# Patient Record
Sex: FEMALE | Race: WHITE | NOT HISPANIC OR LATINO | Employment: FULL TIME | URBAN - METROPOLITAN AREA
[De-identification: names, ages, dates, MRNs, and addresses within clinical notes are randomized per-mention and may not be internally consistent; named-entity substitution may affect disease eponyms.]

---

## 2017-01-26 ENCOUNTER — TREATMENT (OUTPATIENT)
Dept: PHYSICAL THERAPY | Facility: CLINIC | Age: 47
End: 2017-01-26

## 2017-01-26 DIAGNOSIS — R29.3 POSTURE IMBALANCE: Primary | ICD-10-CM

## 2017-01-26 DIAGNOSIS — S29.012A STRAIN OF THORACIC PARASPINAL MUSCLES EXCLUDING T1 AND T2 LEVELS, INITIAL ENCOUNTER: ICD-10-CM

## 2017-01-26 PROCEDURE — 97140 MANUAL THERAPY 1/> REGIONS: CPT | Performed by: PHYSICAL THERAPIST

## 2017-01-26 PROCEDURE — 97112 NEUROMUSCULAR REEDUCATION: CPT | Performed by: PHYSICAL THERAPIST

## 2017-01-26 NOTE — PROGRESS NOTES
"Physical Therapy Note     SUBJECTIVE:   Changes Since Last Seen:  She is doing the PIC to the thoracic spine which has really helped decrease pain, does 1-2x.week.  She notes she has had to do it on the other side and it has also helped.   She is able to  her daughter now without concern for dropping her.   She has been doing the inner unit isolation \"pretty well\".  She has purchased the MY pillow and \"its great with the cervical roll\"      Current Pain level:   0/10  Lowest Pain level since last seen:    0/10  Highest pain level since last seen:   5/10      OBJECTIVE:     Palpation:         12/05/16 01/26/17              Right    Left Right  Left Right  Left Right  Left Right  Left Right  Left Right  Left Right  Left Right  Left Right  Left Right  Left Right  Left   Suboccipitals  slight     slight   -           -             Spinous Processes                          C2-7 rotated to    +  C4-7                  T4-9 rotated to                 +            T4-8             Upper traps  slight       +  slight   slight             Ant/Mid Scalenes  slight     slight  slight  slight             Sternocleidomastoid      -           -              Levator Scapula  slight        +  slight  slight             Pectoralis Major     -            -              Pectoralis Minor     -            -              Sternocostals    -             -              Bicipital tendon     -            -              Supraspinatus     -            -              Infraspinatus     -            -              Lats Insertion      -           -              Rhomboid, Major      -          +             slight             Rhomboid, Minor      -          +            slight             Lateral Epicondyle      -          -              Medial Epicondyle      -          -              Triceps Insertion      -          -                                                                                             Monthly Objective " Measurement/Functional Activity    Date: 12/05/2016 01/26/17         Neck Disability Index 14/100 6/100         Headache Index           Shoulder Pain Score                      AROM Cervical Spine:  Degrees  Degrees Degrees Degrees Degrees Degrees Degrees Degrees      Flexion 55 38            Extension 79 78            Right Lat. Flexion 41, pain left UT 50            Left Lat. Flexion 39 53            Right Rotation 45 52            Left Rotation 48 51                    AROM Shoulders: Degrees Right    Left Right  Left Right  Left Right  Left Right  Left Right  Left Right  Left Right  Left      Flexion 180    180 180      180            Abduction 180    180 180      180            Int. Rotation 90    95 90      96            Ext. Rotation  90    90    92      91         Functional Strength: in lbs Right     Left Right  Left Right  Left Right  Left Right  Left Right  Left Right  Left Right  Left       Strength Trial 1 70    75 74     75                                    Trial 2 55    65 65      70                                    Trial 3 68    65 65      70            Tanner Pinch                          15    14 -       -                    Root Level  -    Motor Right      Left Right  Left Right  Left Right  Left Right  Left Right  Left Right  Left Right  Leeft      C4                Shld Elevation 5/5        5/5             C5                Shld Abduction 5/5        5/5                                 Shld Flexion 5/5       5/5             C6                Elbow Flexion 5/5        5/5                                 Wrist Extension       5/5        5/5                     C7               Elbow Extension 5/5        5/5                                 Wrist Flexion 5/5        5/5             C8               Thumb Extension 5/5        5/5             T1               Hand Intrinsics 5/5        5/5                     Reflexes Right       Left Right  Left Right  Left Right  Left Right  Left Right  Left  Right  Left Right  Left      C5 Biceps +1        +1             C6 Brachioradialis +1        +1             C7 Triceps +1        +1                     Functional Activities:              Sit w/o pain? Yes/20 min Yes/ no limit            Stand w/o pain? Yes/ no limit Yes/ 15 min if carry            Walk w/o pain? Yes/ no limit Yes/ no limit            Drive w/o pain? Yes/ no limit Yes/ no limit            Work w/o paiin? Yes/ no limit Yes/ no limit            # times wakes w/o pain? 2-3x Sometimes wakes and her knees ache            Self [place] w/o pain? yes yes           ASSESSMENT:  Problem List:   1. Thoracic strain, facet restriction  2. Lack of spinal stabilization  3. Postural dysfunction     Discussion:  Debra is much improved!  She was instructed in how to engage the inner unit against gravity.  She has good understanding and will work on before next visit.  She did have some difficulty isolating the scapula to retract without engaging the upper traps.  Once she is able to do this will begin scapula stabilization exercises.        Short Term Goals: (4-6 weeks)                               Date Met:                    1.   pt independent in postural correction     01/26/17  2.   pt independent in thoracic facet self-corrections   01/26/17  3.   pt independent in exer to decrease post. Cervical disc pressures 01/26/17  4.   pt able to sleep through night without pain    01/26/17  5.   pt able to sit 25 minutes without pain     01/26/17  6.   Reduce pt pain to no greater than 5/10     01/26/17  9.   Decrease Neck Pain Score to no greater than 10/100  01/26/17  8.  Pt independent in engaging the inner unit in ADLs  9.  Pt able to do prone arm lift without engaging upper traps x6  10.  Pt able to do inner unit on elliptical machine on/off x4 min    Long Term Goals:(3-4 months)      Date Met:      1.  pt independent in self-management of symptoms       2.  pt independent in home program      3.  pt able to sit  60 minutes without pain      5.  pt able to lift daughter up without pain    PLAN OF CARE  1.  Ultrasound to increase extensibility, decrease pain, if needed  2.  Electrical stimulation for muscle relaxation, decrease pain, if needed, iontophoresis  3.  Manual therapy to increase function, decrease pain  4.  Therapeutic exercise to increase function, decrease pain  5.  Home programming and d/c planning to increase function and decrease pain    Frequency & Duration:  Pt will be seen on a once/week basis for    more visits      TREATMENT TODAY:    Total Treatment Time Minutes: (time in clinic)  50  Timed Code Treatment Minutes:  50    Self Care Home Management Training/ Patient Education:    Purpose of RX  Proper Body Mechanics  Postural Instruction    Compliance w/HEP    Orthotic/Equipmt Usage     Supplies given:     Manual Therapy:  (MA)  RX min: 15  Manual cervical distraction    Positional Isometric contraction (PIC) of T4-9    Positional Isometric contraction of (PIC) C2-7    Myofascial release, head/neck  Ant/middle scalene stretch  Upper trap stretching  Levator scapula stretching      Therapeutic Activities:  (TA)  RX min:      Neuromuscular Reeducation: (NMR)  RX min:  35       Ultrasound:  RX min:          1.6 grey/cm2       Location:   Iontophoresis:  6 hr patch                                Location:                           Procedures:  Code Procedure/Instruction 12/05/2016 01/26/17               TA         Sleeping Positions instructed                     TA Sternum-Up Posture instructed                TA SI jt. self-correction                 TA Lumbar Facet PIC                 TA   Order of Self-Corrections                 TA Use of lumbar pillow instructed                TA Use of cervical roll instructed                TA Use of orthotics                 TA Use of SI belt                 TA Use of Nada chair                 TA Use of Ice                 TA Use of [employer]/ heat                  TA Use of TENS unit                 TA Use of iontophoresis                 TA Decreasing Disc Pressures                 TA [employer] workstation instructed                NMR Pelvic Floor Isolation instructed reviewed               NMR Transverse Abdominus instructed reviewed               NMR Multifidus Isolation instructed reviewed               NMR InnerUnit against Gravity  instructed               NMR Sit-stand w/ inner unit  instructed               NMR Roll w/ inner unit  instructed               NMR Walk w/ inner unit   instructed               NMR Up/down steps w/ inner unit  instructed               NMR Water Exer Instruction                 NMR Hip Abd w/o piriformis                 NMR Hip Add w/o iliop/ham                  NMR Lower abs in supine                 NMR Abd obliques in supine                 NMR Prone Knee Flexion                 NMR Prone glut jasmina                 NMR Retro Step                 NMR Retro Walk                 NMR Retro walk on treadmill                 NMR Forward walk w/ inner unit                 NMR Balance Instruction                 NMR Stability Ball A/P & Lateral                 NMR Ball Catch/Throw /Supine                 NMR Ball Catch/Throw /Stand                 NMR StabilityBall All 4's Arm Lift                 NMR StabilityBall Prone Walk Out                 NMR StabilityBall Supine Oblique                 NMR Stability Ball sit-supine-sit                 NMR Walking Prog Progression                 MNR Home program sequencing                                                     TA Hamstring stretch                 TA Hip Ext Rot Stretch                 TA Hip Int Rot Stretch                 TA Hip Flex/IT Stretch                 TA Hip Add Stretch                 TA Lats Dorsi Stretch                 TA Gastroc/soleus stretch                 TA QuadratusLumborm Stretch                    Supine                    Stance                 TA Quad Stretch                                    NMR Planking  instructed               NMR BOSU Ball Exercises                 NMR Lateral Bridge Drop                 NMR Waiters [place]                 NMR O'Feldt Lat Pull Down                 NMR O'Feldt Hip Ext                 NMR O'Feldt Trunk Ext                                   TA CervDiscExtSup/stnd                 TA Cerv Stretches                 TA Neural Gliding                 TA Ant/Mid Scalene Strch                 TA Biceps stretch                 TA Rotator Cuff Stretch                 TA Anterior Chest Stretch                 TA Wrist Ext Stretch                                   NMR Prone Arm Lifts  instructed               NMR Scapula Stabilization                 NMR Occulomotor Isometrics                 NMR Cervical Isometrics                                   TA Rotator Cuff Theraband                 TA Shld AROM w/bar                 TA Shld Capsular Stretching                 TA Self PIC Thor Spine                                                                                                                                                                                                                                                               Selma Rice, PT, MS  Physical Therapist  [place]# [id number]

## 2017-02-21 ENCOUNTER — TREATMENT (OUTPATIENT)
Dept: PHYSICAL THERAPY | Facility: CLINIC | Age: 47
End: 2017-02-21

## 2017-02-21 DIAGNOSIS — R29.3 POSTURE IMBALANCE: Primary | ICD-10-CM

## 2017-02-21 DIAGNOSIS — S29.012A STRAIN OF THORACIC PARASPINAL MUSCLES EXCLUDING T1 AND T2 LEVELS, INITIAL ENCOUNTER: ICD-10-CM

## 2017-02-21 PROCEDURE — 97112 NEUROMUSCULAR REEDUCATION: CPT | Performed by: PHYSICAL THERAPIST

## 2017-02-21 PROCEDURE — 97140 MANUAL THERAPY 1/> REGIONS: CPT | Performed by: PHYSICAL THERAPIST

## 2017-02-21 PROCEDURE — 97530 THERAPEUTIC ACTIVITIES: CPT | Performed by: PHYSICAL THERAPIST

## 2017-02-21 NOTE — PROGRESS NOTES
"Physical Therapy Note     SUBJECTIVE:   Changes Since Last Seen:  Some days gets a pinch 3x/day since last week when she pulled a heavy table at work, tweaking the left lats dorsi.  She knew she had \"done something\" to her back so she did press ups and stretches and the majority of the pain was gone in 2-3 days. She spasmed initially and since she had catamine cream at home she used it  for a couple days.   She has been able to work out, not sure she's doing the planks correctly, not sure if position is correct.   She really likes the MY Pillow, really helping. She is pleased with ability to do the inner unit and use during the day.      Current Pain level:     0/10  Lowest Pain level since last seen:      0/10  Highest pain level since last seen:     5/10, after pulling on a heavy table      OBJECTIVE:     Palpation:         12/05/16 01/26/17 02/21/17             Right    Left Right  Left Right  Left Right  Left Right  Left Right  Left Right  Left Right  Left Right  Left Right  Left Right  Left Right  Left   Suboccipitals  slight     slight   -           -   -           -            Spinous Processes                          C2-7 rotated to    +  C4-7                  T4-9 rotated to                 +            T4-8             Upper traps  slight       +  slight   slight   -       slight            Ant/Mid Scalenes  slight     slight  slight  slight Slight slight            Sternocleidomastoid      -           -              Levator Scapula  slight        +  slight  slight Slight slight            Pectoralis Major     -            -              Pectoralis Minor     -            -              Sternocostals    -             -              Bicipital tendon     -            -              Supraspinatus     -            -                 +            Infraspinatus     -            -              Lats Insertion      -           -                 +            Rhomboid, Major      -          +             slight        "      Rhomboid, Minor      -          +            slight             Lateral Epicondyle      -          -              Medial Epicondyle      -          -              Triceps Insertion      -          -                             SI joint positioning               Ant rot  of innominate                x            Post rot of innominate      x            Sacral rotation                 x                               Monthly Objective Measurement/Functional Activity    Date: 12/05/2016 01/26/17         Neck Disability Index 14/100 6/100         Headache Index           Shoulder Pain Score                      AROM Cervical Spine:  Degrees  Degrees Degrees Degrees Degrees Degrees Degrees Degrees      Flexion 55 38            Extension 79 78            Right Lat. Flexion 41, pain left UT 50            Left Lat. Flexion 39 53            Right Rotation 45 52            Left Rotation 48 51                    AROM Shoulders: Degrees Right    Left Right  Left Right  Left Right  Left Right  Left Right  Left Right  Left Right  Left      Flexion 180    180 180      180            Abduction 180    180 180      180            Int. Rotation 90    95 90      96            Ext. Rotation  90    90    92      91         Functional Strength: in lbs Right     Left Right  Left Right  Left Right  Left Right  Left Right  Left Right  Left Right  Left       Strength Trial 1 70    75 74     75                                    Trial 2 55    65 65      70                                    Trial 3 68    65 65      70            Tanner Pinch                          15    14 -       -                    Root Level  -    Motor Right      Left Right  Left Right  Left Right  Left Right  Left Right  Left Right  Left Right  Leeft      C4                Shld Elevation 5/5        5/5             C5                Shld Abduction 5/5        5/5                                 Shld Flexion 5/5       5/5             C6                Elbow Flexion  5/5        5/5                                 Wrist Extension       5/5        5/5                     C7               Elbow Extension 5/5        5/5                                 Wrist Flexion 5/5        5/5             C8               Thumb Extension 5/5        5/5             T1               Hand Intrinsics 5/5        5/5                     Reflexes Right       Left Right  Left Right  Left Right  Left Right  Left Right  Left Right  Left Right  Left      C5 Biceps +1        +1             C6 Brachioradialis +1        +1             C7 Triceps +1        +1                     Functional Activities:              Sit w/o pain? Yes/20 min Yes/ no limit            Stand w/o pain? Yes/ no limit Yes/ 15 min if carry            Walk w/o pain? Yes/ no limit Yes/ no limit            Drive w/o pain? Yes/ no limit Yes/ no limit            Work w/o paiin? Yes/ no limit Yes/ no limit            # times wakes w/o pain? 2-3x Sometimes wakes and her knees ache            Self [place] w/o pain? yes yes           ASSESSMENT:  Problem List:   1. Thoracic strain, facet restriction  2. Lack of spinal stabilization  3. Postural dysfunction     Discussion:  Debra did not work on the scapula stabilization over the last few weeks, so reviewed it and moved on to wall planks/push ups.  She is having a hard time turning the inner unit on and off quickly.  Instructed in supine throw/catch to work on this.  The episode where she pulled on a heavy table did tweak the thoracic spine and the left lats.  She also irritated the left supraspinatus.  The need to do the PIC of the thoracic spine again seems to be related to pulling the heavy table. She did well with the prone arm lifts when reviewed this today.     Short Term Goals: (4-6 weeks)                               Date Met:                    1.   pt independent in postural correction     01/26/17  2.   pt independent in thoracic facet self-corrections   01/26/17  3.   pt independent  in exer to decrease post. Cervical disc pressures 01/26/17  4.   pt able to sleep through night without pain    01/26/17  5.   pt able to sit 25 minutes without pain     01/26/17  6.   Reduce pt pain to no greater than 5/10     01/26/17  9.   Decrease Neck Pain Score to no greater than 10/100  01/26/17  8.  Pt independent in engaging the inner unit in ADLs   02/21/17  9.  Pt able to do prone arm lift without engaging upper traps x6  10.  Pt able to do inner unit on elliptical machine on/off x4 min    Long Term Goals:(3-4 months)      Date Met:      1.  pt independent in self-management of symptoms       2.  pt independent in home program      3.  pt able to sit 60 minutes without pain      5.  pt able to lift daughter up without pain    PLAN OF CARE  1.  Ultrasound to increase extensibility, decrease pain, if needed  2.  Electrical stimulation for muscle relaxation, decrease pain, if needed, iontophoresis  3.  Manual therapy to increase function, decrease pain  4.  Therapeutic exercise to increase function, decrease pain  5.  Home programming and d/c planning to increase function and decrease pain    Frequency & Duration:  Pt will be seen on a once/week basis for 10 more visits      TREATMENT TODAY:    Total Treatment Time Minutes: (time in clinic)  50  Timed Code Treatment Minutes:  50    Self Care Home Management Training/ Patient Education:    Purpose of RX  Proper Body Mechanics  Postural Instruction    Compliance w/HEP    Orthotic/Equipmt Usage     Supplies given:     Manual Therapy:  (MA)  RX min: 10  Manual cervical distraction    Positional Isometric contraction (PIC) of T4-9    Positional Isometric contraction of (PIC) C2-7    Myofascial release, head/neck  Ant/middle scalene stretch  Upper trap stretching  Levator scapula stretching      Therapeutic Activities:  (TA)  RX min:   15    Neuromuscular Reeducation: (NMR)  RX min:  25      Ultrasound:  RX min:          1.6 grey/cm2       Location:    Iontophoresis:  6 hr patch                                Location:                           Procedures:  Code Procedure/Instruction 12/05/2016 01/26/17 02/21/17              TA         Sleeping Positions instructed                     TA Sternum-Up Posture instructed                TA SI jt. self-correction                 TA Lumbar Facet PIC                 TA   Order of Self-Corrections                 TA Use of lumbar pillow instructed                TA Use of cervical roll instructed                TA Use of orthotics                 TA Use of SI belt                 TA Use of Nada chair                 TA Use of Ice                 TA Use of [employer]/ heat                 TA Use of TENS unit                 TA Use of iontophoresis                 TA Decreasing Disc Pressures                 TA [employer] workstation instructed                NMR Pelvic Floor Isolation instructed reviewed               NMR Transverse Abdominus instructed reviewed               NMR Multifidus Isolation instructed reviewed               NMR InnerUnit against Gravity  instructed               NMR Sit-stand w/ inner unit  instructed               NMR Roll w/ inner unit  instructed               NMR Walk w/ inner unit   instructed               NMR Up/down steps w/ inner unit  instructed               NMR Water Exer Instruction                 NMR Hip Abd w/o piriformis                 NMR Hip Add w/o iliop/ham                  NMR Lower abs in supine                 NMR Abd obliques in supine                 NMR Prone Knee Flexion                 NMR Prone glut jasmina                 NMR Retro Step                 NMR Retro Walk                 NMR Retro walk on treadmill                 NMR Forward walk w/ inner unit                 NMR Balance Instruction                 NMR Stability Ball A/P & Lateral                 NMR Ball Catch/Throw /Supine   instructed              NMR Ball Catch/Throw /Stand                 NMR  StabilityBall All 4's Arm Lift                 NMR StabilityBall Prone Walk Out                 NMR StabilityBall Supine Oblique                 NMR Stability Ball sit-supine-sit                 NMR Walking Prog Progression                 MNR Home program sequencing                                                     TA Hamstring stretch                 TA Hip Ext Rot Stretch                 TA Hip Int Rot Stretch                 TA Hip Flex/IT Stretch                 TA Hip Add Stretch                 TA Lats Dorsi Stretch                 TA Gastroc/soleus stretch                 TA QuadratusLumborm Stretch                    Supine                    Stance                 TA Quad Stretch                                   NMR Planking  instructed corrected              NMR BOSU Ball Exercises                 NMR Lateral Bridge Drop                 NMR Waiters [place]                 NMR O'Feldt Lat Pull Down                 NMR O'Feldt Hip Ext                 NMR O'Feldt Trunk Ext                 NMR Standing Wall Push Up    instructed                                                  TA CervDiscExtSup/stnd                 TA Cerv Stretches                 TA Neural Gliding                 TA Ant/Mid Scalene Strch                 TA Biceps stretch                 TA Rotator Cuff Stretch   instructed              TA Anterior Chest Stretch                 TA Wrist Ext Stretch                                   NMR Prone Arm Lifts  instructed Re-instruct              NMR Scapula Stabilization                 NMR Occulomotor Isometrics                 NMR Cervical Isometrics                                   TA Rot Cuff Therabd, beginner   instructed              TA Shld AROM w/bar                 TA Shld Capsular Stretching                 TA Self PIC Thor Spine                                                                                                                                                                                                                                                                Selma Rice, PT, MS  Physical Therapist  [place]# [id number]

## 2017-03-02 ENCOUNTER — TREATMENT (OUTPATIENT)
Dept: PHYSICAL THERAPY | Facility: CLINIC | Age: 47
End: 2017-03-02

## 2017-03-02 DIAGNOSIS — S29.012A STRAIN OF THORACIC PARASPINAL MUSCLES EXCLUDING T1 AND T2 LEVELS, INITIAL ENCOUNTER: ICD-10-CM

## 2017-03-02 DIAGNOSIS — R29.3 POSTURE IMBALANCE: Primary | ICD-10-CM

## 2017-03-02 DIAGNOSIS — M54.50 ACUTE BILATERAL LOW BACK PAIN WITHOUT SCIATICA: ICD-10-CM

## 2017-03-02 PROCEDURE — 97530 THERAPEUTIC ACTIVITIES: CPT | Performed by: PHYSICAL THERAPIST

## 2017-03-02 PROCEDURE — 97140 MANUAL THERAPY 1/> REGIONS: CPT | Performed by: PHYSICAL THERAPIST

## 2017-03-02 PROCEDURE — 97035 APP MDLTY 1+ULTRASOUND EA 15: CPT | Performed by: PHYSICAL THERAPIST

## 2017-03-02 NOTE — PROGRESS NOTES
Physical Therapy Note     SUBJECTIVE:   Changes Since Last Seen:  Debra reports she was doing very well until this morning when she picked up a laundry basket and felt immediate pain in her low back; she repositioned herself and picked it up again.  Subsequently she did press ups but now there is pain in left buttock and into left hip, burning, no numbness and no posterior leg pain.  She is having some trouble walking as her back is spasming.     Current Pain level:     7-8/10 in low back  Lowest Pain level since last seen:      0/10  Highest pain level since last seen:     7-8/10 after lifting laundry basket this morning      OBJECTIVE:   Significant right lateral shift of pelvis.     Palpation:         12/05/16 01/26/17 02/21/17 03/02/17            Right    Left Right  Left Right  Left Right  Left Right  Left Right  Left Right  Left Right  Left Right  Left Right  Left Right  Left Right  Left   Suboccipitals  slight     slight   -           -   -           -            Spinous Processes                          C2-7 rotated to    +  C4-7                  T4-9 rotated to                 +            T4-8             Upper traps  slight       +  slight   slight   -       slight            Ant/Mid Scalenes  slight     slight  slight  slight Slight slight            Sternocleidomastoid      -           -              Levator Scapula  slight        +  slight  slight Slight slight            Pectoralis Major     -            -              Pectoralis Minor     -            -              Sternocostals    -             -              Bicipital tendon     -            -              Supraspinatus     -            -                 +            Infraspinatus     -            -              Lats Insertion      -           -                 +            Rhomboid, Major      -          +             slight             Rhomboid, Minor      -          +            slight             Lateral Epicondyle      -          -               Medial Epicondyle      -          -              Triceps Insertion      -          -                             SI joint positioning   02/21/17 03/02/17           Ant rot  of innominate                x                 x           Post rot of innominate      x    x           Sacral rotation                 x    x                                Piriformis      +           ++           Quadratus Lumborum      ++         ++           Gr. Trochanter      -        slight           iliopsoas                    +           Ischial Tub      -            -           IT Bands      -        slight           Spinous Processes                  L rotated to...     L1-5               Monthly Objective Measurement/Functional Activity    Date: 12/05/2016 01/26/17         Neck Disability Index 14/100 6/100         Headache Index           Shoulder Pain Score                      AROM Cervical Spine:  Degrees  Degrees Degrees Degrees Degrees Degrees Degrees Degrees      Flexion 55 38            Extension 79 78            Right Lat. Flexion 41, pain left UT 50            Left Lat. Flexion 39 53            Right Rotation 45 52            Left Rotation 48 51                    AROM Shoulders: Degrees Right    Left Right  Left Right  Left Right  Left Right  Left Right  Left Right  Left Right  Left      Flexion 180    180 180      180            Abduction 180    180 180      180            Int. Rotation 90    95 90      96            Ext. Rotation  90    90    92      91         Functional Strength: in lbs Right     Left Right  Left Right  Left Right  Left Right  Left Right  Left Right  Left Right  Left       Strength Trial 1 70    75 74     75                                    Trial 2 55    65 65      70                                    Trial 3 68    65 65      70            Tanner Pinch                          15    14 -       -                    Root Level  -    Motor Right      Left Right  Left Right  Left Right  Left Right   Left Right  Left Right  Left Right  Leeft      C4                Shld Elevation 5/5        5/5             C5                Shld Abduction 5/5        5/5                                 Shld Flexion 5/5       5/5             C6                Elbow Flexion 5/5        5/5                                 Wrist Extension       5/5        5/5                     C7               Elbow Extension 5/5        5/5                                 Wrist Flexion 5/5        5/5             C8               Thumb Extension 5/5        5/5             T1               Hand Intrinsics 5/5        5/5                     Reflexes Right       Left Right  Left Right  Left Right  Left Right  Left Right  Left Right  Left Right  Left      C5 Biceps +1        +1             C6 Brachioradialis +1        +1             C7 Triceps +1        +1                     Functional Activities:              Sit w/o pain? Yes/20 min Yes/ no limit            Stand w/o pain? Yes/ no limit Yes/ 15 min if carry            Walk w/o pain? Yes/ no limit Yes/ no limit            Drive w/o pain? Yes/ no limit Yes/ no limit            Work w/o paiin? Yes/ no limit Yes/ no limit            # times wakes w/o pain? 2-3x Sometimes wakes and her knees ache            Self Garrett w/o pain? yes yes           ASSESSMENT:  Problem List:   1. Thoracic strain, facet restriction  2. Lack of spinal stabilization  3. Postural dysfunction   4. SI joint dysfunction/ lumbar disc bulge as of  03/02/17    Discussion:  Debra arrived today with significant spasming of both QLs and left piriformis.  There is a significant lateral shift of the pelvis to the right. She is not able to flex more than 35-40 degrees without onset of severe low back pain.  Once manual work was done to bring symmetry to the SI joints, she was able to do mini prone press ups with manual lumbar traction and pain was gone as long as she remained in prone.  Once she got back to sitting, began to instruct her  in how to do PIC for the sacrum the severe spasming returned.  The only thing that would decrease her pain was to stand with the left foot up on a stool to decrease the anterior rotation of the left innominate and then do slight lumbar extension.  She was instructed in lifestyle activities to decrease lumbar disc bulging.  She had a good understanding.  However, she was still spasming and guarding in walking.  Recommended pt avoid sitting and continue with extension principles and SI joint self corrections to the best of her ability and use ice to minimize the spasming.  She is scheduled to be seen next week.     Did not address the upper quarter today secondary to the pain and spasming pt had in the low back.       Short Term Goals: (4-6 weeks)                               Date Met:                    1.   pt independent in postural correction     01/26/17  2.   pt independent in thoracic facet self-corrections   01/26/17  3.   pt independent in exer to decrease post. Cervical disc pressures 01/26/17  4.   pt able to sleep through night without pain    01/26/17  5.   pt able to sit 25 minutes without pain     01/26/17  6.   Reduce pt pain to no greater than 5/10     01/26/17  9.   Decrease Neck Pain Score to no greater than 10/100  01/26/17  8.  Pt independent in engaging the inner unit in ADLs   02/21/17  9.  Pt able to do prone arm lift without engaging upper traps x6  10.  Pt able to do inner unit on elliptical machine on/off x4 min    Long Term Goals:(3-4 months)      Date Met:      1.  pt independent in self-management of symptoms       2.  pt independent in home program      3.  pt able to sit 60 minutes without pain      5.  pt able to lift daughter up without pain    PLAN OF CARE  1.  Ultrasound to increase extensibility, decrease pain, if needed  2.  Electrical stimulation for muscle relaxation, decrease pain, if needed, iontophoresis  3.  Manual therapy to increase function, decrease pain  4.   Therapeutic exercise to increase function, decrease pain  5.  Home programming and d/c planning to increase function and decrease pain    Frequency & Duration:  Pt will be seen on a once/week basis for 9 more visits      TREATMENT TODAY:    Total Treatment Time Minutes: (time in clinic)  60  Timed Code Treatment Minutes:  60    Self Care Home Management Training/ Patient Education:    Purpose of RX  Proper Body Mechanics  Postural Instruction    Compliance w/HEP    Orthotic/Equipmt Usage     Supplies given:     Manual Therapy:  (MA)  RX min:  35  Manual posterior rotation of the left innominate  PIC to the right iliopsoas  Manual distraction to left hip  PIC to right glut minimus  PIC to multifidus  Prone manual lumbar traction       Therapeutic Activities:  (TA)  RX min:   10    Neuromuscular Reeducation: (NMR)  RX min:        Ultrasound:  RX min:     20      1.6 grey/cm2       Location:  Left piriformis, L1-5, left gr trochanter    Iontophoresis:  6 hr patch ...Trial                         Location:    Left SI joint                       Procedures:  Code Procedure/Instruction 12/05/2016 01/26/17 02/21/17 03/02/17             TA         Sleeping Positions instructed                     TA Sternum-Up Posture instructed                TA SI jt. self-correction    instructed             TA Lumbar Facet PIC    instructed             TA   Order of Self-Corrections    instructed             TA Use of lumbar pillow instructed                TA Use of cervical roll instructed                TA Use of orthotics                 TA Use of SI belt                 TA Use of Nada chair                 TA Use of Ice                 TA Use of [employer]/ heat                 TA Use of TENS unit                 TA Use of iontophoresis                 TA Decreasing Disc Pressures    instructed             TA [employer] workstation instructed                NMR Pelvic Floor Isolation instructed reviewed               NMR Transverse  Abdominus instructed reviewed               NMR Multifidus Isolation instructed reviewed               NMR InnerUnit against Gravity  instructed               NMR Sit-stand w/ inner unit  instructed               NMR Roll w/ inner unit  instructed               NMR Walk w/ inner unit   instructed               NMR Up/down steps w/ inner unit  instructed               NMR Water Exer Instruction                 NMR Hip Abd w/o piriformis                 NMR Hip Add w/o iliop/ham                  NMR Lower abs in supine                 NMR Abd obliques in supine                 NMR Prone Knee Flexion                 NMR Prone glut jasmina                 NMR Retro Step                 NMR Retro Walk                 NMR Retro walk on treadmill                 NMR Forward walk w/ inner unit                 NMR Balance Instruction                 NMR Stability Ball A/P & Lateral                 NMR Ball Catch/Throw /Supine   instructed              NMR Ball Catch/Throw /Stand                 NMR StabilityBall All 4's Arm Lift                 NMR StabilityBall Prone Walk Out                 NMR StabilityBall Supine Oblique                 NMR Stability Ball sit-supine-sit                 NMR Walking Prog Progression                 MNR Home program sequencing                                                     TA Hamstring stretch                 TA Hip Ext Rot Stretch                 TA Hip Int Rot Stretch                 TA Hip Flex/IT Stretch                 TA Hip Add Stretch                 TA Lats Dorsi Stretch                 TA Gastroc/soleus stretch                 TA QuadratusLumborm Stretch                    Supine                    Stance                 TA Quad Stretch                                   NMR Planking  instructed corrected              NMR BOSU Ball Exercises                 NMR Lateral Bridge Drop                 NMR Waiters [place]                 NMR O'Feldt Lat Pull Down                 NMR  O'Feldt Hip Ext                 NMR O'Feldt Trunk Ext                 NMR Standing Wall Push Up    instructed                                                  TA CervDiscExtSup/stnd                 TA Cerv Stretches                 TA Neural Gliding                 TA Ant/Mid Scalene Strch                 TA Biceps stretch                 TA Rotator Cuff Stretch   instructed              TA Anterior Chest Stretch                 TA Wrist Ext Stretch                                   NMR Prone Arm Lifts  instructed Re-instruct              NMR Scapula Stabilization                 NMR Occulomotor Isometrics                 NMR Cervical Isometrics                                   TA Rot Cuff Therabd, beginner   instructed              TA Shld AROM w/bar                 TA Shld Capsular Stretching                 TA Self PIC Thor Spine                                                                                                                                                                                                                                                               Selma Rice, PT, MS  Physical Therapist  [place]# [id number]

## 2017-03-07 ENCOUNTER — TREATMENT (OUTPATIENT)
Dept: PHYSICAL THERAPY | Facility: CLINIC | Age: 47
End: 2017-03-07

## 2017-03-07 DIAGNOSIS — R29.3 POSTURE IMBALANCE: Primary | ICD-10-CM

## 2017-03-07 DIAGNOSIS — S29.012A STRAIN OF THORACIC PARASPINAL MUSCLES EXCLUDING T1 AND T2 LEVELS, INITIAL ENCOUNTER: ICD-10-CM

## 2017-03-07 DIAGNOSIS — M54.50 ACUTE BILATERAL LOW BACK PAIN WITHOUT SCIATICA: ICD-10-CM

## 2017-03-07 PROCEDURE — 97035 APP MDLTY 1+ULTRASOUND EA 15: CPT | Performed by: PHYSICAL THERAPIST

## 2017-03-07 PROCEDURE — 97140 MANUAL THERAPY 1/> REGIONS: CPT | Performed by: PHYSICAL THERAPIST

## 2017-03-07 NOTE — PROGRESS NOTES
"Physical Therapy Note     SUBJECTIVE:   Changes Since Last Seen:  She reports she has not had the muscle spasming since she was last here however, she has been on Robaxin and Ibuprofen.     Tried going to work, but couldn't sit for even a couple of hours as the pain increased and walking on the concrete floors at work increased her pain.  She notes the SI belt has really helped and she tries to tighten it as much as she can.    She is astounded at how crooked she was in standing over the weekend.  She tried very hard to straighten up but was minimally successful. She arrives today with a persistent lateral shift, although, she notes its not nearly as bad as it has been the last few days.   \"Because meds knock  out, I am able to sleep\".  She is sleeping with a body pillow and on her stomach with the left knee up.     Current Pain level:    6-7 /10 in low back  Lowest Pain level since last seen:      /10  Highest pain level since last seen:    6-7/10 after lifting laundry basket this morning      OBJECTIVE:   Slight to moderate  right lateral shift of pelvis.     Palpation:         12/05/16 01/26/17 02/21/17 03/02/17 03/07/17           Right    Left Right  Left Right  Left Right  Left Right  Left Right  Left Right  Left Right  Left Right  Left Right  Left Right  Left Right  Left   Suboccipitals  slight     slight   -           -   -           -            Spinous Processes                          C2-7 rotated to    +  C4-7                  T4-9 rotated to                 +            T4-8             Upper traps  slight       +  slight   slight   -       slight            Ant/Mid Scalenes  slight     slight  slight  slight Slight slight            Sternocleidomastoid      -           -              Levator Scapula  slight        +  slight  slight Slight slight            Pectoralis Major     -            -              Pectoralis Minor     -            -              Sternocostals    -             -            "   Bicipital tendon     -            -              Supraspinatus     -            -                 +            Infraspinatus     -            -              Lats Insertion      -           -                 +            Rhomboid, Major      -          +             slight             Rhomboid, Minor      -          +            slight             Lateral Epicondyle      -          -              Medial Epicondyle      -          -              Triceps Insertion      -          -                             SI joint positioning   02/21/17 03/02/17 03/07/17          Ant rot  of innominate                x                 x                 x          Post rot of innominate      x    x    x          Sacral rotation                 x    x          x                         Piriformis      +           ++    ++         +          Quadratus Lumborum      ++         ++    ++        +          Gr. Trochanter      -        slight    -       Slight           iliopsoas                    +    +           +          Ischial Tub      -            -           IT Bands      -        slight    +          Spinous Processes                  L rotated to...     L1-5 L4-5              Monthly Objective Measurement/Functional Activity    Date: 12/05/2016 01/26/17         Neck Disability Index 14/100 6/100         Headache Index           Shoulder Pain Score                      AROM Cervical Spine:  Degrees  Degrees Degrees Degrees Degrees Degrees Degrees Degrees      Flexion 55 38            Extension 79 78            Right Lat. Flexion 41, pain left UT 50            Left Lat. Flexion 39 53            Right Rotation 45 52            Left Rotation 48 51                    AROM Shoulders: Degrees Right    Left Right  Left Right  Left Right  Left Right  Left Right  Left Right  Left Right  Left      Flexion 180    180 180      180            Abduction 180    180 180      180            Int. Rotation 90    95 90      96            Ext.  Rotation  90    90    92      91         Functional Strength: in lbs Right     Left Right  Left Right  Left Right  Left Right  Left Right  Left Right  Left Right  Left       Strength Trial 1 70    75 74     75                                    Trial 2 55    65 65      70                                    Trial 3 68    65 65      70            Tanner Pinch                          15    14 -       -                    Root Level  -    Motor Right      Left Right  Left Right  Left Right  Left Right  Left Right  Left Right  Left Right  Leeft      C4                Shld Elevation 5/5        5/5             C5                Shld Abduction 5/5        5/5                                 Shld Flexion 5/5       5/5             C6                Elbow Flexion 5/5        5/5                                 Wrist Extension       5/5        5/5                     C7               Elbow Extension 5/5        5/5                                 Wrist Flexion 5/5        5/5             C8               Thumb Extension 5/5        5/5             T1               Hand Intrinsics 5/5        5/5                     Reflexes Right       Left Right  Left Right  Left Right  Left Right  Left Right  Left Right  Left Right  Left      C5 Biceps +1        +1             C6 Brachioradialis +1        +1             C7 Triceps +1        +1                     Functional Activities:              Sit w/o pain? Yes/20 min Yes/ no limit            Stand w/o pain? Yes/ no limit Yes/ 15 min if carry            Walk w/o pain? Yes/ no limit Yes/ no limit            Drive w/o pain? Yes/ no limit Yes/ no limit            Work w/o paiin? Yes/ no limit Yes/ no limit            # times wakes w/o pain? 2-3x Sometimes wakes and her knees ache            Self [place] w/o pain? yes yes           ASSESSMENT:  Problem List:   1. Thoracic strain, facet restriction  2. Lack of spinal stabilization  3. Postural dysfunction   4. SI joint dysfunction/ lumbar disc  bulge as of  03/02/17    Discussion:  Because she has been in so much pain in the low back today and on last visit, measurements have not been taken of the C-spine and upper quarter.  The upper quarter pain is essentially gone and the low back pain/ lumbar disc symptoms are so intense that the focus of the treatment has been on the low back.     By the end of today's treatment, she was able to prone prop on her elbow without low back pain.  She is not quite as guarded as she was on last visit.  She is slowly making progress and understands the extension principles to minimize the lumbar disc bulging.     When she left from today's visit, she had reduced pain.  Cont to progress the low back and the upper quarter as tolerated.        Short Term Goals: (4-6 weeks)                               Date Met:                    1.   pt independent in postural correction     01/26/17  2.   pt independent in thoracic facet self-corrections   01/26/17  3.   pt independent in exer to decrease post. Cervical disc pressures 01/26/17  4.   pt able to sleep through night without pain    01/26/17  5.   pt able to sit 25 minutes without pain     01/26/17  6.   Reduce pt pain to no greater than 5/10     01/26/17  9.   Decrease Neck Pain Score to no greater than 10/100  01/26/17  8.  Pt independent in engaging the inner unit in ADLs   02/21/17  9.  Pt able to do prone arm lift without engaging upper traps x6  10.  Pt able to do inner unit on elliptical machine on/off x4 min  11.  Pt able to sit for 5 min without low back pain  12.  Pt able to stand for 5 min without low back pain  13.  Decrease Oswestry Low Back Pain Score < than 30/100    Long Term Goals:(3-4 months)      Date Met:      1.  pt independent in self-management of symptoms       2.  pt independent in home program      3.  pt able to sit 60 minutes without pain      5.  pt able to lift daughter up without pain    PLAN OF CARE  1.  Ultrasound to increase extensibility,  decrease pain, if needed  2.  Electrical stimulation for muscle relaxation, decrease pain, if needed, iontophoresis  3.  Manual therapy to increase function, decrease pain  4.  Therapeutic exercise to increase function, decrease pain  5.  Home programming and d/c planning to increase function and decrease pain    Frequency & Duration:  Pt will be seen on a once/week basis for 8 more visits      TREATMENT TODAY:    Total Treatment Time Minutes: (time in clinic)  60  Timed Code Treatment Minutes:  60    Self Care Home Management Training/ Patient Education:    Purpose of RX  Proper Body Mechanics  Postural Instruction    Compliance w/HEP - need for prone positioning, prone propping after SI joint self correction    Orthotic/Equipmt Usage - use of SI belt, reviewed    Supplies given:       Manual Therapy:  (MA)  RX min:  45  Manual posterior rotation of the left innominate  PIC to the right iliopsoas  Manual distraction to left hip  PIC to right glut minimus  PIC to multifidus  Prone manual lumbar traction  Prone press up       Therapeutic Activities:  (TA)  RX min:       Neuromuscular Reeducation: (NMR)  RX min:        Ultrasound:  RX min:     15    1.6 grey/cm2       Location:  right piriformis, L1-5, right gr trochanter    Iontophoresis:  6 hr patch                          Location:                          Procedures:  Code Procedure/Instruction 12/05/2016 01/26/17 02/21/17 03/02/17 03/07/17            TA         Sleeping Positions instructed                     TA Sternum-Up Posture instructed                TA SI jt. self-correction    instructed             TA Lumbar Facet PIC    instructed             TA   Order of Self-Corrections    instructed             TA Use of lumbar pillow instructed                TA Use of cervical roll instructed                TA Use of orthotics                 TA Use of SI belt                 TA Use of Nada chair                 TA Use of Ice                 TA Use of  [employer]/ heat                 TA Use of TENS unit                 TA Use of iontophoresis                 TA Decreasing Disc Pressures    instructed reviewed            TA [employer] workstation instructed                NMR Pelvic Floor Isolation instructed reviewed               NMR Transverse Abdominus instructed reviewed               NMR Multifidus Isolation instructed reviewed               NMR InnerUnit against Gravity  instructed               NMR Sit-stand w/ inner unit  instructed               NMR Roll w/ inner unit  instructed               NMR Walk w/ inner unit   instructed               NMR Up/down steps w/ inner unit  instructed               NMR Water Exer Instruction                 NMR Hip Abd w/o piriformis                 NMR Hip Add w/o iliop/ham                  NMR Lower abs in supine                 NMR Abd obliques in supine                 NMR Prone Knee Flexion                 NMR Prone glut jasmina                 NMR Retro Step                 NMR Retro Walk                 NMR Retro walk on treadmill                 NMR Forward walk w/ inner unit                 NMR Balance Instruction                 NMR Stability Ball A/P & Lateral                 NMR Ball Catch/Throw /Supine   instructed              NMR Ball Catch/Throw /Stand                 NMR StabilityBall All 4's Arm Lift                 NMR StabilityBall Prone Walk Out                 NMR StabilityBall Supine Oblique                 NMR Stability Ball sit-supine-sit                 NMR Walking Prog Progression                 MNR Home program sequencing                                                     TA Hamstring stretch                 TA Hip Ext Rot Stretch                 TA Hip Int Rot Stretch                 TA Hip Flex/IT Stretch                 TA Hip Add Stretch                 TA Lats Dorsi Stretch                 TA Gastroc/soleus stretch                 TA QuadratusLumborm Stretch                    Supine                     Stance                 TA Quad Stretch                                   NMR Planking  instructed corrected              NMR BOSU Ball Exercises                 NMR Lateral Bridge Drop                 NMR Waiters [place]                 NMR O'Feldt Lat Pull Down                 NMR O'Feldt Hip Ext                 NMR O'Feldt Trunk Ext                 NMR Standing Wall Push Up    instructed                                                  TA CervDiscExtSup/stnd                 TA Cerv Stretches                 TA Neural Gliding                 TA Ant/Mid Scalene Strch                 TA Biceps stretch                 TA Rotator Cuff Stretch   instructed              TA Anterior Chest Stretch                 TA Wrist Ext Stretch                                   NMR Prone Arm Lifts  instructed Re-instruct              NMR Scapula Stabilization                 NMR Occulomotor Isometrics                 NMR Cervical Isometrics                                   TA Rot Cuff Therabd, beginner   instructed              TA Shld AROM w/bar                 TA Shld Capsular Stretching                 TA Self PIC Thor Spine                                                                                                                                                                                                                                                               Selma Rice, PT, MS  Physical Therapist  [place]# [id number]

## 2017-03-21 ENCOUNTER — TREATMENT (OUTPATIENT)
Dept: PHYSICAL THERAPY | Facility: CLINIC | Age: 47
End: 2017-03-21

## 2017-03-21 DIAGNOSIS — M54.50 ACUTE BILATERAL LOW BACK PAIN WITHOUT SCIATICA: ICD-10-CM

## 2017-03-21 DIAGNOSIS — S29.012A STRAIN OF THORACIC PARASPINAL MUSCLES EXCLUDING T1 AND T2 LEVELS, INITIAL ENCOUNTER: ICD-10-CM

## 2017-03-21 DIAGNOSIS — R29.3 POSTURE IMBALANCE: Primary | ICD-10-CM

## 2017-03-21 PROCEDURE — 97530 THERAPEUTIC ACTIVITIES: CPT | Performed by: PHYSICAL THERAPIST

## 2017-03-21 PROCEDURE — 97140 MANUAL THERAPY 1/> REGIONS: CPT | Performed by: PHYSICAL THERAPIST

## 2017-03-21 PROCEDURE — 97112 NEUROMUSCULAR REEDUCATION: CPT | Performed by: PHYSICAL THERAPIST

## 2017-03-21 NOTE — PROGRESS NOTES
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 She only worked half days last week.   She is still waking 1x at night, using the body pillow   Now her left rhomboid has started to hurt again, during the day, sometimes the PIC to thoracic spine works and sometimes it doesn't; she is requesting help with it.  The SI belt started to irritate both gr trochanters the right more than the left, achey, not all the time. Thus she has decreased the amount of time she wears the SI belt.  There are no more muscle spasms, no crooked standing since last visit.   She hasn't corrected the SI joints since last here as she was a little fearful to do because she couldn't tell which knee to put up to do the self correction properly.       Current Pain level:    0 /10 in low back       Left Rhomboid  2-3/10        Lowest Pain level since last seen:      0/10           1/10  Highest pain level since last seen:    3/10 after a full day of work and after opening a heavy door.    3-4/10      OBJECTIVE:   Slight to moderate  right lateral shift of pelvis.     Palpation:         12/05/16 01/26/17 02/21/17 03/02/17 03/07/17 03/21/17          Right    Left Right  Left Right  Left Right  Left Right  Left Right  Left Right  Left Right  Left Right  Left Right  Left Right  Left Right  Left   Suboccipitals  slight     slight   -           -   -           -            Spinous Processes                          C2-7 rotated to    +  C4-7                  T4-9 rotated to                 +            T4-8             Upper traps  slight       +  slight   slight   -       slight     -     slight         Ant/Mid Scalenes  slight     slight  slight  slight Slight slight   Slight slight         Sternocleidomastoid      -           -              Levator Scapula  slight        +  slight  slight Slight slight   Slight " slight         Pectoralis Major     -            -              Pectoralis Minor     -            -              Sternocostals    -             -              Bicipital tendon     -            -              Supraspinatus     -            -                 +            Infraspinatus     -            -              Lats Insertion      -           -                 +            Rhomboid, Major      -          +             slight                 +         Rhomboid, Minor      -          +            slight                 +         Lateral Epicondyle      -          -              Medial Epicondyle      -          -              Triceps Insertion      -          -                                                                          SI joint positioning   02/21/17 03/02/17 03/07/17 03/21/17         Oswestry Back Score      35/100      8/100               Right Left          Ant rot  of innominate                x                 x                 x              x         Post rot of innominate      x    x    x    x         Sacral rotation                 x    x          x    x                        Piriformis      +           ++    ++         +    ++     ++         Quadratus Lumborum      ++         ++    ++        +  +         Gr. Trochanter      -        slight    -       Slight           iliopsoas                    +    +           + Slight slight         Ischial Tub      -            -           IT Bands      -        slight    +   -             -         Spinous Processes                  L rotated to...     L1-5 L4-5 L1-5             Monthly Objective Measurement/Functional Activity    Date: 12/05/2016 01/26/17 03/21/17        Neck Disability Index 14/100 6/100 10/100                                         AROM Cervical Spine:  Degrees  Degrees Degrees Degrees Degrees Degrees Degrees Degrees      Flexion 55 38 52           Extension 79 78 62           Right Lat. Flexion 41, pain left UT 50 45            Left Lat. Flexion 39 53 53           Right Rotation 45 52 55           Left Rotation 48 51 58                   AROM Shoulders: Degrees Right    Left Right  Left Right  Left Right  Left Right  Left Right  Left Right  Left Right  Left      Flexion 180    180 180      180 180     180           Abduction 180    180 180      180   180       180           Int. Rotation 90    95 90      96 92        96           Ext. Rotation  90    90    92      91 90        92        Functional Strength: in lbs Right     Left Right  Left Right  Left Right  Left Right  Left Right  Left Right  Left Right  Left       Strength Trial 1 70    75 74     75 -                                   Trial 2 55    65 65      70 -                                   Trial 3 68    65 65      70 -           Tanner Pinch                          15    14 -       -                    Root Level  -    Motor Right      Left Right  Left Right  Left Right  Left Right  Left Right  Left Right  Left Right  Leeft      C4                Shld Elevation 5/5        5/5             C5                Shld Abduction 5/5        5/5                                 Shld Flexion 5/5       5/5             C6                Elbow Flexion 5/5        5/5                                 Wrist Extension       5/5        5/5                     C7               Elbow Extension 5/5        5/5                                 Wrist Flexion 5/5        5/5             C8               Thumb Extension 5/5        5/5             T1               Hand Intrinsics 5/5        5/5                     Reflexes Right       Left Right  Left Right  Left Right  Left Right  Left Right  Left Right  Left Right  Left      C5 Biceps +1        +1             C6 Brachioradialis +1        +1             C7 Triceps +1        +1                     Functional Activities:              Sit w/o pain? Yes/20 min Yes/ no limit Yes/ no limit           Stand w/o pain? Yes/ no limit Yes/ 15 min if carry Yes/ 15-20 min  if carry           Walk w/o pain? Yes/ no limit Yes/ no limit yes           Drive w/o pain? Yes/ no limit Yes/ no limit yes           Work w/o paiin? Yes/ no limit Yes/ no limit yes           # times wakes w/o pain? 2-3x Sometimes wakes and her knees ache 1x           Self [place] w/o pain? yes yes yes          ASSESSMENT:  Problem List:   1. Thoracic strain, facet restriction  2. Lack of spinal stabilization  3. Postural dysfunction   4. SI joint dysfunction/ lumbar disc bulge as of  03/02/17    Discussion:  Pt is going to 's concert next week, she is requesting how to adapt dancing for his concert.  Discussed need for extension and gentle weight shift when dancing and how to discreetly do the self corrections.   Debra is making excellent progress.  She still needs to be careful in how she moves and how she positions herself.   We discussed what needs to be done at her office workstation to make it more ergonomically appropriate.  She will get a laptop stand &/or a sit to stand desk.  She will raise the screen if she is able to get a new screen.  She will try out the wedge in her car over the next week as she has a car trip scheduled in 2 weeks.     Began the neuro motor retraining exercises for the lower extremities.  It was very difficult for her to isolate the glut medius, bilaterally as she tends to want to engage the piriformis, inappropriately.  She does have a good understanding of how to do it correctly and will work on this over the next week.     She was not quite ready to move onto the scapula stabilization, hopefully she will be able to tolerate this on next visit. Did correct how to do the PIC to herself.  She demonstrated how to do it well.        Short Term Goals: (4-6 weeks)                               Date Met:                    1.   pt independent in postural correction     01/26/17  2.   pt independent in thoracic facet self-corrections   01/26/17  3.   pt independent in exer to  decrease post. Cervical disc pressures 01/26/17  4.   pt able to sleep through night without pain    01/26/17  5.   pt able to sit 25 minutes without pain     01/26/17  6.   Reduce pt pain to no greater than 5/10     01/26/17  9.   Decrease Neck Pain Score to no greater than 10/100  01/26/17  8.  Pt independent in engaging the inner unit in ADLs   02/21/17  9.  Pt able to do prone arm lift without engaging upper traps x6  10.  Pt able to do inner unit on elliptical machine on/off x4 min  11.  Pt able to sit for 5 min without low back pain  12.  Pt able to stand for 5 min without low back pain  13.  Decrease Oswestry Low Back Pain Score < than 30/100    Long Term Goals:(3-4 months)      Date Met:      1.  pt independent in self-management of symptoms       2.  pt independent in home program      3.  pt able to sit 60 minutes without pain      5.  pt able to lift daughter up without pain    PLAN OF CARE  1.  Ultrasound to increase extensibility, decrease pain, if needed  2.  Electrical stimulation for muscle relaxation, decrease pain, if needed, iontophoresis  3.  Manual therapy to increase function, decrease pain  4.  Therapeutic exercise to increase function, decrease pain  5.  Home programming and d/c planning to increase function and decrease pain    Frequency & Duration:  Pt will be seen on a once/week basis for 7 more visits      TREATMENT TODAY:    Total Treatment Time Minutes: (time in clinic)  60  Timed Code Treatment Minutes:  60    Self Care Home Management Training/ Patient Education:    Purpose of RX  Proper Body Mechanics  Postural Instruction    Compliance w/HEP - need for prone positioning, prone propping after SI joint self correction    Orthotic/Equipmt Usage - use of SI belt, reviewed placement, use of wedge in car    Supplies given:  [employer], on trial loan     Manual Therapy:  (MA)  RX min:  35  Manual posterior rotation of the left innominate  PIC to the right iliopsoas  Manual distraction to left  hip  PIC to right glut minimus  PIC to multifidus  Prone manual lumbar traction  Prone press up       Therapeutic Activities:  (TA)  RX min:   10    Neuromuscular Reeducation: (NMR)  RX min:  15      Ultrasound:  RX min:        1.6 grey/cm2       Location:  right piriformis, L1-5, right gr trochanter    Iontophoresis:  6 hr patch                          Location:                          Procedures:  Code Procedure/Instruction 12/05/2016 01/26/17 02/21/17 03/02/17 03/07/17 03/21/17           TA         Sleeping Positions instructed                     TA Sternum-Up Posture instructed                TA SI jt. self-correction    instructed  corrected           TA Lumbar Facet PIC    instructed             TA   Order of Self-Corrections    instructed             TA Use of lumbar pillow instructed                TA Use of cervical roll instructed                TA Use of wedge in car      instructed/ on trial           TA Use of SI belt                 TA Use of Nada chair/ use of work stand       adapted           TA Use of Ice                 TA Use of [employer]/ heat                 TA Use of TENS unit                 TA Use of iontophoresis                 TA Decreasing Disc Pressures    instructed reviewed            TA [employer] workstation instructed                NMR Pelvic Floor Isolation instructed reviewed               NMR Transverse Abdominus instructed reviewed               NMR Multifidus Isolation instructed reviewed               NMR InnerUnit against Gravity  instructed               NMR Sit-stand w/ inner unit  instructed               NMR Roll w/ inner unit  instructed               NMR Walk w/ inner unit   instructed               NMR Up/down steps w/ inner unit  instructed               NMR Water Exer Instruction                 NMR Hip Abd w/o piriformis      instructed           NMR Hip Add w/o iliop/ham                  NMR Lower abs in supine                 NMR Abd obliques in supine                  NMR Prone Knee Flexion                 NMR Prone glut jasmina                 NMR Retro Step                 NMR Retro Walk                 NMR Retro walk on treadmill                 NMR Forward walk w/ inner unit                 NMR Balance Instruction                 NMR Stability Ball A/P & Lateral                 NMR Ball Catch/Throw /Supine   instructed              NMR Ball Catch/Throw /Stand                 NMR StabilityBall All 4's Arm Lift                 NMR StabilityBall Prone Walk Out                 NMR StabilityBall Supine Oblique                 NMR Stability Ball sit-supine-sit                 NMR Walking Prog Progression                 MNR Home program sequencing                                                     TA Hamstring stretch                 TA Hip Ext Rot Stretch                 TA Hip Int Rot Stretch                 TA Hip Flex/IT Stretch                 TA Hip Add Stretch                 TA Lats Dorsi Stretch                 TA Gastroc/soleus stretch                 TA QuadratusLumborm Stretch                    Supine                    Stance                 TA Quad Stretch                                   NMR Planking  instructed corrected              NMR BOSU Ball Exercises                 NMR Lateral Bridge Drop                 NMR Waiters [place]                 NMR O'Feldt Lat Pull Down                 NMR O'Feldt Hip Ext                 NMR O'Feldt Trunk Ext                 NMR Standing Wall Push Up    instructed                                                  TA CervDiscExtSup/stnd                 TA Cerv Stretches                 TA Neural Gliding                 TA Ant/Mid Scalene Strch                 TA Biceps stretch                 TA Rotator Cuff Stretch   instructed              TA Anterior Chest Stretch                 TA Wrist Ext Stretch                                   NMR Prone Arm Lifts  instructed Re-instruct              NMR Scapula Stabilization                  NMR Occulomotor Isometrics                 NMR Cervical Isometrics                                   TA Rot Cuff Therabd, beginner   instructed              TA Shld AROM w/bar                 TA Shld Capsular Stretching                 TA Self PIC Thor Spine                                                                                                                                                                                                                                                               Selma Rice, PT, MS  Physical Therapist  [place]# [id number]

## 2017-03-28 ENCOUNTER — TREATMENT (OUTPATIENT)
Dept: PHYSICAL THERAPY | Facility: CLINIC | Age: 47
End: 2017-03-28

## 2017-03-28 DIAGNOSIS — S29.012A STRAIN OF THORACIC PARASPINAL MUSCLES EXCLUDING T1 AND T2 LEVELS, INITIAL ENCOUNTER: ICD-10-CM

## 2017-03-28 DIAGNOSIS — R29.3 POSTURE IMBALANCE: Primary | ICD-10-CM

## 2017-03-28 DIAGNOSIS — M54.50 ACUTE BILATERAL LOW BACK PAIN WITHOUT SCIATICA: ICD-10-CM

## 2017-03-28 PROCEDURE — 97140 MANUAL THERAPY 1/> REGIONS: CPT | Performed by: PHYSICAL THERAPIST

## 2017-03-28 PROCEDURE — 97112 NEUROMUSCULAR REEDUCATION: CPT | Performed by: PHYSICAL THERAPIST

## 2017-03-28 NOTE — PROGRESS NOTES
"Physical Therapy Note     SUBJECTIVE:   Changes Since Last Seen:  \"A little better\".  Pt states she is a little afraid she is going to have a flare up and not be able to resolve the low back pain.   She Went to see music Fri night and stood a long time and felt it some in the right gr trochanter  Went home and put heat on it and was a little better. However, this was distressing to her.     She started on birth control pills 2 months ago.  She has had 2 cycles which have been light.    Doing hip abd well, and a was little sore in glut medius.   Did 15 min hill on her elliptical runner at home the night she went dancing and this flared up her pain.     Current Pain level:    1 /10 in right SI jt.        Left Rhomboid   0/10        Lowest Pain level since last seen:      0/10           0/10  Highest pain level since last seen:    3/10 when at work         3/10 in morning, but body pillow is helping       OBJECTIVE:   Slight to moderate  right lateral shift of pelvis.     Palpation:         12/05/16 01/26/17 02/21/17 03/02/17 03/07/17 03/21/17 03/28/17         Right    Left Right  Left Right  Left Right  Left Right  Left Right  Left Right  Left Right  Left Right  Left Right  Left Right  Left Right  Left   Suboccipitals  slight     slight   -           -   -           -            Spinous Processes                          C2-7 rotated to    +  C4-7                  T4-9 rotated to                 +            T4-8             Upper traps  slight       +  slight   slight   -       slight     -     slight    -       slight        Ant/Mid Scalenes  slight     slight  slight  slight Slight slight   Slight slight Slight  slight        Sternocleidomastoid      -           -              Levator Scapula  slight        +  slight  slight Slight slight   Slight slight Slight slight        Pectoralis Major     -            -              Pectoralis Minor     -            -              Sternocostals    -             -            "   Bicipital tendon     -            -              Supraspinatus     -            -                 +            Infraspinatus     -            -              Lats Insertion      -           -                 +            Rhomboid, Major      -          +             slight                 +            slight        Rhomboid, Minor      -          +            slight                 +             slight        Lateral Epicondyle      -          -              Medial Epicondyle      -          -              Triceps Insertion      -          -                                                                          SI joint positioning   02/21/17 03/02/17 03/07/17 03/21/17 03/28/17        Oswestry Back Score      35/100      8/100            Right   Left Right  Left  Right  Left Right Left   Right  Left        Ant rot  of innominate                x                 x                 x              x     x        Post rot of innominate      x    x    x    x              x        Sacral rotation                 x    x          x    x     x                       Piriformis      +           ++    ++         +    ++     ++    +         +        Quadratus Lumborum      ++         ++    ++        +  +    +        Gr. Trochanter      -        slight    -       Slight           iliopsoas                    +    +           + Slight slight   slight        Ischial Tub      -            -           IT Bands      -        slight    +   -             -   -            -        Spinous Processes                  L rotated to...     L1-5 L4-5 L1-5 L1-5            Monthly Objective Measurement/Functional Activity    Date: 12/05/2016 01/26/17 03/21/17        Neck Disability Index 14/100 6/100 10/100                                         AROM Cervical Spine:  Degrees  Degrees Degrees Degrees Degrees Degrees Degrees Degrees      Flexion 55 38 52           Extension 79 78 62           Right Lat. Flexion 41, pain left UT 50 45            Left Lat. Flexion 39 53 53           Right Rotation 45 52 55           Left Rotation 48 51 58                   AROM Shoulders: Degrees Right    Left Right  Left Right  Left Right  Left Right  Left Right  Left Right  Left Right  Left      Flexion 180    180 180      180 180     180           Abduction 180    180 180      180   180       180           Int. Rotation 90    95 90      96 92        96           Ext. Rotation  90    90    92      91 90        92        Functional Strength: in lbs Right     Left Right  Left Right  Left Right  Left Right  Left Right  Left Right  Left Right  Left       Strength Trial 1 70    75 74     75 -                                   Trial 2 55    65 65      70 -                                   Trial 3 68    65 65      70 -           Tanner Pinch                          15    14 -       -                    Root Level  -    Motor Right      Left Right  Left Right  Left Right  Left Right  Left Right  Left Right  Left Right  Leeft      C4                Shld Elevation 5/5        5/5             C5                Shld Abduction 5/5        5/5                                 Shld Flexion 5/5       5/5             C6                Elbow Flexion 5/5        5/5                                 Wrist Extension       5/5        5/5                     C7               Elbow Extension 5/5        5/5                                 Wrist Flexion 5/5        5/5             C8               Thumb Extension 5/5        5/5             T1               Hand Intrinsics 5/5        5/5                     Reflexes Right       Left Right  Left Right  Left Right  Left Right  Left Right  Left Right  Left Right  Left      C5 Biceps +1        +1             C6 Brachioradialis +1        +1             C7 Triceps +1        +1                     Functional Activities:              Sit w/o pain? Yes/20 min Yes/ no limit Yes/ no limit           Stand w/o pain? Yes/ no limit Yes/ 15 min if carry Yes/ 15-20 min  "if carry           Walk w/o pain? Yes/ no limit Yes/ no limit yes           Drive w/o pain? Yes/ no limit Yes/ no limit yes           Work w/o paiin? Yes/ no limit Yes/ no limit yes           # times wakes w/o pain? 2-3x Sometimes wakes and her knees ache 1x           Self [place] w/o pain? yes yes yes          ASSESSMENT:  Problem List:   1. Thoracic strain, facet restriction  2. Lack of spinal stabilization  3. Postural dysfunction   4. SI joint dysfunction/ lumbar disc bulge as of  03/02/17    Discussion:  Pt is going to [facility] on Friday.  She returned the trial wedge to see if it would help her in the car.  It did not, she felt like it throws her too far forward.   The use of the elliptical machine on \"hill\" mode on same day as standing for concert and dancing a little, was too much too soon for her SI ligaments.  Advised Debra to avoid hills, start with 4min on the elliptical adding 30 sec each day and using the SI belt while on the elliptical.   She did very well with the hip abduction and not engaging the piriformis, this is much improved from last visit.  She was surprised at how difficult the lower abs were today to not allow any pelvic motion.  She has a good understanding of this and will work on it over the next 2 weeks.   She is a little anxious as the pain returned with the use of the elliptical and dancing.  She is afraid the pain is going to return.  Went over how she is now attentive to posture and positioning and how she is able to self correct and should not get as uncomfortable as it was a coupl weeks ago.     I have asked pt to diary her pain and her menstrual cycles as the hormonal fluctuations may be impacting her pain.     Short Term Goals: (4-6 weeks)           a                        Date Met:                    1.   pt independent in postural correction         01/26/17  2.   pt independent in thoracic facet self-corrections       01/26/17  3.   pt independent in exer to decrease post. " Cervical disc pressures     01/26/17  4.   pt able to sleep through night without pain        01/26/17  5.   pt able to sit 25 minutes without pain         01/26/17  6.   Reduce pt pain to no greater than 5/10         01/26/17  9.   Decrease Neck Pain Score to no greater than 10/100      01/26/17  8.  Pt independent in engaging the inner unit in ADLs       02/21/17  9.  Pt able to do prone arm lift without engaging upper traps x6      03/28/17  10.  Pt able to do inner unit on elliptical machine on/off x4 min  11.  Pt able to sit for 5 min without low back pain  12.  Pt able to stand for 5 min without low back pain   13.  Decrease Oswestry Low Back Pain Score < than 30/100   14.  Pt able to perform lower abs without pelvic motion  15.  Pt able to perform hip abduction x6 without engaging piriformis  16.  Pt able to perform hip adduction x6 without engaging medial hamstring and piriformis  17.  Pt able top perform prone arm lift with elbow lift x6 without engaging upper trap     Long Term Goals:(3-4 months)          Date Met:      1.  pt independent in self-management of symptoms       2.  pt independent in home program      3.  pt able to sit 60 minutes without pain      5.  pt able to lift daughter up without pain    PLAN OF CARE  1.  Ultrasound to increase extensibility, decrease pain, if needed  2.  Electrical stimulation for muscle relaxation, decrease pain, if needed, iontophoresis  3.  Manual therapy to increase function, decrease pain  4.  Therapeutic exercise to increase function, decrease pain  5.  Home programming and d/c planning to increase function and decrease pain    Frequency & Duration:  Pt will be seen on a once/week basis for 6 more visits      TREATMENT TODAY:    Total Treatment Time Minutes: (time in clinic)    Timed Code Treatment Minutes:      Self Care Home Management Training/ Patient Education:    Purpose of RX  Proper Body Mechanics  Postural Instruction    Compliance w/HEP - need for  prone positioning, prone propping after SI joint self correction    Orthotic/Equipmt Usage - use of SI belt, reviewed placement, use of wedge in car    Supplies given:      Manual Therapy:  (MA)  RX min:  20  Manual posterior rotation of the left innominate  PIC to the right iliopsoas  Manual distraction to left hip  PIC to right glut minimus  PIC to multifidus  Prone manual lumbar traction  Prone press up       Therapeutic Activities:  (TA)  RX min:       Neuromuscular Reeducation: (NMR)  RX min:  40      Ultrasound:  RX min:        1.6 grey/cm2       Location:  right piriformis, L1-5, right gr trochanter    Iontophoresis:  6 hr patch                          Location:                          Procedures:  Code Procedure/Instruction 12/05/2016 01/26/17 02/21/17 03/02/17 03/07/17 03/21/17 03/28/17          TA         Sleeping Positions instructed                     TA Sternum-Up Posture instructed                TA SI jt. self-correction    instructed  corrected           TA Lumbar Facet PIC    instructed             TA   Order of Self-Corrections    instructed             TA Use of lumbar pillow instructed                TA Use of cervical roll instructed                TA Use of wedge in car      instructed/ on trial           TA Use of SI belt                 TA Use of Nada chair/ use of work stand       adapted           TA Use of Ice                 TA Use of [employer]/ heat                 TA Use of TENS unit                 TA Use of iontophoresis                 TA Decreasing Disc Pressures    instructed reviewed            TA [employer] workstation instructed                NMR Pelvic Floor Isolation instructed reviewed               NMR Transverse Abdominus instructed reviewed               NMR Multifidus Isolation instructed reviewed               NMR InnerUnit against Gravity  instructed               NMR Sit-stand w/ inner unit  instructed               NMR Roll w/ inner unit  instructed                NMR Walk w/ inner unit   instructed               NMR Up/down steps w/ inner unit  instructed               NMR Water Exer Instruction                 NMR Hip Abd w/o piriformis      instructed reviewed          NMR Hip Add w/o iliop/ham        instructed          NMR Lower abs in supine       instructed          NMR Abd obliques in supine                 NMR Prone Knee Flexion                 NMR Prone glut jasmina                 NMR Retro Step                 NMR Retro Walk                 NMR Retro walk on treadmill                 NMR Forward walk w/ inner unit                 NMR Balance Instruction                 NMR Stability Ball A/P & Lateral                 NMR Ball Catch/Throw /Supine   instructed              NMR Ball Catch/Throw /Stand                 NMR StabilityBall All 4's Arm Lift                 NMR StabilityBall Prone Walk Out                 NMR StabilityBall Supine Oblique                 NMR Stability Ball sit-supine-sit                 NMR Walking Prog Progression                 MNR Home program sequencing                                                     TA Hamstring stretch                 TA Hip Ext Rot Stretch                 TA Hip Int Rot Stretch                 TA Hip Flex/IT Stretch                 TA Hip Add Stretch                 TA Lats Dorsi Stretch                 TA Gastroc/soleus stretch                 TA QuadratusLumborm Stretch                    Supine                    Stance                 TA Quad Stretch                                   NMR Planking  instructed corrected              NMR BOSU Ball Exercises                 NMR Lateral Bridge Drop                 NMR [place]                 NMR O'Feldt Lat Pull Down                 NMR O'Feldt Hip Ext                 NMR O'Feldt Trunk Ext                 NMR Standing Wall Push Up    instructed                                                  TA CervDiscExtSup/stnd                 TA Cerv Stretches                  TA Neural Gliding                 TA Ant/Mid Scalene Strch                 TA Biceps stretch                 TA Rotator Cuff Stretch   instructed              TA Anterior Chest Stretch                 TA Wrist Ext Stretch                                   NMR Prone Arm Lifts  instructed Re-  instructed    progressed          NMR Scapula Stabilization       instructed          NMR Occulomotor Isometrics                 NMR Cervical Isometrics                                   TA Rot Cuff Therabd, beginner   instructed              TA Shld AROM w/bar                 TA Shld Capsular Stretching                 TA Self PIC Thor Spine                                                                                                                                                                                                                                                               Selma Rice, PT, MS  Physical Therapist  [place]# [id number]

## 2017-04-11 ENCOUNTER — TREATMENT (OUTPATIENT)
Dept: PHYSICAL THERAPY | Facility: CLINIC | Age: 47
End: 2017-04-11

## 2017-04-11 DIAGNOSIS — R29.3 POSTURE IMBALANCE: Primary | ICD-10-CM

## 2017-04-11 DIAGNOSIS — S29.012A STRAIN OF THORACIC PARASPINAL MUSCLES EXCLUDING T1 AND T2 LEVELS, INITIAL ENCOUNTER: ICD-10-CM

## 2017-04-11 DIAGNOSIS — M54.50 ACUTE BILATERAL LOW BACK PAIN WITHOUT SCIATICA: ICD-10-CM

## 2017-04-11 PROCEDURE — 97140 MANUAL THERAPY 1/> REGIONS: CPT | Performed by: PHYSICAL THERAPIST

## 2017-04-11 PROCEDURE — A9999 DME SUPPLY OR ACCESSORY, NOS: HCPCS | Performed by: PHYSICAL THERAPIST

## 2017-04-11 NOTE — PROGRESS NOTES
"Physical Therapy Note     SUBJECTIVE:   Changes Since Last Seen:  Pt went to [place], went to the zoo, walked a lot, wore SI belt and did fine!  She used pillows to simulate the body pillow and even slept on a couch in [place] did ok.   Worked out this am, 10 min on elliptical, no pain!  The PIC with medicine ball really helps with lumbar facets.  The left scapula pain has been worse since back from trip. \"I can't quite get it totally corrected on my own in the left scapula\", states pt.   She is currently having her menses all the time, she will call the GYN as she has been on the new BCP for 2 months which what seems to have caused this.      Current Pain level:    0/10 in right SI jt.        Left Rhomboid 0 /10 Last night pain down the left humerus, felt like shld blade was rubbing on something.      Lowest Pain level since last seen:      0/10           0/10  Highest pain level since last seen:    0/10 when at work         3/10        OBJECTIVE:   Slight to moderate  right lateral shift of pelvis.     Palpation:         12/05/16 01/26/17 02/21/17 03/02/17 03/07/17 03/21/17 03/28/17 04/11/17        Right    Left Right  Left Right  Left Right  Left Right  Left Right  Left Right  Left Right  Left Right  Left Right  Left Right  Left Right  Left   Suboccipitals  slight     slight   -           -   -           -            Spinous Processes                          C2-7 rotated to    +  C4-7                  T4-9 rotated to                 +            T4-8       T4-7       Upper traps  slight       +  slight   slight   -       slight     -     slight    -       slight        Ant/Mid Scalenes  slight     slight  slight  slight Slight slight   Slight slight Slight  slight        Sternocleidomastoid      -           -              Levator Scapula  slight        +  slight  slight Slight slight   Slight slight Slight slight        Pectoralis Major     -            -              Pectoralis Minor     -            -      "         Sternocostals    -             -              Bicipital tendon     -            -              Supraspinatus     -            -                 +            Infraspinatus     -            -              Lats Insertion      -           -                 +            Rhomboid, Major      -          +             slight                 +            slight                  +       Rhomboid, Minor      -          +            slight                 +             slight                  +       Lateral Epicondyle      -          -              Medial Epicondyle      -          -              Triceps Insertion      -          -                                                                          SI joint positioning   02/21/17 03/02/17 03/07/17 03/21/17 03/28/17 04/11/17       Oswestry Back Score      35/100 8/100            Right   Left Right  Left  Right  Left Right Left   Right  Left Right Left       Ant rot  of innominate                x                 x                 x              x     x               x       Post rot of innominate      x    x    x    x              x     x       Sacral rotation                 x    x          x    x     x     x       SI joint                     ++       Piriformis      +           ++    ++         +    ++     ++    +         +   ++        ++       Quadratus Lumborum      ++         ++    ++        +  +    +        Gr. Trochanter      -        slight    -       Slight           iliopsoas                    +    +           + Slight slight   slight     +          +       Ischial Tub      -            -           IT Bands      -        slight    +   -             -   -            -        Spinous Processes                  L rotated to...     L1-5 L4-5 L1-5 L1-5  L3-4           Monthly Objective Measurement/Functional Activity    Date: 12/05/2016 01/26/17 03/21/17        Neck Disability Index 14/100 6/100 10/100                                         AROM  Cervical Spine:  Degrees  Degrees Degrees Degrees Degrees Degrees Degrees Degrees      Flexion 55 38 52           Extension 79 78 62           Right Lat. Flexion 41, pain left UT 50 45           Left Lat. Flexion 39 53 53           Right Rotation 45 52 55           Left Rotation 48 51 58                   AROM Shoulders: Degrees Right    Left Right  Left Right  Left Right  Left Right  Left Right  Left Right  Left Right  Left      Flexion 180    180 180      180 180     180           Abduction 180    180 180      180   180       180           Int. Rotation 90    95 90      96 92        96           Ext. Rotation  90    90    92      91 90        92        Functional Strength: in lbs Right     Left Right  Left Right  Left Right  Left Right  Left Right  Left Right  Left Right  Left       Strength Trial 1 70    75 74     75 -                                   Trial 2 55    65 65      70 -                                   Trial 3 68    65 65      70 -           Tanner Pinch                          15    14 -       -                    Root Level  -    Motor Right      Left Right  Left Right  Left Right  Left Right  Left Right  Left Right  Left Right  Leeft      C4                Shld Elevation 5/5        5/5             C5                Shld Abduction 5/5        5/5                                 Shld Flexion 5/5       5/5             C6                Elbow Flexion 5/5        5/5                                 Wrist Extension       5/5        5/5                     C7               Elbow Extension 5/5        5/5                                 Wrist Flexion 5/5        5/5             C8               Thumb Extension 5/5        5/5             T1               Hand Intrinsics 5/5        5/5                     Reflexes Right       Left Right  Left Right  Left Right  Left Right  Left Right  Left Right  Left Right  Left      C5 Biceps +1        +1             C6 Brachioradialis +1        +1             C7 Triceps +1         +1                     Functional Activities:              Sit w/o pain? Yes/20 min Yes/ no limit Yes/ no limit           Stand w/o pain? Yes/ no limit Yes/ 15 min if carry Yes/ 15-20 min if carry           Walk w/o pain? Yes/ no limit Yes/ no limit yes           Drive w/o pain? Yes/ no limit Yes/ no limit yes           Work w/o paiin? Yes/ no limit Yes/ no limit yes           # times wakes w/o pain? 2-3x Sometimes wakes and her knees ache 1x           Self [place] w/o pain? yes yes yes          ASSESSMENT:  Problem List:   1. Thoracic strain, facet restriction  2. Lack of spinal stabilization  3. Postural dysfunction   4. SI joint dysfunction/ lumbar disc bulge as of  03/02/17    Discussion:  Although pt indicates she has no pain in the low back when arrived, she had pain on palpation and felt better after manual work on the SI joints and lumbar spine.  She is pleased with her progress and feels like the left scapula pain is once again the main problem.  She is hoping to get her work station adapted more with a sit-stand desk.   Recommended using her purse to rest her left elbow when driving as the trip to and from [place] seemed aggravate the left scapula again.  Went over how to self correct the thoracic facets.    Reviewed previous exercises and she will work on these over the next couple of weeks as she was not able to do them frequently while on vacation.   By end of visit, pt had no pain in the left scapula area.     Short Term Goals: (4-6 weeks)                                Date Met:                    1.   pt independent in postural correction         01/26/17  2.   pt independent in thoracic facet self-corrections       01/26/17  3.   pt independent in exer to decrease post. Cervical disc pressures     01/26/17  4.   pt able to sleep through night without pain        01/26/17  5.   pt able to sit 25 minutes without pain         01/26/17  6.   Reduce pt pain to no greater than 5/10          01/26/17  9.   Decrease Neck Pain Score to no greater than 10/100      01/26/17  8.  Pt independent in engaging the inner unit in ADLs       02/21/17  9.  Pt able to do prone arm lift without engaging upper traps x6      03/28/17  10.  Pt able to do inner unit on elliptical machine on/off x4 min      04/11/17  11.  Pt able to sit for 5 min without low back pain        04/11/17  12.  Pt able to stand for 5 min without low back pain       04/11/17  13.  Decrease Oswestry Low Back Pain Score < than 30/100   14.  Pt able to perform lower abs without pelvic motion  15.  Pt able to perform hip abduction x6 without engaging piriformis  16.  Pt able to perform hip adduction x6 without engaging medial hamstring and piriformis  17.  Pt able top perform prone arm lift with elbow lift x6 without engaging upper trap   18.  Reduce pain in left scapula are to no greater than 2/10    Long Term Goals:(3-4 months)          Date Met:      1.  pt independent in self-management of symptoms       2.  pt independent in home program      3.  pt able to sit 60 minutes without pain      5.  pt able to lift daughter up without pain    PLAN OF CARE  1.  Ultrasound to increase extensibility, decrease pain, if needed  2.  Electrical stimulation for muscle relaxation, decrease pain, if needed, iontophoresis  3.  Manual therapy to increase function, decrease pain  4.  Therapeutic exercise to increase function, decrease pain  5.  Home programming and d/c planning to increase function and decrease pain    Frequency & Duration:  Pt will be seen on a once/week basis for 5 more visits      TREATMENT TODAY:    Total Treatment Time Minutes: (time in clinic)  65  Timed Code Treatment Minutes:  65    Self Care Home Management Training/ Patient Education:    Purpose of RX  Proper Body Mechanics  Postural Instruction    Compliance w/HEP - need for prone positioning, prone propping after SI joint self correction    Orthotic/Equipmt Usage - use of SI  belt, reviewed placement, use of wedge in car    Supplies given:  Pt has kept the lumbar support that was issued on trial and the cervical roll as they both are helping her self manage at home.       Manual Therapy:  (MA)  RX min:  55  Manual posterior rotation of the left innominate  PIC to the right iliopsoas  Manual distraction to left hip  PIC to right glut minimus  PIC to multifidus  Prone manual lumbar traction  Prone press up       Therapeutic Activities:  (TA)  RX min:   5    Neuromuscular Reeducation: (NMR)  RX min:   5      Ultrasound:  RX min:        1.6 grey/cm2       Location:  right piriformis, L1-5, right gr trochanter    Iontophoresis:  6 hr patch                          Location:                          Procedures:  Code Procedure/Instruction 12/05/2016 01/26/17 02/21/17 03/02/17 03/07/17 03/21/17 03/28/17 04/11/17         TA         Sleeping Positions instructed                     TA Sternum-Up Posture instructed       Purse under elbow in car         TA SI jt. self-correction    instructed  corrected           TA Lumbar Facet PIC    instructed             TA   Order of Self-Corrections    instructed             TA Use of lumbar pillow instructed                TA Use of cervical roll instructed                TA Use of wedge in car      instructed/ on trial           TA Use of SI belt                 TA Use of Nada chair/ use of work stand       adapted           TA Use of Ice                 TA Use of [employer]/ heat                 TA Use of TENS unit                 TA Use of iontophoresis                 TA Decreasing Disc Pressures    instructed reviewed            TA Adapt[employer] workstation instructed                NMR Pelvic Floor Isolation instructed reviewed               NMR Transverse Abdominus instructed reviewed               NMR Multifidus Isolation instructed reviewed               NMR InnerUnit against Gravity  instructed               NMR Sit-stand w/ inner unit  instructed                NMR Roll w/ inner unit  instructed               NMR Walk w/ inner unit   instructed               NMR Up/down steps w/ inner unit  instructed               NMR Water Exer Instruction                 NMR Hip Abd w/o piriformis      instructed reviewed          NMR Hip Add w/o iliop/ham        instructed          NMR Lower abs in supine       instructed          NMR Abd obliques in supine                 NMR Prone Knee Flexion                 NMR Prone glut jasmina                 NMR Retro Step                 NMR Retro Walk                 NMR Retro walk on treadmill                 NMR Forward walk w/ inner unit                 NMR Balance Instruction                 NMR Stability Ball A/P & Lateral                 NMR Ball Catch/Throw /Supine   instructed              NMR Ball Catch/Throw /Stand                 NMR StabilityBall All 4's Arm Lift                 NMR StabilityBall Prone Walk Out                 NMR StabilityBall Supine Oblique                 NMR Stability Ball sit-supine-sit                 NMR Walking Prog Progression                 MNR Home program sequencing                                                     TA Hamstring stretch                 TA Hip Ext Rot Stretch                 TA Hip Int Rot Stretch                 TA Hip Flex/IT Stretch                 TA Hip Add Stretch                 TA Lats Dorsi Stretch                 TA Gastroc/soleus stretch                 TA QuadratusLumborm Stretch                    Supine                    Stance                 TA Quad Stretch                                   NMR Planking  instructed corrected              NMR BOSU Ball Exercises                 NMR Lateral Bridge Drop                 NMR Waiters [place]                 NMR O'Feldt Lat Pull Down                 NMR O'Feldt Hip Ext                 NMR O'Feldt Trunk Ext                 NMR Standing Wall Push Up    instructed                                                  TA  CervDiscExtSup/stnd                 TA Cerv Stretches                 TA Neural Gliding                 TA Ant/Mid Scalene Strch                 TA Biceps stretch                 TA Rotator Cuff Stretch   instructed              TA Anterior Chest Stretch                 TA Wrist Ext Stretch                                   NMR Prone Arm Lifts  instructed Re-  instructed    progressed          NMR Scapula Stabilization       instructed reviewed         NMR Occulomotor Isometrics                 NMR Cervical Isometrics                                   TA Rot Cuff Therabd, beginner   instructed              TA Shld AROM w/bar                 TA Shld Capsular Stretching                 TA Self PIC Thor Spine                                                                                                                                                                                                                                                               Selma Rcie, PT, MS  Physical Therapist  [place]# [id number]

## 2017-05-18 ENCOUNTER — TREATMENT (OUTPATIENT)
Dept: PHYSICAL THERAPY | Facility: CLINIC | Age: 47
End: 2017-05-18

## 2017-05-18 DIAGNOSIS — M54.50 ACUTE BILATERAL LOW BACK PAIN WITHOUT SCIATICA: ICD-10-CM

## 2017-05-18 DIAGNOSIS — R29.3 POSTURE IMBALANCE: Primary | ICD-10-CM

## 2017-05-18 DIAGNOSIS — S29.012A STRAIN OF THORACIC PARASPINAL MUSCLES EXCLUDING T1 AND T2 LEVELS, INITIAL ENCOUNTER: ICD-10-CM

## 2018-04-17 RX ORDER — BUDESONIDE AND FORMOTEROL FUMARATE DIHYDRATE 160; 4.5 UG/1; UG/1
AEROSOL RESPIRATORY (INHALATION)
Qty: 10.2 G | Refills: 9 | OUTPATIENT
Start: 2018-04-17

## 2018-04-19 ENCOUNTER — TELEPHONE (OUTPATIENT)
Dept: FAMILY MEDICINE CLINIC | Facility: CLINIC | Age: 48
End: 2018-04-19

## 2018-04-19 RX ORDER — BUDESONIDE AND FORMOTEROL FUMARATE DIHYDRATE 160; 4.5 UG/1; UG/1
2 AEROSOL RESPIRATORY (INHALATION)
Qty: 1 INHALER | Refills: 0 | Status: SHIPPED | OUTPATIENT
Start: 2018-04-19 | End: 2018-08-27 | Stop reason: SDUPTHER

## 2018-04-19 NOTE — TELEPHONE ENCOUNTER
----- Message from Debra Garner sent at 4/19/2018 11:41 AM EDT -----  Contact: PT  NEEDS SYMBICORT CALLED IN    [facility]-[address] [place] [address] - [place], [place] - [address] [place] [address] - [phone]  - [phone] (Phone)  [phone] (Fax)

## 2018-08-27 ENCOUNTER — OFFICE VISIT (OUTPATIENT)
Dept: FAMILY MEDICINE CLINIC | Facility: CLINIC | Age: 48
End: 2018-08-27

## 2018-08-27 VITALS
DIASTOLIC BLOOD PRESSURE: 78 MMHG | WEIGHT: 137 LBS | BODY MASS INDEX: 22.02 KG/M2 | OXYGEN SATURATION: 98 % | HEIGHT: 66 IN | TEMPERATURE: 98 F | HEART RATE: 52 BPM | SYSTOLIC BLOOD PRESSURE: 108 MMHG

## 2018-08-27 DIAGNOSIS — S03.00XS TMJ (DISLOCATION OF TEMPOROMANDIBULAR JOINT), SEQUELA: ICD-10-CM

## 2018-08-27 DIAGNOSIS — J45.30 MILD PERSISTENT ASTHMA WITHOUT COMPLICATION: Primary | ICD-10-CM

## 2018-08-27 PROCEDURE — 99213 OFFICE O/P EST LOW 20 MIN: CPT | Performed by: FAMILY MEDICINE

## 2018-08-27 RX ORDER — ALBUTEROL SULFATE 90 UG/1
2 AEROSOL, METERED RESPIRATORY (INHALATION) EVERY 4 HOURS PRN
Qty: 1 INHALER | Refills: 11 | Status: SHIPPED | OUTPATIENT
Start: 2018-08-27 | End: 2019-11-06 | Stop reason: SDUPTHER

## 2018-08-27 RX ORDER — BUDESONIDE AND FORMOTEROL FUMARATE DIHYDRATE 160; 4.5 UG/1; UG/1
2 AEROSOL RESPIRATORY (INHALATION)
Qty: 1 INHALER | Refills: 11 | Status: SHIPPED | OUTPATIENT
Start: 2018-08-27 | End: 2019-11-06 | Stop reason: SDUPTHER

## 2018-08-27 NOTE — PROGRESS NOTES
Subjective   Debra Calero is a 48 y.o. female    Patient presents today for follow-up regarding her chronic asthma which is mild and well controlled.  She has tolerated the switch from Advair to Symbicort without any adverse effects.  She uses her rescue inhaler quite infrequently.  She is due for refills on both inhalers.    Complaining of right TMJ pain secondary to remote mandibular fracture with dislocation.  Apparently she fractured the left side of her mandible from a fall from a horse but also dislocated the right TM joint.  Recently she has been having some subluxation but has been able to reduce the subluxation manually.  She is having quite a bit of discomfort.  I have recommended a follow-up with an oral surgeon and she is agreeable.  She is advised to use nonsteroidal anti-inflammatory agents for discomfort.      Jaw Pain   Pertinent negatives include no arthralgias, chest pain, congestion, coughing, fatigue, fever or myalgias.       The following portions of the patient's history were reviewed and updated as appropriate: allergies, current medications, past social history and problem list    Review of Systems   Constitutional: Negative for fatigue and fever.   HENT: Negative for congestion, postnasal drip, sinus pressure, trouble swallowing and voice change.    Respiratory: Negative for cough, chest tightness, shortness of breath and wheezing.    Cardiovascular: Negative for chest pain.   Musculoskeletal: Negative for arthralgias and myalgias.   Neurological: Negative.    Hematological: Negative for adenopathy. Does not bruise/bleed easily.   Psychiatric/Behavioral: Negative for dysphoric mood. The patient is not nervous/anxious.        Objective     Vitals:    08/27/18 1023   BP: 108/78   Pulse: 52   Temp: 98 °F (36.7 °C)   SpO2: 98%       Physical Exam   Constitutional: She appears well-developed and well-nourished. No distress.   HENT:   Head: Normocephalic and atraumatic.   Very tender over  the right TM joint.  There is asymmetry on opening and closing of her jaw.   Eyes: Pupils are equal, round, and reactive to light. Conjunctivae are normal.   Cardiovascular: Normal rate, regular rhythm and normal heart sounds.    No murmur heard.  Pulmonary/Chest: Effort normal and breath sounds normal. No respiratory distress. She has no wheezes. She has no rales. She exhibits no tenderness.   Skin: Skin is warm and dry. She is not diaphoretic.   Psychiatric: She has a normal mood and affect. Her behavior is normal.   Nursing note and vitals reviewed.      Assessment/Plan   Problem List Items Addressed This Visit     None      Visit Diagnoses     Mild persistent asthma without complication    -  Primary    Relevant Medications    budesonide-formoterol (SYMBICORT) 160-4.5 MCG/ACT inhaler    albuterol (PROVENTIL HFA;VENTOLIN HFA) 108 (90 Base) MCG/ACT inhaler    TMJ (dislocation of temporomandibular joint), sequela        Relevant Orders    Ambulatory Referral to Oral Maxillofacial Surgery (Completed)

## 2019-02-18 ENCOUNTER — TRANSCRIBE ORDERS (OUTPATIENT)
Dept: ADMINISTRATIVE | Facility: HOSPITAL | Age: 49
End: 2019-02-18

## 2019-02-18 DIAGNOSIS — Z12.31 VISIT FOR SCREENING MAMMOGRAM: Primary | ICD-10-CM

## 2019-03-27 ENCOUNTER — HOSPITAL ENCOUNTER (OUTPATIENT)
Dept: MAMMOGRAPHY | Facility: HOSPITAL | Age: 49
Discharge: HOME | End: 2019-03-27
Admitting: OBSTETRICS & GYNECOLOGY

## 2019-03-27 DIAGNOSIS — Z12.31 VISIT FOR SCREENING MAMMOGRAM: ICD-10-CM

## 2019-03-27 PROCEDURE — 77063 BREAST TOMOSYNTHESIS BI: CPT | Performed by: RADIOLOGY

## 2019-03-27 PROCEDURE — 77067 SCR MAMMO BI INCL CAD: CPT

## 2019-03-27 PROCEDURE — 77063 BREAST TOMOSYNTHESIS BI: CPT

## 2019-03-27 PROCEDURE — 77067 SCR MAMMO BI INCL CAD: CPT | Performed by: RADIOLOGY

## 2019-03-28 DIAGNOSIS — M51.36 DEGENERATIVE DISC DISEASE, LUMBAR: Primary | ICD-10-CM

## 2019-10-30 RX ORDER — BUDESONIDE AND FORMOTEROL FUMARATE DIHYDRATE 160; 4.5 UG/1; UG/1
AEROSOL RESPIRATORY (INHALATION)
Qty: 10.2 G | Refills: 11 | OUTPATIENT
Start: 2019-10-30

## 2019-11-06 ENCOUNTER — LAB (OUTPATIENT)
Dept: LAB | Facility: HOSPITAL | Age: 49
End: 2019-11-06

## 2019-11-06 ENCOUNTER — OFFICE VISIT (OUTPATIENT)
Dept: FAMILY MEDICINE CLINIC | Facility: CLINIC | Age: 49
End: 2019-11-06

## 2019-11-06 VITALS
DIASTOLIC BLOOD PRESSURE: 72 MMHG | WEIGHT: 138 LBS | OXYGEN SATURATION: 99 % | HEART RATE: 54 BPM | SYSTOLIC BLOOD PRESSURE: 102 MMHG | HEIGHT: 66 IN | BODY MASS INDEX: 22.18 KG/M2 | TEMPERATURE: 98.7 F

## 2019-11-06 DIAGNOSIS — F41.8 SITUATIONAL ANXIETY: ICD-10-CM

## 2019-11-06 DIAGNOSIS — Z00.00 HEALTHCARE MAINTENANCE: ICD-10-CM

## 2019-11-06 DIAGNOSIS — J45.30 MILD PERSISTENT ASTHMA WITHOUT COMPLICATION: ICD-10-CM

## 2019-11-06 DIAGNOSIS — J45.30 MILD PERSISTENT ASTHMA WITHOUT COMPLICATION: Primary | ICD-10-CM

## 2019-11-06 LAB
ALBUMIN SERPL-MCNC: 4.1 G/DL (ref 3.5–5.2)
ALBUMIN/GLOB SERPL: 1.3 G/DL
ALP SERPL-CCNC: 37 U/L (ref 39–117)
ALT SERPL W P-5'-P-CCNC: 12 U/L (ref 1–33)
ANION GAP SERPL CALCULATED.3IONS-SCNC: 8.6 MMOL/L (ref 5–15)
AST SERPL-CCNC: 17 U/L (ref 1–32)
BILIRUB SERPL-MCNC: 0.4 MG/DL (ref 0.2–1.2)
BUN BLD-MCNC: 13 MG/DL (ref 6–20)
BUN/CREAT SERPL: 18.8 (ref 7–25)
CALCIUM SPEC-SCNC: 9.5 MG/DL (ref 8.6–10.5)
CHLORIDE SERPL-SCNC: 102 MMOL/L (ref 98–107)
CO2 SERPL-SCNC: 27.4 MMOL/L (ref 22–29)
CREAT BLD-MCNC: 0.69 MG/DL (ref 0.57–1)
DEPRECATED RDW RBC AUTO: 43 FL (ref 37–54)
ERYTHROCYTE [DISTWIDTH] IN BLOOD BY AUTOMATED COUNT: 12.2 % (ref 12.3–15.4)
GFR SERPL CREATININE-BSD FRML MDRD: 91 ML/MIN/1.73
GLOBULIN UR ELPH-MCNC: 3.2 GM/DL
GLUCOSE BLD-MCNC: 80 MG/DL (ref 65–99)
HCT VFR BLD AUTO: 44.4 % (ref 34–46.6)
HGB BLD-MCNC: 15.2 G/DL (ref 12–15.9)
MCH RBC QN AUTO: 32.8 PG (ref 26.6–33)
MCHC RBC AUTO-ENTMCNC: 34.2 G/DL (ref 31.5–35.7)
MCV RBC AUTO: 95.9 FL (ref 79–97)
PLATELET # BLD AUTO: 258 10*3/MM3 (ref 140–450)
PMV BLD AUTO: 9.5 FL (ref 6–12)
POTASSIUM BLD-SCNC: 4.2 MMOL/L (ref 3.5–5.2)
PROT SERPL-MCNC: 7.3 G/DL (ref 6–8.5)
RBC # BLD AUTO: 4.63 10*6/MM3 (ref 3.77–5.28)
SODIUM BLD-SCNC: 138 MMOL/L (ref 136–145)
T4 FREE SERPL-MCNC: 1.16 NG/DL (ref 0.93–1.7)
TSH SERPL DL<=0.05 MIU/L-ACNC: 1.67 UIU/ML (ref 0.27–4.2)
WBC NRBC COR # BLD: 4.96 10*3/MM3 (ref 3.4–10.8)

## 2019-11-06 PROCEDURE — 80053 COMPREHEN METABOLIC PANEL: CPT

## 2019-11-06 PROCEDURE — 85027 COMPLETE CBC AUTOMATED: CPT

## 2019-11-06 PROCEDURE — 84439 ASSAY OF FREE THYROXINE: CPT

## 2019-11-06 PROCEDURE — 99214 OFFICE O/P EST MOD 30 MIN: CPT | Performed by: FAMILY MEDICINE

## 2019-11-06 PROCEDURE — 36415 COLL VENOUS BLD VENIPUNCTURE: CPT

## 2019-11-06 PROCEDURE — 84443 ASSAY THYROID STIM HORMONE: CPT

## 2019-11-06 RX ORDER — ALBUTEROL SULFATE 90 UG/1
2 AEROSOL, METERED RESPIRATORY (INHALATION) EVERY 4 HOURS PRN
Qty: 1 INHALER | Refills: 11 | Status: SHIPPED | OUTPATIENT
Start: 2019-11-06 | End: 2021-06-29

## 2019-11-06 RX ORDER — BUDESONIDE AND FORMOTEROL FUMARATE DIHYDRATE 160; 4.5 UG/1; UG/1
2 AEROSOL RESPIRATORY (INHALATION)
Qty: 1 INHALER | Refills: 11 | Status: SHIPPED | OUTPATIENT
Start: 2019-11-06 | End: 2020-11-11

## 2019-11-06 NOTE — PROGRESS NOTES
Subjective   Debra Calero is a 49 y.o. female    Chief Complaint    Asthma  Situational anxiety  Fatigue    History of Present Illness  Patient presents today with history of asthma, due for refill of her inhalers.  She reports her asthma has been relatively stable with infrequent use of rescue inhaler.  She is been trying to exercise regularly but does not have any exercise-induced asthma.  Patient reports that she is been quite stressed as of late.  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  She says she feels quite overwhelmed.  She is also a wife and mother of 2 grade school aged children.  She has been more fatigued of late.  She is due for general health maintenance including laboratory studies.    I spent 25 minutes face-to-face with the patient with greater than 50% of time spent counseling on stress.    The following portions of the patient's history were reviewed and updated as appropriate: allergies, current medications, past social history and problem list    Review of Systems   Constitutional: Positive for fatigue. Negative for appetite change and fever.   HENT: Negative for congestion, sore throat and trouble swallowing.    Eyes: Negative for pain and visual disturbance.   Respiratory: Negative for cough, chest tightness, shortness of breath and wheezing.    Cardiovascular: Negative for chest pain.   Gastrointestinal: Negative for abdominal pain, diarrhea and nausea.   Musculoskeletal: Negative.    Neurological: Negative for dizziness, tremors, weakness, light-headedness and headaches.   Hematological: Negative for adenopathy. Does not bruise/bleed easily.   Psychiatric/Behavioral: Positive for dysphoric mood and sleep disturbance. Negative for agitation, behavioral problems, confusion, decreased concentration and suicidal ideas. The patient is nervous/anxious.        Objective     Vitals:    11/06/19 0809   BP:  102/72   Pulse: 54   Temp: 98.7 °F (37.1 °C)   SpO2: 99%       Physical Exam   Constitutional: She is oriented to person, place, and time. She appears well-developed and well-nourished. No distress.   HENT:   Head: Normocephalic and atraumatic.   Eyes: Conjunctivae are normal. Pupils are equal, round, and reactive to light.   Cardiovascular: Normal rate, regular rhythm and normal heart sounds.   No murmur heard.  Pulmonary/Chest: Effort normal and breath sounds normal. No respiratory distress. She has no wheezes. She has no rales. She exhibits no tenderness.   Neurological: She is alert and oriented to person, place, and time.   Skin: Skin is warm and dry. She is not diaphoretic.   Psychiatric: She has a normal mood and affect. Her behavior is normal.   Nursing note and vitals reviewed.      Assessment/Plan   Problem List Items Addressed This Visit     None      Visit Diagnoses     Mild persistent asthma without complication    -  Primary    Relevant Orders    CBC (No Diff)    Comprehensive Metabolic Panel    TSH    T4, Free    Situational anxiety        Relevant Orders    Comprehensive Metabolic Panel    TSH    T4, Free    Healthcare maintenance        Relevant Orders    CBC (No Diff)    Comprehensive Metabolic Panel    TSH    T4, Free        Counseling recommended.  Patient is given names of 2 local counselors.

## 2020-01-14 RX ORDER — NORETHINDRONE ACETATE AND ETHINYL ESTRADIOL, ETHINYL ESTRADIOL AND FERROUS FUMARATE 1MG-10(24)
KIT ORAL
Qty: 84 TABLET | Refills: 1 | Status: SHIPPED | OUTPATIENT
Start: 2020-01-14 | End: 2020-09-08

## 2020-05-26 ENCOUNTER — TRANSCRIBE ORDERS (OUTPATIENT)
Dept: ADMINISTRATIVE | Facility: HOSPITAL | Age: 50
End: 2020-05-26

## 2020-05-26 DIAGNOSIS — Z12.31 VISIT FOR SCREENING MAMMOGRAM: Primary | ICD-10-CM

## 2020-08-12 ENCOUNTER — APPOINTMENT (OUTPATIENT)
Dept: MAMMOGRAPHY | Facility: HOSPITAL | Age: 50
End: 2020-08-12

## 2020-09-08 RX ORDER — NORETHINDRONE ACETATE AND ETHINYL ESTRADIOL, ETHINYL ESTRADIOL AND FERROUS FUMARATE 1MG-10(24)
KIT ORAL
Qty: 84 TABLET | Refills: 1 | Status: SHIPPED | OUTPATIENT
Start: 2020-09-08 | End: 2021-02-03

## 2020-09-12 ENCOUNTER — HOSPITAL ENCOUNTER (OUTPATIENT)
Dept: MAMMOGRAPHY | Facility: HOSPITAL | Age: 50
Discharge: HOME | End: 2020-09-12
Admitting: OBSTETRICS & GYNECOLOGY

## 2020-09-12 DIAGNOSIS — Z12.31 VISIT FOR SCREENING MAMMOGRAM: ICD-10-CM

## 2020-09-12 PROCEDURE — 77067 SCR MAMMO BI INCL CAD: CPT

## 2020-09-12 PROCEDURE — 77063 BREAST TOMOSYNTHESIS BI: CPT

## 2020-09-12 PROCEDURE — 77067 SCR MAMMO BI INCL CAD: CPT | Performed by: RADIOLOGY

## 2020-09-12 PROCEDURE — 77063 BREAST TOMOSYNTHESIS BI: CPT | Performed by: RADIOLOGY

## 2020-11-09 DIAGNOSIS — J45.30 MILD PERSISTENT ASTHMA WITHOUT COMPLICATION: ICD-10-CM

## 2020-11-11 RX ORDER — BUDESONIDE AND FORMOTEROL FUMARATE DIHYDRATE 160; 4.5 UG/1; UG/1
AEROSOL RESPIRATORY (INHALATION)
Qty: 10.2 G | Refills: 11 | Status: SHIPPED | OUTPATIENT
Start: 2020-11-11 | End: 2021-07-08 | Stop reason: SDUPTHER

## 2021-01-02 DIAGNOSIS — S02.101A CLOSED FRACTURE OF RIGHT SIDE OF BASE OF SKULL, INITIAL ENCOUNTER (HCC): ICD-10-CM

## 2021-01-02 DIAGNOSIS — M54.10 RADICULOPATHY, UNSPECIFIED SPINAL REGION: ICD-10-CM

## 2021-01-02 DIAGNOSIS — M21.371 FOOT DROP, RIGHT: Primary | ICD-10-CM

## 2021-02-03 RX ORDER — NORETHINDRONE ACETATE AND ETHINYL ESTRADIOL, ETHINYL ESTRADIOL AND FERROUS FUMARATE 1MG-10(24)
KIT ORAL
Qty: 84 TABLET | Refills: 1 | Status: SHIPPED | OUTPATIENT
Start: 2021-02-03 | End: 2022-03-02

## 2021-02-12 ENCOUNTER — IMMUNIZATION (OUTPATIENT)
Dept: VACCINE CLINIC | Facility: HOSPITAL | Age: 51
End: 2021-02-12

## 2021-02-12 PROCEDURE — 91301 HC SARSCO02 VAC 100MCG/0.5ML IM: CPT | Performed by: INTERNAL MEDICINE

## 2021-02-12 PROCEDURE — 0011A: CPT | Performed by: INTERNAL MEDICINE

## 2021-03-12 ENCOUNTER — IMMUNIZATION (OUTPATIENT)
Dept: VACCINE CLINIC | Facility: HOSPITAL | Age: 51
End: 2021-03-12

## 2021-03-12 PROCEDURE — 91301 HC SARSCO02 VAC 100MCG/0.5ML IM: CPT | Performed by: INTERNAL MEDICINE

## 2021-03-12 PROCEDURE — 0012A: CPT | Performed by: INTERNAL MEDICINE

## 2021-05-20 ENCOUNTER — TREATMENT (OUTPATIENT)
Dept: PHYSICAL THERAPY | Facility: CLINIC | Age: 51
End: 2021-05-20

## 2021-05-20 DIAGNOSIS — M53.3 SACROILIAC JOINT DYSFUNCTION: Primary | ICD-10-CM

## 2021-05-20 PROCEDURE — 97161 PT EVAL LOW COMPLEX 20 MIN: CPT | Performed by: PHYSICAL THERAPIST

## 2021-05-20 PROCEDURE — 97140 MANUAL THERAPY 1/> REGIONS: CPT | Performed by: PHYSICAL THERAPIST

## 2021-05-20 PROCEDURE — 97530 THERAPEUTIC ACTIVITIES: CPT | Performed by: PHYSICAL THERAPIST
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
## 2021-06-28 DIAGNOSIS — J45.30 MILD PERSISTENT ASTHMA WITHOUT COMPLICATION: ICD-10-CM

## 2021-06-29 RX ORDER — ALBUTEROL SULFATE 90 UG/1
AEROSOL, METERED RESPIRATORY (INHALATION)
Qty: 18 G | Refills: 11 | Status: SHIPPED | OUTPATIENT
Start: 2021-06-29 | End: 2021-07-08 | Stop reason: SDUPTHER

## 2021-07-08 ENCOUNTER — OFFICE VISIT (OUTPATIENT)
Dept: FAMILY MEDICINE CLINIC | Facility: CLINIC | Age: 51
End: 2021-07-08

## 2021-07-08 VITALS — HEIGHT: 66 IN | WEIGHT: 142.4 LBS | RESPIRATION RATE: 15 BRPM | BODY MASS INDEX: 22.88 KG/M2 | TEMPERATURE: 97.1 F

## 2021-07-08 DIAGNOSIS — M54.50 MIDLINE LOW BACK PAIN WITHOUT SCIATICA, UNSPECIFIED CHRONICITY: ICD-10-CM

## 2021-07-08 DIAGNOSIS — J45.30 MILD PERSISTENT ASTHMA WITHOUT COMPLICATION: Primary | ICD-10-CM

## 2021-07-08 PROCEDURE — 99213 OFFICE O/P EST LOW 20 MIN: CPT | Performed by: FAMILY MEDICINE

## 2021-07-08 RX ORDER — CELECOXIB 200 MG/1
200 CAPSULE ORAL DAILY
Qty: 30 CAPSULE | Refills: 11 | Status: SHIPPED | OUTPATIENT
Start: 2021-07-08

## 2021-07-08 RX ORDER — BUDESONIDE AND FORMOTEROL FUMARATE DIHYDRATE 160; 4.5 UG/1; UG/1
2 AEROSOL RESPIRATORY (INHALATION) 2 TIMES DAILY
Qty: 10.2 G | Refills: 11 | Status: SHIPPED | OUTPATIENT
Start: 2021-07-08 | End: 2022-01-06

## 2021-07-08 RX ORDER — ALBUTEROL SULFATE 90 UG/1
2 AEROSOL, METERED RESPIRATORY (INHALATION) EVERY 4 HOURS PRN
Qty: 18 G | Refills: 11 | Status: SHIPPED | OUTPATIENT
Start: 2021-07-08 | End: 2022-07-26 | Stop reason: SDUPTHER

## 2021-07-08 NOTE — PROGRESS NOTES
basal cell carcinoma    Subjective   Debra Beckman is a 50 y.o. female    Chief Complaint    Asthma   Back pain     History of Present Illness  The patient presents today for evaluation of asthma and back pain. The patient said she has a lot of stress at work and is considering going into private practice. She is recently , and her dog  on . The patient has been going to therapy and seeing Deborah Yu.    She reports she has been having issues with her back. The patient has been taking her mother's Celebrex. She has been seeing Selma Rice for physical therapy here and there. The patient has a hip issue related to sciatic pain. She reports she has been taking Celebrex 1 tablet at night for approximately 2 months. The patient has been trying to drink more water.     She notes she had something in her [employer] chart that she is due for other testing like a colonoscopy. The patient has been to see Dr. Byrd a couple of times, the last time was in 2021.     She reports she received her flu and COVID vaccinations this year.    The following portions of the patient's history were reviewed and updated as appropriate: allergies, current medications, past social history and problem list    Review of Systems   Constitutional: Negative.  Negative for fatigue and fever.   HENT: Negative for congestion.    Respiratory: Negative.  Negative for cough, chest tightness, shortness of breath and wheezing.    Cardiovascular: Negative for chest pain.   Gastrointestinal: Negative.    Musculoskeletal: Positive for back pain. Negative for arthralgias, gait problem and myalgias.   Neurological: Negative for dizziness, tremors, weakness and numbness.   Psychiatric/Behavioral: Negative for behavioral problems and dysphoric mood. The patient is not nervous/anxious.        Objective     Vitals:    21 1135   Resp: 15   Temp: 97.1 °F (36.2 °C)       Physical Exam  Vitals and nursing note reviewed.    Constitutional:       General: She is not in acute distress.     Appearance: She is well-developed. She is not diaphoretic.   Cardiovascular:      Rate and Rhythm: Normal rate and regular rhythm.      Heart sounds: Normal heart sounds. No murmur heard.     Pulmonary:      Effort: Pulmonary effort is normal. No respiratory distress.      Breath sounds: Normal breath sounds. No wheezing or rales.   Chest:      Chest wall: No tenderness.   Abdominal:      General: Bowel sounds are normal.      Palpations: Abdomen is soft.   Musculoskeletal:      Lumbar back: Tenderness and bony tenderness present. No swelling, deformity or spasms. Decreased range of motion.   Skin:     General: Skin is warm and dry.   Neurological:      Mental Status: She is alert and oriented to person, place, and time.      Deep Tendon Reflexes: Reflexes are normal and symmetric.         Assessment/Plan   Problems Addressed this Visit     None      Visit Diagnoses     Mild persistent asthma without complication    -  Primary    Relevant Medications    albuterol sulfate  (90 Base) MCG/ACT inhaler    budesonide-formoterol (Symbicort) 160-4.5 MCG/ACT inhaler    Midline low back pain without sciatica, unspecified chronicity          Diagnoses       Codes Comments    Mild persistent asthma without complication    -  Primary ICD-10-CM: J45.30  ICD-9-CM: 493.90     Midline low back pain without sciatica, unspecified chronicity     ICD-10-CM: M54.5  ICD-9-CM: 724.2                Scribed for WILBERTO Centeno MD by Reshma Mosley.  07/08/21   13:13 EDT    I have personally performed the services described in this document as scribed by the above individual, and it is both accurate and complete.  WILBERTO Centeno MD  7/8/2021  16:34 EDT

## 2021-07-11 ENCOUNTER — DOCUMENTATION (OUTPATIENT)
Dept: NEUROSURGERY | Facility: CLINIC | Age: 51
End: 2021-07-11

## 2021-07-11 NOTE — PROGRESS NOTES
Debra Beckman  1970  [id number]      To whom it may concern:      This is a 50-year-old female with chronic low back pain and sciatica.  Due to the type of work she does on a daily basis she would require a stand-up desk to provide better balance and support of her back pain and SI joint pain.  She also requires a carpal tunnel pad for keyboard and mouse use to improve posture and hand pain.        Lydia France PA-C

## 2021-07-27 ENCOUNTER — TREATMENT (OUTPATIENT)
Dept: PHYSICAL THERAPY | Facility: CLINIC | Age: 51
End: 2021-07-27

## 2021-07-27 DIAGNOSIS — M53.3 SACROILIAC JOINT DYSFUNCTION: Primary | ICD-10-CM

## 2021-07-27 PROCEDURE — 97112 NEUROMUSCULAR REEDUCATION: CPT | Performed by: PHYSICAL THERAPIST

## 2021-07-27 PROCEDURE — 97140 MANUAL THERAPY 1/> REGIONS: CPT | Performed by: PHYSICAL THERAPIST

## 2021-07-27 NOTE — PROGRESS NOTES
"Physical Therapy Note          SUBJECTIVE:   Changes since last seen:  Debra states she was gone on vacation for a couple weeks and did ok but about one month ago she had onset of left knee pain, feeling very tight when she wakes up, bilateral thumb and hand pain, carpal tunnel symptoms at night and ankle pain.   The left knee pain seems to be deep in knee, different from the right knee pain, which decreases as she corrects the SI joint positioning.   She has an elliptical runner which she is using on a daily basis for 1-2 miles.  This doesn't bother either knee when doing it.    She notes she has worked standing on concrete everyday except during the pandemic when she worked remotely.  This seems to bother her knees more now.  Her right hip still hurst when she sits for a long time, but still able to \"walk off the pain.\"   She indicates her low back feels fine, she has been sleeping on her left side with pillows between her legs which has been helpful.   She states she has been self correcting the SI joint positions once/week.   She notes her office has ordered a sit stand desk for her but it has not arrived yet.     She notes she is perimenopausal, on decreasing amount of estrogen.       Current level of pain:     0/10  Lowest level of pain since last seen:   0/10  Highest level of pain since last seen:     Right hip 5-6/10 on sit to stand ,  Left knee 4/5 couldn't bend   Right knee       Past Medical History:  1.  Hx of 2 C-sections  2.  Hx of 3 pregnancies  3.  Hx of regular menses, last menses 12/04/16  4.  Hx of AA, 2001  5.  Hx of kidney stones    Functional Limitations:  Stairs, sit to stand, prolonged sitting  Patient Goals for Physical Therapy:      OBJECTIVE:      Monthly Objective Measurement/Functional Activity  Date: 05/20/2021 07/27/21                Oswestry Pain Score 12/100 20/100                LE Functional Scale                                    AROM Lumbar Spine: Degrees   Degrees      " Degrees      Degrees      Degrees    Degrees Degrees Degrees Degrees Degrees Degrees Degrees Degrees Degrees Degrees      Flexion 135 pain ant hip           140                    Extension 35           32                   Right Lat. Flexion 45           43                   Left Lat. Flexion 41           43                   Right Lat Shift Pelvis 1st one pain    Right ant thigh strain                    Left Lat Shift Pelvis No pain   No pain                                  AROM Hips:  (degrees) Right      Left Right Left Right  Left Right  Left Right  Left Right  Left Right Left Right Left Right Left Right Left Right  Left Right Left Right Left Right Left Right Left      Flexion 115       120  120   125                      Abduction 42       45   45     47                     Int. Rotation 28        40   32      40                    Ext. Rotation  32       33    42     44                                    Flexibility:   (degrees) Right    Left Right  Left Right  Left Right  Left Right  Left Right  Left Right Left Right Left Right Left Right Left Right  Left Right Left Right Left Right Left Right Left      Hamstrings -18       -18    -15    -16                    Quadriceps 85       80    85    82                     HipExt/Rot(piriformis) 32       46 40       45                   Hip Int/Rotators 30       22  32      28                   Hip Flexors (iliopsoas) -6       -8  not tested                   IT Band -5       -1  not tested                                  Root Level  - Motor Right      Left Right  Left Right  Left Right  Left Right  Left Right  Left Right Left Right Left Right Left Right Left Right  Left Right Left Right Left Right Left Right Left     T12             Rectus Abdominus 5/5                     L1              Paraspinals 5/5        5/5                    L2              Hip Flexion 5/5       5/5                    L3             Quads  5/5        5/5                    L4               Anterior Tibialis  5/5        5/5                    L5             Gr. Toe Extension  5/5         5/5                    S1            Gastroc/Sol/Peroneals 5/5        5/5                                    Functional Strength:                    Single LegStanceTime (seconds) R:30   L:30   R:      L: R:      L: R:     L: R:      L: R:      L:  R:       L:   R:      L:   R:      L:   R:      L: R:      L:   R:      L:   R:      L:    R:      L:    R:      L:     Pelvic Floor Isolation (seconds) -                    Inner Unit  Isolation (seconds) -                                   Functional Activities:                     Sit w/o pain? Pain increases (minutes) yes Yes/ 60 min                   Stand w/o pain? yes Yes/ > 1 hr                   Walk w/o pain? yes Yes/ no limit                   Steps w/o pain? no no                   Drive w/o pain? Yes/ pain getting out of car  yes/ pain getting out of car                    Work w/o pain? Yes/ pain getting out of chair Yes/ 60 min, pain getting out of chair                   # times wakes w/ pain? 2-3x   0-1x                         05/20/2021 07/27/21                  SI Joint Positioning  Right  Left Right  Left Right  Left Right  Left Right  Left Right  Left Right  Left Right  Left Right  Left Right  Left Right  Left Right  Left Right  Left Right Left Right Left Right Left Right  Left       Innominate                           Anterior                 x               x                          Posterior     x   x                          Sacrum                              Unilateral rotation    x   x                                         SI Joint Testing  Right  Left Right  Left Right  Left Right  Left Right  Left Right  Left Right  Left Right  Left Right  Left Right  Left Right  Left Right  Left Right  Left Right Left Right Left Right Left Right  Left           Distraction   +          +   +           +                          Kyra's   -           -                            Compression   -           -                           Prone Press Up   -            -                                       Special Tests  Right   Left Right  Left Right  Left Right  Left Right  Left Right  Left Right Left  Right  Left Right  Left Right  Left Right  Left Right  Left Right  Left Right Left Right Left Right Left Right Left       Straight Leg Raise                                    Active   -          -                             Passive   -            -                            Hip Scour Test   +          -   +          -                                           Baker's Cysts   -           -   -         -                      Palpation:         Muscle/Bone Irritation  Date 05/20/2021 07/27/21                   Right  Left Right  Left Right  Left Right  Left Right  Left Right  Left Right  Left Right  Left Right  Left Right  Left Right  Left Right  Left Right  Left Right Left Right Left Right Left Right  Left   SI joint  +           -   Sl           -                  Piriformis  sl          -   Sl         -                  Gr. Trochanter  +          -   Sl           -                  IT Band  -           -   -           -                  Quadratus Lumborum  -             -   -           -                  Ischial Tuberosity  -            -   -          -                  Sacrococcygeal Ligs  -            -   -             -                  Paraspinals  -           -                   Spinous Processes                                       T-spine rotated to   -           -   -           -                         L-spine rotated to  -           -   -            -                  Adductor Franck  -          -   -           -                  Iliopsoas  +          -   Sl        -                  Quad Origin  -          -   -             -                  Pubic Symphysis        -            Not tested                                                             All 4s  "Positioning: Pt not able to assume full lumbar extension in this position  Leg Length:  Equal       PLAN OF CARE:    ASSESSMENT:  Problem List:   1.  Positive Hip Scour sign on right  2.  Slight SI joint dysfunction    Discussion:    Debra arrives with new pain symptoms, noted above, which started about 4 weeks ago.  She is not able to correlate this new pain to anything.  She is surprised how the hand, ankle, knee pain all seemed to start at the same time. She also notes she had pain between scapula a few weeks ago, none today.  She is able to discern the difference between the right SI joint referred pain into the right knee versus the tightened inflamed feeling in the left knee. She is able to reduce the right knee pain with the SI joint self corrections. When asked about potential hormonal changes she notes she is perimenopausal and has been on reduced estrogen to help her ease through it.  Suggest she note her pain levels and location on calendar to be able to track any trending in her pain.    She presents with full ROM of both knees. No ligamentous irritation of both knees and no meniscal irritation.  However, slight warmth was noted over lateral/inf/anterior left knee.  Recommend she use an ice compression sleeve for her left knee.  Recommend over the counter night splints to reduce carpal tunnel symptoms when sleeping at night.     It is interesting to note that after manual SI joint corrections here in the clinic, today, she had reduced pain in the right knee.     Reviewed self correction of SI joint positioning.  Progressed instruction to diaphragmatic breathing in supine, followed by recoil of pelvic floor on exhale and then engaging transverse abdominus to \"set\" the Core musculature.  Following this she was instructed in how to maintain engagement of TA and take 3 lower costal breaths.  This was difficult for her to coordinate.  She will work on this between now and next visit.  Will progress spinal " stabilization with engagement of inner unit using this technique.     Short Term Goals: (4-6 weeks)                                Date Met:                1.   pt independent in postural correction      07/27/21      2.   pt independent in SI joint self-corrections     07/27/21  3.   pt independent in exer to decrease post. disc pressures  4.   pt able to sleep through night without pain  5.   pt able to move sit to stand w/o catching after 5 minutes  6.   Decrease Oswestry Pain Score to no greater than 10/100  7.  Pt able to engage TA and pelvic floor on exhale of diaphr breath x3  8.  Pt able to take 3 lower costal breaths with TA and pelvic fl engaged     Long Term Goals:(3-5 months)       Date Met:      1.  pt independent in self-management of symptoms       2.  pt independent in home program      3.  pt able to work 2 hours without pain on sit to stand        Treatment Plan:   1.  Ultrasound to increase extensibility, decrease pain, if needed  2.  Electrical stimulation for muscle relaxation, decrease pain, if needed, iontophoresis  3.  Manual therapy to increase function, decrease pain  4.  Therapeutic exercise to increase function, decrease pain  5.  Home programming and d/c planning to increase function and decrease pain    Frequency & Duration:  Pt will be seen on a once/ 2-4 week basis for 7 more visits    Patient Participated in and Agrees With This Plan of Care and Goals:  YES  Rehab Potential:  ernestine Rice, PT, MS  Physical Therapist  [place]# [id number]    TREATMENT TODAY:    Total Treatment Time: (time in clinic)     55 min   Timed Code Treatment Minutes:   55    Manual Therapy:  (MA)  RX min:  25  Manual posterior rotation of left innominate    Positional Isometric contraction (PIC) of right iliopsoas    Positional Isometric contraction of (PIC) right gluteus minimus    Distraction of both SI jts in open pack hip position  Piriformis stretching, bilaterally    Quadratus lumborum stretching,  bilaterally    Anterior/Inferior Mobilization of  right hip      Therapeutic Activities:  (TA)  RX min:  5    Neuromuscular Reeducation: (NMR)  RX min: 25    Ultrasound:  RX min:          1.6 grey/cm2       Location:     Iontophoresis:  6 hr patch                                Location:                               Procedures:      Key:  i=instructed        r=review        c=corrected        a=adapted   Code Procedure/Instruction 05/20/2021 07/27/21                 TA         Sleeping Positions instructed               review                 TA Sternum-Up Posture                   TA SI jt. self-correction instructed review                 TA Lumbar Facet PIC                   TA   Order of Self-Corrections instructed review                 TA Use of lumbar pillow instructed                  TA Use of cervical roll                   TA Use of orthotics                   TA Use of SI belt                   TA Use of Nada chair                   TA Use of Ice                   TA Use of [employer]/ heat                   TA Use of TENS unit                   TA Use of iontophoresis                   TA Decreasing Disc Pressures                                                           NMR Diaphragmatic Breathing  instruct                 NMR Diaphrag breath engage TA, 3 lower costal breaths  instruct                 NMR Pelvic Floor Isolation  review                 NMR Transverse Abdominus  review                 NMR Multifidus Isolation  review                 NMR InnerUnit against Gravity                   NMR Sit-stand w/ inner unit                   NMR Roll w/ inner unit                   NMR Walk w/ inner unit                    NMR Up/down steps w/ inner unit                   NMR Water Exer Instruction                   NMR Hip Abd w/o piriformis                   NMR Hip Add w/o iliop/ham                    NMR Lower abs in supine                   NMR Abd obliques in supine                   NMR Prone  Knee Flexion                   NMR Prone glut jasmina                   NMR Retro Step                   NMR Retro Walk                   NMR Retro walk on treadmill                   NMR Forward walk w/ inner unit                   NMR Balance Instruction                   NMR Stability Ball A/P & Lateral                   NMR Ball Catch/Throw /Supine                   NMR Ball Catch/Throw /Stand                   NMR StabilityBall All 4's Arm Lift                   NMR StabilityBall Prone Walk Out                   NMR StabilityBall Supine Oblique                   NMR Stability Ball sit-supine-sit                   NMR Walking Prog Progression                   MNR Home program sequencing                                       TA Hamstring stretch                   TA Hip Ext Rot Stretch                   TA Hip Int Rot Stretch                   TA Hip Flex/IT Stretch                   TA Hip Add Stretch                   TA Lats Dorsi Stretch                   TA Gastroc/soleus stretch                   TA QuadratusLumborm Stretch                      Supine                      Stance                   TA Quad Stretch                                       NMR Wall Push Ups                   NMR Planking                   NMR BOSU Ball Exercises                   NMR Lateral Bridge Drop                   NMR [place]                   NMR O'Feldt Lat Pull Down                   NMR O'Feldt Hip Ext                   NMR O'Feldt Trunk Ext                                       TA CervDiscExtSup/stnd                   TA Cerv Stretches                   TA Self PIC Cervical Spine                   TA Neural Gliding                   TA Ant/Mid Scalene Strch                   TA Biceps stretch                   TA Rotator Cuff Stretch                   TA Anterior Chest Stretch                   TA Wrist Ext Stretch                                       NMR Prone Arm Lifts                   NMR Scapula  Stabilization                   NMR Occulomotor Isometrics                   NMR Cervical Isometrics                                       TA Shld AROM w/bar                   TA Shld Capsular Stretching                   TA Self PIC Thor Spine                   TA Rot Cuff Therabd, beginner                   TA Rot Cuff Therabd, debbie Rice, PT, MS  Physical Therapist  [place]# [id number]

## 2021-08-10 ENCOUNTER — TELEPHONE (OUTPATIENT)
Dept: PHYSICAL THERAPY | Facility: CLINIC | Age: 51
End: 2021-08-10

## 2021-09-07 ENCOUNTER — TREATMENT (OUTPATIENT)
Dept: PHYSICAL THERAPY | Facility: CLINIC | Age: 51
End: 2021-09-07

## 2021-09-07 DIAGNOSIS — M53.3 SACROILIAC JOINT DYSFUNCTION: Primary | ICD-10-CM

## 2021-09-07 PROCEDURE — 97140 MANUAL THERAPY 1/> REGIONS: CPT | Performed by: PHYSICAL THERAPIST

## 2021-09-07 PROCEDURE — 97112 NEUROMUSCULAR REEDUCATION: CPT | Performed by: PHYSICAL THERAPIST

## 2021-09-07 NOTE — PROGRESS NOTES
"Physical Therapy Note          SUBJECTIVE:   Changes since last seen:  Debar notes she has noticed a correlation with cycle of hormones; she experiences the worst pain in various joints midway through her pill pack and this last about 4-5 days.  When pain is bad she states she could barely walk secondary to knee pain.   She states she has been doing the diaphragmatic breathing followed by engaging transverse abdominus has helped stabilize her spine.   Now she states her right hip pain is the least of her pains, as she can kick her leg to relieve pinching of labrum.  But there is nothing she can do to relieve her pain which jumps from her knees to wrist to shoulders which comes and goes without correlation to position/movement.        Current level of pain:     0/10  Hip           Right wrist and right shoulder \"was jacked up to 2-3/10\"   Lowest level of pain since last seen:   0/10           0/10  Highest level of pain since last seen:       Right hip 5-6/10 on sit to stand        Left knee 4/5 couldn't bend         Past Medical History:  1.  Hx of 2 C-sections  2.  Hx of 3 pregnancies  3.  Hx of regular menses, last menses 12/04/16  4.  Hx of AA, 2001  5.  Hx of kidney stones    Functional Limitations:  Stairs, sit to stand, prolonged sitting  Patient Goals for Physical Therapy:      OBJECTIVE:      Monthly Objective Measurement/Functional Activity  Date: 05/20/2021 07/27/21 09/07/21               Oswestry Pain Score 12/100   20/100 11/100               LE Functional Scale                                    AROM Lumbar Spine: Degrees   Degrees      Degrees      Degrees      Degrees    Degrees Degrees Degrees Degrees Degrees Degrees Degrees Degrees Degrees Degrees      Flexion 135 pain ant hip           140      142                  Extension 35           32       32                  Right Lat. Flexion 45           43       55                  Left Lat. Flexion 41           43       40                  Right " Lat Shift Pelvis 1st one pain    Right ant thigh strain  No pain                   Left Lat Shift Pelvis No pain   No pain  no pain                                  AROM Hips:  (degrees) Right      Left Right Left Right  Left Right  Left Right  Left Right  Left Right Left Right Left Right Left Right Left Right  Left Right Left Right Left Right Left Right Left      Flexion 115       120  120   125    126   123                  Abduction 42       45   45     47    45       46                  Int. Rotation 28        40   32      40   35      40                  Ext. Rotation  32       33    42     44    45       46                                 Flexibility:   (degrees) Right    Left Right  Left Right  Left Right  Left Right  Left Right  Left Right Left Right Left Right Left Right Left Right  Left Right Left Right Left Right Left Right Left      Hamstrings -18       -18    -15    -16   - 15     - 15                  Quadriceps 85       80    85    82    85        85                  HipExt/Rot(piriformis) 32       46 40       45  45       45                  Hip Int/Rotators 30       22  32      28  33       32                  Hip Flexors (iliopsoas) -6       -8  not tested Not tested                  IT Band -5       -1  not tested  not tested                                 Root Level  - Motor Right      Left Right  Left Right  Left Right  Left Right  Left Right  Left Right Left Right Left Right Left Right Left Right  Left Right Left Right Left Right Left Right Left     T12             Rectus Abdominus 5/5                     L1              Paraspinals 5/5        5/5                    L2              Hip Flexion 5/5       5/5                    L3             Quads  5/5        5/5                    L4              Anterior Tibialis  5/5        5/5                    L5             Gr. Toe Extension  5/5         5/5                    S1            Gastroc/Sol/Peroneals 5/5        5/5                                     Functional Strength:                    Single LegStanceTime (seconds) R:30   L:30   R:      L: R:      L: R:     L: R:      L: R:      L:  R:       L:   R:      L:   R:      L:   R:      L: R:      L:   R:      L:   R:      L:    R:      L:    R:      L:     Pelvic Floor Isolation (seconds) -                    Inner Unit  Isolation (seconds) -                                   Functional Activities:                     Sit w/o pain? Pain increases (minutes) yes Yes/ 60 min Yes/ 60 min                  Stand w/o pain? yes Yes/ > 1 hr Yes/  > 1 hr                  Walk w/o pain? yes Yes/ no limit  yes/ no limit unless right hip flare up                  Steps w/o pain? no no  yes                  Drive w/o pain? Yes/ pain getting out of car  yes/ pain getting out of car   yes/ 60 min                  Work w/o pain? Yes/ pain getting out of chair Yes/ 60 min, pain getting out of chair Yes/ 60 min                  # times wakes w/ pain? 2-3x   0-1x      0x                        05/20/2021 07/27/21 09/07/21                 SI Joint Positioning  Right  Left Right  Left Right  Left Right  Left Right  Left Right  Left Right  Left Right  Left Right  Left Right  Left Right  Left Right  Left Right  Left Right Left Right Left Right Left Right  Left       Innominate                           Anterior                 x               x    x                         Posterior     x   x              x                         Sacrum                              Unilateral rotation    x   x   x                                        SI Joint Testing  Right  Left Right  Left Right  Left Right  Left Right  Left Right  Left Right  Left Right  Left Right  Left Right  Left Right  Left Right  Left Right  Left Right Left Right Left Right Left Right  Left           Distraction   +          +   +           +   +        +                         Kyra's   -           -                           Compression   -           -                            Prone Press Up   -            -                                       Special Tests  Right   Left Right  Left Right  Left Right  Left Right  Left Right  Left Right Left  Right  Left Right  Left Right  Left Right  Left Right  Left Right  Left Right Left Right Left Right Left Right Left       Straight Leg Raise                                    Active   -          -                             Passive   -            -                            Hip Scour Test   +          -   +          -  +         -                                          Baker's Cysts   -           -   -         -                      Palpation:         Muscle/Bone Irritation  Date 05/20/2021 07/27/21 09/07/21                  Right  Left Right  Left Right  Left Right  Left Right  Left Right  Left Right  Left Right  Left Right  Left Right  Left Right  Left Right  Left Right  Left Right Left Right Left Right Left Right  Left   SI joint  +           -   Sl           -   -            -                 Piriformis  sl          -   Sl         -  -            -                 Gr. Trochanter  +          -   Sl           -  -           -                 IT Band  -           -   -           -   -           -                 Quadratus Lumborum  -             -   -           -    +         -                 Ischial Tuberosity  -            -   -          -   -           -                 Sacrococcygeal Ligs  -            -   -             -   -           -                 Paraspinals  -           -    +          _                 Spinous Processes                                       T-spine rotated to   -           -   -           -   -            -                        L-spine rotated to  -           -   -            -  L 3-5                  Adductor Franck  -          -   -           -   -           -                 Iliopsoas  +          -   Sl        -   -           -                 Quad Origin  -          -   -             -   -            -                 Pubic Symphysis        -            Not tested Not tested                                                            All 4s Positioning: Pt not able to assume full lumbar extension in this position  Leg Length:  Equal       PLAN OF CARE:    ASSESSMENT:  Problem List:   1.  Positive Hip Scour sign on right  2.  Slight SI joint dysfunction    Discussion:    Assessment of both shoulders reveals full AROM of right shoulder.    However, there is decreased range in the left shoulder at:    Flexion 168;    Abduction 165,  Internal rotation  70               External rotation  82  Assessment her right wrist reveals pain on palpation or right ulnar collateral ligament.   Assessment of her right shoulder reveals no pain on palpation.     The random onset and resolution of pain in multiple joints does seem to correlate when she is at mid pill pack cycle.  She will continue to track this pain pattern and discuss this with her GYN on her next visit.     She has improved in her overall low back pain as her Oswestry Low Back Pain Score has improved to 11/100.    Reviewed using diaphragmatic breathing to engage the inner unit and breathe with lower costals. She has improved significantly in this.     Progressed engaging inner unit and then perform capital nodding to increase endurance of capital flexors on top of inner unit engagement.  She had trouble with this as she tends to inappropriately engage the SCM instead of the capital flexors.  She also needed cueing to focus on the inner unit and capital flex on the inner unit.      Also progressed spinal stabilization to bridging with inner unit engaged and glut max isolation.  This was very hard for her to do but with cueing she was able to execute correctly sensing proper use of glut max and not inappropriately use the hamstrings.  She could tell when did incorrectly as she would get spasm on both hamstrings.     Continue progressing spinal stabilization.        Short Term Goals: (4-6 weeks)                                Date Met:                1.   pt independent in postural correction      07/27/21      2.   pt independent in SI joint self-corrections     07/27/21  3.   pt independent in exer to decrease post. disc pressures  4.   pt able to sleep through night without pain     09/07/21  5.   pt able to move sit to stand w/o catching after 5 minutes  6.   Decrease Oswestry Pain Score to no greater than 10/100  7.  Pt able to engage TA and pelvic floor on exhale of diaphr breath x3  09/07/21  8.  Pt able to take 3 lower costal breaths with TA and pelvic fl engaged  09/07/21  9.  Pt able to perform capital flexion on inner unit engagement x3  10.  Pt able to perform bridging with inner unit engaged, isolating glut max x3    Long Term Goals:(3-5 months)       Date Met:      1.  pt independent in self-management of symptoms       2.  pt independent in home program      3.  pt able to work 2 hours without pain on sit to stand        Treatment Plan:   1.  Ultrasound to increase extensibility, decrease pain, if needed  2.  Electrical stimulation for muscle relaxation, decrease pain, if needed, iontophoresis  3.  Manual therapy to increase function, decrease pain  4.  Therapeutic exercise to increase function, decrease pain  5.  Home programming and d/c planning to increase function and decrease pain    Frequency & Duration:  Pt will be seen on a once/ 2-4 week basis for 6 more visits    Patient Participated in and Agrees With This Plan of Care and Goals:  YES  Rehab Potential:  ernestine Rice, PT, MS  Physical Therapist  [place]# [id number]    TREATMENT TODAY:    Total Treatment Time: (time in clinic)     60 min   Timed Code Treatment Minutes:   60    Manual Therapy:  (MA)  RX min:  20  Manual posterior rotation of right innominate    Positional Isometric contraction (PIC) of left iliopsoas    Positional Isometric contraction of (PIC) right gluteus minimus     Distraction of both SI jts in open pack hip position  Piriformis stretching, bilaterally    Quadratus lumborum stretching, bilaterally    Anterior/Inferior Mobilization of  right hip      Therapeutic Activities:  (TA)  RX min:      Neuromuscular Reeducation: (NMR)  RX min: 40    Ultrasound:  RX min:          1.6 grey/cm2       Location:     Iontophoresis:  6 hr patch                                Location:                               Procedures:      Key:  i=instructed        r=review        c=corrected        a=adapted   Code Procedure/Instruction 05/20/2021 07/27/21 09/07/21                TA         Sleeping Positions instructed               review                 TA Sternum-Up Posture                   TA SI jt. self-correction instructed review                 TA Lumbar Facet PIC                   TA   Order of Self-Corrections instructed review                 TA Use of lumbar pillow instructed                  TA Use of cervical roll                   TA Use of orthotics                   TA Use of SI belt                   TA Use of Nada chair                   TA Use of Ice                   TA Use of [employer]/ heat                   TA Use of TENS unit                   TA Use of iontophoresis                   TA Decreasing Disc Pressures                                                           NMR Diaphragmatic Breathing  instruct review                NMR Diaphrag breath engage TA, 3 lower costal breaths  instruct correct                NMR Capital flexion with inner unit on   instructed                NMR Bridging with inner unit on isolating glut max   instructed                                    NMR Pelvic Floor Isolation  review review                NMR Transverse Abdominus  review review                NMR Multifidus Isolation  review review                NMR InnerUnit against Gravity                   NMR Sit-stand w/ inner unit                   NMR Roll w/ inner unit                    NMR Walk w/ inner unit                    NMR Up/down steps w/ inner unit                   NMR Water Exer Instruction                   NMR Hip Abd w/o piriformis                   NMR Hip Add w/o iliop/ham                    NMR Lower abs in supine                   NMR Abd obliques in supine                   NMR Prone Knee Flexion                   NMR Prone glut jasmina                   NMR Retro Step                   NMR Retro Walk                   NMR Retro walk on treadmill                   NMR Forward walk w/ inner unit                   NMR Balance Instruction                   NMR Stability Ball A/P & Lateral                   NMR Ball Catch/Throw /Supine                   NMR Ball Catch/Throw /Stand                   NMR StabilityBall All 4's Arm Lift                   NMR StabilityBall Prone Walk Out                   NMR StabilityBall Supine Oblique                   NMR Stability Ball sit-supine-sit                   NMR Walking Prog Progression                   MNR Home program sequencing                                       TA Hamstring stretch                   TA Hip Ext Rot Stretch                   TA Hip Int Rot Stretch                   TA Hip Flex/IT Stretch                   TA Hip Add Stretch                   TA Lats Dorsi Stretch                   TA Gastroc/soleus stretch                   TA QuadratusLumborm Stretch                      Supine                      Stance                   TA Quad Stretch                                       NMR Wall Push Ups                   NMR Planking                   NMR BOSU Ball Exercises                   NMR Lateral Bridge Drop                   NMR [place]                   NMR O'Feldt Lat Pull Down                   NMR O'Feldt Hip Ext                   NMR O'Feldt Trunk Ext                                       TA CervDiscExtSup/stnd                   TA Cerv Stretches                   TA Self PIC Cervical Spine                    TA Neural Gliding                   TA Ant/Mid Scalene Strch                   TA Biceps stretch                   TA Rotator Cuff Stretch                   TA Anterior Chest Stretch                   TA Wrist Ext Stretch                                       NMR Prone Arm Lifts                   NMR Scapula Stabilization                   NMR Occulomotor Isometrics                   NMR Cervical Isometrics                                       TA Shld AROM w/bar                   TA Shld Capsular Stretching                   TA Self PIC Thor Spine                   TA Rot Cuff Therabd, beginner                   TA Rot Cuff Therabd, intermed                                                                                                                                                                                                                                                 Selma Rice, PT, MS  Physical Therapist  [place]# [id number]

## 2021-11-04 ENCOUNTER — TELEPHONE (OUTPATIENT)
Dept: PHYSICAL THERAPY | Facility: CLINIC | Age: 51
End: 2021-11-04

## 2021-12-07 ENCOUNTER — TREATMENT (OUTPATIENT)
Dept: PHYSICAL THERAPY | Facility: CLINIC | Age: 51
End: 2021-12-07

## 2021-12-07 DIAGNOSIS — M53.3 SACROILIAC JOINT DYSFUNCTION: Primary | ICD-10-CM

## 2021-12-07 PROCEDURE — 97140 MANUAL THERAPY 1/> REGIONS: CPT | Performed by: PHYSICAL THERAPIST

## 2021-12-07 PROCEDURE — 97530 THERAPEUTIC ACTIVITIES: CPT | Performed by: PHYSICAL THERAPIST

## 2021-12-07 NOTE — PROGRESS NOTES
Physical Therapy Re-Evaluation          SUBJECTIVE:   Changes since last seen:  Debra was self managing her symptoms fairly well however she had noticed a recent progression of pain in the left hip when she would externally rotates and lift her left foot to put on her left shoe.   She notes she stopped taking her birth control pills right before Thanksgiving.   After putting up her [place] tree she realized she had tweaked her low back.   She starting using her SI belt again on Sunday then yesterday she had a significant right lateral shift of her pelvis and her pain was so bad she was not able to go to work.       Current level of pain:     5-6/10  Bilateral SI joints             Lowest level of pain since last seen:   0/10 Prior to last week           Highest level of pain since last seen:       8-9/10            Past Medical History:  1.  Hx of 2 C-sections  2.  Hx of 3 pregnancies  3.  Hx of regular menses, last menses 12/04/16  4.  Hx of AA, 2001  5.  Hx of kidney stones    Functional Limitations:  Stairs, sit to stand, prolonged sitting  Patient Goals for Physical Therapy:      OBJECTIVE:    Observation:   Standing posture:  Left shoulder elevated, severe right lateral shift of the pelvis and slight posterior tilt.        05/20/2021 07/27/21 09/07/21 12/07/21                SI Joint Positioning  Right  Left Right  Left Right  Left Right  Left Right  Left Right  Left Right  Left Right  Left Right  Left Right  Left Right  Left Right  Left Right  Left Right Left Right Left Right Left Right  Left       Innominate                           Anterior                 x               x    x  x                       Posterior     x   x              x                x                     Sacrum                              Unilateral rotation    x   x   x   x                                       SI Joint Testing  Right  Left Right  Left Right  Left Right  Left Right  Left Right  Left Right  Left Right  Left Right   Left Right  Left Right  Left Right  Left Right  Left Right Left Right Left Right Left Right  Left           Distraction   +          +   +           +   +        +   +          +                        Kyra's   -           -     +         +                        Compression   -           -     -          -                        Prone Press Up   -            -     +          +                                    Special Tests  Right   Left Right  Left Right  Left Right  Left Right  Left Right  Left Right Left  Right  Left Right  Left Right  Left Right  Left Right  Left Right  Left Right Left Right Left Right Left Right Left       Straight Leg Raise                                    Active   -          -     -          -                        Passive   -            -     -         -                    Hip Scour Test   +          -   +          -  +         -    +       -                                    Baker's Cysts   -           -   -         -    -          -                    Palpation:         Muscle/Bone Irritation  Date 05/20/2021 07/27/21 09/07/21 12/07/21                 Right  Left Right  Left Right  Left Right  Left Right  Left Right  Left Right  Left Right  Left Right  Left Right  Left Right  Left Right  Left Right  Left Right Left Right Left Right Left Right  Left   SI joint  +           -   Sl           -   -            -   +         sl                Piriformis  sl          -   Sl         -  -            -    +        +                Gr. Trochanter  +          -   Sl           -  -           -    -        +                IT Band  -           -   -           -   -           -    -         -                Quadratus Lumborum  -             -   -           -    +         -   +        +                Ischial Tuberosity  -            -   -          -   -           -    -        -                Sacrococcygeal Ligs  -            -   -             -   -           -   -         -                 Paraspinals  -           -    +          _   +        sl                Spinous Processes                                       T-spine rotated to   -           -   -           -   -            -    -                       L-spine rotated to  -           -   -            -  L 3-5  L3-5                Adductor Franck  -          -   -           -   -           -                 Iliopsoas  +          -   Sl        -   -           -    +        sl                Quad Origin  -          -   -             -   -           -                 Pubic Symphysis        -            Not tested Not tested          +                                                                Monthly Objective Measurement/Functional Activity  Date: 05/20/2021 07/27/21 09/07/21 12/07/21              Oswestry Pain Score 12/100 20/100 11/100 50/100              LE Functional Scale                                    AROM Lumbar Spine: Degrees   Degrees      Degrees      Degrees      Degrees    Degrees Degrees Degrees Degrees Degrees Degrees Degrees Degrees Degrees Degrees      Flexion 135 pain ant hip           140      142        41 p                 Extension 35           32       32        19p                 Right Lat. Flexion 45           43       55        21p                 Left Lat. Flexion 41           43       40        24 p                 Right Lat Shift Pelvis 1st one pain    Right ant thigh strain  No pain  Decrease on R but pain down left leg                  Left Lat Shift Pelvis No pain   No pain  no pain  Low back pain                                AROM Hips:  (degrees) Right      Left Right Left Right  Left Right  Left Right  Left Right  Left Right Left Right Left Right Left Right Left Right  Left Right Left Right Left Right Left Right Left      Flexion 115       120  120   125    126   123 115      110                 Abduction 42       45   45     47    45       46   40      44                 Int. Rotation 28         40   32      40   35      40  25        38                 Ext. Rotation  32       33    42     44    45       46  40        42                                Flexibility:   (degrees) Right    Left Right  Left Right  Left Right  Left Right  Left Right  Left Right Left Right Left Right Left Right Left Right  Left Right Left Right Left Right Left Right Left      Hamstrings -18       -18    -15    -16   - 15     - 15  -20     -18                 Quadriceps 85       80    85    82    85        85    78     51                 HipExt/Rot(piriformis) 32       46 40       45  45       45   41      40                 Hip Int/Rotators 30       22  32      28  33       32    42      35                  Hip Flexors (iliopsoas) -6       -8  not tested Not tested Not tested                 IT Band -5       -1  not tested  not tested  not tested                                Root Level  - Motor Right      Left Right  Left Right  Left Right  Left Right  Left Right  Left Right Left Right Left Right Left Right Left Right  Left Right Left Right Left Right Left Right Left     T12             Rectus Abdominus 5/5         5/5                 L1              Paraspinals 5/5        5/5     5/5      5/5                 L2              Hip Flexion 5/5       5/5   4+/5p    5/5                 L3             Quads  5/5        5/5   5/5       5/5                 L4              Anterior Tibialis  5/5        5/5   5/5         5/5                 L5             Gr. Toe Extension  5/5         5/5   5/5      5/'5                 S1            Gastroc/Sol/Peroneals 5/5        5/5    5/5     5?5                                Functional Strength:                    Single LegStanceTime (seconds) R:30   L:30   R:      L: R:      L: R: 15     L:20 R:      L: R:      L:  R:       L:   R:      L:   R:      L:   R:      L: R:      L:   R:      L:   R:      L:    R:      L:    R:      L:     Pelvic Floor Isolation (seconds) -                    Inner  Unit  Isolation (seconds) -                                   Functional Activities:                     Sit w/o pain? Pain increases (minutes) yes Yes/ 60 min Yes/ 60 min No/ 60 min                 Stand w/o pain? yes Yes/ > 1 hr Yes/  > 1 hr No/ extra pain                 Walk w/o pain? yes Yes/ no limit  yes/ no limit unless right hip flare up No/ 1/4 mile                 Steps w/o pain? no no  yes no                 Drive w/o pain? Yes/ pain getting out of car  yes/ pain getting out of car   yes/ 60 min  no/ 60 min                 Work w/o pain? Yes/ pain getting out of chair Yes/ 60 min, pain getting out of chair Yes/ 60 min No/ 60 min                 # times wakes w/ pain? 2-3x   0-1x      0x  every time up to BR x2                     PLAN OF CARE:    ASSESSMENT:  Problem List:   1.  Significant flare up of SI joint dysfunction  2.  Slight ositive Hip Scour sign on right      Discussion:    Debra present with an anterior rotation of the right innominate and a corresponding posterior rotation of the left innominate.   There is a significant right unilateral sacral rotation.     Manual work was done slowly, with gentle stretching as she presents with severe guarding of both quadratus lumborums, right> left, causing facet restriction at L3-5 with spinous processes rotated to the right.    Debra was over engaging the low back musculature when attempting facet self corrections.      By the end of the manual work she had soreness but no catching and no severe pain. Then had her sit on edge of treatment table and showed how to put right foot up on a stool to self correct when this acute.  She flexed from her hips to 120 degrees and experienced a click in the right SI joint and severe pain.  It took a few minutes for this to subside and then she noted she only had the soreness again and felt like it was slightly improved.  However, she still had a right lateral shift of her pelvis, although slightly less than  when she arrived.       Instructed in using ice on her SI joints and simultaneously using heat on both quadratus lumborums.     It is possible that the confluence of hormonal changes, having stopped her birth control pills, and putting up her [place] tree, lifting it trying to get it straight in the stand, strained the Si ligaments and possibly slightly bulging the lumbar disc, although she has no significant disc symptoms today, she presents with more compensatory shifting of the pelvis straining the SI ligaments .     Recommend she not work this week.  Use the ice and heat and positioning over the next 2 days and then reassess and proceed accordingly.         Short Term Goals: (4-6 weeks)                                 Date Met:                1.   pt independent in postural correction       07/27/21      2.   pt independent in SI joint self-corrections      07/27/21  3.   pt independent in exer to decrease post. disc pressures  4.   pt able to sleep through night without pain      09/07/21  5.   pt able to move sit to stand w/o catching after 5 minutes  6.   Decrease Oswestry Pain Score to no greater than 10/100  7.  Pt able to engage TA and pelvic floor on exhale of diaphr breath x3   09/07/21  8.  Pt able to take 3 lower costal breaths with TA and pelvic fl engaged   09/07/21  9.  Pt able to perform capital flexion on inner unit engagement x3  10.  Pt able to perform bridging with inner unit engaged, isolating glut max x3  11.  Pt to adapt SI joint self corrections reducing SI joint pain no greater than 2/10    Long Term Goals:(3-5 months)       Date Met:      1.  pt independent in self-management of symptoms       2.  pt independent in home program      3.  pt able to work 2 hours without pain on sit to stand        Treatment Plan:   1.  Ultrasound to increase extensibility, decrease pain, if needed  2.  Electrical stimulation for muscle relaxation, decrease pain, if needed, iontophoresis  3.  Manual  therapy to increase function, decrease pain  4.  Therapeutic exercise to increase function, decrease pain  5.  Home programming and d/c planning to increase function and decrease pain    Frequency & Duration:  Pt will be seen on a once/ 2-4 week basis for 5 more visits    Patient Participated in and Agrees With This Plan of Care and Goals:  YES  Rehab Potential:  ernestine Rice, PT, MS  Physical Therapist  [place]# [id number]    TREATMENT TODAY:    Total Treatment Time: (time in clinic)     60 min   Timed Code Treatment Minutes:   60    Manual Therapy:  (MA)  RX min:  50  Manual posterior rotation of right innominate    Positional Isometric contraction (PIC) of left iliopsoas    Positional Isometric contraction of (PIC) right gluteus minimus    Distraction of both SI jts in open pack hip position  Piriformis stretching, bilaterally    Quadratus lumborum stretching, bilaterally    Anterior/Inferior Mobilization of  right hip      Therapeutic Activities:  (TA)  RX min:  10    Neuromuscular Reeducation: (NMR)  RX min:     Ultrasound:  RX min:          1.6 grey/cm2       Location:     Iontophoresis:  6 hr patch                                Location:                               Procedures:      Key:  i=instructed        r=review        c=corrected        a=adapted   Code Procedure/Instruction 05/20/2021 07/27/21 09/07/21 12/07/21               TA         Sleeping Positions instructed               review                 TA Sternum-Up Posture                   TA SI jt. self-correction instructed review  adapt               TA Lumbar Facet PIC    instruct               TA   Order of Self-Corrections instructed review                 TA Use of lumbar pillow instructed                  TA Use of cervical roll                   TA Use of orthotics                   TA Use of SI belt                   TA Use of Nada chair                   TA Use of Ice    instruct               TA Use of [employer]/ heat    instruct                TA Use of TENS unit                   TA Use of iontophoresis                   TA Decreasing Disc Pressures                                                           NMR Diaphragmatic Breathing  instruct review                NMR Diaphrag breath engage TA, 3 lower costal breaths  instruct correct                NMR Capital flexion with inner unit on   instructed                NMR Bridging with inner unit on isolating glut max   instructed                                    NMR Pelvic Floor Isolation  review review                NMR Transverse Abdominus  review review                NMR Multifidus Isolation  review review                NMR InnerUnit against Gravity                   NMR Sit-stand w/ inner unit                   NMR Roll w/ inner unit                   NMR Walk w/ inner unit                    NMR Up/down steps w/ inner unit                   NMR Water Exer Instruction                   NMR Hip Abd w/o piriformis                   NMR Hip Add w/o iliop/ham                    NMR Lower abs in supine                   NMR Abd obliques in supine                   NMR Prone Knee Flexion                   NMR Prone glut jasmina                   NMR Retro Step                   NMR Retro Walk                   NMR Retro walk on treadmill                   NMR Forward walk w/ inner unit                   NMR Balance Instruction                   NMR Stability Ball A/P & Lateral                   NMR Ball Catch/Throw /Supine                   NMR Ball Catch/Throw /Stand                   NMR StabilityBall All 4's Arm Lift                   NMR StabilityBall Prone Walk Out                   NMR StabilityBall Supine Oblique                   NMR Stability Ball sit-supine-sit                   NMR Walking Prog Progression                   MNR Home program sequencing                                       TA Hamstring stretch                   TA Hip Ext Rot Stretch                   TA Hip Int  Rot Stretch                   TA Hip Flex/IT Stretch                   TA Hip Add Stretch                   TA Lats Dorsi Stretch                   TA Gastroc/soleus stretch                   TA QuadratusLumborm Stretch                      Supine                      Stance                   TA Quad Stretch                                       NMR Wall Push Ups                   NMR Planking                   NMR BOSU Ball Exercises                   NMR Lateral Bridge Drop                   NMR Waiters Purdin                   NMR O'Feldt Lat Pull Down                   NMR O'Feldt Hip Ext                   NMR O'Feldt Trunk Ext                                       TA CervDiscExtSup/stnd                   TA Cerv Stretches                   TA Self PIC Cervical Spine                   TA Neural Gliding                   TA Ant/Mid Scalene Strch                   TA Biceps stretch                   TA Rotator Cuff Stretch                   TA Anterior Chest Stretch                   TA Wrist Ext Stretch                                       NMR Prone Arm Lifts                   NMR Scapula Stabilization                   NMR Occulomotor Isometrics                   NMR Cervical Isometrics                                       TA Shld AROM w/bar                   TA Shld Capsular Stretching                   TA Self PIC Thor Spine                   TA Rot Cuff Therabd, beginner                   TA Rot Cuff Therabd, intermed                                                                                                                                                                                                                                                 Selma Rice, PT, MS  Physical Therapist  [place]# [id number]

## 2021-12-09 ENCOUNTER — TREATMENT (OUTPATIENT)
Dept: PHYSICAL THERAPY | Facility: CLINIC | Age: 51
End: 2021-12-09

## 2021-12-09 DIAGNOSIS — M53.3 SACROILIAC JOINT DYSFUNCTION: Primary | ICD-10-CM

## 2021-12-09 PROCEDURE — 97140 MANUAL THERAPY 1/> REGIONS: CPT | Performed by: PHYSICAL THERAPIST

## 2021-12-09 PROCEDURE — 97112 NEUROMUSCULAR REEDUCATION: CPT | Performed by: PHYSICAL THERAPIST

## 2021-12-09 NOTE — PROGRESS NOTES
Physical Therapy Note          SUBJECTIVE:   Changes since last seen:  Debra states she had not remembered she shouldn't sleep with SI belt on, once she took it off when in supine she did feel better.   She not longer has burning in the quadratus lumborum.   She is not able to put her left shoe with less pain.   She has still had pain but only one episode of muscles grabbing when she bent down a little to interact with her cat.   She states she is still taking Robaxin.  She notes that when standing with right foot up on a stool it feels slightly better than when left foot is up on a stool.   She has not been doing the lumbar facets self corrections.     Current level of pain:    4-5/10  Lowest level of pain since last seen:  2-3/10 laying or during a short sit   Highest level of pain since last seen:           7/10 at night when rolls       Past Medical History:  1.  Hx of 2 C-sections  2.  Hx of 3 pregnancies  3.  Hx of regular menses, last menses 12/04/16  4.  Hx of AA, 2001  5.  Hx of kidney stones    Functional Limitations:  Stairs, sit to stand, prolonged sitting  Patient Goals for Physical Therapy:      OBJECTIVE:    Observation:   Standing posture:  Left shoulder elevated, severe right lateral shift of the pelvis and slight posterior tilt.        05/20/2021 07/27/21 09/07/21 12/07/21 12/09/21               SI Joint Positioning  Right  Left Right  Left Right  Left Right  Left Right  Left Right  Left Right  Left Right  Left Right  Left Right  Left Right  Left Right  Left Right  Left Right Left Right Left Right Left Right  Left       Innominate                           Anterior                 x               x    x  x   x                      Posterior     x   x              x                x                 x                  Sacrum                              Unilateral rotation    x   x   x   x   x                                      SI Joint Testing  Right  Left Right  Left Right  Left Right  Left  Right  Left Right  Left Right  Left Right  Left Right  Left Right  Left Right  Left Right  Left Right  Left Right Left Right Left Right Left Right  Left           Distraction   +          +   +           +   +        +   +          +  +          +                       Kyra's   -           -     +         +   +          +                       Compression   -           -     -          -   -           -                       Prone Press Up   -            -     +          +   -          -                                   Special Tests  Right   Left Right  Left Right  Left Right  Left Right  Left Right  Left Right Left  Right  Left Right  Left Right  Left Right  Left Right  Left Right  Left Right Left Right Left Right Left Right Left       Straight Leg Raise                                    Active   -          -     -          -   -          -                       Passive   -            -     -         -   -          -                   Hip Scour Test   +          -   +          -  +         -    +       -   +           -                                   Baker's Cysts   -           -   -         -    -          -   -          -                   Palpation:         Muscle/Bone Irritation  Date 05/20/2021 07/27/21 09/07/21 12/07/21 12/09/21                Right  Left Right  Left Right  Left Right  Left Right  Left Right  Left Right  Left Right  Left Right  Left Right  Left Right  Left Right  Left Right  Left Right Left Right Left Right Left Right  Left   SI joint  +           -   Sl           -   -            -   +         sl    +        +               Piriformis  sl          -   Sl         -  -            -    +        +   +         ++               Gr. Trochanter  +          -   Sl           -  -           -    -        +    -           -               IT Band  -           -   -           -   -           -    -         -    -          -               Quadratus Lumborum  -             -   -           -    +          -   +        + + trv p    -               Ischial Tuberosity  -            -   -          -   -           -    -        -   -           -               Sacrococcygeal Ligs  -            -   -             -   -           -   -         -   -          -               Paraspinals  -           -    +          -   +        sl   +         -               Spinous Processes                                       T-spine rotated to   -           -   -           -   -            -    -        -   -          -                      L-spine rotated to  -           -   -            -  L 3-5  L3-5 L2-5               Adductor Franck  -          -   -           -   -           -   -           -               Iliopsoas  +          -   Sl        -   -           -    +        sl   Sl         -               Quad Origin  -          -   -             -   -           -                 Pubic Symphysis        -            Not tested Not tested          +         sl                                                              Monthly Objective Measurement/Functional Activity  Date: 05/20/2021 07/27/21 09/07/21 12/07/21              Oswestry Pain Score 12/100 20/100 11/100     50/100              LE Functional Scale                                    AROM Lumbar Spine: Degrees   Degrees      Degrees      Degrees      Degrees    Degrees Degrees Degrees Degrees Degrees Degrees Degrees Degrees Degrees Degrees      Flexion 135 pain ant hip           140      142        41 p                 Extension 35           32       32        19p                 Right Lat. Flexion 45           43       55        21p                 Left Lat. Flexion 41           43       40        24 p                 Right Lat Shift Pelvis 1st one pain    Right ant thigh strain  No pain  Decrease on R but pain down left leg                  Left Lat Shift Pelvis No pain   No pain  no pain  Low back pain                                AROM Hips:  (degrees) Right       Left Right Left Right  Left Right  Left Right  Left Right  Left Right Left Right Left Right Left Right Left Right  Left Right Left Right Left Right Left Right Left      Flexion 115       120  120   125    126   123 115      110                 Abduction 42       45   45     47    45       46   40      44                 Int. Rotation 28        40   32      40   35      40  25        38                 Ext. Rotation  32       33    42     44    45       46  40        42                                Flexibility:   (degrees) Right    Left Right  Left Right  Left Right  Left Right  Left Right  Left Right Left Right Left Right Left Right Left Right  Left Right Left Right Left Right Left Right Left      Hamstrings -18       -18    -15    -16   - 15     - 15  -20     -18                 Quadriceps 85       80    85    82    85        85    78     51                 HipExt/Rot(piriformis) 32       46 40       45  45       45   41      40                 Hip Int/Rotators 30       22  32      28  33       32    42      35                  Hip Flexors (iliopsoas) -6       -8  not tested Not tested Not tested                 IT Band -5       -1  not tested  not tested  not tested                                Root Level  - Motor Right      Left Right  Left Right  Left Right  Left Right  Left Right  Left Right Left Right Left Right Left Right Left Right  Left Right Left Right Left Right Left Right Left     T12             Rectus Abdominus 5/5         5/5                 L1              Paraspinals 5/5        5/5     5/5      5/5                 L2              Hip Flexion 5/5       5/5   4+/5p    5/5                 L3             Quads  5/5        5/5   5/5       5/5                 L4              Anterior Tibialis  5/5        5/5   5/5         5/5                 L5             Gr. Toe Extension  5/5         5/5   5/5      5/'5                 S1            Gastroc/Sol/Peroneals 5/5        5/5    5/5     5?5                                 Functional Strength:                    Single LegStanceTime (seconds) R:30   L:30   R:      L: R:      L: R: 15     L:20 R:      L: R:      L:  R:       L:   R:      L:   R:      L:   R:      L: R:      L:   R:      L:   R:      L:    R:      L:    R:      L:     Pelvic Floor Isolation (seconds) -                    Inner Unit  Isolation (seconds) -                                   Functional Activities:                     Sit w/o pain? Pain increases (minutes) yes Yes/ 60 min Yes/ 60 min No/ 60 min                 Stand w/o pain? yes Yes/ > 1 hr Yes/  > 1 hr No/ extra pain                 Walk w/o pain? yes Yes/ no limit  yes/ no limit unless right hip flare up No/ 1/4 mile                 Steps w/o pain? no no  yes no                 Drive w/o pain? Yes/ pain getting out of car  yes/ pain getting out of car   yes/ 60 min  no/ 60 min                 Work w/o pain? Yes/ pain getting out of chair Yes/ 60 min, pain getting out of chair Yes/ 60 min No/ 60 min                 # times wakes w/ pain? 2-3x   0-1x      0x  every time up to BR x2                     PLAN OF CARE:    ASSESSMENT:  Problem List:   1.  Significant flare up of SI joint dysfunction  2.  Slight positive Hip Scour sign on right      Discussion:    Debra presents with less guarding and only a slight right lateral shift of the pelvis compared to the severe lateral shift she presented with 2 days ago.     Able to perform manual posterior rotation of the right innominate without any guarding or pain. She was able to perform positional isometric contraction of the left psoas without pain today.   Performed positional isometric contraction for the right glut min without pain today.     Reviewed diaphragmatic breathing to engage the inner unit.  She did well with this, without pain.   She has resumed capital nodding with inner unit engaged.  She needed correction of this to properly engage the capital flexors while engaging the inner  "unit and rectus abdominus to stabilize her spine.     Progressed spinal stabilization to supine \"scissor arm\" with 1-2 lb wts.  She was able to execute this correctly.     She will continue with icing, self corrections and spinal stabilization exercises. Progress spinal stabilization on next visit.       Short Term Goals: (4-6 weeks)                                 Date Met:                1.   pt independent in postural correction       07/27/21      2.   pt independent in SI joint self-corrections      07/27/21  3.   pt independent in exer to decrease post. disc pressures  4.   pt able to sleep through night without pain      09/07/21  5.   pt able to move sit to stand w/o catching after 5 minutes  6.   Decrease Oswestry Pain Score to no greater than 10/100  7.  Pt able to engage TA and pelvic floor on exhale of diaphr breath x3   09/07/21  8.  Pt able to take 3 lower costal breaths with TA and pelvic fl engaged   09/07/21  9.  Pt able to perform capital flexion on inner unit engagement x3  10.  Pt able to perform bridging with inner unit engaged, isolating glut max x3  11.  Pt to adapt SI joint self corrections reducing SI joint pain no greater than 2/10    Long Term Goals:(3-5 months)       Date Met:      1.  pt independent in self-management of symptoms       2.  pt independent in home program      3.  pt able to work 2 hours without pain on sit to stand        Treatment Plan:   1.  Ultrasound to increase extensibility, decrease pain, if needed  2.  Electrical stimulation for muscle relaxation, decrease pain, if needed, iontophoresis  3.  Manual therapy to increase function, decrease pain  4.  Therapeutic exercise to increase function, decrease pain  5.  Home programming and d/c planning to increase function and decrease pain    Frequency & Duration:  Pt will be seen on a once/ 2-4 week basis for 4 more visits    Patient Participated in and Agrees With This Plan of Care and Goals:  YES  Rehab Potential:  " ernestine Rice, PT, MS  Physical Therapist  [place]# [id number]    TREATMENT TODAY:    Total Treatment Time: (time in clinic)     60 min   Timed Code Treatment Minutes:   60    Manual Therapy:  (MA)  RX min:  35  Manual posterior rotation of right innominate    Positional Isometric contraction (PIC) of left iliopsoas    Positional Isometric contraction of (PIC) right gluteus minimus    Distraction of both SI jts in open pack hip position  Piriformis stretching, bilaterally    Quadratus lumborum stretching, bilaterally    Hip internal rotator stretching  Hamstring stretching  Anterior/Inferior Mobilization of  right hip      Therapeutic Activities:  (TA)  RX min:      Neuromuscular Reeducation: (NMR)  RX min: 25    Ultrasound:  RX min:          1.6 grey/cm2       Location:     Iontophoresis:  6 hr patch                                Location:                               Procedures:      Key:  i=instructed        r=review        c=corrected        a=adapted   Code Procedure/Instruction 05/20/2021 07/27/21 09/07/21 12/07/21 12/09/21              TA         Sleeping Positions instructed               review                 TA Sternum-Up Posture                   TA SI jt. self-correction instructed review  adapt               TA Lumbar Facet PIC    instruct               TA   Order of Self-Corrections instructed review                 TA Use of lumbar pillow instructed                  TA Use of cervical roll                   TA Use of orthotics                   TA Use of SI belt                   TA Use of Nada chair                   TA Use of Ice    instruct               TA Use of [employer]/ heat    instruct               TA Use of TENS unit                   TA Use of iontophoresis                   TA Decreasing Disc Pressures                                                           NMR Diaphragmatic Breathing  instruct review  correct              NMR Diaphrag breath engage TA, 3 lower costal breaths   "instruct correct  review              NMR Capital flexion with inner unit on   instructed  review              NMR Bridging with inner unit on isolating glut max   instructed  Adapt to no raising, just glut max isola              NMR Supine \"scissor arm\" w/ IU engaged     instruct              NMR Pelvic Floor Isolation  review review  review              NMR Transverse Abdominus  review review  review              NMR Multifidus Isolation  review review  review              NMR InnerUnit against Gravity                   NMR Sit-stand w/ inner unit                   NMR Roll w/ inner unit                   NMR Walk w/ inner unit                    NMR Up/down steps w/ inner unit                   NMR Water Exer Instruction                   NMR Hip Abd w/o piriformis                   NMR Hip Add w/o iliop/ham                    NMR Lower abs in supine                   NMR Abd obliques in supine                   NMR Prone Knee Flexion                   NMR Prone glut jasmina                   NMR Retro Step                   NMR Retro Walk                   NMR Retro walk on treadmill                   NMR Forward walk w/ inner unit                   NMR Balance Instruction                   NMR Stability Ball A/P & Lateral                   NMR Ball Catch/Throw /Supine                   NMR Ball Catch/Throw /Stand                   NMR StabilityBall All 4's Arm Lift                   NMR StabilityBall Prone Walk Out                   NMR StabilityBall Supine Oblique                   NMR Stability Ball sit-supine-sit                   NMR Walking Prog Progression                   MNR Home program sequencing                                       TA Hamstring stretch                   TA Hip Ext Rot Stretch                   TA Hip Int Rot Stretch                   TA Hip Flex/IT Stretch                   TA Hip Add Stretch                   TA Lats Dorsi Stretch                   TA Gastroc/soleus stretch        "            TA QuadratusLumborm Stretch                      Supine                      Stance                   TA Quad Stretch                                       NMR Wall Push Ups                   NMR Planking                   NMR BOSU Ball Exercises                   NMR Lateral Bridge Drop                   NMR Waiters [place]                   NMR O'Feldt Lat Pull Down                   NMR O'Feldt Hip Ext                   NMR O'Feldt Trunk Ext                                       TA CervDiscExtSup/stnd                   TA Cerv Stretches                   TA Self PIC Cervical Spine                   TA Neural Gliding                   TA Ant/Mid Scalene Strch                   TA Biceps stretch                   TA Rotator Cuff Stretch                   TA Anterior Chest Stretch                   TA Wrist Ext Stretch                                       NMR Prone Arm Lifts                   NMR Scapula Stabilization                   NMR Occulomotor Isometrics                   NMR Cervical Isometrics                                       TA Shld AROM w/bar                   TA Shld Capsular Stretching                   TA Self PIC Thor Spine                   TA Rot Cuff Therabd, beginner                   TA Rot Cuff Therabd, intermed                                                                                                                                                                                                                                                 Selma Rice, PT, MS  Physical Therapist  [place]# [id number]

## 2022-01-05 DIAGNOSIS — J45.30 MILD PERSISTENT ASTHMA WITHOUT COMPLICATION: ICD-10-CM

## 2022-01-06 RX ORDER — BUDESONIDE AND FORMOTEROL FUMARATE DIHYDRATE 160; 4.5 UG/1; UG/1
AEROSOL RESPIRATORY (INHALATION)
Qty: 10.2 G | Refills: 2 | Status: SHIPPED | OUTPATIENT
Start: 2022-01-06 | End: 2022-07-22

## 2022-02-01 ENCOUNTER — TREATMENT (OUTPATIENT)
Dept: PHYSICAL THERAPY | Facility: CLINIC | Age: 52
End: 2022-02-01

## 2022-02-01 DIAGNOSIS — M53.3 SACROILIAC JOINT DYSFUNCTION: Primary | ICD-10-CM

## 2022-02-01 PROCEDURE — 97530 THERAPEUTIC ACTIVITIES: CPT | Performed by: PHYSICAL THERAPIST

## 2022-02-01 PROCEDURE — 97140 MANUAL THERAPY 1/> REGIONS: CPT | Performed by: PHYSICAL THERAPIST

## 2022-02-01 NOTE — PROGRESS NOTES
"Physical Therapy Note          SUBJECTIVE:   Changes since last seen:  Debra states she's been able to keep her pain under control by doing the lumbar facet self corrections and using 1/2 tablet of Robaxin and Advil.   When she can't lift her left foot to put on socks she knows she needs to self correct her SI joints.     She went roller skating this past weekend and fell; \"I really didn't hurt myself but did the self corrections, took Robaxin and Advil.\"  She notes when she senses her pain is getting bad the 1/2 Robaxin and Advil keeps it from going into spasming.   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   She got a sit to stand desk to go on top of her desk at work but it didn't fit so will be returning it.      She notes she did a plank for 60 sec and it hurt the left side of lumbar spine   She just got a firm mattress, early last fall and feels like she has good support at night.    She is back to taking birth control pills all the time, since doing this she has had less burning in her hips.         Current level of pain:    0/10  Lowest level of pain since last seen:  0/10   Highest level of pain since last seen:            7/10 at night when rolls and changes positions       Past Medical History:  1.  Hx of 2 C-sections  2.  Hx of 3 pregnancies  3.  Hx of regular menses, last menses 12/04/16  4.  Hx of AA, 2001  5.  Hx of kidney stones    Functional Limitations:  Stairs, sit to stand, prolonged sitting  Patient Goals for Physical Therapy:      OBJECTIVE:    Observation:     Very slight right lateral of the pelvis       Standing posture:  Left shoulder elevated, severe right lateral shift of the pelvis and slight posterior tilt.        05/20/2021 07/27/21 09/07/21 12/07/21 12/09/21 02/01/22              SI Joint " Positioning  Right  Left Right  Left Right  Left Right  Left Right  Left Right  Left Right  Left Right  Left Right  Left Right  Left Right  Left Right  Left Right  Left Right Left Right Left Right Left Right  Left       Innominate                           Anterior                 x               x    x  x   x    x                     Posterior     x   x              x                x                 x              x                 Sacrum                              Unilateral rotation    x   x   x   x   x    x                                     SI Joint Testing  Right  Left Right  Left Right  Left Right  Left Right  Left Right  Left Right  Left Right  Left Right  Left Right  Left Right  Left Right  Left Right  Left Right Left Right Left Right Left Right  Left           Distraction   +          +   +           +   +        +   +          +  +          +   +          +                      Kyra's   -           -     +         +   +          +   Sl        sl                      Compression   -           -     -          -   -           -   -         -                      Prone Press Up   -            -     +          +   -          -   -         sl                                  Special Tests  Right   Left Right  Left Right  Left Right  Left Right  Left Right  Left Right Left  Right  Left Right  Left Right  Left Right  Left Right  Left Right  Left Right Left Right Left Right Left Right Left       Straight Leg Raise                                    Active   -          -     -          -   -          -   -          -                      Passive   -            -     -         -   -          -   -          -                  Hip Scour Test   +          -   +          -  +         -    +       -   +           -   +         sl                                  Baker's Cysts   -           -   -         -    -          -   -          -   -            -                  Palpation:         Muscle/Bone  Irritation  Date 05/20/2021 07/27/21 09/07/21 12/07/21 12/09/21 02/01/22              Right  Left Right  Left Right  Left Right  Left Right  Left Right  Left  RightLeft Right  Left Right  Left Right  Left Right  Left Right  Left Right  Left Right Left Right Left Right Left   SI joint  +           -   Sl           -   -            -   +         sl    +        +   +         -             Piriformis  sl          -   Sl         -  -            -    +        +   +         ++   +         +             Gr. Trochanter  +          -   Sl           -  -           -    -        +    -           -    -         sl             IT Band  -           -   -           -   -           -    -         -    -          -              Quadratus Lumborum  -             -   -           -    +         -   +        + + trv p    - Sl instSl inst             Ischial Tuberosity  -            -   -          -   -           -    -        -   -           -   -          -             Sacrococcygeal Ligs  -            -   -             -   -           -   -         -   -          -  -          -             Paraspinals  -           -    +          -   +        sl   +         -    -          +             Spinous Processes                                      T-spine rotated to   -           -   -           -   -            -    -        -   -          -   -          -                    L-spine rotated to  -           -   -            -  L 3-5  L3-5 L2-5           L3-5             Adductor Franck  -          -   -           -   -           -   -           -   -          -             Iliopsoas  +          -   Sl        -   -           -    +        sl   Sl         -   Sl         -             Quad Origin  -          -   -             -   -           -     -            -             Pubic Symphysis        -            Not tested Not tested          +         sl          sl                                                          Monthly Objective  Measurement/Functional Activity  Date: 05/20/2021 07/27/21 09/07/21 12/07/21 02/01/22            Oswestry Pain Score 12/100 20/100 11/100 50/100 18/100            LE Functional Scale                                  AROM Lumbar Spine: Degrees   Degrees      Degrees      Degrees      Degrees    Degrees Degrees Degrees Degrees Degrees Degrees Degrees Degrees Degrees      Flexion 135 pain ant hip           140      142        41 p    132 feels strain left lumbar               Extension 35           32       32        19p       31               Right Lat. Flexion 45           43       55        21p      44               Left Lat. Flexion 41           43       40        24 p      40               Right Lat Shift Pelvis 1st one pain    Right ant thigh strain  No pain  Decrease on R but pain down left leg  No change/ pain               Left Lat Shift Pelvis No pain   No pain  no pain  Low back pain  no change/ pain                             AROM Hips:  (degrees) Right      Left Right Left Right  Left Right  Left Right  Left Right  Left Right Left Right Left Right Left Right Left Right  Left Right Left Right Left Right Left      Flexion 115       120  120   125    126   123 115      110  118     112               Abduction 42       45   45     47    45       46   40      44   45        45               Int. Rotation 28        40   32      40   35      40  25        38   36        40               Ext. Rotation  32       33    42     44    45       46  40        42   44        45                             Flexibility:   (degrees) Right    Left Right  Left Right  Left Right  Left Right  Left Right  Left Right Left Right Left Right Left Right Left Right  Left Right Left Right Left Right Left      Hamstrings -18       -18    -15    -16   - 15     - 15  -20     -18  -20       -17               Quadriceps 85       80    85    82    85        85    78     51   85      65               HipExt/Rot(piriformis) 32        46 40       45  45       45   41      40  42        46               Hip Int/Rotators 30       22  32      28  33       32    42      35    43       35               Hip Flexors (iliopsoas) -6       -8  not tested Not tested Not tested Not tested               IT Band -5       -1  not tested  not tested  not tested  not tested                             Root Level  - Motor Right      Left Right  Left Right  Left Right  Left Right  Left Right  Left Right Left Right Left Right Left Right Left Right  Left Right Left Right Left Right Left     T12             Rectus Abdominus 5/5         5/5        5/5               L1              Paraspinals 5/5        5/5     5/5      5/5  5/5       5/5               L2              Hip Flexion 5/5       5/5   4+/5p    5/5 5/5       5/5               L3             Quads  5/5        5/5   5/5       5/5 5/5       5/5               L4              Anterior Tibialis  5/5        5/5   5/5         5/5                 L5             Gr. Toe Extension  5/5         5/5   5/5        5/'5                S1            Gastroc/Sol/Peroneals 5/5        5/5    5/5        5/5 5/5        5/5                              Functional Strength:                   Single LegStanceTime (seconds) R:30   L:30   R:      L: R:      L: R: 15     L:20 R:30     L:30 R:      L:  R:       L:   R:      L:   R:      L:   R:      L: R:      L:   R:      L:   R:      L:    R:      L:      Pelvic Floor Isolation (seconds) -                   Inner Unit  Isolation (seconds) -                                 Functional Activities:                    Sit w/o pain? Pain increases (minutes) yes Yes/ 60 min Yes/ 60 min No/ 60 min Yes/ 60                Stand w/o pain? yes Yes/ > 1 hr Yes/  > 1 hr No/ extra pain No, minor annoyance , 60 min                Walk w/o pain? yes Yes/ no limit  yes/ no limit unless right hip flare up No/ 1/4 mile  ystill minor es/ 30 min                Steps w/o pain? no no  yes no Yes/ minor   "              Drive w/o pain? Yes/ pain getting out of car  yes/ pain getting out of car   yes/ 60 min  no/ 60 min Yes/ 60 still stick shift, pain getting rufina out                Work w/o pain? Yes/ pain getting out of chair Yes/ 60 min, pain getting out of chair Yes/ 60 min No/ 60 min                # times wakes w/ pain? 2-3x   0-1x      0x  every time up to BR x2  Wakes when rolls                   PLAN OF CARE:    ASSESSMENT:  Problem List:   1.  Significant flare up of SI joint dysfunction  2.  Slight positive Hip Scour sign on right      Discussion:    Debra has managed her symptoms fairly well as confirmed by the improvement in her Oswestry Low Back Pain Score from 50/100 on 12/07/21 to 18/100 today.   She has also improved with increased pain free lumbar range of motion as noted above.   She is now able to sit 60 min, but always has an awareness of pain in the left lumbar paraspinals.     Standing forward trunk flexion does not cause pain but sitting forward flexion brought on pain down the left LE.     It is interesting to note that she is able to keep her SI joint symptoms \"at bay\" but she has persistent lumbar pain. The lumbar facet self correction technique minimizes this pain but is not able to eliminate it.  Following facet correction, lumbar extension does decrease her lumbar pain.     Thus instructed and reviewed \"Lifestyle Activities to Decrease Lumbar Disc Pressures\"    Discussed possibly using a Kristyn Night time Lumbar roll when sleeping and to be attentive to always having lumbar support in sitting.  Gave her links to be able to purchase these various lumbar supports.     Debra notes she has never had any imaging of her lumbar spine.  She has had flare ups of low back pain in the past but she has always been able to resolve it.  This time she has not been able to completely eliminate the pain.  May be beneficial to get baseline imaging of her lumbar spine and SI joints.      She will " continue with icing, self corrections and spinal stabilization exercises. Progress spinal stabilization on next visit.       Short Term Goals: (4-6 weeks)                                 Date Met:                1.   pt independent in postural correction       07/27/21      2.   pt independent in SI joint self-corrections      07/27/21  3.   pt independent in exer to decrease post. disc pressures  4.   pt able to sleep through night without pain      09/07/21  5.   pt able to move sit to stand w/o catching after 5 minutes  6.   Decrease Oswestry Pain Score to no greater than 10/100  7.  Pt able to engage TA and pelvic floor on exhale of diaphr breath x3   09/07/21  8.  Pt able to take 3 lower costal breaths with TA and pelvic fl engaged   09/07/21  9.  Pt able to perform capital flexion on inner unit engagement x3  10.  Pt able to perform bridging with inner unit engaged, isolating glut max x3  11.  Pt to adapt SI joint self corrections reducing SI joint pain no greater than 2/10 02/01/22    Long Term Goals:(3-5 months)        Date Met:      1.  pt independent in self-management of symptoms       2.  pt independent in home program      3.  pt able to work 2 hours without pain on sit to stand        Treatment Plan:   1.  Ultrasound to increase extensibility, decrease pain, if needed  2.  Electrical stimulation for muscle relaxation, decrease pain, if needed, iontophoresis  3.  Manual therapy to increase function, decrease pain  4.  Therapeutic exercise to increase function, decrease pain  5.  Home programming and d/c planning to increase function and decrease pain    Frequency & Duration:  Pt will be seen on a once/ 2-4 week basis for 3 more visits    Patient Participated in and Agrees With This Plan of Care and Goals:  YES  Rehab Potential:  ernestine Rice, PT, MS  Physical Therapist  [place]# [id number]    TREATMENT TODAY:    Total Treatment Time: (time in clinic)     60 min   Timed Code Treatment Minutes:    60    Manual Therapy:  (MA)  RX min:  30  Manual posterior rotation of right innominate    Positional Isometric contraction (PIC) of left iliopsoas    Positional Isometric contraction of (PIC) right gluteus minimus    Distraction of both SI jts in open pack hip position  Piriformis stretching, bilaterally    Quadratus lumborum stretching, bilaterally    Hip internal rotator stretching  Hamstring stretching  Anterior/Inferior Mobilization of  right hip      Therapeutic Activities:  (TA)  RX min:  30    Neuromuscular Reeducation: (NMR)  RX min:     Ultrasound:  RX min:          1.6 grey/cm2       Location:     Iontophoresis:  6 hr patch                                Location:                               Procedures:      Key:  i=instructed        r=review        c=corrected        a=adapted   Code Procedure/Instruction 05/20/2021 07/27/21 09/07/21 12/07/21 12/09/21 02/01/22             TA         Sleeping Positions instructed               review                 TA Sternum-Up Posture                   TA SI jt. self-correction instructed review  adapt  Review/correct             TA Lumbar Facet PIC    instruct  Review/correct             TA   Order of Self-Corrections instructed review    review             TA Use of lumbar pillow instructed     review             TA Use of cervical roll                   TA Use of orthotics                   TA Use of SI belt                   TA Use of Nada chair                   TA Use of Ice    instruct               TA Use of [employer]/ heat    instruct               TA Use of TENS unit                   TA Use of iontophoresis                   TA Decreasing Disc Pressures      instruct                                                     NMR Diaphragmatic Breathing  instruct review  correct              NMR Diaphrag breath engage TA, 3 lower costal breaths  instruct correct  review              NMR Capital flexion with inner unit on   instructed  review              NMR  "Bridging with inner unit on isolating glut max   instructed  Adapt to no raising, just glut max isola              NMR Supine \"scissor arm\" w/ IU engaged     instruct              NMR Pelvic Floor Isolation  review review  review              NMR Transverse Abdominus  review review  review              NMR Multifidus Isolation  review review  review              NMR InnerUnit against Gravity                   NMR Sit-stand w/ inner unit                   NMR Roll w/ inner unit                   NMR Walk w/ inner unit                    NMR Up/down steps w/ inner unit                   NMR Water Exer Instruction                   NMR Hip Abd w/o piriformis                   NMR Hip Add w/o iliop/ham                    NMR Lower abs in supine                   NMR Abd obliques in supine                   NMR Prone Knee Flexion                   NMR Prone glut jasmina                   NMR Retro Step                   NMR Retro Walk                   NMR Retro walk on treadmill                   NMR Forward walk w/ inner unit                   NMR Balance Instruction                   NMR Stability Ball A/P & Lateral                   NMR Ball Catch/Throw /Supine                   NMR Ball Catch/Throw /Stand                   NMR StabilityBall All 4's Arm Lift                   NMR StabilityBall Prone Walk Out                   NMR StabilityBall Supine Oblique                   NMR Stability Ball sit-supine-sit                   NMR Walking Prog Progression                   MNR Home program sequencing                                       TA Hamstring stretch                   TA Hip Ext Rot Stretch                   TA Hip Int Rot Stretch                   TA Hip Flex/IT Stretch                   TA Hip Add Stretch                   TA Lats Dorsi Stretch                   TA Gastroc/soleus stretch                   TA QuadratusLumborm Stretch                      Supine                      Stance                 "   TA Quad Stretch                                       NMR Wall Push Ups                   NMR Planking                   NMR BOSU Ball Exercises                   NMR Lateral Bridge Drop                   NMR Waiters [place]                   NMR O'Feldt Lat Pull Down                   NMR O'Feldt Hip Ext                   NMR O'Feldt Trunk Ext                                       TA CervDiscExtSup/stnd                   TA Cerv Stretches                   TA Self PIC Cervical Spine                   TA Neural Gliding                   TA Ant/Mid Scalene Strch                   TA Biceps stretch                   TA Rotator Cuff Stretch                   TA Anterior Chest Stretch                   TA Wrist Ext Stretch                                       NMR Prone Arm Lifts                   NMR Scapula Stabilization                   NMR Occulomotor Isometrics                   NMR Cervical Isometrics                                       TA Shld AROM w/bar                   TA Shld Capsular Stretching                   TA Self PIC Thor Spine                   TA Rot Cuff Therabd, beginner                   TA Rot Cuff Therabd, intermed                                                                                                                                                                                                                                                 Selma Rice, PT, MS  Physical Therapist  [place]# [id number]

## 2022-02-23 ENCOUNTER — OFFICE VISIT (OUTPATIENT)
Dept: NEUROSURGERY | Facility: CLINIC | Age: 52
End: 2022-02-23

## 2022-02-23 VITALS
TEMPERATURE: 97.3 F | HEIGHT: 66 IN | SYSTOLIC BLOOD PRESSURE: 130 MMHG | DIASTOLIC BLOOD PRESSURE: 100 MMHG | WEIGHT: 139.2 LBS | BODY MASS INDEX: 22.37 KG/M2

## 2022-02-23 DIAGNOSIS — M54.10 RADICULOPATHY, UNSPECIFIED SPINAL REGION: ICD-10-CM

## 2022-02-23 DIAGNOSIS — G89.29 CHRONIC BILATERAL LOW BACK PAIN WITHOUT SCIATICA: Primary | ICD-10-CM

## 2022-02-23 DIAGNOSIS — M54.50 CHRONIC BILATERAL LOW BACK PAIN WITHOUT SCIATICA: Primary | ICD-10-CM

## 2022-02-23 PROCEDURE — 99204 OFFICE O/P NEW MOD 45 MIN: CPT | Performed by: PHYSICIAN ASSISTANT

## 2022-02-23 RX ORDER — ERGOCALCIFEROL (VITAMIN D2) 10 MCG
400 TABLET ORAL DAILY
COMMUNITY

## 2022-02-23 RX ORDER — LANOLIN ALCOHOL/MO/W.PET/CERES
1000 CREAM (GRAM) TOPICAL DAILY
COMMUNITY

## 2022-02-23 RX ORDER — LANOLIN ALCOHOL/MO/W.PET/CERES
50 CREAM (GRAM) TOPICAL DAILY
COMMUNITY
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
## 2022-03-01 ENCOUNTER — TREATMENT (OUTPATIENT)
Dept: PHYSICAL THERAPY | Facility: CLINIC | Age: 52
End: 2022-03-01

## 2022-03-01 DIAGNOSIS — M53.3 SACROILIAC JOINT DYSFUNCTION: Primary | ICD-10-CM

## 2022-03-01 PROCEDURE — 97530 THERAPEUTIC ACTIVITIES: CPT | Performed by: PHYSICAL THERAPIST

## 2022-03-01 PROCEDURE — 97140 MANUAL THERAPY 1/> REGIONS: CPT | Performed by: PHYSICAL THERAPIST

## 2022-03-01 NOTE — PROGRESS NOTES
"Physical Therapy Note          SUBJECTIVE:   Changes since last seen:  Debra saw MARIAA Thomas.  She has ordered xrays and an MRI which is scheduled for 03/20/22.   Debra states her pain is \"day to day pain on right side.\"  She notes the instruction given on last visit was helpful as she is much more attentive to how she sits and positions herself.    \"It is really helpful to sleep prone on pillow.\"  She notes she wakes up and \"sometimes feels weak to stand up.\"    She has been trying to unweight her right hip, which feels caught on right now, which was previously on the left.   She occasionally has pain down the right lateral thigh to right knee.    She notes that to avoid pain she compensates her positioning to eliminate pain but then gets pain in the left upper trap/neck from the compensating posture.    She states she is no longer taking Robaxim at night, but she is still taking Advil, \"I haven't gone back to taking Celebrex.\"    Current level of pain:    2-3/10  Lowest level of pain since last seen:  0/10   Highest level of pain since last seen:            7/10 at night when rolls and changes positions, still       Past Medical History:  1.  Hx of 2 C-sections  2.  Hx of 3 pregnancies  3.  Hx of regular menses, last menses 12/04/16  4.  Hx of AA, 2001  5.  Hx of kidney stones    Functional Limitations:  Stairs, sit to stand, prolonged sitting  Patient Goals for Physical Therapy:      OBJECTIVE:    Observation:   Right lateral shift of the pelvis and slight hip hike on right with elevation of the left shoulder.        05/20/2021 07/27/21 09/07/21 12/07/21 12/09/21 02/01/22 03/01/22             SI Joint Positioning  Right  Left Right  Left Right  Left Right  Left Right  Left Right  Left Right  Left Right  Left Right  Left Right  Left Right  Left Right  Left Right  Left Right Left Right Left Right Left Right  Left       Innominate                           Anterior                 x               x    x  x  "  x    x  x                    Posterior     x   x              x                x                 x              x               x                Sacrum                              Unilateral rotation    x   x   x   x   x    x   x                                    SI Joint Testing  Right  Left Right  Left Right  Left Right  Left Right  Left Right  Left Right  Left Right  Left Right  Left Right  Left Right  Left Right  Left Right  Left Right Left Right Left Right Left Right  Left           Distraction   +          +   +           +   +        +   +          +  +          +   +          +    +          +                      Kyra's   -           -     +         +   +          +   Sl        sl   Sl          -                     Compression   -           -     -          -   -           -   -         -   -          -                     Prone Press Up   -            -     +          +   -          -   -         sl   Sl        sl                                 Special Tests  Right   Left Right  Left Right  Left Right  Left Right  Left Right  Left Right Left  Right  Left Right  Left Right  Left Right  Left Right  Left Right  Left Right Left Right Left Right Left Right Left       Straight Leg Raise                                    Active   -          -     -          -   -          -   -          -   -          -                     Passive   -            -     -         -   -          -   -          -   +        ++                 Hip Scour Test   +          -   +          -  +         -    +       -   +           -   +         sl   +         sl                                 Baker's Cysts   -           -   -         -    -          -   -          -   -            -                  Palpation:      Muscle/Bone Irritation  Date 05/20/2021 07/27/21 09/07/21 12/07/21 12/09/21 02/01/22 03/01/22             Right  Left Right  Left Right  Left Right  Left Right  Left Right  Left  RightLeft Right  Left Right  Left Right   Left Right  Left Right  Left Right  Left Right Left Right Left Right Left   SI joint  +           -   Sl           -   -            -   +         sl    +        +   +         -   +        -            Piriformis  sl          -   Sl         -  -            -    +        +   +         ++   +         +             Gr. Trochanter  +          -   Sl           -  -           -    -        +    -           -    -         sl             IT Band  -           -   -           -   -           -    -         -    -          -              Quadratus Lumborum  -             -   -           -    +         -   +        + + trv p    - Sl instSl inst             Ischial Tuberosity  -            -   -          -   -           -    -        -   -           -   -          -             Sacrococcygeal Ligs  -            -   -             -   -           -   -         -   -          -  -          -             Paraspinals  -           -    +          -   +        sl   +         -    -          +             Spinous Processes                                      T-spine rotated to   -           -   -           -   -            -    -        -   -          -   -          -                    L-spine rotated to  -           -   -            -  L 3-5  L3-5 L2-5           L3-5             Adductor Franck  -          -   -           -   -           -   -           -   -          -   -        -            Iliopsoas  +          -   Sl        -   -           -    +        sl   Sl         -   Sl         -  +        sl            Quad Origin  -          -   -             -   -           -     -            -             Pubic Symphysis        -            Not tested Not tested          +         sl          sl  sl                                                          Monthly Objective Measurement/Functional Activity  Date: 05/20/2021 07/27/21 09/07/21 12/07/21 02/01/22 03/01/22           Oswestry Pain Score 12/100 20/100 11/100     50/100      18/100    32/100           LE Functional Scale                                  AROM Lumbar Spine: Degrees   Degrees      Degrees      Degrees      Degrees    Degrees Degrees Degrees Degrees Degrees Degrees Degrees Degrees Degrees      Flexion 135 pain ant hip           140      142        41 p    132 feels strain left lumbar 123  Pain down R lat thigh to knee               Extension 35           32       32        19p       31    33 pinch   Right  SI jt              Right Lat. Flexion 45           43       55        21p      44       40              Left Lat. Flexion 41           43       40        24 p      40       33              Right Lat Shift Pelvis 1st one pain    Right ant thigh strain  No pain  Decrease on R but pain down left leg  No change/ pain No change              Left Lat Shift Pelvis No pain   No pain  no pain  Low back pain  no change/ pain No change                            AROM Hips:  (degrees) Right      Left Right Left Right  Left Right  Left Right  Left Right  Left Right Left Right Left Right Left Right Left Right  Left Right Left Right Left Right Left      Flexion 115       120  120   125    126   123 115      110  118     112 120      114              Abduction 42       45   45     47    45       46   40      44   45        45 45        47              Int. Rotation 28        40   32      40   35      40  25        38   36        40  32      38              Ext. Rotation  32       33    42     44    45       46  40        42   44        45  42      44                            Flexibility:   (degrees) Right    Left Right  Left Right  Left Right  Left Right  Left Right  Left Right Left Right Left Right Left Right Left Right  Left Right Left Right Left Right Left      Hamstrings -18       -18    -15    -16   - 15     - 15  -20     -18  -20       -17  -22       -16              Quadriceps 85       80    85    82    85        85    78     51   85      65  80         66               HipExt/Rot(piriformis) 32       46 40       45  45       45   41      40  42        46   35       42              Hip Int/Rotators 30       22  32      28  33       32    42      35    43       35    44      33              Hip Flexors (iliopsoas) -6       -8  not tested Not tested Not tested Not tested Not tested              IT Band -5       -1  not tested  not tested  not tested  not tested  not tested                            Root Level  - Motor Right      Left Right  Left Right  Left Right  Left Right  Left Right  Left Right Left Right Left Right Left Right Left Right  Left Right Left Right Left Right Left     T12             Rectus Abdominus 5/5         5/5        5/5        5/5              L1              Paraspinals 5/5        5/5     5/5      5/5  5/5       5/5   5/5       5/5              L2              Hip Flexion 5/5       5/5   4+/5p    5/5 5/5       5/5  5/5        5/5              L3             Quads  5/5        5/5   5/5       5/5 5/5       5/5                L4              Anterior Tibialis  5/5        5/5   5/5         5/5    5/5        5/5              L5             Gr. Toe Extension  5/5         5/5   5/5        5/'5   5/5        5/5              S1            Gastroc/Sol/Peroneals 5/5        5/5    5/5        5/5 5/5        5/5    5/5     5/5                             Functional Strength:                   Single LegStanceTime (seconds) R:30   L:30   R:      L: R:      L: R: 15     L:20 R:30     L:30 R:      L:  R:       L:   R:      L:   R:      L:   R:      L: R:      L:   R:      L:   R:      L:    R:      L:      Pelvic Floor Isolation (seconds) -                   Inner Unit  Isolation (seconds) -                                 Functional Activities:                    Sit w/o pain? Pain increases (minutes) yes Yes/ 60 min Yes/ 60 min No/ 60 min Yes/ 60  No/ 60 min              Stand w/o pain? yes Yes/ > 1 hr Yes/  > 1 hr No/ extra pain No, minor annoyance , 60 min  No/ 60 min               Walk w/o pain? yes Yes/ no limit  yes/ no limit unless right hip flare up No/ 1/4 mile  ystill minor es/ 30 min   No/ 1 mile              Steps w/o pain? no no  yes no Yes/ minor  No/ f1 fl              Drive w/o pain? Yes/ pain getting out of car  yes/ pain getting out of car   yes/ 60 min  no/ 60 min Yes/ 60 still stick shift, pain getting rufina out  No/ 60 min w/ supports              Work w/o pain? Yes/ pain getting out of chair Yes/ 60 min, pain getting out of chair Yes/ 60 min No/ 60 min  No/ 60 min              # times wakes w/ pain? 2-3x   0-1x      0x  every time up to BR x2  Wakes when rolls   1x                  PLAN OF CARE:    ASSESSMENT:  Problem List:   1.  Significant flare up of SI joint dysfunction  2.  Slight positive Hip Scour sign on right      Discussion:    Debra presents with increased right lateral shift of the pelvis, increased tension in right QL and left upper traps.  This compensatory posturing is contributing to her pain presentation today.    Following positional isometric work to the lumbar facets, manual posterior rotation of the right innominate and positional isometric contraction to left psoas and positional isometric contraction to the right glut min and left piriformis, she had significant decrease in pain and notes the left upper trap and neck pain reduced.      Reviewed how to self correct the innominates, sacrum and lumbar facets. She demonstrated a good understanding of this.  She is in need of purchasing a yellow 2000kg medicine ball to facilitate lumbar facet correction. Also took video on her phone to remind her how to do this with help at home.     Also instructed in use of adapted PIC to the left psoas that she can do independently and took video of this on her phone.     Instructed how to stretch the hamstrings in supine without tweaking her SI joints and how to stretch bilateral hip adductors in supine with the inner unit engaged.  She was given handouts for  these stretches.   Also took video on her phone of how to have someone at home help her stretch her QLs in supine.     She demonstrated a good understanding of all today's instruction.     She is in need of progression of spinal stabilization, inner and out unit and neuromotor retraining.     She will continue with icing, self corrections and adapted positioning. Progress spinal stabilization on next visit.       Short Term Goals: (4-6 weeks)                                 Date Met:                1.   pt independent in postural correction       07/27/21      2.   pt independent in SI joint self-corrections      07/27/21  3.   pt independent in exer to decrease post. disc pressures    03/01/22  4.   pt able to sleep through night without pain      09/07/21  5.   pt able to move sit to stand w/o catching after 5 minutes  6.   Decrease Oswestry Pain Score to no greater than 10/100  7.  Pt able to engage TA and pelvic floor on exhale of diaphr breath x3   09/07/21  8.  Pt able to take 3 lower costal breaths with TA and pelvic fl engaged   09/07/21  9.  Pt able to perform capital flexion on inner unit engagement x3  10.  Pt able to perform bridging with inner unit engaged, isolating glut max x3  11.  Pt to adapt SI joint self corrections reducing SI joint pain no greater than 2/10 02/01/22  12.  Pt able to perform hamstring stretch without irritating SI joints x2  13.  Pt able to perform hip adductor stretch without irritating SI joints x2    Long Term Goals:(3-5 months)        Date Met:      1.  pt independent in self-management of symptoms       2.  pt independent in home program      3.  pt able to work 2 hours without pain on sit to stand        Treatment Plan:   1.  Ultrasound to increase extensibility, decrease pain, if needed  2.  Electrical stimulation for muscle relaxation, decrease pain, if needed, iontophoresis  3.  Manual therapy to increase function, decrease pain  4.  Therapeutic exercise to increase  function, decrease pain  5.  Home programming and d/c planning to increase function and decrease pain    Frequency & Duration:  Pt will be seen on a once/ 2-4 week basis for 2 more visits    Patient Participated in and Agrees With This Plan of Care and Goals:  YES  Rehab Potential:  ernestine Rice, PT, MS  Physical Therapist  [place]# [id number]    TREATMENT TODAY:    Total Treatment Time: (time in clinic)     50 min   Timed Code Treatment Minutes:   50    Manual Therapy:  (MA)  RX min:  40  Manual posterior rotation of right innominate    Positional Isometric contraction (PIC) of left iliopsoas    Positional Isometric contraction of (PIC) right gluteus minimus    Distraction of both SI jts in open pack hip position  Piriformis stretching, bilaterally    Quadratus lumborum stretching, bilaterally    Hip internal rotator stretching  Hamstring stretching  Anterior/Inferior Mobilization of  right hip      Therapeutic Activities:  (TA)  RX min:  10    Neuromuscular Reeducation: (NMR)  RX min:     Ultrasound:  RX min:          1.6 grey/cm2       Location:     Iontophoresis:  6 hr patch                                Location:                               Procedures:      Key:  i=instructed        r=review        c=corrected        a=adapted   Code Procedure/Instruction 05/20/2021 07/27/21 09/07/21 12/07/21 12/09/21 02/01/22 03/01/22            TA         Sleeping Positions instructed               review                 TA Sternum-Up Posture                   TA SI jt. self-correction instructed review  adapt  Review/correct Review /correct            TA Lumbar Facet PIC    instruct  Review/correct Review/ correct            TA   Order of Self-Corrections instructed review    review review            TA Use of lumbar pillow instructed     review Review, do with supine exercises             TA Use of cervical roll                   TA Use of orthotics                   TA Use of SI belt                   TA Use of Nada  "chair                   TA Use of Ice    instruct               TA Use of [employer]/ heat    instruct               TA Use of TENS unit                   TA Use of iontophoresis                   TA Decreasing Disc Pressures      instruct                                                     NMR Diaphragmatic Breathing  instruct review  correct              NMR Diaphrag breath engage TA, 3 lower costal breaths  instruct correct  review              NMR Capital flexion with inner unit on   instructed  review              NMR Bridging with inner unit on isolating glut max   instructed  Adapt to no raising, just glut max isola              NMR Supine \"scissor arm\" w/ IU engaged     instruct              NMR Pelvic Floor Isolation  review review  review              NMR Transverse Abdominus  review review  review              NMR Multifidus Isolation  review review  review              NMR InnerUnit against Gravity                   NMR Sit-stand w/ inner unit                   NMR Roll w/ inner unit                   NMR Walk w/ inner unit                    NMR Up/down steps w/ inner unit                   NMR Water Exer Instruction                   NMR Hip Abd w/o piriformis                   NMR Hip Add w/o iliop/ham                    NMR Lower abs in supine                   NMR Abd obliques in supine                   NMR Prone Knee Flexion                   NMR Prone glut jasmina                   NMR Retro Step                   NMR Retro Walk                   NMR Retro walk on treadmill                   NMR Forward walk w/ inner unit                   NMR Balance Instruction                   NMR Stability Ball A/P & Lateral                   NMR Ball Catch/Throw /Supine                   NMR Ball Catch/Throw /Stand                   NMR StabilityBall All 4's Arm Lift                   NMR StabilityBall Prone Walk Out                   NMR StabilityBall Supine Oblique                   NMR Stability Ball " sit-supine-sit                   NMR Walking Prog Progression                   MNR Home program sequencing                                       TA Hamstring stretch       instruct            TA Hip Ext Rot Stretch                   TA Hip Int Rot Stretch                   TA Hip Flex/IT Stretch                   TA Hip Add Stretch       instruct            TA Lats Dorsi Stretch                   TA Gastroc/soleus stretch                   TA QuadratusLumborm Stretch                      Supine                      Stance                   TA Quad Stretch                                       NMR Wall Push Ups                   NMR Planking                   NMR BOSU Ball Exercises                   NMR Lateral Bridge Drop                   NMR [place]                   NMR O'Feldt Lat Pull Down                   NMR O'Feldt Hip Ext                   NMR O'Feldt Trunk Ext                                       TA CervDiscExtSup/stnd                   TA Cerv Stretches                   TA Self PIC Cervical Spine                   TA Neural Gliding                   TA Ant/Mid Scalene Strch                   TA Biceps stretch                   TA Rotator Cuff Stretch                   TA Anterior Chest Stretch                   TA Wrist Ext Stretch                                       NMR Prone Arm Lifts                   NMR Scapula Stabilization                   NMR Occulomotor Isometrics                   NMR Cervical Isometrics                                       TA Shld AROM w/bar                   TA Shld Capsular Stretching                   TA Self PIC Thor Spine                   TA Rot Cuff Therabd, beginner                   TA Rot Cuff Therabd, diazed                                                                                                                                                                                                                                                 Selma ZAMARRIPA  Dwayne, PT, MS  Physical Therapist  [place]# [id number]

## 2022-03-02 ENCOUNTER — HOSPITAL ENCOUNTER (OUTPATIENT)
Dept: GENERAL RADIOLOGY | Facility: HOSPITAL | Age: 52
Discharge: HOME | End: 2022-03-02
Admitting: PHYSICIAN ASSISTANT

## 2022-03-02 DIAGNOSIS — G89.29 CHRONIC BILATERAL LOW BACK PAIN WITHOUT SCIATICA: ICD-10-CM

## 2022-03-02 DIAGNOSIS — M54.10 RADICULOPATHY, UNSPECIFIED SPINAL REGION: ICD-10-CM

## 2022-03-02 DIAGNOSIS — M54.50 CHRONIC BILATERAL LOW BACK PAIN WITHOUT SCIATICA: ICD-10-CM

## 2022-03-02 PROCEDURE — 72110 X-RAY EXAM L-2 SPINE 4/>VWS: CPT

## 2022-03-02 RX ORDER — NORETHINDRONE ACETATE AND ETHINYL ESTRADIOL, ETHINYL ESTRADIOL AND FERROUS FUMARATE 1MG-10(24)
KIT ORAL
Qty: 84 TABLET | Refills: 1 | Status: SHIPPED | OUTPATIENT
Start: 2022-03-02 | End: 2022-09-21

## 2022-03-20 ENCOUNTER — HOSPITAL ENCOUNTER (OUTPATIENT)
Dept: MRI IMAGING | Facility: HOSPITAL | Age: 52
Discharge: HOME | End: 2022-03-20
Admitting: PHYSICIAN ASSISTANT

## 2022-03-20 DIAGNOSIS — G89.29 CHRONIC BILATERAL LOW BACK PAIN WITHOUT SCIATICA: ICD-10-CM

## 2022-03-20 DIAGNOSIS — M54.50 CHRONIC BILATERAL LOW BACK PAIN WITHOUT SCIATICA: ICD-10-CM

## 2022-03-20 DIAGNOSIS — M54.10 RADICULOPATHY, UNSPECIFIED SPINAL REGION: ICD-10-CM

## 2022-03-20 PROCEDURE — 72148 MRI LUMBAR SPINE W/O DYE: CPT

## 2022-03-22 ENCOUNTER — TREATMENT (OUTPATIENT)
Dept: PHYSICAL THERAPY | Facility: CLINIC | Age: 52
End: 2022-03-22

## 2022-03-22 DIAGNOSIS — M53.3 SACROILIAC JOINT DYSFUNCTION: Primary | ICD-10-CM

## 2022-03-22 PROCEDURE — 97112 NEUROMUSCULAR REEDUCATION: CPT | Performed by: PHYSICAL THERAPIST

## 2022-03-22 PROCEDURE — 97140 MANUAL THERAPY 1/> REGIONS: CPT | Performed by: PHYSICAL THERAPIST

## 2022-03-22 NOTE — PROGRESS NOTES
"Physical Therapy Note          SUBJECTIVE:   Changes since last seen:  \"The past few days I have been feeling pretty good.\"  \"It only hurts when I'm sleeping and I roll from left side to right side.\"     She notes that she hasn't had to do standing traction, un-weighting herself at the counter.  She also notes she hasn't felt her legs buckling the last couple of days.    She got an MRI and Xrays of her lumbar spine.  She follows up with MARIAA Thomas this Thursday.       Current level of pain:    0/10  Lowest level of pain since last seen:  0/10   Highest level of pain since last seen:            6/10 during the day and at night when rolls and changes positions      Past Medical History:  1.  Hx of 2 C-sections  2.  Hx of 3 pregnancies  3.  Hx of regular menses, last menses 12/04/16  4.  Hx of AA, 2001  5.  Hx of kidney stones    Functional Limitations:  Stairs, sit to stand, prolonged sitting  Patient Goals for Physical Therapy:      OBJECTIVE:    Observation:   Right lateral shift of the pelvis and slight hip hike on right with elevation of the left shoulder.        05/20/2021 07/27/21 09/07/21 12/07/21 12/09/21 02/01/22 03/01/22 03/22/22            SI Joint Positioning  Right  Left Right  Left Right  Left Right  Left Right  Left Right  Left Right  Left Right  Left Right  Left Right  Left Right  Left Right  Left Right  Left Right Left Right Left Right Left Right  Left       Innominate                           Anterior                 x               x    x  x   x    x  x    sl                   Posterior     x   x              x                x                 x              x               x              sl               Sacrum                              Unilateral rotation    x   x   x   x   x    x   x    x                                   SI Joint Testing  Right  Left Right  Left Right  Left Right  Left Right  Left Right  Left Right  Left Right  Left Right  Left Right  Left Right  Left Right  Left Right "  Left Right Left Right Left Right Left Right  Left           Distraction   +          +   +           +   +        +   +          +  +          +   +          +    +          +    +         +                    Kyra's   -           -     +         +   +          +   Sl        sl   Sl          -   -         -                    Compression   -           -     -          -   -           -   -         -   -          -   -         -                    Prone Press Up   -            -     +          +   -          -   -         sl   Sl        sl   -          -                                Special Tests  Right   Left Right  Left Right  Left Right  Left Right  Left Right  Left Right Left  Right  Left Right  Left Right  Left Right  Left Right  Left Right  Left Right Left Right Left Right Left Right Left       Straight Leg Raise                                    Active   -          -     -          -   -          -   -          -   -          -                     Passive   -            -     -         -   -          -   -          -   +        ++                 Hip Scour Test   +          -   +          -  +         -    +       -   +           -   +         sl   +         sl   +        sl                                Baker's Cysts   -           -   -         -    -          -   -          -   -            -                  Palpation:      Muscle/Bone Irritation  Date 05/20/2021 07/27/21 09/07/21 12/07/21 12/09/21 02/01/22 03/01/22 03/22/22            Right  Left Right  Left Right  Left Right  Left Right  Left Right  Left  RightLeft Right  Left Right  Left Right  Left Right  Left Right  Left Right  Left Right Left Right Left Right Left   SI joint  +           -   Sl           -   -            -   +         sl    +        +   +         -   +        -  sl           -           Piriformis  sl          -   Sl         -  -            -    +        +   +         ++   +         +    Sl         -           Gr. Trochanter  +           -   Sl           -  -           -    -        +    -           -    -         sl    -          -           IT Band  -           -   -           -   -           -    -         -    -          -     -          -           Quadratus Lumborum  -             -   -           -    +         -   +        + + trv p    - Sl instSl inst   sl         sl           Ischial Tuberosity  -            -   -          -   -           -    -        -   -           -   -          -    -         -           Sacrococcygeal Ligs  -            -   -             -   -           -   -         -   -          -  -          -    -         -           Paraspinals  -           -    +          -   +        sl   +         -    -          +    Sl        -           Spinous Processes                                      T-spine rotated to   -           -   -           -   -            -    -        -   -          -   -          -                     L-spine rotated to  -           -   -            -  L 3-5  L3-5 L2-5           L3-5 L3-5 L4-5           Adductor Franck  -          -   -           -   -           -   -           -   -          -   -        -   -          -           Iliopsoas  +          -   Sl        -   -           -    +        sl   Sl         -   Sl         -  +        sl   Sl        -           Quad Origin  -          -   -             -   -           -     -            -    -          -           Pubic Symphysis        -            Not tested Not tested          +         sl          sl  sl   not tested                                                        Monthly Objective Measurement/Functional Activity  Date: 05/20/2021 07/27/21 09/07/21 12/07/21 02/01/22 03/01/22           Oswestry Pain Score 12/100   20/100   11/100     50/100     18/100    32/100           LE Functional Scale                                  AROM Lumbar Spine: Degrees   Degrees      Degrees      Degrees      Degrees    Degrees Degrees Degrees  Degrees Degrees Degrees Degrees Degrees Degrees      Flexion 135 pain ant hip           140      142        41 p    132 feels strain left lumbar 123  Pain down R lat thigh to knee               Extension 35           32       32        19p       31    33 pinch   Right  SI jt              Right Lat. Flexion 45           43       55        21p      44       40              Left Lat. Flexion 41           43       40        24 p      40       33              Right Lat Shift Pelvis 1st one pain    Right ant thigh strain  No pain  Decrease on R but pain down left leg  No change/ pain No change              Left Lat Shift Pelvis No pain   No pain  no pain  Low back pain  no change/ pain No change                            AROM Hips:  (degrees) Right      Left Right Left Right  Left Right  Left Right  Left Right  Left Right Left Right Left Right Left Right Left Right  Left Right Left Right Left Right Left      Flexion 115       120  120   125    126   123 115      110  118     112 120      114              Abduction 42       45   45     47    45       46   40      44   45        45 45        47              Int. Rotation 28        40   32      40   35      40  25        38   36        40  32      38              Ext. Rotation  32       33    42     44    45       46  40        42   44        45  42      44                            Flexibility:   (degrees) Right    Left Right  Left Right  Left Right  Left Right  Left Right  Left Right Left Right Left Right Left Right Left Right  Left Right Left Right Left Right Left      Hamstrings -18       -18    -15    -16   - 15     - 15  -20     -18  -20       -17  -22       -16              Quadriceps 85       80    85    82    85        85    78     51   85      65  80         66              HipExt/Rot(piriformis) 32       46 40       45  45       45   41      40  42        46   35       42              Hip Int/Rotators 30       22  32      28  33       32    42      35    43        35    44      33              Hip Flexors (iliopsoas) -6       -8  not tested Not tested Not tested Not tested Not tested              IT Band -5       -1  not tested  not tested  not tested  not tested  not tested                            Root Level  - Motor Right      Left Right  Left Right  Left Right  Left Right  Left Right  Left Right Left Right Left Right Left Right Left Right  Left Right Left Right Left Right Left     T12             Rectus Abdominus 5/5         5/5        5/5        5/5              L1              Paraspinals 5/5        5/5     5/5      5/5  5/5       5/5   5/5       5/5              L2              Hip Flexion 5/5       5/5   4+/5p    5/5 5/5       5/5  5/5        5/5              L3             Quads  5/5        5/5   5/5       5/5 5/5       5/5                L4              Anterior Tibialis  5/5        5/5   5/5         5/5    5/5        5/5              L5             Gr. Toe Extension  5/5         5/5   5/5        5/'5   5/5        5/5              S1            Gastroc/Sol/Peroneals 5/5        5/5    5/5        5/5 5/5        5/5    5/5     5/5                             Functional Strength:                   Single LegStanceTime (seconds) R:30   L:30   R:      L: R:      L: R: 15     L:20 R:30     L:30 R:      L:  R:       L:   R:      L:   R:      L:   R:      L: R:      L:   R:      L:   R:      L:    R:      L:      Pelvic Floor Isolation (seconds) -                   Inner Unit  Isolation (seconds) -                                 Functional Activities:                    Sit w/o pain? Pain increases (minutes) yes Yes/ 60 min Yes/ 60 min No/ 60 min Yes/ 60  No/ 60 min              Stand w/o pain? yes Yes/ > 1 hr Yes/  > 1 hr No/ extra pain No, minor annoyance , 60 min  No/ 60 min              Walk w/o pain? yes Yes/ no limit  yes/ no limit unless right hip flare up No/ 1/4 mile  ystill minor es/ 30 min   No/ 1 mile              Steps w/o pain? no no  yes no Yes/ minor  No/  "f1 fl              Drive w/o pain? Yes/ pain getting out of car  yes/ pain getting out of car   yes/ 60 min  no/ 60 min Yes/ 60 still stick shift, pain getting rufina out  No/ 60 min w/ supports              Work w/o pain? Yes/ pain getting out of chair Yes/ 60 min, pain getting out of chair Yes/ 60 min No/ 60 min  No/ 60 min              # times wakes w/ pain? 2-3x   0-1x      0x  every time up to BR x2  Wakes when rolls   1x                  PLAN OF CARE:    ASSESSMENT:  Problem List:   1.  Significant flare up of SI joint dysfunction  2.  Slight positive Hip Scour sign on right      Discussion:    Discussed using an inversion table as one of the \"tools in her toolbox\" to minimize posterior lumbar disc pressures and provide some traction to her lumbar facets.     Performed manual work on SI joints and lumbar facets.  She had no pain following this.     Instructed her in how to do diaphragmatic breathing to engage the pelvic floor and transverse abdominus.   Following this instructed in how to do the diaphragmatic breath engaging the inner unit and then taking 3 lower costal breaths.     Following this instructed in using the diaphragmatic breath to set the inner unit and perform capital flexion with core stabilization.   This was a little hard for her to do.  She is able to sustain this for 6 sec.  Goal is to be able to hold 10 seconds for functional positioning at desk.     Instructed in planking with inner unit engaged and \"bucket handle up.\"  She is able to hold the plank for 6 seconds.     Continue progressing spinal stabilization, inner and out unit and neuromotor retraining.     Short Term Goals: (4-6 weeks)                                 Date Met:                1.   pt independent in postural correction       07/27/21      2.   pt independent in SI joint self-corrections      07/27/21  3.   pt independent in exer to decrease post. disc pressures    03/01/22  4.   pt able to sleep through night without " pain      09/07/21  5.   pt able to move sit to stand w/o catching after 5 minutes    03/22/22  6.   Decrease Oswestry Pain Score to no greater than 10/100  7.  Pt able to engage TA and pelvic floor on exhale of diaphr breath x3   09/07/21  8.  Pt able to take 3 lower costal breaths with TA and pelvic fl engaged   09/07/21  9.  Pt able to perform capital flexion on inner unit engagement x3, hold 6 seconds  10.  Pt able to perform bridging with inner unit engaged, isolating glut max x3  11.  Pt to adapt SI joint self corrections reducing SI joint pain no greater than 2/10 02/01/22  12.  Pt able to perform hamstring stretch without irritating SI joints x2   03/22/22  13.  Pt able to perform hip adductor stretch without irritating SI joints x2   03/22/22    Long Term Goals:(3-5 months)        Date Met:      1.  pt independent in self-management of symptoms       2.  pt independent in home program      3.  pt able to work 2 hours without pain on sit to stand        Treatment Plan:   1.  Ultrasound to increase extensibility, decrease pain, if needed  2.  Electrical stimulation for muscle relaxation, decrease pain, if needed, iontophoresis  3.  Manual therapy to increase function, decrease pain  4.  Therapeutic exercise to increase function, decrease pain  5.  Home programming and d/c planning to increase function and decrease pain    Frequency & Duration:  Pt will be seen on a once/ 2-4 week basis for 1 more visits    Patient Participated in and Agrees With This Plan of Care and Goals:  YES  Rehab Potential:  ernestine Rice, PT, MS  Physical Therapist  [place]# [id number]    TREATMENT TODAY:    Total Treatment Time: (time in clinic)     55 min   Timed Code Treatment Minutes:   55    Manual Therapy:  (MA)  RX min:  25  Manual posterior rotation of right innominate    Positional Isometric contraction (PIC) of left iliopsoas    Positional Isometric contraction of (PIC) right gluteus minimus    Distraction of both SI jts in  open pack hip position  Piriformis stretching, bilaterally    Quadratus lumborum stretching, bilaterally    Hip internal rotator stretching  Hamstring stretching  Anterior/Inferior Mobilization of  right hip      Therapeutic Activities:  (TA)  RX min:      Neuromuscular Reeducation: (NMR)  RX min: 30    Ultrasound:  RX min:          1.6 grey/cm2       Location:     Iontophoresis:  6 hr patch                                Location:                               Procedures:      Key:  i=instructed        r=review        c=corrected        a=adapted   Code Procedure/Instruction 05/20/2021 07/27/21 09/07/21 12/07/21 12/09/21 02/01/22 03/01/22 03/22/22           TA         Sleeping Positions instructed               review                 TA Sternum-Up Posture                   TA SI jt. self-correction instructed review  adapt  Review/correct Review /correct            TA Lumbar Facet PIC    instruct  Review/correct Review/ correct            TA   Order of Self-Corrections instructed review    review review            TA Use of lumbar pillow instructed     review Review, do with supine exercises             TA Use of cervical roll                   TA Use of orthotics                   TA Use of SI belt                   TA Use of Nada chair                   TA Use of Ice    instruct               TA Use of [employer]/ heat    instruct               TA Use of TENS unit                   TA Use of iontophoresis                   TA Decreasing Disc Pressures      instruct                                                     NMR Diaphragmatic Breathing  instruct review  correct   review           NMR Diaphrag breath engage TA, 3 lower costal breaths  instruct correct  review   Review/correct           NMR Capital flexion with inner unit on   instructed  review   Review/correct           NMR Bridging with inner unit on isolating glut max   instructed  Adapt to no raising, just glut max isola              NMR Supine  "\"scissor arm\" w/ IU engaged     instruct              NMR Pelvic Floor Isolation  review review  review              NMR Transverse Abdominus  review review  review              NMR Multifidus Isolation  review review  review              NMR Planking        Instruct w/ IU on and sternum up           NMR InnerUnit against Gravity                   NMR Sit-stand w/ inner unit                   NMR Roll w/ inner unit                   NMR Walk w/ inner unit                    NMR Up/down steps w/ inner unit                   NMR Water Exer Instruction                   NMR Hip Abd w/o piriformis                   NMR Hip Add w/o iliop/ham                    NMR Lower abs in supine                   NMR Abd obliques in supine                   NMR Prone Knee Flexion                   NMR Prone glut jasmina                   NMR Retro Step                   NMR Retro Walk                   NMR Retro walk on treadmill                   NMR Forward walk w/ inner unit                   NMR Balance Instruction                   NMR Stability Ball A/P & Lateral                   NMR Ball Catch/Throw /Supine                   NMR Ball Catch/Throw /Stand                   NMR StabilityBall All 4's Arm Lift                   NMR StabilityBall Prone Walk Out                   NMR StabilityBall Supine Oblique                   NMR Stability Ball sit-supine-sit                   NMR Walking Prog Progression                   MNR Home program sequencing                                       TA Hamstring stretch       instruct            TA Hip Ext Rot Stretch                   TA Hip Int Rot Stretch                   TA Hip Flex/IT Stretch                   TA Hip Add Stretch       instruct            TA Lats Dorsi Stretch                   TA Gastroc/soleus stretch                   TA QuadratusLumborm Stretch                      Supine                      Stance                   TA Quad Stretch                                  "      NMR Wall Push Ups                   NMR Planking                   NMR BOSU Ball Exercises                   NMR Lateral Bridge Drop                   NMR [place]                   NMR O'Feldt Lat Pull Down                   NMR O'Feldt Hip Ext                   NMR O'Feldt Trunk Ext                                       TA CervDiscExtSup/stnd                   TA Cerv Stretches                   TA Self PIC Cervical Spine                   TA Neural Gliding                   TA Ant/Mid Scalene Strch                   TA Biceps stretch                   TA Rotator Cuff Stretch                   TA Anterior Chest Stretch                   TA Wrist Ext Stretch                                       NMR Prone Arm Lifts                   NMR Scapula Stabilization                   NMR Occulomotor Isometrics                   NMR Cervical Isometrics                                       TA Shld AROM w/bar                   TA Shld Capsular Stretching                   TA Self PIC Thor Spine                   TA Rot Cuff Therabd, beginner                   TA Rot Cuff Therabd, intermed                                                                                                                                                                                                                                                 Selma Rice, PT, MS  Physical Therapist  [place]# [id number]

## 2022-05-24 ENCOUNTER — TRANSCRIBE ORDERS (OUTPATIENT)
Dept: ADMINISTRATIVE | Facility: HOSPITAL | Age: 52
End: 2022-05-24

## 2022-05-24 DIAGNOSIS — Z12.31 VISIT FOR SCREENING MAMMOGRAM: Primary | ICD-10-CM

## 2022-06-09 ENCOUNTER — HOSPITAL ENCOUNTER (OUTPATIENT)
Dept: MAMMOGRAPHY | Facility: HOSPITAL | Age: 52
Discharge: HOME | End: 2022-06-09
Admitting: OBSTETRICS & GYNECOLOGY

## 2022-06-09 DIAGNOSIS — Z12.31 VISIT FOR SCREENING MAMMOGRAM: ICD-10-CM

## 2022-06-09 PROCEDURE — 77063 BREAST TOMOSYNTHESIS BI: CPT

## 2022-06-09 PROCEDURE — 77063 BREAST TOMOSYNTHESIS BI: CPT | Performed by: RADIOLOGY

## 2022-06-09 PROCEDURE — 77067 SCR MAMMO BI INCL CAD: CPT

## 2022-06-09 PROCEDURE — 77067 SCR MAMMO BI INCL CAD: CPT | Performed by: RADIOLOGY

## 2022-07-11 ENCOUNTER — OFFICE VISIT (OUTPATIENT)
Dept: FAMILY MEDICINE CLINIC | Facility: CLINIC | Age: 52
End: 2022-07-11

## 2022-07-11 ENCOUNTER — LAB (OUTPATIENT)
Dept: LAB | Facility: HOSPITAL | Age: 52
End: 2022-07-11

## 2022-07-11 VITALS
TEMPERATURE: 96.6 F | RESPIRATION RATE: 16 BRPM | WEIGHT: 140.2 LBS | DIASTOLIC BLOOD PRESSURE: 72 MMHG | HEART RATE: 81 BPM | OXYGEN SATURATION: 99 % | SYSTOLIC BLOOD PRESSURE: 116 MMHG | HEIGHT: 66 IN | BODY MASS INDEX: 22.53 KG/M2

## 2022-07-11 DIAGNOSIS — J45.30 MILD PERSISTENT ASTHMA WITHOUT COMPLICATION: ICD-10-CM

## 2022-07-11 DIAGNOSIS — Z00.00 HEALTHCARE MAINTENANCE: ICD-10-CM

## 2022-07-11 DIAGNOSIS — M25.50 ARTHRALGIA, UNSPECIFIED JOINT: ICD-10-CM

## 2022-07-11 DIAGNOSIS — J45.30 MILD PERSISTENT ASTHMA WITHOUT COMPLICATION: Primary | ICD-10-CM

## 2022-07-11 DIAGNOSIS — M54.50 MIDLINE LOW BACK PAIN WITHOUT SCIATICA, UNSPECIFIED CHRONICITY: ICD-10-CM

## 2022-07-11 LAB
ALBUMIN SERPL-MCNC: 4.6 G/DL (ref 3.5–5.2)
ALBUMIN/GLOB SERPL: 1.8 G/DL
ALP SERPL-CCNC: 52 U/L (ref 39–117)
ALT SERPL W P-5'-P-CCNC: 18 U/L (ref 1–33)
ANION GAP SERPL CALCULATED.3IONS-SCNC: 11.3 MMOL/L (ref 5–15)
AST SERPL-CCNC: 19 U/L (ref 1–32)
BILIRUB SERPL-MCNC: 0.5 MG/DL (ref 0–1.2)
BUN SERPL-MCNC: 15 MG/DL (ref 6–20)
BUN/CREAT SERPL: 18.8 (ref 7–25)
CALCIUM SPEC-SCNC: 9.5 MG/DL (ref 8.6–10.5)
CHLORIDE SERPL-SCNC: 101 MMOL/L (ref 98–107)
CHOLEST SERPL-MCNC: 199 MG/DL (ref 0–200)
CHROMATIN AB SERPL-ACNC: <10 IU/ML (ref 0–14)
CO2 SERPL-SCNC: 24.7 MMOL/L (ref 22–29)
CREAT SERPL-MCNC: 0.8 MG/DL (ref 0.57–1)
CRP SERPL-MCNC: 0.8 MG/DL (ref 0–0.5)
DEPRECATED RDW RBC AUTO: 41.7 FL (ref 37–54)
EGFRCR SERPLBLD CKD-EPI 2021: 90.5 ML/MIN/1.73
ERYTHROCYTE [DISTWIDTH] IN BLOOD BY AUTOMATED COUNT: 12 % (ref 12.3–15.4)
ERYTHROCYTE [SEDIMENTATION RATE] IN BLOOD: 8 MM/HR (ref 0–20)
GLOBULIN UR ELPH-MCNC: 2.6 GM/DL
GLUCOSE SERPL-MCNC: 76 MG/DL (ref 65–99)
HCT VFR BLD AUTO: 43.4 % (ref 34–46.6)
HDLC SERPL-MCNC: 90 MG/DL (ref 40–60)
HGB BLD-MCNC: 15.1 G/DL (ref 12–15.9)
LDLC SERPL CALC-MCNC: 97 MG/DL (ref 0–100)
LDLC/HDLC SERPL: 1.06 {RATIO}
MCH RBC QN AUTO: 33.2 PG (ref 26.6–33)
MCHC RBC AUTO-ENTMCNC: 34.8 G/DL (ref 31.5–35.7)
MCV RBC AUTO: 95.4 FL (ref 79–97)
PLATELET # BLD AUTO: 246 10*3/MM3 (ref 140–450)
PMV BLD AUTO: 9.6 FL (ref 6–12)
POTASSIUM SERPL-SCNC: 4.1 MMOL/L (ref 3.5–5.2)
PROT SERPL-MCNC: 7.2 G/DL (ref 6–8.5)
RBC # BLD AUTO: 4.55 10*6/MM3 (ref 3.77–5.28)
SODIUM SERPL-SCNC: 137 MMOL/L (ref 136–145)
T4 FREE SERPL-MCNC: 1.17 NG/DL (ref 0.93–1.7)
TRIGL SERPL-MCNC: 68 MG/DL (ref 0–150)
TSH SERPL DL<=0.05 MIU/L-ACNC: 2.03 UIU/ML (ref 0.27–4.2)
URATE SERPL-MCNC: 3.4 MG/DL (ref 2.4–5.7)
VLDLC SERPL-MCNC: 12 MG/DL (ref 5–40)
WBC NRBC COR # BLD: 6.05 10*3/MM3 (ref 3.4–10.8)

## 2022-07-11 PROCEDURE — 86140 C-REACTIVE PROTEIN: CPT

## 2022-07-11 PROCEDURE — 86431 RHEUMATOID FACTOR QUANT: CPT

## 2022-07-11 PROCEDURE — 85652 RBC SED RATE AUTOMATED: CPT

## 2022-07-11 PROCEDURE — 84439 ASSAY OF FREE THYROXINE: CPT

## 2022-07-11 PROCEDURE — 99214 OFFICE O/P EST MOD 30 MIN: CPT | Performed by: FAMILY MEDICINE

## 2022-07-11 PROCEDURE — 80061 LIPID PANEL: CPT

## 2022-07-11 PROCEDURE — 99396 PREV VISIT EST AGE 40-64: CPT | Performed by: FAMILY MEDICINE

## 2022-07-11 PROCEDURE — 86038 ANTINUCLEAR ANTIBODIES: CPT

## 2022-07-11 PROCEDURE — 80050 GENERAL HEALTH PANEL: CPT

## 2022-07-11 PROCEDURE — 84550 ASSAY OF BLOOD/URIC ACID: CPT

## 2022-07-11 PROCEDURE — 36415 COLL VENOUS BLD VENIPUNCTURE: CPT

## 2022-07-11 NOTE — PROGRESS NOTES
"Subjective   Debra Beckman is a 51 y.o. female    Chief Complaint    Asthma  Annual check up    History of Present Illness  The patient presents today for an annual follow-up and prescription renewal related to her asthma. She reports she is doing well. The patient states she is still going to therapy with Deborah Yu, and sometimes with this, she feels like she needs to be on something that will help her not feel overwhelmed. The patient states she took Paxil a long time ago, and it made her sick to her stomach, and she did not really like it.    The patient states she has been having periodic arthritis feeling stuff. She reports there was a period of it right after her last visit that she was having a hard time even picking up things. The patient states it was hurting really badly, and then it went away. She reports she talked to Dr. Byrd about it when she went to see her just in case it was hormonal because she is also seeing Selma Rice for her back. The patient states she has degenerative disc disease. She states Selma thinks that it is a hormonal thing that maybe certain times of the month, her joints are getting a little lax. The patient reports she talked to Dr. Byrd about it, and she was told she needed to get tested for rheumatoid arthritis. She states every now and again, her finger will feel like it is swollen and really sore in her joint. The patient states there was a period of time where she was having to ice her hands, and she bought that all that \"crap\" to put on her hands. She states she was using all that stuff, and then it went away. The patient reports it was bad enough where she was not really able to  a fork. She reports it has not happened in a while, but she still has remnants of it here and there.    The following portions of the patient's history were reviewed and updated as appropriate: allergies, current medications, past social history and problem list    Review " of Systems   Constitutional: Negative.  Negative for fatigue and fever.   HENT: Negative for congestion.    Respiratory: Negative.  Negative for cough, chest tightness, shortness of breath and wheezing.    Cardiovascular: Negative for chest pain.   Gastrointestinal: Negative.    Musculoskeletal: Positive for arthralgias, back pain and myalgias. Negative for gait problem.   Neurological: Negative for dizziness, tremors, weakness and numbness.   Psychiatric/Behavioral: Negative for behavioral problems and dysphoric mood. The patient is not nervous/anxious.        Objective     Vitals:    07/11/22 0848   BP: 116/72   Pulse: 81   Resp: 16   Temp: 96.6 °F (35.9 °C)   SpO2: 99%       Physical Exam  Vitals and nursing note reviewed.   Constitutional:       General: She is not in acute distress.     Appearance: She is well-developed. She is not diaphoretic.   Cardiovascular:      Rate and Rhythm: Normal rate and regular rhythm.      Heart sounds: Normal heart sounds. No murmur heard.  Pulmonary:      Effort: Pulmonary effort is normal. No respiratory distress.      Breath sounds: Normal breath sounds. No wheezing or rales.   Chest:      Chest wall: No tenderness.   Abdominal:      General: Bowel sounds are normal.      Palpations: Abdomen is soft.   Musculoskeletal:      Lumbar back: Tenderness and bony tenderness present. No swelling, deformity or spasms. Decreased range of motion.   Skin:     General: Skin is warm and dry.   Neurological:      Mental Status: She is alert and oriented to person, place, and time.      Deep Tendon Reflexes: Reflexes are normal and symmetric.         Assessment & Plan   Problems Addressed this Visit        Musculoskeletal and Injuries    Low back pain    Relevant Orders    Uric Acid    C-reactive Protein    Sedimentation Rate    Rheumatoid Factor    Nuclear Antigen Antibody, IFA      Other Visit Diagnoses     Mild persistent asthma without complication    -  Primary    Relevant Orders    CBC  (No Diff)    Comprehensive Metabolic Panel    Arthralgia, unspecified joint        Relevant Orders    Uric Acid    C-reactive Protein    Sedimentation Rate    Rheumatoid Factor    Nuclear Antigen Antibody, IFA    Healthcare maintenance        Relevant Orders    CBC (No Diff)    Comprehensive Metabolic Panel    Lipid Panel    TSH    T4, Free      Diagnoses       Codes Comments    Mild persistent asthma without complication    -  Primary ICD-10-CM: J45.30  ICD-9-CM: 493.90     Midline low back pain without sciatica, unspecified chronicity     ICD-10-CM: M54.50  ICD-9-CM: 724.2     Arthralgia, unspecified joint     ICD-10-CM: M25.50  ICD-9-CM: 719.40     Healthcare maintenance     ICD-10-CM: Z00.00  ICD-9-CM: V70.0         I spent 25 minutes in patient care: Reviewing records prior to the visit, examining the patient, entering orders and documentation    Part of this note may be an electronic transcription/translation of spoken language to printed text using the Dragon Dictation System.        Transcribed from ambient dictation for WILBERTO Centeno MD by Faviola Lima.  07/11/22   10:08 EDT    Patient verbalized consent to the visit recording.  I have personally performed the services described in this document as transcribed by the above individual, and it is both accurate and complete.  WILBERTO Centeno MD  7/12/2022  13:41 EDT    Answers for HPI/ROS submitted by the patient on 7/5/2022  What is the primary reason for your visit?: Physical

## 2022-07-12 LAB
ANA HOMOGEN TITR SER: ABNORMAL {TITER}
ANA SER QL IF: POSITIVE
Lab: ABNORMAL

## 2022-07-12 RX ORDER — ESCITALOPRAM OXALATE 10 MG/1
10 TABLET ORAL DAILY
Qty: 30 TABLET | Refills: 11 | Status: SHIPPED | OUTPATIENT
Start: 2022-07-12 | End: 2022-08-17

## 2022-07-21 DIAGNOSIS — J45.30 MILD PERSISTENT ASTHMA WITHOUT COMPLICATION: ICD-10-CM

## 2022-07-22 RX ORDER — BUDESONIDE AND FORMOTEROL FUMARATE DIHYDRATE 160; 4.5 UG/1; UG/1
AEROSOL RESPIRATORY (INHALATION)
Qty: 10.2 G | Refills: 2 | Status: SHIPPED | OUTPATIENT
Start: 2022-07-22 | End: 2022-10-04

## 2022-07-26 DIAGNOSIS — J45.30 MILD PERSISTENT ASTHMA WITHOUT COMPLICATION: ICD-10-CM

## 2022-07-26 RX ORDER — ALBUTEROL SULFATE 90 UG/1
2 AEROSOL, METERED RESPIRATORY (INHALATION) EVERY 4 HOURS PRN
Qty: 18 G | Refills: 11 | Status: SHIPPED | OUTPATIENT
Start: 2022-07-26

## 2022-07-26 NOTE — TELEPHONE ENCOUNTER
Caller: [id number] - [place], [place] - [address] - [phone]  - [phone] FX    Relationship: Pharmacy    Best call back number: [phone]      Requested Prescriptions:   Requested Prescriptions     Pending Prescriptions Disp Refills   • albuterol sulfate  (90 Base) MCG/ACT inhaler 18 g 11     Sig: Inhale 2 puffs Every 4 (Four) Hours As Needed for Wheezing.        Pharmacy where request should be sent: [id number] - [place], [place] - [address] - [phone]  - [phone] FX     Additional details provided by patient:    Does the patient have less than a 3 day supply:  [x] Yes  [] No    Tiffanie Acosta Rep   07/26/22 11:15 EDT

## 2022-08-10 ENCOUNTER — HOSPITAL ENCOUNTER (OUTPATIENT)
Dept: MAMMOGRAPHY | Facility: HOSPITAL | Age: 52
Discharge: HOME | End: 2022-08-10

## 2022-08-10 ENCOUNTER — HOSPITAL ENCOUNTER (OUTPATIENT)
Dept: ULTRASOUND IMAGING | Facility: HOSPITAL | Age: 52
Discharge: HOME | End: 2022-08-10

## 2022-08-10 DIAGNOSIS — R92.8 ABNORMAL MAMMOGRAM: ICD-10-CM

## 2022-08-10 PROCEDURE — 0 LIDOCAINE 1 % SOLUTION: Performed by: RADIOLOGY

## 2022-08-10 PROCEDURE — 88342 IMHCHEM/IMCYTCHM 1ST ANTB: CPT | Performed by: RADIOLOGY

## 2022-08-10 PROCEDURE — G0279 TOMOSYNTHESIS, MAMMO: HCPCS

## 2022-08-10 PROCEDURE — A4648 IMPLANTABLE TISSUE MARKER: HCPCS

## 2022-08-10 PROCEDURE — 77065 DX MAMMO INCL CAD UNI: CPT | Performed by: RADIOLOGY

## 2022-08-10 PROCEDURE — 76642 ULTRASOUND BREAST LIMITED: CPT

## 2022-08-10 PROCEDURE — 88360 TUMOR IMMUNOHISTOCHEM/MANUAL: CPT | Performed by: RADIOLOGY

## 2022-08-10 PROCEDURE — 76642 ULTRASOUND BREAST LIMITED: CPT | Performed by: RADIOLOGY

## 2022-08-10 PROCEDURE — 77065 DX MAMMO INCL CAD UNI: CPT

## 2022-08-10 PROCEDURE — 77061 BREAST TOMOSYNTHESIS UNI: CPT | Performed by: RADIOLOGY

## 2022-08-10 PROCEDURE — 19083 BX BREAST 1ST LESION US IMAG: CPT | Performed by: RADIOLOGY

## 2022-08-10 PROCEDURE — 88305 TISSUE EXAM BY PATHOLOGIST: CPT | Performed by: RADIOLOGY

## 2022-08-10 RX ORDER — LIDOCAINE HYDROCHLORIDE 10 MG/ML
5 INJECTION, SOLUTION INFILTRATION; PERINEURAL ONCE
Status: COMPLETED | OUTPATIENT
Start: 2022-08-10 | End: 2022-08-10

## 2022-08-10 RX ORDER — LIDOCAINE HYDROCHLORIDE AND EPINEPHRINE 10; 10 MG/ML; UG/ML
10 INJECTION, SOLUTION INFILTRATION; PERINEURAL ONCE
Status: COMPLETED | OUTPATIENT
Start: 2022-08-10 | End: 2022-08-10

## 2022-08-10 RX ADMIN — Medication 5 ML: at 13:50

## 2022-08-10 RX ADMIN — LIDOCAINE HYDROCHLORIDE,EPINEPHRINE BITARTRATE 2 ML: 10; .01 INJECTION, SOLUTION INFILTRATION; PERINEURAL at 13:53

## 2022-08-13 LAB
CYTO UR: NORMAL
LAB AP CASE REPORT: NORMAL
LAB AP CLINICAL INFORMATION: NORMAL
LAB AP DIAGNOSIS COMMENT: NORMAL
LAB AP SPECIAL STAINS: NORMAL
PATH REPORT.FINAL DX SPEC: NORMAL
PATH REPORT.GROSS SPEC: NORMAL

## 2022-08-15 ENCOUNTER — TELEPHONE (OUTPATIENT)
Dept: MAMMOGRAPHY | Facility: HOSPITAL | Age: 52
End: 2022-08-15

## 2022-08-15 NOTE — TELEPHONE ENCOUNTER
Referring [provider's office notified pathology returned as cancer & patient will be notified.     Left message for patient to return my call.

## 2022-08-15 NOTE — TELEPHONE ENCOUNTER
Patient returned call and was notified of pathology results and recommendation. Verbalizes understanding. Denies discomfort. Denies signs and symptoms of infection.     Patient previously requested surgical consult with Dr Russell. Patient notified of appointment on 9/7/22 @ 0930. Patient verbalized understanding.    Patient given office contact & location information. Told to bring photo ID, list of prescription & OTC medications and insurance information. Must wear a mask & can bring one person for support also wearing a mask.      Reviewed what would be discussed at surgical consult visit, including detailed explanation of pathology report & imaging reports; treatment options & pros/cons, availability of Breast Nurse Navigator. Patient encouraged to call back or contact Breast Nurse Navigator, with any questions or concerns. Patient information sent to Genetics/Navigator pool for evaluation & possible referral for genetic counseling.    Breast cancer information packet offered and accepted.

## 2022-08-17 ENCOUNTER — TELEPHONE (OUTPATIENT)
Dept: FAMILY MEDICINE CLINIC | Facility: CLINIC | Age: 52
End: 2022-08-17

## 2022-08-17 RX ORDER — DULOXETIN HYDROCHLORIDE 30 MG/1
30 CAPSULE, DELAYED RELEASE ORAL EVERY MORNING
Qty: 30 CAPSULE | Refills: 5 | Status: SHIPPED | OUTPATIENT
Start: 2022-08-17 | End: 2022-09-21

## 2022-08-17 NOTE — TELEPHONE ENCOUNTER
----- Message from Debra Beckman sent at 8/16/2022 10:26 PM EDT -----  Regarding: Uday Medellin,    I am writing at the suggestion of the pharmacist. The Lexapro I was recently prescribed has brought on continued fatigue symptoms. Pharmacist thought there were other meds in same class that may be better, and that I should reach out to you.     Please let me know your thoughts, when you get a chance.     Thank you,    Debra Beckman

## 2022-08-18 ENCOUNTER — NURSE NAVIGATOR (OUTPATIENT)
Dept: ONCOLOGY | Facility: CLINIC | Age: 52
End: 2022-08-18

## 2022-08-18 ENCOUNTER — TELEPHONE (OUTPATIENT)
Dept: FAMILY MEDICINE CLINIC | Facility: CLINIC | Age: 52
End: 2022-08-18

## 2022-08-18 ENCOUNTER — HOSPITAL ENCOUNTER (OUTPATIENT)
Dept: MRI IMAGING | Facility: HOSPITAL | Age: 52
Discharge: HOME | End: 2022-08-18
Admitting: SURGERY

## 2022-08-18 ENCOUNTER — TELEPHONE (OUTPATIENT)
Dept: GENETICS | Facility: HOSPITAL | Age: 52
End: 2022-08-18

## 2022-08-18 DIAGNOSIS — C50.912 MALIGNANT NEOPLASM OF LEFT FEMALE BREAST: ICD-10-CM

## 2022-08-18 PROCEDURE — 77049 MRI BREAST C-+ W/CAD BI: CPT

## 2022-08-18 PROCEDURE — 0 GADOBENATE DIMEGLUMINE 529 MG/ML SOLUTION: Performed by: SURGERY

## 2022-08-18 PROCEDURE — 77049 MRI BREAST C-+ W/CAD BI: CPT | Performed by: RADIOLOGY

## 2022-08-18 PROCEDURE — A9577 INJ MULTIHANCE: HCPCS | Performed by: SURGERY

## 2022-08-18 RX ADMIN — GADOBENATE DIMEGLUMINE 13 ML: 529 INJECTION, SOLUTION INTRAVENOUS at 14:26

## 2022-08-18 NOTE — TELEPHONE ENCOUNTER
"I have looked at all of the SSRIs, SNRIs and benzodiazepines-all medications which would help control feeling overwhelmed-and they all have adverse effects.  Lexapro, the medication I first prescribed, historically, is the \"cleanest\" medication that I am aware of for this indication.  We may need to seek the guidance of a psychiatrist or perhaps her therapist would have some suggestions."

## 2022-08-18 NOTE — TELEPHONE ENCOUNTER
----- Message from Debra Beckman sent at 8/18/2022  9:54 AM EDT -----  Regarding: Lexapro  Hello,    Not trying to be difficult. But, as I look up the potential side effects, I notice that one of the side effects is fainting (syncope). I already periodically struggle with this issue.  Is there another med that might work?    I apologize for hassle.     Thanks,    Debra

## 2022-08-18 NOTE — TELEPHONE ENCOUNTER
Contacted the patient to setup a genetic appt for 8-22-22 at 1 pm with Barbara James for a surgical decision for Dr. MACIAS. Dr. MACIAS appt on 8-24-22 at 845.

## 2022-08-19 ENCOUNTER — HOSPITAL ENCOUNTER (OUTPATIENT)
Dept: MRI IMAGING | Facility: HOSPITAL | Age: 52
End: 2022-08-19

## 2022-08-19 ENCOUNTER — TELEPHONE (OUTPATIENT)
Dept: MRI IMAGING | Facility: HOSPITAL | Age: 52
End: 2022-08-19

## 2022-08-19 NOTE — TELEPHONE ENCOUNTER
Called patient with MRI Breast results. Recommended 2nd look US with Dr. Marie. I will call her next week with the date/time. She is getting her genetic testing. She has not made a surgical decision as of now. Pt expressed understanding and was encouraged to call with any further questions or concerns.

## 2022-08-22 ENCOUNTER — LAB (OUTPATIENT)
Dept: LAB | Facility: HOSPITAL | Age: 52
End: 2022-08-22

## 2022-08-22 ENCOUNTER — CLINICAL SUPPORT (OUTPATIENT)
Dept: GENETICS | Facility: HOSPITAL | Age: 52
End: 2022-08-22

## 2022-08-22 DIAGNOSIS — C50.919 MALIGNANT NEOPLASM OF BREAST IN FEMALE, ESTROGEN RECEPTOR POSITIVE, UNSPECIFIED LATERALITY, UNSPECIFIED SITE OF BREAST: Primary | ICD-10-CM

## 2022-08-22 DIAGNOSIS — Z13.79 GENETIC TESTING: ICD-10-CM

## 2022-08-22 DIAGNOSIS — Z17.0 MALIGNANT NEOPLASM OF BREAST IN FEMALE, ESTROGEN RECEPTOR POSITIVE, UNSPECIFIED LATERALITY, UNSPECIFIED SITE OF BREAST: Primary | ICD-10-CM

## 2022-08-22 DIAGNOSIS — Z80.42 FAMILY HISTORY OF PROSTATE CANCER: ICD-10-CM

## 2022-08-22 DIAGNOSIS — Z80.3 FAMILY HISTORY OF BREAST CANCER: ICD-10-CM

## 2022-08-22 DIAGNOSIS — Z13.79 GENETIC TESTING: Primary | ICD-10-CM

## 2022-08-22 PROCEDURE — 96040: CPT | Performed by: GENETIC COUNSELOR, MS

## 2022-08-23 NOTE — PROGRESS NOTES
Debra Beckman is a 52-year-old female who was referred for genetic counseling due to a personal history and family history of breast cancer.   Ms. Beckman was recently diagnosed with an ER/OK+ breast cancer at age 51.  Ms. Beckman is meeting with Dr. MACIAS on 8/24/22.  Based on her personal and family history, we discussed Ms. Beckman’ risk for a hereditary cancer syndrome, and she decided to pursue genetic testing.    Comprehensive testing via the CancerNext panel ordered through [employer] was ordered which includes BRCA1/2 and 34 additional genes associated with an increased risk of breast cancer or other cancers (APC, RONNIE, AXIN2, BARD1, BMPR1A, BRCA1, BRCA2, BRIP1, CDH1, CDK4, CDKN2A, CHEK2, DICER1, EPCAM, GREM1, HOXB13, MLH1, MRE11A, MSH2, MSH3, MSH6, MUTYH, NBN, NF1, NTHL1, PALB2, PMS2, POLD1, POLE, PTEN, RAD51C, RAD51D, RECQL, SMAD4, SMARCA4, STK11, and TP53). Blood draw was [facility] at [facility] on 8/22/22.  Results from the high/moderate risk breast cancer genes are expected in 7-10 days, and results from the remainder of the panel are expected in 2-3 weeks.     PERTINENT FAMILY HISTORY:  Mat. Aunt: Breast cancer, >50  Mat. Aunt: Breast cancer, 30s-40s  Mat. Grandfather: Prostate cancer  Mat. Great-Grandfather: Prostate cancer    RISK ASSESSMENT:  Ms. Beckman’ personal history of breast cancer in combination with the family history of breast cancer raises the question of a hereditary cancer syndrome.   NCCN guidelines recommend BRCA1/2 testing for individuals diagnosed with breast cancer diagnosed at or under age 50 if there is a close relative diagnosed at any age.  Therefore, testing is appropriate for Ms. Beckman.  We discussed that the standard approach to BRCA1/2 testing is via a multigene panel that evaluates BRCA1/2 and multiple other genes known to impact cancer risk.  This risk assessment is based on the family history information provided at the time of the appointment.  The assessment could change in  the future should new information be obtained.     GENETIC COUNSELING (30 minutes):  We reviewed the family history information in detail.  Cases of breast cancer follow three general patterns: sporadic, familial, and hereditary.  While most cancer is sporadic, some cases appear to occur in family clusters.  These cases are said to be familial and account for 10-20% of breast cancer cases.  Familial cases may be due to a combination of shared genes and environmental factors among family members.  In even fewer cases (5-10%), the risk for cancer is inherited, and the genes responsible for the increased cancer risk are known.       Family histories typical of hereditary cancer syndromes usually include multiple first- and second-degree relatives diagnosed with cancer types that define a syndrome.  These cases tend to be diagnosed at younger-than-expected ages and can be bilateral or multifocal.  The cancer in these families follows an autosomal dominant inheritance pattern, which indicates the likely presence of a mutation in a cancer susceptibility gene.  Children and siblings of an individual believed to carry this mutation have a 50% chance of inheriting that mutation, thereby inheriting the increased risk to develop cancer.  These mutations can be passed down from the maternal or the paternal lineage.     Hereditary breast cancer accounts for 5-10% of all cases of breast cancer.  A significant proportion of hereditary breast cancer can be attributed to mutations in the BRCA1 and BRCA2 genes.  Mutations in these genes confer an increased risk for breast cancer, ovarian cancer, male breast cancer, prostate cancer and pancreatic cancer.  There are other genes that are known to be associated with an increased risk for breast cancer and other cancers. In order to get as much information as possible regarding Ms. Beckman’ personal risks and potential risks for her family, testing was pursued through a multigene panel  that would look at several other genes known to increase the risk for breast cancer and other cancers.     GENETIC TESTING:  The risks, benefits and limitations of genetic testing and implications for clinical management following testing were reviewed.  DNA test results can influence decisions regarding screening, prevention and surgical management.  Genetic testing can have significant psychological implications for both individuals and families.  Also discussed was the possibility of insurance discrimination based on genetic test results and the laws ([place]) in place to prevent this.     We discussed panel testing, which would involve testing for BRCA1/2 as well as several other cancer susceptibility genes at the same time.  The benefits and limitations of genetic testing were discussed and Ms. Beckman decided to pursue testing. The implications of a positive or negative test result were discussed. We discussed the possibility that, in some cases, genetic test results may be uninformative due to the identification of a genetic variant of uncertain significance. These variants may or may not be associated with an increased cancer risk.  VUSs are frequently reported through multigene panel testing, given the presence of genetic variation in the population and the number of genes being analyzed. Given her personal history, a negative test result would not eliminate all breast cancer risk to her relatives, although the risk would not be as high as it would with positive genetic testing.          PLAN:  The patient will be contacted by telephone once results are available.  Genetic counseling remains available to Ms. Beckman and her family in the future, and she is welcome to contact us at [phone] with any questions she may have.        Barbara James MS, [facility]  Licensed Certified Genetic Counselor

## 2022-08-24 ENCOUNTER — TELEPHONE (OUTPATIENT)
Dept: MRI IMAGING | Facility: HOSPITAL | Age: 52
End: 2022-08-24

## 2022-08-24 ENCOUNTER — TELEPHONE (OUTPATIENT)
Dept: FAMILY MEDICINE CLINIC | Facility: CLINIC | Age: 52
End: 2022-08-24

## 2022-08-24 RX ORDER — ESCITALOPRAM OXALATE 10 MG/1
10 TABLET ORAL DAILY
Qty: 30 TABLET | Refills: 1 | Status: SHIPPED | OUTPATIENT
Start: 2022-08-24 | End: 2022-09-26 | Stop reason: ALTCHOICE

## 2022-08-24 NOTE — TELEPHONE ENCOUNTER
Called patient with 2nd look date of 8/26 at 2:00 at the [address]. Pt not on BT.  Pt expressed understanding and was encouraged to call with any further questions or concerns.

## 2022-08-24 NOTE — TELEPHONE ENCOUNTER
----- Message from Debra Beckman sent at 8/23/2022  7:13 PM EDT -----  Regarding: Lexapro  Hello,    I am fine to stay on  LexaPro for now. I have a trial that begins on Monday, August 29. I would rather not switch psych meds at this point. Could Dr. Medellin please call in refill for  LexaPro?     Will stay on that until we figure out an alternative, if there is one. The pharmacist suggested Zoloft, for what it’s worth.     Thank you,    Debra

## 2022-08-25 ENCOUNTER — NURSE NAVIGATOR (OUTPATIENT)
Dept: OTHER | Facility: HOSPITAL | Age: 52
End: 2022-08-25

## 2022-08-25 DIAGNOSIS — Z17.0 MALIGNANT NEOPLASM OF UPPER-INNER QUADRANT OF LEFT BREAST IN FEMALE, ESTROGEN RECEPTOR POSITIVE: Primary | ICD-10-CM

## 2022-08-25 DIAGNOSIS — C50.212 MALIGNANT NEOPLASM OF UPPER-INNER QUADRANT OF LEFT BREAST IN FEMALE, ESTROGEN RECEPTOR POSITIVE: Primary | ICD-10-CM

## 2022-08-25 NOTE — PROGRESS NOTES
I saw patient with Dr MACIAS and patient's father. Dr. MACIAS reviewed the patient's pathology report and surgical/treatment options. The patient has had Breast MRI and will be having an US of area in the left breast - she verbalized understanding that this might require a MRI guided biopsy. The patient saw genetics 8/22/2022 and those results are pending. After genetic result and biopsy result is back - patient will make her final surgical decision. Educational and supportive materials were given and reviewed. Notes taken for the patient.

## 2022-08-26 ENCOUNTER — HOSPITAL ENCOUNTER (OUTPATIENT)
Dept: ULTRASOUND IMAGING | Facility: HOSPITAL | Age: 52
Discharge: HOME | End: 2022-08-26

## 2022-08-26 ENCOUNTER — HOSPITAL ENCOUNTER (OUTPATIENT)
Dept: MAMMOGRAPHY | Facility: HOSPITAL | Age: 52
Discharge: HOME | End: 2022-08-26

## 2022-08-26 DIAGNOSIS — C50.912 MALIGNANT NEOPLASM OF LEFT FEMALE BREAST: ICD-10-CM

## 2022-08-26 PROCEDURE — 19084 BX BREAST ADD LESION US IMAG: CPT | Performed by: RADIOLOGY

## 2022-08-26 PROCEDURE — 19083 BX BREAST 1ST LESION US IMAG: CPT | Performed by: RADIOLOGY

## 2022-08-26 PROCEDURE — 88342 IMHCHEM/IMCYTCHM 1ST ANTB: CPT | Performed by: SURGERY

## 2022-08-26 PROCEDURE — 88305 TISSUE EXAM BY PATHOLOGIST: CPT | Performed by: SURGERY

## 2022-08-26 PROCEDURE — 0 LIDOCAINE 1 % SOLUTION: Performed by: RADIOLOGY

## 2022-08-26 PROCEDURE — 77065 DX MAMMO INCL CAD UNI: CPT | Performed by: RADIOLOGY

## 2022-08-26 PROCEDURE — 88360 TUMOR IMMUNOHISTOCHEM/MANUAL: CPT | Performed by: SURGERY

## 2022-08-26 PROCEDURE — A4648 IMPLANTABLE TISSUE MARKER: HCPCS

## 2022-08-26 RX ORDER — LIDOCAINE HYDROCHLORIDE AND EPINEPHRINE 10; 10 MG/ML; UG/ML
3 INJECTION, SOLUTION INFILTRATION; PERINEURAL ONCE
Status: COMPLETED | OUTPATIENT
Start: 2022-08-26 | End: 2022-08-26

## 2022-08-26 RX ORDER — LIDOCAINE HYDROCHLORIDE 10 MG/ML
3 INJECTION, SOLUTION INFILTRATION; PERINEURAL ONCE
Status: COMPLETED | OUTPATIENT
Start: 2022-08-26 | End: 2022-08-26

## 2022-08-26 RX ADMIN — LIDOCAINE HYDROCHLORIDE,EPINEPHRINE BITARTRATE 1 ML: 10; .01 INJECTION, SOLUTION INFILTRATION; PERINEURAL at 15:57

## 2022-08-26 RX ADMIN — Medication 0.5 ML: at 16:00

## 2022-08-26 RX ADMIN — Medication 1 ML: at 15:56

## 2022-08-26 RX ADMIN — LIDOCAINE HYDROCHLORIDE,EPINEPHRINE BITARTRATE 1 ML: 10; .01 INJECTION, SOLUTION INFILTRATION; PERINEURAL at 16:04

## 2022-08-30 LAB
CYTO UR: NORMAL
CYTO UR: NORMAL
LAB AP CASE REPORT: NORMAL
LAB AP CASE REPORT: NORMAL
LAB AP DIAGNOSIS COMMENT: NORMAL
LAB AP DIAGNOSIS COMMENT: NORMAL
LAB AP SPECIAL STAINS: NORMAL
LAB AP SPECIAL STAINS: NORMAL
PATH REPORT.FINAL DX SPEC: NORMAL
PATH REPORT.FINAL DX SPEC: NORMAL
PATH REPORT.GROSS SPEC: NORMAL
PATH REPORT.GROSS SPEC: NORMAL

## 2022-08-31 ENCOUNTER — TELEPHONE (OUTPATIENT)
Dept: MAMMOGRAPHY | Facility: HOSPITAL | Age: 52
End: 2022-08-31

## 2022-09-01 ENCOUNTER — TELEPHONE (OUTPATIENT)
Dept: GENETICS | Facility: HOSPITAL | Age: 52
End: 2022-09-01

## 2022-09-01 ENCOUNTER — TELEPHONE (OUTPATIENT)
Dept: MAMMOGRAPHY | Facility: HOSPITAL | Age: 52
End: 2022-09-01

## 2022-09-01 NOTE — TELEPHONE ENCOUNTER
Spoke with Jacqueline, Dr. Russell's assistant.  She stated Dr. Russell will call the patient and get her scheduled for surgery.

## 2022-09-01 NOTE — TELEPHONE ENCOUNTER
Patient returned my call and I disclosed her negative BRCAPlus genetic results. Informed patient the results would be on [facility] and sent to Dr. MACIAS. Informed patient the remaining results should be available within the next week or two and we would call her when we got those results.

## 2022-09-06 ENCOUNTER — TRANSCRIBE ORDERS (OUTPATIENT)
Dept: MAMMOGRAPHY | Facility: HOSPITAL | Age: 52
End: 2022-09-06

## 2022-09-06 ENCOUNTER — DOCUMENTATION (OUTPATIENT)
Dept: GENETICS | Facility: HOSPITAL | Age: 52
End: 2022-09-06

## 2022-09-06 DIAGNOSIS — C50.812 MALIGNANT NEOPLASM OF OVERLAPPING SITES OF LEFT FEMALE BREAST, UNSPECIFIED ESTROGEN RECEPTOR STATUS: ICD-10-CM

## 2022-09-06 DIAGNOSIS — R92.8 ABNORMAL MAMMOGRAM: Primary | ICD-10-CM

## 2022-09-06 NOTE — PROGRESS NOTES
Patient was contacted with first part of genetic test results that included analysis of BRCA1/2, RONNIE, CDH1, CHEK2, PALB2, PTEN, and TP53.  High/moderate risk breast cancer genes were resulted first to help with surgical decision making.  Patient will be contacted when remainder of panel and RNA results are available.

## 2022-09-07 ENCOUNTER — TELEPHONE (OUTPATIENT)
Dept: GENETICS | Facility: HOSPITAL | Age: 52
End: 2022-09-07

## 2022-09-08 ENCOUNTER — TRANSCRIBE ORDERS (OUTPATIENT)
Dept: ADMINISTRATIVE | Facility: HOSPITAL | Age: 52
End: 2022-09-08

## 2022-09-08 DIAGNOSIS — C50.812 MALIGNANT NEOPLASM OF OVERLAPPING SITES OF LEFT FEMALE BREAST, UNSPECIFIED ESTROGEN RECEPTOR STATUS: Primary | ICD-10-CM

## 2022-09-09 ENCOUNTER — TELEPHONE (OUTPATIENT)
Dept: GENETICS | Facility: HOSPITAL | Age: 52
End: 2022-09-09

## 2022-09-09 ENCOUNTER — DOCUMENTATION (OUTPATIENT)
Dept: GENETICS | Facility: HOSPITAL | Age: 52
End: 2022-09-09

## 2022-09-09 NOTE — PROGRESS NOTES
Debra Beckman is a 52-year-old female who was referred for genetic counseling due to a personal history and family history of breast cancer.   Ms. Beckman was recently diagnosed with an ER/NE+ breast cancer at age 51.  Based on her personal and family history, we discussed Ms. Beckman’ risk for a hereditary cancer syndrome, and she decided to pursue genetic testing.    Comprehensive testing via the CancerNext panel ordered through [facility] was ordered which includes BRCA1/2 and 34 additional genes associated with an increased risk of breast cancer or other cancers (APC, RONNIE, AXIN2, BARD1, BMPR1A, BRCA1, BRCA2, BRIP1, CDH1, CDK4, CDKN2A, CHEK2, DICER1, EPCAM, GREM1, HOXB13, MLH1, MRE11A, MSH2, MSH3, MSH6, MUTYH, NBN, NF1, NTHL1, PALB2, PMS2, POLD1, POLE, PTEN, RAD51C, RAD51D, RECQL, SMAD4, SMARCA4, STK11, and TP53). Genetic testing was negative by DNA sequencing and rearrangement testing for deleterious germline mutations in the BRCA1/2 genes and 34 additional genes included on the CancerNext Panel (see attached results). These normal results were discussed with Ms. Beckman by telephone on 9/9/22.     PERTINENT FAMILY HISTORY:  Mat. Aunt:  Breast cancer, >50  Mat. Aunt:  Breast cancer, 30s-40s  Mat. Grandfather: Prostate cancer  Mat. Great-Grandfather: Prostate cancer    RISK ASSESSMENT:  Ms. Beckman’ personal history of breast cancer in combination with the family history of breast cancer raises the question of a hereditary cancer syndrome.   NCCN guidelines recommend BRCA1/2 testing for individuals diagnosed with breast cancer diagnosed at or under age 50 if there is a close relative diagnosed at any age.  Therefore, testing is appropriate for Ms. Beckman.  We discussed that the standard approach to BRCA1/2 testing is via a multigene panel that evaluates BRCA1/2 and multiple other genes known to impact cancer risk.  This risk assessment is based on the family history information provided at the time of the  appointment.  The assessment could change in the future should new information be obtained.     GENETIC COUNSELING:  We reviewed the family history information in detail.  Cases of breast cancer follow three general patterns: sporadic, familial, and hereditary.  While most cancer is sporadic, some cases appear to occur in family clusters.  These cases are said to be familial and account for 10-20% of breast cancer cases.  Familial cases may be due to a combination of shared genes and environmental factors among family members.  In even fewer cases (5-10%), the risk for cancer is inherited, and the genes responsible for the increased cancer risk are known.       Family histories typical of hereditary cancer syndromes usually include multiple first- and second-degree relatives diagnosed with cancer types that define a syndrome.  These cases tend to be diagnosed at younger-than-expected ages and can be bilateral or multifocal.  The cancer in these families follows an autosomal dominant inheritance pattern, which indicates the likely presence of a mutation in a cancer susceptibility gene.  Children and siblings of an individual believed to carry this mutation have a 50% chance of inheriting that mutation, thereby inheriting the increased risk to develop cancer.  These mutations can be passed down from the maternal or the paternal lineage.     Hereditary breast cancer accounts for 5-10% of all cases of breast cancer.  A significant proportion of hereditary breast cancer can be attributed to mutations in the BRCA1 and BRCA2 genes.  Mutations in these genes confer an increased risk for breast cancer, ovarian cancer, male breast cancer, prostate cancer and pancreatic cancer.  There are other genes that are known to be associated with an increased risk for breast cancer and other cancers. In order to get as much information as possible regarding Ms. Beckman’ personal risks and potential risks for her family, testing was  pursued through a multigene panel that would look at several other genes known to increase the risk for breast cancer and other cancers.     GENETIC TESTING:  The risks, benefits and limitations of genetic testing and implications for clinical management following testing were reviewed.  DNA test results can influence decisions regarding screening, prevention and surgical management.  Genetic testing can have significant psychological implications for both individuals and families.  Also discussed was the possibility of insurance discrimination based on genetic test results and the laws ([place]) in place to prevent this.     We discussed panel testing, which would involve testing for BRCA1/2 as well as several other cancer susceptibility genes at the same time.  The benefits and limitations of genetic testing were discussed and Ms. Beckman decided to pursue testing. The implications of a positive or negative test result were discussed. We discussed the possibility that, in some cases, genetic test results may be uninformative due to the identification of a genetic variant of uncertain significance. These variants may or may not be associated with an increased cancer risk.  VUSs are frequently reported through multigene panel testing, given the presence of genetic variation in the population and the number of genes being analyzed. Given her personal history, a negative test result would not eliminate all breast cancer risk to her relatives, although the risk would not be as high as it would with positive genetic testing.       TEST RESULTS:  Genetic testing was negative by sequencing, deletion/duplication testing for germline mutations in BRCA1/2 and the additional 34 genes on the CancerNext panel.  The negative result greatly lowers the risk of a hereditary cancer syndrome for Ms. Beckman.  Ms. Beckman’ unaffected female relatives may still be considered to have a somewhat increased risk for breast cancer based on the  family history. This assessment is based on the information provided at the time of the consultation.    Cancer Prevention:  Despite the negative genetic test results, Ms. Beckman’ female relatives may have a somewhat increased lifetime risk for breast cancer based on family history.  Female relatives could have a risk assessment performed using a family history-based model, such as the Tyrer-Cuzick model, to determine their individual risks.   Given the increased risk, options available to individuals with an elevated lifetime risk for breast cancer were briefly discussed.   Surveillance for individuals with an elevated lifetime risk of breast cancer (>20%, versus the average risk of 12%), based on NCCN guidelines, would consist of semi-annual clinical breast exams and monthly self-breast exams starting by age 18 and annual mammography starting 10 years younger than the earliest diagnosis in a close relative, or by age 40.  According to an American Cancer Society expert panel and NCCN guidelines, annual breast MRI should be offered to women whose lifetime risk of breast cancer is 20-25 percent or more.      PLAN:  Genetic counseling remains available for Ms. Beckman.  If Ms. Beckman has any questions or concerns she is welcome to call us at [phone].        Barbara James MS, [facility], [facility]  Licensed Certified Genetic Counselor    Cc: MD Goran Le MD

## 2022-09-14 ENCOUNTER — LAB (OUTPATIENT)
Dept: LAB | Facility: HOSPITAL | Age: 52
End: 2022-09-14

## 2022-09-14 DIAGNOSIS — Z01.812 PRE-OPERATIVE LABORATORY EXAMINATION: Primary | ICD-10-CM

## 2022-09-14 LAB
ALBUMIN SERPL-MCNC: 4.3 G/DL (ref 3.5–5.2)
ALBUMIN/GLOB SERPL: 1.9 G/DL
ALP SERPL-CCNC: 75 U/L (ref 39–117)
ALT SERPL W P-5'-P-CCNC: 96 U/L (ref 1–33)
ANION GAP SERPL CALCULATED.3IONS-SCNC: 10.2 MMOL/L (ref 5–15)
AST SERPL-CCNC: 71 U/L (ref 1–32)
BILIRUB SERPL-MCNC: 0.5 MG/DL (ref 0–1.2)
BUN SERPL-MCNC: 9 MG/DL (ref 6–20)
BUN/CREAT SERPL: 12.9 (ref 7–25)
CALCIUM SPEC-SCNC: 10 MG/DL (ref 8.6–10.5)
CHLORIDE SERPL-SCNC: 104 MMOL/L (ref 98–107)
CO2 SERPL-SCNC: 24.8 MMOL/L (ref 22–29)
CREAT SERPL-MCNC: 0.7 MG/DL (ref 0.57–1)
EGFRCR SERPLBLD CKD-EPI 2021: 105.5 ML/MIN/1.73
GLOBULIN UR ELPH-MCNC: 2.3 GM/DL
GLUCOSE SERPL-MCNC: 81 MG/DL (ref 65–99)
POTASSIUM SERPL-SCNC: 4.3 MMOL/L (ref 3.5–5.2)
PROT SERPL-MCNC: 6.6 G/DL (ref 6–8.5)
SODIUM SERPL-SCNC: 139 MMOL/L (ref 136–145)

## 2022-09-14 PROCEDURE — 36415 COLL VENOUS BLD VENIPUNCTURE: CPT

## 2022-09-14 PROCEDURE — 80053 COMPREHEN METABOLIC PANEL: CPT

## 2022-09-14 PROCEDURE — 85025 COMPLETE CBC W/AUTO DIFF WBC: CPT

## 2022-09-15 LAB
BASOPHILS # BLD AUTO: 0.06 10*3/MM3 (ref 0–0.2)
BASOPHILS NFR BLD AUTO: 1 % (ref 0–1.5)
DEPRECATED RDW RBC AUTO: 43.1 FL (ref 37–54)
EOSINOPHIL # BLD AUTO: 0.08 10*3/MM3 (ref 0–0.4)
EOSINOPHIL NFR BLD AUTO: 1.3 % (ref 0.3–6.2)
ERYTHROCYTE [DISTWIDTH] IN BLOOD BY AUTOMATED COUNT: 12 % (ref 12.3–15.4)
HCT VFR BLD AUTO: 40.9 % (ref 34–46.6)
HGB BLD-MCNC: 14 G/DL (ref 12–15.9)
IMM GRANULOCYTES # BLD AUTO: 0.01 10*3/MM3 (ref 0–0.05)
IMM GRANULOCYTES NFR BLD AUTO: 0.2 % (ref 0–0.5)
LYMPHOCYTES # BLD AUTO: 2.13 10*3/MM3 (ref 0.7–3.1)
LYMPHOCYTES NFR BLD AUTO: 34.1 % (ref 19.6–45.3)
MCH RBC QN AUTO: 33.3 PG (ref 26.6–33)
MCHC RBC AUTO-ENTMCNC: 34.2 G/DL (ref 31.5–35.7)
MCV RBC AUTO: 97.4 FL (ref 79–97)
MONOCYTES # BLD AUTO: 0.44 10*3/MM3 (ref 0.1–0.9)
MONOCYTES NFR BLD AUTO: 7 % (ref 5–12)
NEUTROPHILS NFR BLD AUTO: 3.53 10*3/MM3 (ref 1.7–7)
NEUTROPHILS NFR BLD AUTO: 56.4 % (ref 42.7–76)
NRBC BLD AUTO-RTO: 0 /100 WBC (ref 0–0.2)
PLATELET # BLD AUTO: 256 10*3/MM3 (ref 140–450)
PMV BLD AUTO: 9.7 FL (ref 6–12)
RBC # BLD AUTO: 4.2 10*6/MM3 (ref 3.77–5.28)
WBC NRBC COR # BLD: 6.25 10*3/MM3 (ref 3.4–10.8)

## 2022-09-16 ENCOUNTER — HOSPITAL ENCOUNTER (OUTPATIENT)
Dept: MAMMOGRAPHY | Facility: HOSPITAL | Age: 52
Discharge: HOME | End: 2022-09-16

## 2022-09-16 ENCOUNTER — HOSPITAL ENCOUNTER (OUTPATIENT)
Dept: ULTRASOUND IMAGING | Facility: HOSPITAL | Age: 52
Discharge: HOME | End: 2022-09-16

## 2022-09-16 ENCOUNTER — LAB REQUISITION (OUTPATIENT)
Dept: LAB | Facility: HOSPITAL | Age: 52
End: 2022-09-16

## 2022-09-16 ENCOUNTER — HOSPITAL ENCOUNTER (OUTPATIENT)
Dept: NUCLEAR MEDICINE | Facility: HOSPITAL | Age: 52
Discharge: HOME | End: 2022-09-16

## 2022-09-16 DIAGNOSIS — C50.812 MALIGNANT NEOPLASM OF OVERLAPPING SITES OF LEFT FEMALE BREAST, UNSPECIFIED ESTROGEN RECEPTOR STATUS: ICD-10-CM

## 2022-09-16 DIAGNOSIS — C50.812 MALIGNANT NEOPLASM OF OVERLAPPING SITES OF LEFT FEMALE BREAST: ICD-10-CM

## 2022-09-16 PROCEDURE — 88342 IMHCHEM/IMCYTCHM 1ST ANTB: CPT | Performed by: SURGERY

## 2022-09-16 PROCEDURE — 19283 PERQ DEV BREAST 1ST STRTCTC: CPT | Performed by: RADIOLOGY

## 2022-09-16 PROCEDURE — 88307 TISSUE EXAM BY PATHOLOGIST: CPT | Performed by: SURGERY

## 2022-09-16 PROCEDURE — 77065 DX MAMMO INCL CAD UNI: CPT | Performed by: RADIOLOGY

## 2022-09-16 PROCEDURE — 38792 RA TRACER ID OF SENTINL NODE: CPT

## 2022-09-16 PROCEDURE — 88341 IMHCHEM/IMCYTCHM EA ADD ANTB: CPT | Performed by: SURGERY

## 2022-09-16 PROCEDURE — 88305 TISSUE EXAM BY PATHOLOGIST: CPT | Performed by: SURGERY

## 2022-09-16 PROCEDURE — C1819 TISSUE LOCALIZATION-EXCISION: HCPCS

## 2022-09-16 PROCEDURE — 88360 TUMOR IMMUNOHISTOCHEM/MANUAL: CPT | Performed by: NURSE PRACTITIONER

## 2022-09-16 PROCEDURE — A9541 TC99M SULFUR COLLOID: HCPCS | Performed by: SURGERY

## 2022-09-16 PROCEDURE — 76098 X-RAY EXAM SURGICAL SPECIMEN: CPT | Performed by: RADIOLOGY

## 2022-09-16 PROCEDURE — 0 TECHNETIUM FILTERED SULFUR COLLOID: Performed by: SURGERY

## 2022-09-16 PROCEDURE — 76098 X-RAY EXAM SURGICAL SPECIMEN: CPT

## 2022-09-16 RX ORDER — LIDOCAINE HYDROCHLORIDE 10 MG/ML
10 INJECTION, SOLUTION INFILTRATION; PERINEURAL ONCE
Status: SHIPPED | OUTPATIENT
Start: 2022-09-16

## 2022-09-16 RX ORDER — LIDOCAINE HYDROCHLORIDE 10 MG/ML
5 INJECTION, SOLUTION INFILTRATION; PERINEURAL ONCE
Status: SHIPPED | OUTPATIENT
Start: 2022-09-16

## 2022-09-16 RX ADMIN — TECHNETIUM TC 99M SULFUR COLLOID 1 DOSE: KIT at 11:03

## 2022-09-20 LAB
CYTO UR: NORMAL
LAB AP CASE REPORT: NORMAL
LAB AP CLINICAL INFORMATION: NORMAL
LAB AP DIAGNOSIS COMMENT: NORMAL
PATH REPORT.FINAL DX SPEC: NORMAL
PATH REPORT.GROSS SPEC: NORMAL

## 2022-09-21 ENCOUNTER — CONSULT (OUTPATIENT)
Dept: ONCOLOGY | Facility: CLINIC | Age: 52
End: 2022-09-21

## 2022-09-21 ENCOUNTER — HOSPITAL ENCOUNTER (OUTPATIENT)
Dept: RADIATION ONCOLOGY | Facility: HOSPITAL | Age: 52
Setting detail: RADIATION/ONCOLOGY SERIES
Discharge: HOME | End: 2022-09-21

## 2022-09-21 VITALS
DIASTOLIC BLOOD PRESSURE: 75 MMHG | BODY MASS INDEX: 23.32 KG/M2 | SYSTOLIC BLOOD PRESSURE: 119 MMHG | WEIGHT: 140 LBS | HEIGHT: 65 IN | OXYGEN SATURATION: 99 % | HEART RATE: 63 BPM | TEMPERATURE: 97.3 F | RESPIRATION RATE: 20 BRPM

## 2022-09-21 DIAGNOSIS — Z17.0 MALIGNANT NEOPLASM OF CENTRAL PORTION OF LEFT BREAST IN FEMALE, ESTROGEN RECEPTOR POSITIVE: Primary | ICD-10-CM

## 2022-09-21 DIAGNOSIS — C50.112 MALIGNANT NEOPLASM OF CENTRAL PORTION OF LEFT BREAST IN FEMALE, ESTROGEN RECEPTOR POSITIVE: Primary | ICD-10-CM

## 2022-09-21 PROCEDURE — 99205 OFFICE O/P NEW HI 60 MIN: CPT | Performed by: INTERNAL MEDICINE

## 2022-09-22 DIAGNOSIS — C80.1 MALIGNANT NEOPLASM: ICD-10-CM

## 2022-09-22 DIAGNOSIS — Z17.0 MALIGNANT NEOPLASM OF CENTRAL PORTION OF LEFT BREAST IN FEMALE, ESTROGEN RECEPTOR POSITIVE: Primary | ICD-10-CM

## 2022-09-22 DIAGNOSIS — C50.112 MALIGNANT NEOPLASM OF CENTRAL PORTION OF LEFT BREAST IN FEMALE, ESTROGEN RECEPTOR POSITIVE: Primary | ICD-10-CM

## 2022-09-24 NOTE — PROGRESS NOTES
Hematology and Oncology [place]  Office number [phone]    Fax number [phone]     New Patient Office Visit      Date: 2022     Patient Name: Debra Beckman  MRN: [id number]  : 1970    Referring Physician: Dr. Goran Russell      Chief Complaint: Left breast cancer    Cancer Staging:   Cancer Staging  Stage IA (cT1b, cN0, cM0, G1, ER+, NH+, HER2-      History of Present Illness: Debra Beckman is a pleasant 52 y.o. female who presents today for evaluation of left breast cancer.  She is accompanied by her supportive father who is a retired [employer] neurosurgeon, Dr. Beckman.    Screening mammogram 2022 demonstrated an asymmetry in the anterior left medial breast.  Diagnostic mammogram on 2022 demonstrated a persistent ill-defined asymmetry with architectural distortion in the left 9:00 periareolar region.  On ultrasound, this corresponded to an 8 mm mass in the 9:00 left breast.    Left breast 9:00 biopsy on 8/10/2022 demonstrated grade 1 invasive ductal carcinoma with associated DCIS (%, NH 60%, HER2/kathleen negative, 0+).    She proceeded with bilateral breast MRI on 8/15/2022.  This demonstrated a 1.4 cm 9:00 left breast enhancing mass, with a 1.3 cm area of clumped non-mass enhancement versus postbiopsy change 1 cm anterior lateral to this.  There is additionally a dominant focus of enhancement suspicious for a satellite lesion spanning 0.4 cm.  She underwent second look ultrasound and biopsy of the second lesion which corresponded to a subcentimeteric mass on ultrasound.    Left breast 9:00 biopsy on 2022 showed grade 2 invasive ductal carcinoma (ER greater than 90%, NH greater than 80%, HER2/kathleen negative, 0+)    Left breast lumpectomy, sentinel lymph node biopsy on 2022 demonstrated grade 2 invasive ductal carcinoma spanning 1.4 cm with associated DCIS.  Margin positive.  [place] lymph node positive () with a macrometastasis spanning 4 mm with  extracapsular extension.    She is recovering well from surgery.  Postoperative pain is well controlled.  She is anxious regarding neck steps of treatment.  She is meeting with cancer psychology is and is considering a change to her chronic antidepressant regimen.  She endorses a several month history of fatigue and also pain in her left back/ flank. She denies new persistent headaches, early satiety, cough.    Breast cancer risk profile:  G3, P2; Age of first live birth 37  Premenopausal, LMP was 2022.  She discontinued OCPs at the time of her cancer diagnosis.  Family history of breast, ovarian, prostate or pancreatic cancer: Maternal aunt with breast cancer.  Genetics: [facility] cancer next panel was negative in .    Past Medical History:   Past Medical History:   Diagnosis Date   • Allergic    • Asthma    • Low back pain    • Malignant neoplasm of central portion of left breast in female, estrogen receptor positive (HCC) 2022       Past Surgical History:   Past Surgical History:   Procedure Laterality Date   •  SECTION         Family History:   Family History   Problem Relation Age of Onset   • Hypertension Mother    • No Known Problems Father    • Breast cancer Maternal Aunt    • Ovarian cancer Neg Hx        Social History:   Social History     Socioeconomic History   • Marital status: Single   Tobacco Use   • Smoking status: Former Smoker   • Smokeless tobacco: Never Used   Vaping Use   • Vaping Use: Former   Substance and Sexual Activity   • Alcohol use: Yes     Comment: occasional   • Drug use: No   • Sexual activity: Defer   Works as a .  She is running for [email or web address] in November.    Medications:     Current Outpatient Medications:   •  albuterol sulfate  (90 Base) MCG/ACT inhaler, Inhale 2 puffs Every 4 (Four) Hours As Needed for Wheezing., Disp: 18 g, Rfl: 11  •  celecoxib (CeleBREX) 200 MG capsule, Take 1 capsule by mouth Daily. (Patient taking differently: Take 200  "mg by mouth As Needed.), Disp: 30 capsule, Rfl: 11  •  escitalopram (Lexapro) 10 MG tablet, Take 1 tablet by mouth Daily., Disp: 30 tablet, Rfl: 1  •  Symbicort 160-4.5 MCG/ACT inhaler, INHALE 2 PUFFS BY MOUTH TWICE DAILY, Disp: 10.2 g, Rfl: 2  •  traMADol (ULTRAM) 50 MG tablet, Take 1 tablet by mouth Every 6 (Six) Hours As Needed., Disp: 7 tablet, Rfl: 0  •  vitamin B-12 (CYANOCOBALAMIN) 1000 MCG tablet, Take 1,000 mcg by mouth Daily., Disp: , Rfl:   •  vitamin B-6 (PYRIDOXINE) 50 MG tablet, Take 50 mg by mouth Daily., Disp: , Rfl:   •  Vitamin D, Cholecalciferol, (CHOLECALCIFEROL) 10 MCG (400 UNIT) tablet, Take 400 Units by mouth Daily., Disp: , Rfl:     Current Facility-Administered Medications:   •  lidocaine (XYLOCAINE) 1 % injection 10 mL, 10 mL, Infiltration, Once, Connie Marie MD  •  lidocaine (XYLOCAINE) 1 % injection 5 mL, 5 mL, Infiltration, Once, Connie Marie MD    Allergies:   No Known Allergies    Objective     Vital Signs:   Vitals:    09/21/22 1555   BP: 119/75   Pulse: 63   Resp: 20   Temp: 97.3 °F (36.3 °C)   TempSrc: Temporal   SpO2: 99%   Weight: 63.5 kg (140 lb)   Height: 165.1 cm (65\")   PainSc:   2    Body mass index is 23.3 kg/m².   Pain Score    09/21/22 1555   PainSc:   2       ECOG Performance Status: 0    Physical Exam:  General: No acute distress. Well appearing   HEENT: Normocephalic, atraumatic. Sclera anicteric. Masked.  Neck: supple, no adenopathy.   Cardiovascular: regular rate and rhythm,. No murmurs.   Respiratory: Normal rate. Clear to auscultation bilaterally  Abdomen: Soft, nontender, non distended with normoactive bowel sounds  Lymph: no cervical, supraclavicular or axillary adenopathy  Neuro: Alert and oriented x 3. No focal deficits.   Ext: Symmetric, no swelling.   Psych: Euthymic      Laboratory/Imaging Reviewed:   Lab Requisition on 09/16/2022   Component Date Value Ref Range Status   • Case Report 09/16/2022    Final                    Value:Surgical Pathology Report   "                       Case: [id number]                                  Authorizing Provider:  Goran Russell MD   Collected:           09/16/2022 11:25 AM          Ordering Location:     [facility]   Received:            09/16/2022 12:53 PM                                 LABORATORY                                                                   Pathologist:           Osmar Santos MD                                                        Specimens:   1) - Breast, Left                                                                                   2) - [place] Lymph Node, left                                                                      3) - Breast, Left                                                                         • Clinical Information 09/16/2022    Final                    Value:This result contains rich text formatting which cannot be displayed here.   • Final Diagnosis 09/16/2022    Final                    Value:This result contains rich text formatting which cannot be displayed here.   • Comment 09/16/2022    Final                    Value:This result contains rich text formatting which cannot be displayed here.   • Gross Description 09/16/2022    Final                    Value:This result contains rich text formatting which cannot be displayed here.   • Microscopic Description 09/16/2022    Final                    Value:This result contains rich text formatting which cannot be displayed here.   Lab on 09/14/2022   Component Date Value Ref Range Status   • Glucose 09/14/2022 81  65 - 99 mg/dL Final   • BUN 09/14/2022 9  6 - 20 mg/dL Final   • Creatinine 09/14/2022 0.70  0.57 - 1.00 mg/dL Final   • Sodium 09/14/2022 139  136 - 145 mmol/L Final   • Potassium 09/14/2022 4.3  3.5 - 5.2 mmol/L Final   • Chloride 09/14/2022 104  98 - 107 mmol/L Final   • CO2 09/14/2022 24.8  22.0 - 29.0 mmol/L Final   • Calcium 09/14/2022 10.0  8.6 - 10.5 mg/dL Final   • Total  Protein 09/14/2022 6.6  6.0 - 8.5 g/dL Final   • Albumin 09/14/2022 4.30  3.50 - 5.20 g/dL Final   • ALT (SGPT) 09/14/2022 96 (A) 1 - 33 U/L Final   • AST (SGOT) 09/14/2022 71 (A) 1 - 32 U/L Final   • Alkaline Phosphatase 09/14/2022 75  39 - 117 U/L Final   • Total Bilirubin 09/14/2022 0.5  0.0 - 1.2 mg/dL Final   • Globulin 09/14/2022 2.3  gm/dL Final   • A/G Ratio 09/14/2022 1.9  g/dL Final   • BUN/Creatinine Ratio 09/14/2022 12.9  7.0 - 25.0 Final   • Anion Gap 09/14/2022 10.2  5.0 - 15.0 mmol/L Final   • eGFR 09/14/2022 105.5  >60.0 mL/min/1.73 Final    National Kidney Foundation and American Society of Nephrology (ASN) Task Force recommended calculation based on the Chronic Kidney Disease Epidemiology Collaboration (CKD-EPI) equation refit without adjustment for race.   • WBC 09/14/2022 6.25  3.40 - 10.80 10*3/mm3 Final   • RBC 09/14/2022 4.20  3.77 - 5.28 10*6/mm3 Final   • Hemoglobin 09/14/2022 14.0  12.0 - 15.9 g/dL Final   • Hematocrit 09/14/2022 40.9  34.0 - 46.6 % Final   • MCV 09/14/2022 97.4 (A) 79.0 - 97.0 fL Final   • MCH 09/14/2022 33.3 (A) 26.6 - 33.0 pg Final   • MCHC 09/14/2022 34.2  31.5 - 35.7 g/dL Final   • RDW 09/14/2022 12.0 (A) 12.3 - 15.4 % Final   • RDW-SD 09/14/2022 43.1  37.0 - 54.0 fl Final   • MPV 09/14/2022 9.7  6.0 - 12.0 fL Final   • Platelets 09/14/2022 256  140 - 450 10*3/mm3 Final   • Neutrophil % 09/14/2022 56.4  42.7 - 76.0 % Final   • Lymphocyte % 09/14/2022 34.1  19.6 - 45.3 % Final   • Monocyte % 09/14/2022 7.0  5.0 - 12.0 % Final   • Eosinophil % 09/14/2022 1.3  0.3 - 6.2 % Final   • Basophil % 09/14/2022 1.0  0.0 - 1.5 % Final   • Immature Grans % 09/14/2022 0.2  0.0 - 0.5 % Final   • Neutrophils, Absolute 09/14/2022 3.53  1.70 - 7.00 10*3/mm3 Final   • Lymphocytes, Absolute 09/14/2022 2.13  0.70 - 3.10 10*3/mm3 Final   • Monocytes, Absolute 09/14/2022 0.44  0.10 - 0.90 10*3/mm3 Final   • Eosinophils, Absolute 09/14/2022 0.08  0.00 - 0.40 10*3/mm3 Final   • Basophils,  "Absolute 09/14/2022 0.06  0.00 - 0.20 10*3/mm3 Final   • Immature Grans, Absolute 09/14/2022 0.01  0.00 - 0.05 10*3/mm3 Final   • nRBC 09/14/2022 0.0  0.0 - 0.2 /100 WBC Final       Mammo Post Clip Placement Left, Mammo Stereotactic Breast Device Placement Initial Without Bx    Result Date: 9/23/2022  Narrative: LEFT BREAST TOMOSYNTHESIS/STEREOTACTIC GUIDED NEEDLE LOCALIZATION: AFFIRM  CLINICAL HISTORY:  Known biopsy-proven malignancies on the left breast medially, bracketed localization  TECHNIQUE:  After obtaining informed consent and performing a \"time-out\" procedure, the left breast was breast was positioned in the alphanumeric grid compression paddle from a medial approach.  A 2D/3D LM view and stereotactic pair were obtained.  The lesion undergoing localization was identified.  The breast was prepped in the usual sterile fashion and anesthestized with 1% Lidocaine without epinephrine.  Next, a 9 cm Bard needle was placed into the breast from a medial approach to the appropriate depth and stereotactic images were obtained.  After confirming accurate positioning of the needle, a wire was placed through the needle and the needle was removed with the wire remaining in place.  A similar procedure was performed for the second wire placed to bracket the additional clip and mass.  Routine Left 2D/3D CC and ML digital mammographic images were obtained with a BB placed on the medial skin edge. Mammographic images were sent along with the patient to the operating room. No complications occurred during this procedure.      Impression: Successful tomosynthesis guided needle localization of multiple cancers in the left breast.   PATHOLOGY:  Pathology demonstrated invasive ductal carcinoma, intermediate grade, 1.4 cm in maximum dimension, ductal carcinoma in situ, high-grade, with cancerization of lobules. DCIS extends to the inked anterior margin of the specimen and is present less than 1 mm from the posterior margin. " Invasive carcinoma present 1 mm from the anterior margin, 2 mm to the superior posterior margins and 3 mm to the lateral margin. An extended margin posterior, medial lateral demonstrated benign breast tissue with no invasive or in situ carcinoma identified.  RECOMMENDATION: Surgical consultation is recommended.  This report was finalized on 9/23/2022 8:37 PM by Connie Marie MD.      NM [place] Node Injection Only    Result Date: 9/16/2022  Narrative: DATE OF EXAM: 9/16/2022 11:03 AM  PROCEDURE: NM [place] NODE INJECTION ONLY-  INDICATIONS: C50.812; C50.812-Malignant neoplasm of overlapping sites of left female breast  COMPARISON: None available  TECHNIQUE: 535 uCi of technetium 99 sulfur colloid was provided to the surgeon for injection in the operating room.  FINDINGS: No images were obtained.      Impression: Radiotracer provided to the surgeon for injection within the operating room. No images were obtained.  This report was finalized on 9/16/2022 1:48 PM by Josue Gerardo MD.      Mammo Breast Specimen    Result Date: 9/16/2022  Narrative: SPECIMEN RADIOGRAPH:  Specimen radiograph demonstrates the 3 clips and the dominant mass. The posterior lateral and posterior medial margins are close.  Results were communicated with the operating room at the time the specimen was received.  This report was finalized on 9/16/2022 4:56 PM by Connie Marie MD.      US Guided Breast Biopsy With & Without Device initial, US Guided Breast Biopsy With & Without Device Each Additional, Mammo Post Clip Placement Left    Addendum Date: 8/31/2022 Addendum:   There is a typographical error in the summary portion of the report which should read as follows:  SUMMARY: 14-gauge ultrasound guided core biopsy of subcentimeter masses at the 9:00 position of the LEFT breast 1 and 2 cm from the nipple. A post biopsy marking clip was placed at each position.  This report was finalized on 8/31/2022 7:31 PM by Connie Marie MD.      Result Date:  "8/31/2022  Narrative: 14G BARD SPRING-LOADED ULTRASOUND GUIDED CORE BIOPSY, 2 SITES  HISTORY: Probable sonographic correlate for a suspicious satellite lesion and nonmass enhancement within the tissues adjacent to the known biopsy-proven malignancy on the left at 9:00.  PROCEDURE: Written and verbal consent was obtained for ultrasound guided core biopsy of a subcentimeter mass is located at the 9:00 position 1 cm from the nipple and the 9:00 position 2 cm from the nipple. The lesion at the 9:00 position 1 cm from the nipple was approached first. \"Time out\" was observed to verify the patient's identity and correct location of the breast abnormality. The presence of the lesion was confirmed ultrasound and a lateral approach was chosen. The breast was prepped and draped in the usual sterile fashion and 1% lidocaine with and without epinephrine was utilized for local anesthesia. A small skin incision was made with a scalpel and a 14-gauge needle was introduced into the breast under direct sonographic guidance. The needle was placed adjacent to the mass. A total of 3 core samples were obtained. The specimens were placed in formalin and forwarded to the pathology department. A wing-shaped post biopsy marking clip was placed.  A similar procedure was performed of the 9:00 position 2 cm from the nipple. A total of 3 samples were obtained. A ribbon clip was placed.  Post biopsy mammographic images were obtained. Clip placement is appropriate and correlates nicely with MRI.  Upon completion of the procedure, compression was applied to the biopsy site until all appreciable bleeding subsided and a sterile dressing was applied. Post biopsy instructions were reviewed with the patient by our clinical breast imaging staff. A written copy of these instructions was also given to the patient. The patient tolerated the procedure well and no immediate complications occurred.  SUMMARY: 14-gauge ultrasound guided core biopsy of " subcentimeter masses at the 9:00 position of the right breast 1 and 2 cm from the nipple. A post biopsy marking clip was placed at each position.  Pathology demonstrated invasive ductal carcinoma at both sites, specifically the 9:00 position 2 cm from the nipple and the 9:00 position 1 cm from the nipple. Both masses are ER positive, AL positive and HER-2/kathleen negative. Please note the  coil clip and the wing clip are roughly 2 cm apart.  This is considered concordant with the imaging findings.  Results will be communicated to the patient by our breast care nurse. Follow-up with the patient's surgeon is recommended.  This report was finalized on 8/31/2022 1:34 PM by Connie Marie MD.          Assessment / Plan      Assessment/Plan:     1. Malignant neoplasm of central portion of left breast in female, estrogen receptor positive  -I reviewed the patient's history, imaging and pathology and discussed her case with her surgeon, Dr. Russell   and Dr. Dunn  of Radiation Oncology.  We discussed staging, prognosis and general principles of breast cancer therapy.  -She has an early stage hormone sensitive breast cancer with a single positive lymph node.  She has undergone curative intent surgery, and is planning to undergo reexcision for a positive margin.  She has been evaluated for consideration of adjuvant radiation.   -We discussed the potential role of adjuvant systemic therapy.  Historically, premenopausal lymph node positive breast cancer has been treated with adjuvant chemotherapy in addition to endocrine therapy. More recently, genomic testing has been shown to help to stratify the potential added benefit for an individual patient and identify patients who are less likely to derive significant additional benefit from chemotherapy.  For patients with N1 disease and age > 50, and low risk genomic signature with either Oncotype or MammaPrint has been shown to predict lack of benefit for adjuvant chemotherapy.   However, for patients less than 50, there is a minimum 5% absolute benefit in the lymph node positive setting from the addition of adjuvant chemotherapy even with a low risk genomic signature.  The patient is age 52, but premenopausal.  We discussed that this is a gray zone.  It is postulated that the potential benefit of adjuvant chemotherapy in premenopausal women may be at least in part explained by the effective ovarian suppression from chemotherapy.  We discussed the potential role of genomic testing to guide chemotherapy decision.  However the patient is healthy and wishes to be aggressive with her cancer treatment.  She declines genomic testing and wishes to proceed with adjuvant chemotherapy. We discussed standard treatment options of 1) dose dense Adriamycin and Cytoxan followed by weekly Taxol or 2) Taxotere and Cytoxan. We discussed differences in schedule and toxicities. We specifically discussed an increased risk for cardiotoxicity and late bone marrow disorders with the addition of adriamycin.  Given strongly ER positive disease and N1 I would favor adjuvant TC over dose dense AC followed by weekly taxol.We discussed the goals of chemotherapy being cure.  We reviewed the chemotherapy schedule and its side effects including but not limited to alopecia, myelosuppression, infection, fevers, nausea, vomiting, diarrhea, mucositis, dehydration, fatigue, heart disease, neuropathy, and late cancers.  Informed consent was obtained, and the patient elected to proceed.  She will have a port placed at the time of reexcision, and we will plan to initiate therapy around 3 weeks after surgery or when incisions are adequately healed.  -She will benefit from adjuvant endocrine therapy.  In the context of lymph node positive disease, would prefer ovarian suppression or BSO plus AI if she does not experience chemotherapy induced menopause.  -We will send Ki-67 to assess for the benefit of adjuvant Verzenio.  -In light of  her systemic symptoms of fatigue, malaise, and backslash flank pain, we will proceed with a staging PET/CT.    2. Anticipated chemotherapy alopecia  -We discussed use of the [place] cooling device.  We discussed success rates of 60% grade 1 alopecia for taxane based chemotherapy.  She does understand that there is still potential for more significant alopecia.  She will let me know if she wishes to proceed with use.    Follow Up:   With PET results    Rama Cano MD  Hematology and Oncology     Time spent on the day of service was 70 minutes inclusive of time before, during, and after office visit on record review, medically appropriate history and physical, counseling patient, ordering tests, documenting in the medical record, and communicating with referring provider.

## 2022-09-26 ENCOUNTER — OFFICE VISIT (OUTPATIENT)
Dept: PSYCHIATRY | Facility: CLINIC | Age: 52
End: 2022-09-26

## 2022-09-26 DIAGNOSIS — F41.1 GENERALIZED ANXIETY DISORDER: Primary | ICD-10-CM

## 2022-09-26 DIAGNOSIS — G47.9 SLEEP DISTURBANCE: ICD-10-CM

## 2022-09-26 PROCEDURE — 90792 PSYCH DIAG EVAL W/MED SRVCS: CPT | Performed by: NURSE PRACTITIONER

## 2022-09-26 RX ORDER — VENLAFAXINE HYDROCHLORIDE 37.5 MG/1
37.5 CAPSULE, EXTENDED RELEASE ORAL DAILY
Qty: 30 CAPSULE | Refills: 1 | Status: SHIPPED | OUTPATIENT
Start: 2022-09-26 | End: 2022-10-31 | Stop reason: SDUPTHER

## 2022-09-26 NOTE — PROGRESS NOTES
"  Subjective   Debra Beckman is a 52 y.o. female who is here today for initial appointment in person face to face with Covid precautions taken. Patient was referred by: Dr. Cristel Cano medical oncologist and surgeon Dr. MACIAS. Patient has been diagnosed with Left sided breast cancer s/p left lumpectomy with 1/1 sentinel lymph node positive. IDC is ER/HI + and HER2 negative.   She is premenopausal and will begin chemotherapy infusions approximately 3 weeks after re-excision for positive margin.  A port will be placed at time of surgery. After chemotherapy patient will then have irradiation treatments to left breast. She will also then be placed on an AI if chemotherapy stops her menstruation.          Chief Complaint:  H/o depression        History of Present Illness  The patient reports the following symptoms of  anxiety: constant anxiety/worry, restlessness/on edge, difficulty concentrating, mind goes blank, irritability, muscle tension and sleep disturbance. The symptoms have been present for at least 6 month(s) and have caused impairment in important areas of functioning. She was placed on escitalopram 10 mg two months ago and reports it has helped decreased anxious feelings but feels more numbing and feels tired all the time.  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 Has two young daughters 13 yo and 10 yo. Her ex  is out of country often for extended periods of time so can't count on him for childcare. Now diagnosed with breast cancer and need for extended treatment has increased her anxiety and feelings of being overwhelmed.  The patient reports depressive symptoms including depressed mood, crying spells, low energy and difficulty concentrating, present on most days for the past 3 month(s).  She has some sleep issues with diagnosis and awaiting chemotherapy treatment. Prior to this she \"sleep fine\". She " is waiting on a PET scan in two days and then re excision of margin and port placement end of this week. Patient reports excellent support from parents and friends and work. Right now she is trying to shut down her campaign, reassign her cases to others and prepare to take as much time as she needs for chemotherapy and radiation treatments. She plans on being home. Denies panic, denies PTSD, OCD or aimee ever. Denies illicit drug use or  Nicotine, social alcohol use.     The following portions of the patient's history were reviewed and updated as appropriate: allergies, current medications, past family history, past medical history, past social history, past surgical history and problem list.      Past Psych History: has been on Paxil in past for depression and anxiety. Sees a therapist currently and for past depression and anxiety. Denies SI/HI or inpatient psychiatric admissions.    Substance Abuse:  Nicotine: denies   Alcohol: use social  benzos or opioids: denies  Cannabis, stimulants, psychodelics: denies     BEREKET REVIEWED: no red flags      Family Psychiatric History:  family history includes Breast cancer in her maternal aunt; Hypertension in her mother; No Known Problems in her father.      Social History: raised by her parents in [place], [place]. Both still alive and very supportive. Has one sibling, sister, who lives in [place]. Patient got her ANNIE and works as . She was running for [employer] but now has had to dismantle campaign because of breast cancer treatment. Was  and has two daughters, 13 yo and 10 yo. She and the girls live in her home. She reports good support from family and friends.       Medical/Surgical History:  Past Medical History:   Diagnosis Date   • Allergic    • Asthma    • Low back pain    • Malignant neoplasm of central portion of left breast in female, estrogen receptor positive (HCC) 2022     Past Surgical History:   Procedure Laterality Date   •   SECTION         No Known Allergies    Current Medications:   Current Outpatient Medications   Medication Sig Dispense Refill   • albuterol sulfate  (90 Base) MCG/ACT inhaler Inhale 2 puffs Every 4 (Four) Hours As Needed for Wheezing. 18 g 11   • celecoxib (CeleBREX) 200 MG capsule Take 1 capsule by mouth Daily. (Patient taking differently: Take 200 mg by mouth As Needed.) 30 capsule 11   • Symbicort 160-4.5 MCG/ACT inhaler INHALE 2 PUFFS BY MOUTH TWICE DAILY 10.2 g 2   • traMADol (ULTRAM) 50 MG tablet Take 1 tablet by mouth Every 6 (Six) Hours As Needed. 7 tablet 0   • venlafaxine XR (EFFEXOR-XR) 37.5 MG 24 hr capsule Take 1 capsule by mouth Daily. 30 capsule 1   • vitamin B-12 (CYANOCOBALAMIN) 1000 MCG tablet Take 1,000 mcg by mouth Daily.     • vitamin B-6 (PYRIDOXINE) 50 MG tablet Take 50 mg by mouth Daily.     • Vitamin D, Cholecalciferol, (CHOLECALCIFEROL) 10 MCG (400 UNIT) tablet Take 400 Units by mouth Daily.       Current Facility-Administered Medications   Medication Dose Route Frequency Provider Last Rate Last Admin   • lidocaine (XYLOCAINE) 1 % injection 10 mL  10 mL Infiltration Once Connie Marie MD       • lidocaine (XYLOCAINE) 1 % injection 5 mL  5 mL Infiltration Once Connie Marie MD           Lab Results:  Lab Requisition on 09/16/2022   Component Date Value Ref Range Status   • Case Report 09/16/2022    Final                    Value:Surgical Pathology Report                         Case: [id number]                                  Authorizing Provider:  Goran Russell MD   Collected:           09/16/2022 11:25 AM          Ordering Location:     [facility]   Received:            09/16/2022 12:53 PM                                 LABORATORY                                                                   Pathologist:           Osmar Santos MD                                                        Specimens:   1) - Breast, Left                                                                                    2) - [place] Lymph Node, left                                                                      3) - Breast, Left                                                                         • Clinical Information 09/16/2022    Final                    Value:This result contains rich text formatting which cannot be displayed here.   • Final Diagnosis 09/16/2022    Final                    Value:This result contains rich text formatting which cannot be displayed here.   • Comment 09/16/2022    Final                    Value:This result contains rich text formatting which cannot be displayed here.   • Gross Description 09/16/2022    Final                    Value:This result contains rich text formatting which cannot be displayed here.   • Microscopic Description 09/16/2022    Final                    Value:This result contains rich text formatting which cannot be displayed here.   Lab on 09/14/2022   Component Date Value Ref Range Status   • Glucose 09/14/2022 81  65 - 99 mg/dL Final   • BUN 09/14/2022 9  6 - 20 mg/dL Final   • Creatinine 09/14/2022 0.70  0.57 - 1.00 mg/dL Final   • Sodium 09/14/2022 139  136 - 145 mmol/L Final   • Potassium 09/14/2022 4.3  3.5 - 5.2 mmol/L Final   • Chloride 09/14/2022 104  98 - 107 mmol/L Final   • CO2 09/14/2022 24.8  22.0 - 29.0 mmol/L Final   • Calcium 09/14/2022 10.0  8.6 - 10.5 mg/dL Final   • Total Protein 09/14/2022 6.6  6.0 - 8.5 g/dL Final   • Albumin 09/14/2022 4.30  3.50 - 5.20 g/dL Final   • ALT (SGPT) 09/14/2022 96 (A) 1 - 33 U/L Final   • AST (SGOT) 09/14/2022 71 (A) 1 - 32 U/L Final   • Alkaline Phosphatase 09/14/2022 75  39 - 117 U/L Final   • Total Bilirubin 09/14/2022 0.5  0.0 - 1.2 mg/dL Final   • Globulin 09/14/2022 2.3  gm/dL Final   • A/G Ratio 09/14/2022 1.9  g/dL Final   • BUN/Creatinine Ratio 09/14/2022 12.9  7.0 - 25.0 Final   • Anion Gap 09/14/2022 10.2  5.0 - 15.0 mmol/L Final   • eGFR 09/14/2022 105.5  >60.0  mL/min/1.73 Final    National Kidney Foundation and American Society of Nephrology (ASN) Task Force recommended calculation based on the Chronic Kidney Disease Epidemiology Collaboration (CKD-EPI) equation refit without adjustment for race.   • WBC 09/14/2022 6.25  3.40 - 10.80 10*3/mm3 Final   • RBC 09/14/2022 4.20  3.77 - 5.28 10*6/mm3 Final   • Hemoglobin 09/14/2022 14.0  12.0 - 15.9 g/dL Final   • Hematocrit 09/14/2022 40.9  34.0 - 46.6 % Final   • MCV 09/14/2022 97.4 (A) 79.0 - 97.0 fL Final   • MCH 09/14/2022 33.3 (A) 26.6 - 33.0 pg Final   • MCHC 09/14/2022 34.2  31.5 - 35.7 g/dL Final   • RDW 09/14/2022 12.0 (A) 12.3 - 15.4 % Final   • RDW-SD 09/14/2022 43.1  37.0 - 54.0 fl Final   • MPV 09/14/2022 9.7  6.0 - 12.0 fL Final   • Platelets 09/14/2022 256  140 - 450 10*3/mm3 Final   • Neutrophil % 09/14/2022 56.4  42.7 - 76.0 % Final   • Lymphocyte % 09/14/2022 34.1  19.6 - 45.3 % Final   • Monocyte % 09/14/2022 7.0  5.0 - 12.0 % Final   • Eosinophil % 09/14/2022 1.3  0.3 - 6.2 % Final   • Basophil % 09/14/2022 1.0  0.0 - 1.5 % Final   • Immature Grans % 09/14/2022 0.2  0.0 - 0.5 % Final   • Neutrophils, Absolute 09/14/2022 3.53  1.70 - 7.00 10*3/mm3 Final   • Lymphocytes, Absolute 09/14/2022 2.13  0.70 - 3.10 10*3/mm3 Final   • Monocytes, Absolute 09/14/2022 0.44  0.10 - 0.90 10*3/mm3 Final   • Eosinophils, Absolute 09/14/2022 0.08  0.00 - 0.40 10*3/mm3 Final   • Basophils, Absolute 09/14/2022 0.06  0.00 - 0.20 10*3/mm3 Final   • Immature Grans, Absolute 09/14/2022 0.01  0.00 - 0.05 10*3/mm3 Final   • nRBC 09/14/2022 0.0  0.0 - 0.2 /100 WBC Final   Hospital Outpatient Visit on 08/26/2022   Component Date Value Ref Range Status   • Case Report 08/26/2022    Final                    Value:Surgical Pathology Report                         Case: [id number]                                  Authorizing Provider:  Connie Marie MD           Collected:           08/26/2022 04:06 PM          Ordering Location:      [facility]   Received:            08/27/2022 04:51 AM                                 [address] [place] [address]                                                                                  ULTRASOUND                                                                   Pathologist:           Bekah Escobedo DO                                                        Specimen:    Breast, Left, Left breast 9:00 2cm fn hypoechoic irregular mass                           • Final Diagnosis 08/26/2022    Final                    Value:This result contains rich text formatting which cannot be displayed here.   • Comment 08/26/2022    Final                    Value:This result contains rich text formatting which cannot be displayed here.   • Gross Description 08/26/2022    Final                    Value:This result contains rich text formatting which cannot be displayed here.   • Special Stains 08/26/2022    Final                    Value:This result contains rich text formatting which cannot be displayed here.   • Microscopic Description 08/26/2022    Final                    Value:This result contains rich text formatting which cannot be displayed here.   Hospital Outpatient Visit on 08/26/2022   Component Date Value Ref Range Status   • Case Report 08/26/2022    Final                    Value:Surgical Pathology Report                         Case: [id number]                                  Authorizing Provider:  Connie Marie MD           Collected:           08/26/2022 03:53 PM          Ordering Location:     [facility]   Received:            08/27/2022 04:51 AM                                 [address] [place] [address]                                                                                  ULTRASOUND                                                                   Pathologist:           Bekah Escobedo DO                                                        Specimen:    Breast, Left, Left  breast 9:00 1cm fn hypoechoic irregular mass <1cm                      • Final Diagnosis 08/26/2022    Final                    Value:This result contains rich text formatting which cannot be displayed here.   • Comment 08/26/2022    Final                    Value:This result contains rich text formatting which cannot be displayed here.   • Gross Description 08/26/2022    Final                    Value:This result contains rich text formatting which cannot be displayed here.   • Special Stains 08/26/2022    Final                    Value:This result contains rich text formatting which cannot be displayed here.   • Microscopic Description 08/26/2022    Final                    Value:This result contains rich text formatting which cannot be displayed here.   Hospital Outpatient Visit on 08/10/2022   Component Date Value Ref Range Status   • Case Report 08/10/2022    Final                    Value:Surgical Pathology Report                         Case: [id number]                                  Authorizing Provider:  Brook Lincoln MD       Collected:           08/10/2022 02:03 PM          Ordering Location:     [facility]   Received:            08/11/2022 07:53 AM                                 [facility] [address]                                                                                  ULTRASOUND                                                                   Pathologist:           John Méndez MD                                                     Specimen:    Breast, Left, 0.8cm mass, 900, left breast                                                • Clinical Information 08/10/2022    Final                    Value:This result contains rich text formatting which cannot be displayed here.   • Final Diagnosis 08/10/2022    Final                    Value:This result contains rich text formatting which cannot be displayed here.   • Comment 08/10/2022    Final                     Value:This result contains rich text formatting which cannot be displayed here.   • Gross Description 08/10/2022    Final                    Value:This result contains rich text formatting which cannot be displayed here.   • Special Stains 08/10/2022    Final                    Value:This result contains rich text formatting which cannot be displayed here.   • Microscopic Description 08/10/2022    Final                    Value:This result contains rich text formatting which cannot be displayed here.   Lab on 07/11/2022   Component Date Value Ref Range Status   • WBC 07/11/2022 6.05  3.40 - 10.80 10*3/mm3 Final   • RBC 07/11/2022 4.55  3.77 - 5.28 10*6/mm3 Final   • Hemoglobin 07/11/2022 15.1  12.0 - 15.9 g/dL Final   • Hematocrit 07/11/2022 43.4  34.0 - 46.6 % Final   • MCV 07/11/2022 95.4  79.0 - 97.0 fL Final   • MCH 07/11/2022 33.2 (A) 26.6 - 33.0 pg Final   • MCHC 07/11/2022 34.8  31.5 - 35.7 g/dL Final   • RDW 07/11/2022 12.0 (A) 12.3 - 15.4 % Final   • RDW-SD 07/11/2022 41.7  37.0 - 54.0 fl Final   • MPV 07/11/2022 9.6  6.0 - 12.0 fL Final   • Platelets 07/11/2022 246  140 - 450 10*3/mm3 Final   • Glucose 07/11/2022 76  65 - 99 mg/dL Final   • BUN 07/11/2022 15  6 - 20 mg/dL Final   • Creatinine 07/11/2022 0.80  0.57 - 1.00 mg/dL Final   • Sodium 07/11/2022 137  136 - 145 mmol/L Final   • Potassium 07/11/2022 4.1  3.5 - 5.2 mmol/L Final   • Chloride 07/11/2022 101  98 - 107 mmol/L Final   • CO2 07/11/2022 24.7  22.0 - 29.0 mmol/L Final   • Calcium 07/11/2022 9.5  8.6 - 10.5 mg/dL Final   • Total Protein 07/11/2022 7.2  6.0 - 8.5 g/dL Final   • Albumin 07/11/2022 4.60  3.50 - 5.20 g/dL Final   • ALT (SGPT) 07/11/2022 18  1 - 33 U/L Final   • AST (SGOT) 07/11/2022 19  1 - 32 U/L Final   • Alkaline Phosphatase 07/11/2022 52  39 - 117 U/L Final   • Total Bilirubin 07/11/2022 0.5  0.0 - 1.2 mg/dL Final   • Globulin 07/11/2022 2.6  gm/dL Final   • A/G Ratio 07/11/2022 1.8  g/dL Final   • BUN/Creatinine Ratio  07/11/2022 18.8  7.0 - 25.0 Final   • Anion Gap 07/11/2022 11.3  5.0 - 15.0 mmol/L Final   • eGFR 07/11/2022 90.5  >60.0 mL/min/1.73 Final    National Kidney Foundation and American Society of Nephrology (ASN) Task Force recommended calculation based on the Chronic Kidney Disease Epidemiology Collaboration (CKD-EPI) equation refit without adjustment for race.   • Total Cholesterol 07/11/2022 199  0 - 200 mg/dL Final   • Triglycerides 07/11/2022 68  0 - 150 mg/dL Final   • HDL Cholesterol 07/11/2022 90 (A) 40 - 60 mg/dL Final   • LDL Cholesterol  07/11/2022 97  0 - 100 mg/dL Final   • VLDL Cholesterol 07/11/2022 12  5 - 40 mg/dL Final   • LDL/HDL Ratio 07/11/2022 1.06   Final   • TSH 07/11/2022 2.030  0.270 - 4.200 uIU/mL Final   • Free T4 07/11/2022 1.17  0.93 - 1.70 ng/dL Final   • Uric Acid 07/11/2022 3.4  2.4 - 5.7 mg/dL Final   • C-Reactive Protein 07/11/2022 0.80 (A) 0.00 - 0.50 mg/dL Final   • Sed Rate 07/11/2022 8  0 - 20 mm/hr Final   • Rheumatoid Factor Quantitative 07/11/2022 <10.0  0.0 - 14.0 IU/mL Final   • CINDI 07/11/2022 Positive (A)  Final                                         Negative   <1:80                                       Borderline  1:80                                       Positive   >1:80   • Homogeneous Pattern 07/11/2022 1:1280 (A)  Final    ICAP nomenclature: AC-1   • Note: (Reference) 07/11/2022 Comment   Final    Comment: For more information about Hep-2 cell patterns use  ANApatterns.org, the official website for the International  Consensus on Antinuclear Antibody (CINDI) Patterns (ICAP).  ------------------------------------------------------------  A positive CINDI result may occur in healthy individuals (low  titer) or be associated with a variety of diseases.  See  interpretation chart which is not all inclusive:  Pattern      Antigen Detected  Suggested Disease Association  -----------  ----------------  -----------------------------  Homogeneous  DNA(ds,ss),       SLE - High  titers               Nucleosomes,               Histones          Drug-induced SLE  -----------  ----------------  -----------------------------  Speckled     Sm, RNP, SCL-70,  SLE,MCTD,PSS (diffuse form),               SS-A/SS-B         Sjogrens  -----------  ----------------  -----------------------------  Nucleolar    SCL-70, PM-1/SCL  High titers Scleroderma,                                 PM/DM  -----------                             ----------------  -----------------------------  Centromere   Centromere        PSS (limited form) w/Crest                                 syndrome variable  -----------  ----------------  -----------------------------  Nuclear Dot  [id number]  Primary Biliary Cirrhosis  -----------  ----------------  -----------------------------  Nuclear      ,            Primary Biliary Cirrhosis  Membrane     elsi A,B,C  -----------  ----------------  -----------------------------         Review of Systems Constitutional: Negative for appetite change, chills, diaphoresis, fatigue, fever and unexpected weight change.   HENT: Negative for hearing loss, sore throat, trouble swallowing and voice change.    Eyes: Negative for photophobia and visual disturbance.   Respiratory: Negative for cough, chest tightness and shortness of breath.    Cardiovascular: Negative for chest pain and palpitations.   Gastrointestinal: Negative for abdominal pain, constipation, nausea and vomiting.   Endocrine: Negative for cold intolerance and heat intolerance.   Genitourinary: Negative for dysuria and frequency.   Musculoskeletal: Negative for arthralgia, back pain, joint swelling and neck stiffness.   Skin: Negative for color change and wound.   Allergic/Immunologic: Negative for environmental allergies and immunocompromised state.   Neurological: Negative for dizziness, tremors, seizures, syncope, weakness, light-headedness and headaches.   Hematological: Negative for adenopathy. Does not bruise/bleed  easily.    Objective   Physical Exam  There were no vitals taken for this visit.    DANIA-7:    Over the last two weeks, how often have you been bothered by the following problems?  Feeling nervous, anxious or on edge: Nearly every day  Not being able to stop or control worrying: Nearly every day  Worrying too much about different things: Nearly every day  Trouble Relaxing: More than half the days  Being so restless that it is hard to sit still: More than half the days  Becoming easily annoyed or irritable: More than half the days  Feeling afraid as if something awful might happen: Not at all  DANIA 7 Total Score: 15  If you checked any problems, how difficult have these problems made it for you to do your work, take care of things at home, or get along with other people: Very difficult  0-4: Minimal anxiety  5-9: Mild anxiety  10-14: Moderate anxiety  15-21: Severe anxiety    PHQ-9:  PHQ-2/PHQ-9 Depression Screening 9/26/2022   Little Interest or Pleasure in Doing Things 1-->several days   Feeling Down, Depressed or Hopeless 0-->not at all   PHQ-9: Brief Depression Severity Measure Score 1      5-9: Minimal symptoms  10-14: Major depression mild  15-19: Major depression moderate  Greater then 20: Major depression severe      Mental Status Exam:   Appearance: appropriate  Hygiene:   good  Cooperation:  Cooperative  Eye Contact:  Good  Psychomotor Behavior:  Appropriate  Mood:  anxious  Affect:  Appropriate  Hopelessness: Denies  Speech:  Normal  Thought Process:  Linear  Thought Content:  Normal  Suicidal:  None  Homicidal:  None  Hallucinations:  None  Delusion:  None  Memory:  Intact  Orientation:  Person, Place, Time and Situation  Reliability:  good  Insight:  Good  Judgement:  Good  Impulse Control:  Good  Physical/Medical Issues:  Yes breast cancer will be starting chemotherapy s/p surgery      Short-term goals: Patient will be compliant with clinic appointments.  Patient will be engaged in therapy, medication  compliant with minimal side effects. Patient  will report decrease of symptoms and frequency.    Long-term goals: Patient will have minimal symptoms of mental health disorder with continued treatment. Patient will be compliant with treatment and appointments.       Problem list: anxiety, sleep disturbance   Strengths: patient appears motivated for treatment          Assessment & Plan   Diagnoses and all orders for this visit:    1. Generalized anxiety disorder (Primary)  -     Cancel: [employer], IHC with Interpretation; Future    2. Sleep disturbance    Other orders  -     venlafaxine XR (EFFEXOR-XR) 37.5 MG 24 hr capsule; Take 1 capsule by mouth Daily.  Dispense: 30 capsule; Refill: 1        A psychological evaluation was conducted in order to assess past and current level of functioning. Areas assessed included, but were not limited to: perception of social support, perception of ability to face and deal with challenges in life (positive functioning), anxiety symptoms, depressive symptoms, perspective on beliefs/belief system, coping skills for stress, intelligence level,  Therapeutic rapport was established. Interventions conducted today were geared towards incorporating medication management along with support for continued therapy. Education was also provided as to the med management with this provider and what to expect in subsequent sessions.    Assisted patient in processing above session content; acknowledged and normalized patient’s thoughts, feelings, and concerns.  Applied  positive coping skills and behavior management in session.  Allowed patient to freely discuss issues without interruption or judgment. Provided safe, confidential environment to facilitate the development of positive therapeutic relationship and encourage open, honest communication. Assisted patient in identifying risk factors which would indicate the need for higher level of care including thoughts to harm self or others and/or  self-harming behavior and encouraged patient to contact this office, call 911, or present to the nearest emergency room should any of these events occur. Discussed crisis intervention services and means to access.  Patient adamantly and convincingly denies current suicidal or homicidal ideation or perceptual disturbance.    Discussed diagnosis and recommendations for treatment:    PROVIDE: Cognitive Behavioral Therapy and Solution Focused Therapy to improve functioning, maintain stability, and avoid decompensation and the need for higher level of care.    MEDICATION MANAGEMENT RECOMMENDATIONS: venlafaxine XR 37.5 mg daily.   Patient had run out of Lexapro yesterday and took half yesterday and half the day before      We discussed risks, benefits,goals and side effects of the above medication and the patient was agreeable with the plan.Patient was educated on the importance of compliance with treatment and follow-up appointments.To call for questions or concerns and return early if necessary. Crisis plan reviewed including going to the Emergency department.       Treatment Plan: stabilize mood,  patient will stay out of the hospital and be at optimal level of functioning, take all medication as prescribed. Patient verbalized  understanding and agreement to plan.      Return in about 2 weeks (around 10/10/2022).

## 2022-09-29 ENCOUNTER — TRANSCRIBE ORDERS (OUTPATIENT)
Dept: GENERAL RADIOLOGY | Facility: HOSPITAL | Age: 52
End: 2022-09-29

## 2022-09-29 ENCOUNTER — HOSPITAL ENCOUNTER (OUTPATIENT)
Dept: PET IMAGING | Facility: HOSPITAL | Age: 52
Discharge: HOME | End: 2022-09-29

## 2022-09-29 DIAGNOSIS — Z17.0 MALIGNANT NEOPLASM OF CENTRAL PORTION OF LEFT BREAST IN FEMALE, ESTROGEN RECEPTOR POSITIVE: ICD-10-CM

## 2022-09-29 DIAGNOSIS — C50.112 MALIGNANT NEOPLASM OF CENTRAL PORTION OF LEFT BREAST IN FEMALE, ESTROGEN RECEPTOR POSITIVE: ICD-10-CM

## 2022-09-29 DIAGNOSIS — C50.812 MALIGNANT NEOPLASM OF OVERLAPPING SITES OF LEFT FEMALE BREAST, UNSPECIFIED ESTROGEN RECEPTOR STATUS: Primary | ICD-10-CM

## 2022-09-29 LAB — GLUCOSE BLDC GLUCOMTR-MCNC: 82 MG/DL (ref 70–130)

## 2022-09-29 PROCEDURE — 78815 PET IMAGE W/CT SKULL-THIGH: CPT

## 2022-09-29 PROCEDURE — 82962 GLUCOSE BLOOD TEST: CPT

## 2022-09-29 PROCEDURE — A9552 F18 FDG: HCPCS | Performed by: INTERNAL MEDICINE

## 2022-09-29 PROCEDURE — 0 FLUDEOXYGLUCOSE F18 SOLUTION: Performed by: INTERNAL MEDICINE

## 2022-09-29 RX ADMIN — FLUDEOXYGLUCOSE F18 1 DOSE: 300 INJECTION INTRAVENOUS at 08:19

## 2022-09-30 ENCOUNTER — LAB REQUISITION (OUTPATIENT)
Dept: LAB | Facility: HOSPITAL | Age: 52
End: 2022-09-30

## 2022-09-30 ENCOUNTER — HOSPITAL ENCOUNTER (OUTPATIENT)
Dept: GENERAL RADIOLOGY | Facility: HOSPITAL | Age: 52
Discharge: HOME | End: 2022-09-30
Admitting: SURGERY

## 2022-09-30 DIAGNOSIS — C50.812 MALIGNANT NEOPLASM OF OVERLAPPING SITES OF LEFT FEMALE BREAST, UNSPECIFIED ESTROGEN RECEPTOR STATUS: ICD-10-CM

## 2022-09-30 DIAGNOSIS — C50.812 MALIGNANT NEOPLASM OF OVERLAPPING SITES OF LEFT FEMALE BREAST: ICD-10-CM

## 2022-09-30 PROCEDURE — 88307 TISSUE EXAM BY PATHOLOGIST: CPT | Performed by: SURGERY

## 2022-09-30 PROCEDURE — 88305 TISSUE EXAM BY PATHOLOGIST: CPT | Performed by: SURGERY

## 2022-09-30 PROCEDURE — 71045 X-RAY EXAM CHEST 1 VIEW: CPT

## 2022-10-03 ENCOUNTER — HOSPITAL ENCOUNTER (OUTPATIENT)
Dept: ONCOLOGY | Facility: HOSPITAL | Age: 52
Discharge: HOME | End: 2022-10-03
Admitting: INTERNAL MEDICINE

## 2022-10-03 ENCOUNTER — EDUCATION (OUTPATIENT)
Dept: ONCOLOGY | Facility: HOSPITAL | Age: 52
End: 2022-10-03

## 2022-10-03 ENCOUNTER — TELEPHONE (OUTPATIENT)
Dept: ONCOLOGY | Facility: CLINIC | Age: 52
End: 2022-10-03

## 2022-10-03 ENCOUNTER — OFFICE VISIT (OUTPATIENT)
Dept: ONCOLOGY | Facility: CLINIC | Age: 52
End: 2022-10-03

## 2022-10-03 VITALS
RESPIRATION RATE: 20 BRPM | WEIGHT: 146 LBS | TEMPERATURE: 97.7 F | HEART RATE: 68 BPM | BODY MASS INDEX: 23.46 KG/M2 | SYSTOLIC BLOOD PRESSURE: 124 MMHG | DIASTOLIC BLOOD PRESSURE: 80 MMHG | OXYGEN SATURATION: 98 % | HEIGHT: 66 IN

## 2022-10-03 DIAGNOSIS — C50.112 MALIGNANT NEOPLASM OF CENTRAL PORTION OF LEFT BREAST IN FEMALE, ESTROGEN RECEPTOR POSITIVE: Primary | ICD-10-CM

## 2022-10-03 DIAGNOSIS — Z17.0 MALIGNANT NEOPLASM OF CENTRAL PORTION OF LEFT BREAST IN FEMALE, ESTROGEN RECEPTOR POSITIVE: Primary | ICD-10-CM

## 2022-10-03 LAB — REF LAB TEST METHOD: NORMAL

## 2022-10-03 PROCEDURE — 99214 OFFICE O/P EST MOD 30 MIN: CPT | Performed by: NURSE PRACTITIONER

## 2022-10-03 PROCEDURE — G0463 HOSPITAL OUTPT CLINIC VISIT: HCPCS

## 2022-10-03 NOTE — TELEPHONE ENCOUNTER
Pt left set of FMLA forms for herself to be completed on her behalf.  She didn't specify what to do with them on the office form.  Faxing I'd say be best.      No form fee    Paperwork placed in MA box (Princess)

## 2022-10-03 NOTE — PROGRESS NOTES
CHEMOTHERAPY PREPARATION    Debra Beckman  [id number]  1970    Chief Complaint: Treatment preparation and needs assessment    History of present illness:  Debra Beckman is a 52 y.o. year old female who is here today for treatment preparation and needs assessment.  The patient has been diagnosed with breast cancer and is scheduled to begin treatment with DOCEtaxel / Cyclophosphamide.     Oncology History:    Oncology/Hematology History   Malignant neoplasm of central portion of left breast in female, estrogen receptor positive (HCC)   8/10/2022 Cancer Staged    Staging form: Breast, AJCC 8th Edition  - Clinical stage from 8/10/2022: Stage IA (cT1b, cN0, cM0, G1, ER+, DE+, HER2-) - Signed by Raman Dunn MD on 9/21/2022 9/16/2022 Cancer Staged    Staging form: Breast, AJCC 8th Edition  - Pathologic stage from 9/16/2022: Stage IA (pT1c, pN1a, cM0, G2, ER+, DE+, HER2-) - Signed by Raman Dunn MD on 9/21/2022 9/21/2022 Initial Diagnosis    Malignant neoplasm of central portion of left breast in female, estrogen receptor positive (HCC)     10/4/2022 -  Chemotherapy    OP BREAST TC DOCEtaxel / Cyclophosphamide         The current medication list and allergy list were reviewed and reconciled.     Past Medical History, Past Surgical History, Social History, Family History have been reviewed and are without significant changes except as mentioned.    Review of Systems:    Review of Systems   Constitutional: Negative for appetite change, fatigue, fever and unexpected weight change.   HENT: Negative for mouth sores, sore throat and trouble swallowing.    Respiratory: Negative for cough, shortness of breath and wheezing.    Cardiovascular: Negative for chest pain, palpitations and leg swelling.   Gastrointestinal: Negative for abdominal distention, abdominal pain, constipation, diarrhea, nausea and vomiting.   Genitourinary: Negative for difficulty urinating, dysuria and frequency.    Musculoskeletal: Negative for arthralgias.   Skin: Negative for pallor, rash and wound.   Neurological: Negative for dizziness and weakness.   Hematological: Does not bruise/bleed easily.   Psychiatric/Behavioral: Negative for confusion and sleep disturbance. The patient is not nervous/anxious.        Physical Exam:    Vitals:    10/03/22 1045   BP: 124/80   Pulse: 68   Resp: 20   Temp: 97.7 °F (36.5 °C)   SpO2: 98%     Vitals:    10/03/22 1045   PainSc: 2  Comment: PORT          ECOG: (0) Fully Active - Able to Carry On All Pre-disease Performance Without Restriction    General: well appearing, in no acute distress    Psych: Mood is stable            NEEDS ASSESSMENTS    Genetics  The patient's new diagnosis and family history have been reviewed for genetic counseling needs. A genetic referral is not recommended.     Psychosocial  The patient has completed a PHQ-9 Depression Screening and the Distress Thermometer (DT) today.   PHQ-9 results show 5-9 (Mild Depression). The patient scored their distress today as 3 on a scale of 0-10 with 0 being no distress and 10 being extreme distress.   Problems marked by the patient as being an issue for them within the last week include emotional problems.   Results were reviewed along with psychosocial resources offered by our cancer center.  Our oncology social worker will be flagged for a DT score of 4 or above, and a same day call will be made for a score of 9 or 10.  A mental health referral is offered at this time. The patient has already met with KADE Humphries.   Copies of patient's questionnaires will be scanned into EMR for details and further reference.    Barriers to care  A barriers form was also completed by the patient today. We discussed services offered by our facility to help her have adequate access to care. The patient was given the name and contact information for our Oncology Social Worker, Duyen Barajas.  Based upon barriers assessment today, the  "patient will not require a follow-up call from the  to further discuss needs.   A copy of the barriers form will also be scanned into EMR for details and further reference.     VAD Assessment  The patient and I discussed planned intervenous chemotherapy as well as other IV treatments that are often needed throughout the course of treatment. These may include, but are not limited to blood transfusions, antibiotics, and IV hydration. The vasculature does appear to be adequate for multiple peripheral IVs throughout their treatment course. Patient has Port-A-Cath in place.      Advanced Care Planning  The patient and I discussed advanced care planning, \"Conversations that Matter\".   This service was offered, free of charge, for development of advance directives with a certified ACP facilitator.  The patient does not have an up-to-date advanced directive. The patient is not interested in an appointment with one of our facilitators to create or update their advanced directives.      Palliative Care  The patient and I discussed palliative care services. Palliative care is not the same as Hospice care. This is specialized medical care for people living with serious illness with the goal of improving quality of life for the patient and their family. [facility] offers our patients outpatient palliative care early along with their treatment to assist in coordination of care, symptom management, pain management, and medical decision making.  Oncology criteria for palliative care referral is not met at this time. The patient is not interested in a palliative care consultation.     Additional Referral needs  none      CHEMOTHERAPY EDUCATION    Chemotherapy education completed and consent obtained per oncology pharmacist.  See their documentation for further details.    Booklets Given: Chemotherapy and You [x]  Nutrition for the Patient with Cancer During Treatment [x]    Sexuality/Fertility Books []     Chemotherapy " Regimen:   Treatment Plans     Name Type Plan Dates Plan Provider         Active    OP BREAST TC DOCEtaxel / Cyclophosphamide ONCOLOGY TREATMENT  10/3/2022 - Present Rama Cano MD                    Chemotherapy education comprehension reviewed. Questions answered.      Assessment and Plan:    Diagnoses and all orders for this visit:    1. Malignant neoplasm of central portion of left breast in female, estrogen receptor positive (HCC) (Primary)    The patient and I have reviewed their cancer diagnosis and scheduled treatment plan. Needs assessment was completed including genetics, psychosocial needs, barriers to care, VAD evaluation, advanced care planning, and palliative care services. Referrals have been ordered as appropriate based upon our evaluation and patient desires.     Chemotherapy teaching was also completed today per pharmacy.  Adequate time was given to answer questions.  Patient and family are aware of their care team members and contact information if they have questions or problems throughout the treatment course. The patient is adequately prepared to begin treatment as scheduled.     Reviewed with patient education regarding EMLA cream, dexamethasone and Zofran prescriptions.     I spent 30 minutes caring for Debra on this date of service. This time includes time spent by me in the following activities: preparing for the visit, reviewing tests, counseling and educating the patient/family/caregiver, documenting information in the medical record and care coordination.     Unique Ellison, APRN  10/03/22

## 2022-10-03 NOTE — PLAN OF CARE
[facility]  [address] [place] [address]  [phone]      CHEMOTHERAPY EDUCATION    NAME:  Debra Beckman      : 1970           DATE: 10/03/22    Medication Education Sheets: (select all that apply)  Cyclophosphamide, Docetaxel and Pegfilgrastim    Other Education Sheets: (select all that apply)  CINV, Diarrhea and Symptom Tracker Sheet and JONH Information    Chemotherapy Regimen:   OP BREAST TC DOCEtaxel / Cyclophosphamide  every 21 days + Pegfilgrastim-cbqv (Udenyca) on day 2 of each 21-day cycle.      TOPICS EDUCATION PROVIDED COMMENTS   ANEMIA:  role of RBC, cause, s/s, ways to manage, role of transfusion [x] Reviewed the role of RBC and the use of transfusions if hemoglobin decreases too much.  Patient to notify us if they experience shortness of breath, dizziness, or palpitations.  Also let patient know they could feel more tired than usual and to try to stay active, but rest if they need to.    THROMBOCYTOPENIA:  role of platelet, cause, s/s, ways to prevent bleeding, things to avoid, when to seek help [x] Reviewed the role of platelets in blood clotting and when to call clinic (bloody nose that bleeds for 5 mins despite pressure, a cut that won't stop bleeding despite pressure, gums that bleed excessively with brushing or flossing). Recommended using an electric razor, soft bristle toothbrush, and blowing your nose gently.    NEUTROPENIA:  role of WBC, cause, infection precautions, s/s of infection, when to call MD [x] Reviewed the role of WBC, good infection prevention practices, and when to call the clinic (temperature 100.4F, sore throat, burning urination, etc)  COVID Vaccines: 2 doses plus 2 boosters  Flu Vaccine:  flu vaccine received   NUTRITION & APPETITE CHANGES:  importance of maintaining healthy diet & weight, ways to manage to improve intake, dietary consult, exercise regimen, electrolyte and/or blood glucose abnormalities [x] Discussed risk of  decreased appetite. Recommended eating smaller, more frequent meals. Instructed the patient to contact clinic if they were losing weight or having difficulty eating enough to maintain their energy level., Increased Appetite:     Discussed the possibility of increased appetite with steroid premedication + home med and the need to take steroids with food to avoid stomach upset.       Steroid-Induced Heartburn: This can occur with steroids. Instructed the patient to notify the clinic if it becomes problematic. Nothing was started to prevent heartburn.    Patients father asked about any OTC vitamins and supplement recommendations and patient was informed that there was not any specific vitamins/supplements recommended for this treatment.    DIARRHEA:  causes, s/s of dehydration, ways to manage, dietary changes, when to call MD [x] Chemotherapy - Discussed risk of diarrhea. Instructed patient that they can use OTC loperamide at first presentation of diarrhea, but call MD if 4-6 episodes in 24 hours not relieved by OTC loperamide.   NAUSEA & VOMITING:  cause, use of antiemetics, dietary changes, when to call MD [x] Emetic risk: Moderate  Premeds: Palonosetron   Scheduled home meds: Dexamethasone (also for fluid retention, but will help with nausea, too)  PRN home meds: Ondansetron   Pharmacy home meds sent to: Medications have not been sent to pharmacy. [facility] [address] [place], [place] and the team was notified.    Scheduled dexamethasone 4 mg tablets - Take 2 tablets PO BID with food the day before, day of, and day after chemotherapy.       Instructed the patient to take a dose of the PRN medication at the first onset of nausea and if it's not working to call us for additional medications.  Also provided non-drug measures to mitigate nausea.   MOUTH SORES:  causes, oral care, ways to manage [x] Mouth sores can be prevented by making a mouth wash mixture of salt,  baking soda, and water. The patient was instructed to swish and spit four times daily after meals and before bedtime.  Use of a soft bristle toothbrush was recommended.  The patient was instructed to avoid alcohol-containing OTC mouthwashes. Explained this was less likely to occur since she will only be getting 4 cycles.   ALOPECIA:  cause, ways to manage, resources [x] Discussed the possibility of hair loss with the patient. Informed patient that they could request a prescription for a wig if desired and most of the cost is usually covered by insurance. Recommended covering the head with a hat and/or protecting the skin on the head with SPF 30 or higher.  Gave the patient contact information for her nurse navigator and let her know that she could help the patient with a wig and insurance reimbursement.   NERVOUS SYSTEM CHANGES:  causes, s/s, neuropathies, cognitive changes, ways to manage [x] discussed the adverse effect of peripheral neuropathy and signs/symptoms associated with this adverse effect. Instructed patient to call if symptoms become more frequent or worsen. Explained this was less likely to occur since she will only be getting 4 cycles.   PAIN:  causes, ways to manage [x] Chemo - Discussed muscle and joint aches/pains with chemotherapy, and recommended the use of OTC pain relief with ibuprofen or acetaminophen if needed.   Dicussed with patient the risk of bone pain with the pegfilgrastim injection. Suggested to patient to purchase OTC loratadine 10 mg PO QHS and take for days 1-7 of each cycle to help prevent bone pain.    SKIN & NAIL CHANGES:  cause, s/s, ways to manage [x] Chemotherapy - Informed patient that they may see dark or white lines on finger and toenails during treatment, and that their nails may become brittle and even fall off in extreme cases. But that this would likely reverse after treatment concludes.    Discussed with patient the potential for a rash or itchy skin, what to look out  for, and when to call the MD.   Dicussed with patient that we do not recommend fake or acrylic nails and the increased risk of nail damage and infection at nail salons.    ORGAN TOXICITIES:  cause, s/s, need for diagnostic tests, labs, when to notify MD [x] Discussed potential effects on organ systems, monitoring, diagnostic tests, labs, and when to notify their MD. Discussed the signs/symptoms of the following: lung changes, nephrotoxicity and skin changes. Dicussed with patient the risk of irritation of the bladder wall with cyclophosphamide, the importance of staying hydrated while receiving this medication, what signs/symptoms to look out for and when to call the MD.    INFUSION RELATED REACTIONS or INJECTION-SITE REACTION:  Cause, s/s, anaphylaxis, monitoring, etc. [x] No premeds.    Dicussed with patient the rare, but possible potential for a severe allergic reaction, what signs/symptoms to look for, and when to seek medical attention.     Dicussed with the patient the potential for injection site pain with pegfilgratim, and suggest OTC ibuprofen and acetaminophen to control the pain.    MISCELLANEOUS:  drug interactions, administration, labs, etc. [x] Discussed chemotherapy schedule, lab draws, infusion times, and total expected visit time.   DDIs: No significant DDIs  Drug-food interactions:  None  Lab draws: On or before day 1 of each cycle, no sooner than 3 days early.  Miscellaneous: Fluid Retention: Explained the signs/symptoms of fluid retention around ankles, feet, and possibly in the hands.  Reviewed strategies to minimize this and recommended to call the clinic if they gain 5 or more pounds in 1 week or have shortness of breath without exertion. Explained that the scheduled dexamethasone is intended to prevent this side effect.   INFERTILITY & SEXUALITY:    causes, fertility preservation options, sexuality changes, ways to manage, importance of birth control [x] IV Oncology Therapy: Reviewed safe sex  practices and minimizing exposure to body fluids for 48 hours after each dose of IV oncology therapy. and The patient is of childbearing potential.  Two methods of birth control were recommended, including barrier protection (condom) and spermacide.        HOME CARE:  storing of oral chemo, how to manage bodily fluids [x] IV - Counseled on management of soiled linens and proper flush technique.  Discussed how to manage all the side effects at home and advised when to contact the MD office   SURVIVORSHIP:  distress, distress assessment, secondary malignancies, early/late effects, follow-up, social issues, social support [x] Discussed the rare, but possible risk of secondary malignancies months to years after treatment, most commonly Acute myeloid leukemia.      Medications:  Prior to Admission medications    Medication Sig Start Date End Date Taking? Authorizing Provider   albuterol sulfate  (90 Base) MCG/ACT inhaler Inhale 2 puffs Every 4 (Four) Hours As Needed for Wheezing. 7/26/22   Jos Centeno MD   celecoxib (CeleBREX) 200 MG capsule Take 1 capsule by mouth Daily.  Patient taking differently: Take 200 mg by mouth As Needed. 7/8/21   Jos Centeno MD   Symbicort 160-4.5 MCG/ACT inhaler INHALE 2 PUFFS BY MOUTH TWICE DAILY 7/22/22   Jos Centeno MD   traMADol (ULTRAM) 50 MG tablet Take 1 tablet by mouth Every 6 (Six) Hours As Needed. 9/16/22      venlafaxine XR (EFFEXOR-XR) 37.5 MG 24 hr capsule Take 1 capsule by mouth Daily. 9/26/22   Shirley Justin APRN   vitamin B-12 (CYANOCOBALAMIN) 1000 MCG tablet Take 1,000 mcg by mouth Daily.    ProviderPauline MD   vitamin B-6 (PYRIDOXINE) 50 MG tablet Take 50 mg by mouth Daily.    Pauline Tillman MD   Vitamin D, Cholecalciferol, (CHOLECALCIFEROL) 10 MCG (400 UNIT) tablet Take 400 Units by mouth Daily.    Pauline Tillman MD         Notes: All questions and concerns were addressed. Provided a  personalized treatment calendar to patient (includes treatment and lab schedule). Provided patient with contact information for the pharmacist and clinic while instructing them to call if any questions or concerns arise. Informed consent for treatment was obtained. The patient and her father were receptive to information and expressed understanding.     Jimenez Kay PharmD Candidate 2023    10/3/2022  10:49 EDT    Note reviewed and edited by: Amber Ellison PharmD, BCOP - Oncology Clinical Pharmacist [phone]

## 2022-10-04 ENCOUNTER — OFFICE VISIT (OUTPATIENT)
Dept: ONCOLOGY | Facility: CLINIC | Age: 52
End: 2022-10-04

## 2022-10-04 ENCOUNTER — APPOINTMENT (OUTPATIENT)
Dept: ONCOLOGY | Facility: HOSPITAL | Age: 52
End: 2022-10-04

## 2022-10-04 VITALS
SYSTOLIC BLOOD PRESSURE: 110 MMHG | OXYGEN SATURATION: 98 % | WEIGHT: 145 LBS | HEART RATE: 62 BPM | RESPIRATION RATE: 18 BRPM | HEIGHT: 66 IN | TEMPERATURE: 96.8 F | BODY MASS INDEX: 23.3 KG/M2 | DIASTOLIC BLOOD PRESSURE: 74 MMHG

## 2022-10-04 DIAGNOSIS — Z17.0 MALIGNANT NEOPLASM OF CENTRAL PORTION OF LEFT BREAST IN FEMALE, ESTROGEN RECEPTOR POSITIVE: Primary | ICD-10-CM

## 2022-10-04 DIAGNOSIS — C50.112 MALIGNANT NEOPLASM OF CENTRAL PORTION OF LEFT BREAST IN FEMALE, ESTROGEN RECEPTOR POSITIVE: Primary | ICD-10-CM

## 2022-10-04 DIAGNOSIS — J45.30 MILD PERSISTENT ASTHMA WITHOUT COMPLICATION: ICD-10-CM

## 2022-10-04 PROCEDURE — 99215 OFFICE O/P EST HI 40 MIN: CPT | Performed by: INTERNAL MEDICINE

## 2022-10-04 RX ORDER — LIDOCAINE AND PRILOCAINE 25; 25 MG/G; MG/G
1 CREAM TOPICAL AS NEEDED
Qty: 30 G | Refills: 3 | Status: SHIPPED | OUTPATIENT
Start: 2022-10-04

## 2022-10-04 RX ORDER — BUDESONIDE AND FORMOTEROL FUMARATE DIHYDRATE 160; 4.5 UG/1; UG/1
AEROSOL RESPIRATORY (INHALATION)
Qty: 10.2 G | Refills: 2 | Status: SHIPPED | OUTPATIENT
Start: 2022-10-04

## 2022-10-04 RX ORDER — ONDANSETRON HYDROCHLORIDE 8 MG/1
8 TABLET, FILM COATED ORAL 3 TIMES DAILY PRN
Qty: 30 TABLET | Refills: 5 | Status: SHIPPED | OUTPATIENT
Start: 2022-10-04

## 2022-10-04 RX ORDER — DEXAMETHASONE 4 MG/1
TABLET ORAL
Qty: 12 TABLET | Refills: 3 | Status: SHIPPED | OUTPATIENT
Start: 2022-10-04

## 2022-10-04 NOTE — PROGRESS NOTES
Hematology and Oncology [place]  Office number [phone]    Fax number [phone]     New Patient Office Visit      Date: 2022     Patient Name: Debra Beckman  MRN: [id number]  : 1970    Referring Physician: Dr. Goran Russell      Chief Complaint: Left breast cancer    Cancer Staging:   Cancer Staging  Stage IA (cT1b, cN0, cM0, G1, ER+, MO+, HER2-      History of Present Illness: Debra Beckman is a pleasant 52 y.o. female who presents today for evaluation of left breast cancer.  She is accompanied by her supportive father who is a retired [employer] neurosurgeon, Dr. Beckman.    Screening mammogram 2022 demonstrated an asymmetry in the anterior left medial breast.  Diagnostic mammogram on 2022 demonstrated a persistent ill-defined asymmetry with architectural distortion in the left 9:00 periareolar region.  On ultrasound, this corresponded to an 8 mm mass in the 9:00 left breast.    Left breast 9:00 biopsy on 8/10/2022 demonstrated grade 1 invasive ductal carcinoma with associated DCIS (%, MO 60%, HER2/kathleen negative, 0+).    She proceeded with bilateral breast MRI on 8/15/2022.  This demonstrated a 1.4 cm 9:00 left breast enhancing mass, with a 1.3 cm area of clumped non-mass enhancement versus postbiopsy change 1 cm anterior lateral to this.  There is additionally a dominant focus of enhancement suspicious for a satellite lesion spanning 0.4 cm.  She underwent second look ultrasound and biopsy of the second lesion which corresponded to a subcentimeteric mass on ultrasound.    Left breast 9:00 biopsy on 2022 showed grade 2 invasive ductal carcinoma (ER greater than 90%, MO greater than 80%, HER2/kathleen negative, 0+)    Left breast lumpectomy, sentinel lymph node biopsy on 2022 demonstrated grade 2 invasive ductal carcinoma spanning 1.4 cm with associated DCIS.  Margin positive.  [place] lymph node positive () with a macrometastasis spanning 4 mm with  extracapsular extension.    She is recovering well from surgery.  Postoperative pain is well controlled.  She is anxious regarding neck steps of treatment.  She is meeting with cancer psychology is and is considering a change to her chronic antidepressant regimen.  She endorses a several month history of fatigue and also pain in her left back/ flank. She denies new persistent headaches, early satiety, cough.    Breast cancer risk profile:  G3, P2; Age of first live birth 37  Premenopausal, LMP was 2022.  She discontinued OCPs at the time of her cancer diagnosis.  Family history of breast, ovarian, prostate or pancreatic cancer: Maternal aunt with breast cancer.  Genetics: [facility] cancer next panel was negative in .    Past Medical History:   Past Medical History:   Diagnosis Date   • Allergic    • Asthma    • Low back pain    • Malignant neoplasm of central portion of left breast in female, estrogen receptor positive (HCC) 2022       Past Surgical History:   Past Surgical History:   Procedure Laterality Date   • BREAST BIOPSY  08/10/2022   • BREAST BIOPSY Left 2022   • BREAST LUMPECTOMY WITH AXILLARY NODE DISSECTION Left 2022    clean dread   • BREAST LUMPECTOMY WITH SENTINEL NODE BIOPSY Left 2022   •  SECTION     • VENOUS ACCESS DEVICE (PORT) INSERTION Right 2022       Family History:   Family History   Problem Relation Age of Onset   • Hypertension Mother    • No Known Problems Father    • Breast cancer Maternal Aunt    • Ovarian cancer Neg Hx        Social History:   Social History     Socioeconomic History   • Marital status: Single   Tobacco Use   • Smoking status: Former Smoker   • Smokeless tobacco: Never Used   Vaping Use   • Vaping Use: Former   Substance and Sexual Activity   • Alcohol use: Yes     Comment: occasional   • Drug use: No   • Sexual activity: Defer   Works as a .  She is running for [employer] in November.    Medications:     Current  Outpatient Medications:   •  albuterol sulfate  (90 Base) MCG/ACT inhaler, Inhale 2 puffs Every 4 (Four) Hours As Needed for Wheezing., Disp: 18 g, Rfl: 11  •  celecoxib (CeleBREX) 200 MG capsule, Take 1 capsule by mouth Daily. (Patient taking differently: Take 200 mg by mouth As Needed.), Disp: 30 capsule, Rfl: 11  •  dexamethasone (DECADRON) 4 MG tablet, Take 2 tablets oral twice a day for 3 consecutive days beginning the day before chemotherapy and continue for 6 doses., Disp: 12 tablet, Rfl: 3  •  lidocaine-prilocaine (EMLA) 2.5-2.5 % cream, Apply 1 application topically to the appropriate area as directed As Needed (45-60 minutes prior to port access.  Cover with saran/plastic wrap.)., Disp: 30 g, Rfl: 3  •  ondansetron (ZOFRAN) 8 MG tablet, Take 1 tablet by mouth 3 (Three) Times a Day As Needed for Nausea or Vomiting., Disp: 30 tablet, Rfl: 5  •  Symbicort 160-4.5 MCG/ACT inhaler, INHALE 2 PUFFS BY MOUTH TWICE DAILY, Disp: 10.2 g, Rfl: 2  •  traMADol (ULTRAM) 50 MG tablet, Take 1 tablet by mouth Every 6 (Six) Hours As Needed., Disp: 7 tablet, Rfl: 0  •  venlafaxine XR (EFFEXOR-XR) 37.5 MG 24 hr capsule, Take 1 capsule by mouth Daily., Disp: 30 capsule, Rfl: 1  •  vitamin B-12 (CYANOCOBALAMIN) 1000 MCG tablet, Take 1,000 mcg by mouth Daily., Disp: , Rfl:   •  vitamin B-6 (PYRIDOXINE) 50 MG tablet, Take 50 mg by mouth Daily., Disp: , Rfl:   •  Vitamin D, Cholecalciferol, (CHOLECALCIFEROL) 10 MCG (400 UNIT) tablet, Take 400 Units by mouth Daily., Disp: , Rfl:     Current Facility-Administered Medications:   •  lidocaine (XYLOCAINE) 1 % injection 10 mL, 10 mL, Infiltration, Once, Connie Marie MD  •  lidocaine (XYLOCAINE) 1 % injection 5 mL, 5 mL, Infiltration, Once, Connie Marie MD    Allergies:   No Known Allergies    Objective     Vital Signs:   Vitals:    10/04/22 1353   BP: 110/74  Comment: MEME   Pulse: 62   Resp: 18   Temp: 96.8 °F (36 °C)   TempSrc: Infrared   SpO2: 98%  Comment: RA   Weight: 65.8  "kg (145 lb)   Height: 167.6 cm (66\")   PainSc: 0-No pain    Body mass index is 23.4 kg/m².   Pain Score    10/04/22 1353   PainSc: 0-No pain       ECOG Performance Status: 0    Physical Exam:  General: No acute distress. Well appearing   HEENT: Normocephalic, atraumatic. Sclera anicteric. Masked.  Neck: supple, no adenopathy.   Cardiovascular: regular rate and rhythm,. No murmurs.   Respiratory: Normal rate. Clear to auscultation bilaterally  Abdomen: Soft, nontender, non distended with normoactive bowel sounds  Lymph: no cervical, supraclavicular or axillary adenopathy  Neuro: Alert and oriented x 3. No focal deficits.   Ext: Symmetric, no swelling.   Psych: Euthymic      Laboratory/Imaging Reviewed:   Lab Requisition on 09/30/2022   Component Date Value Ref Range Status   • Case Report 09/30/2022    Final                    Value:Surgical Pathology Report                         Case: [id number]                                  Authorizing Provider:  Goran Russell MD   Collected:           09/30/2022 08:00 AM          Ordering Location:     [facility]   Received:            09/30/2022 10:59 AM                                 LABORATORY                                                                   Pathologist:           Jaime Frye MD                                                           Specimens:   1) - Breast, Left                                                                                   2) - Axilla, Left                                                                         • Clinical Information 09/30/2022    Final                    Value:This result contains rich text formatting which cannot be displayed here.   • Final Diagnosis 09/30/2022    Final                    Value:This result contains rich text formatting which cannot be displayed here.   • Comment 09/30/2022    Final                    Value:This result contains rich text formatting which cannot be " displayed here.   • Gross Description 09/30/2022    Final                    Value:This result contains rich text formatting which cannot be displayed here.   • Microscopic Description 09/30/2022    Final                    Value:This result contains rich text formatting which cannot be displayed here.   Hospital Outpatient Visit on 09/29/2022   Component Date Value Ref Range Status   • Glucose 09/29/2022 82  70 - 130 mg/dL Final    Meter: [id number] : [id number] Maria Luz Connie   Orders Only on 09/22/2022   Component Date Value Ref Range Status   • Reference Lab Report 09/16/2022    Final    See scanned report         XR Chest 1 View    Result Date: 9/30/2022  Narrative:  DATE OF EXAM: 9/30/2022 9:15 AM  PROCEDURE: XR CHEST 1 VW-  INDICATIONS: C50.812; C50.812-Malignant neoplasm of overlapping sites of left female breast  COMPARISON: No Comparisons Available  TECHNIQUE: Portable Chest  FINDINGS:  Right-sided port is in place from IJ approach with the tip projecting over the SVC.  No pneumothorax or immediate postprocedure complication identified. The heart and mediastinal contours are within normal limits. Lungs are grossly clear. Subcutaneous emphysema is seen overlying the left axilla. Osseous structures demonstrate no acute abnormality.      Impression: IMPRESSION :  1. Right-sided IJ port projects over the SVC. No pneumothorax or immediate postprocedure complication noted. 2. Subcutaneous emphysema over the left axilla. On these represent recent procedure. Please correlate with history[  This report was finalized on 9/30/2022 9:35 AM by Braeden Rutherford.      Mammo Post Clip Placement Left, Mammo Stereotactic Breast Device Placement Initial Without Bx    Result Date: 9/23/2022  Narrative: LEFT BREAST TOMOSYNTHESIS/STEREOTACTIC GUIDED NEEDLE LOCALIZATION: AFFIRM  CLINICAL HISTORY:  Known biopsy-proven malignancies on the left breast medially, bracketed localization  TECHNIQUE:  After obtaining informed consent  "and performing a \"time-out\" procedure, the left breast was breast was positioned in the alphanumeric grid compression paddle from a medial approach.  A 2D/3D LM view and stereotactic pair were obtained.  The lesion undergoing localization was identified.  The breast was prepped in the usual sterile fashion and anesthestized with 1% Lidocaine without epinephrine.  Next, a 9 cm Bard needle was placed into the breast from a medial approach to the appropriate depth and stereotactic images were obtained.  After confirming accurate positioning of the needle, a wire was placed through the needle and the needle was removed with the wire remaining in place.  A similar procedure was performed for the second wire placed to bracket the additional clip and mass.  Routine Left 2D/3D CC and ML digital mammographic images were obtained with a BB placed on the medial skin edge. Mammographic images were sent along with the patient to the operating room. No complications occurred during this procedure.      Impression: Successful tomosynthesis guided needle localization of multiple cancers in the left breast.   PATHOLOGY:  Pathology demonstrated invasive ductal carcinoma, intermediate grade, 1.4 cm in maximum dimension, ductal carcinoma in situ, high-grade, with cancerization of lobules. DCIS extends to the inked anterior margin of the specimen and is present less than 1 mm from the posterior margin. Invasive carcinoma present 1 mm from the anterior margin, 2 mm to the superior posterior margins and 3 mm to the lateral margin. An extended margin posterior, medial lateral demonstrated benign breast tissue with no invasive or in situ carcinoma identified.  RECOMMENDATION: Surgical consultation is recommended.  This report was finalized on 9/23/2022 8:37 PM by Connie Marie MD.      NM PET/CT Skull Base to Mid Thigh    Result Date: 9/29/2022  Narrative: NM PET/CT SKULL BASE TO MID THIGH-  Date of Exam: 9/29/2022 8:00 AM  Indication: " Breast cancer, invasive, stage I/II/III, initial workup; C50.112-Malignant neoplasm of central portion of left female breast; Z17.0-Estrogen receptor positive status (ER+)  Comparison: None available.  Technique:  Whole body PET/CT imaging was performed with positron emission tomography (PET with concurrently acquired computed tomography (CT) for attenuation correction and anatomical localization); field of view imaged was the skull base to midthigh.  Fasting Blood glucose level: 82 mg/dl.  FDG dosage: 13.36 mCi F-18 Images were obtained after one hour of equilibrium.  FINDINGS:  Base of Skull/Neck: No suspicious metabolic activity.  Physiologic activity is present.  Thorax: There is indistinct soft tissue within the left axilla with low level FDG activity with an SUV maximum of 2. Findings are consistent with recent left axillary lymph node dissection. There are a few nonenlarged subpectoral lymph nodes, the largest and most FDG avid has an SUV maximum of 1.8 and measures 1.3 cm in diameter. This finding is nonspecific and favored to be reactive, though metastatic disease is not excluded. There are nonenlarged mildly metabolically active right axillary lymph nodes as well. No mediastinal adenopathy. No suspicious pulmonary nodules are identified. There are calcified nodules present consistent with prior granulomatous infection.  Abdomen and Pelvis: No suspicious metabolic activity.  Physiologic activity is present.  Musculoskeletal System and Extremities:  No suspicious metabolic activity.  Physiologic activity is present.  Background mediastinal blood pool activity has an SUV maximum of 1.9.      Impression:  1. Status post left axillary node dissection. 2. There are bilateral nonenlarged, mildly FDG avid axillary and subpectoral lymph nodes, which are favored to be reactive, metastatic disease is much less favored.  This report was finalized on 9/29/2022 9:50 AM by Ken Moore MD.      NM [place] Node  Injection Only    Result Date: 9/16/2022  Narrative: DATE OF EXAM: 9/16/2022 11:03 AM  PROCEDURE: NM SENTINEL NODE INJECTION ONLY-  INDICATIONS: C50.812; C50.812-Malignant neoplasm of overlapping sites of left female breast  COMPARISON: None available  TECHNIQUE: 535 uCi of technetium 99 sulfur colloid was provided to the surgeon for injection in the operating room.  FINDINGS: No images were obtained.      Impression: Radiotracer provided to the surgeon for injection within the operating room. No images were obtained.  This report was finalized on 9/16/2022 1:48 PM by Josue Gerardo MD.      Mammo Breast Specimen    Result Date: 9/16/2022  Narrative: SPECIMEN RADIOGRAPH:  Specimen radiograph demonstrates the 3 clips and the dominant mass. The posterior lateral and posterior medial margins are close.  Results were communicated with the operating room at the time the specimen was received.  This report was finalized on 9/16/2022 4:56 PM by Connie Marie MD.          Assessment / Plan      Assessment/Plan:     1. Malignant neoplasm of central portion of left breast in female, estrogen receptor positive  -I reviewed the patient's history, imaging and pathology and discussed her case with her surgeon, Dr. Russell   and Dr. Dunn  of Radiation Oncology.  We discussed staging, prognosis and general principles of breast cancer therapy.  -She has an early stage hormone sensitive breast cancer with a single positive lymph node.  She has undergone curative intent surgery, and is planning to undergo reexcision for a positive margin.  She has been evaluated for consideration of adjuvant radiation.   -We discussed the potential role of adjuvant systemic therapy.  Historically, premenopausal lymph node positive breast cancer has been treated with adjuvant chemotherapy in addition to endocrine therapy. More recently, genomic testing has been shown to help to stratify the potential added benefit for an individual patient and  identify patients who are less likely to derive significant additional benefit from chemotherapy.  For patients with N1 disease and age > 50, and low risk genomic signature with either Oncotype or MammaPrint has been shown to predict lack of benefit for adjuvant chemotherapy.  However, for patients less than 50, there is a minimum 5% absolute benefit in the lymph node positive setting from the addition of adjuvant chemotherapy even with a low risk genomic signature.  The patient is age 52, but premenopausal.  We discussed that this is a gray zone.  It is postulated that the potential benefit of adjuvant chemotherapy in premenopausal women may be at least in part explained by the effective ovarian suppression from chemotherapy.  We discussed the potential role of genomic testing to guide chemotherapy decision.  However the patient is healthy and wishes to be aggressive with her cancer treatment.  She declines genomic testing and wishes to proceed with adjuvant chemotherapy. We discussed standard treatment options of 1) dose dense Adriamycin and Cytoxan followed by weekly Taxol or 2) Taxotere and Cytoxan. We discussed differences in schedule and toxicities. We specifically discussed an increased risk for cardiotoxicity and late bone marrow disorders with the addition of adriamycin.  Given strongly ER positive disease and N1 I would favor adjuvant TC over dose dense AC followed by weekly taxol. We discussed the goals of chemotherapy being cure.  We reviewed the chemotherapy schedule and its side effects including but not limited to alopecia, myelosuppression, infection, fevers, nausea, vomiting, diarrhea, mucositis, dehydration, fatigue, heart disease, neuropathy, and late cancers.  Informed consent was obtained, and the patient elected to proceed.   -We discussed 4 vs 6 cycles of TC in LN positive setting.   -She will benefit from adjuvant endocrine therapy.  In the context of lymph node positive disease, would prefer  ovarian suppression or BSO plus AI if she does not experience chemotherapy induced menopause.  -Ki-67 20% suggesting benefit from adjuvant Verzenio.  -In light of her systemic symptoms of fatigue, malaise, and backslash flank pain, we proceeded with a staging PET/CT which was personally reviewed with the patient and shows no metastatic disease. We reviewed additional pathology showing no additional invasive disease.       Follow Up:   2 weeks     Rama Cano MD  Hematology and Oncology     Time spent on the day of service was 40 minutes inclusive of time before, during, and after office visit on record review, medically appropriate history and physical, counseling patient, ordering tests, documenting in the medical record, and communicating with surgeon.

## 2022-10-05 ENCOUNTER — DOCUMENTATION (OUTPATIENT)
Dept: RADIATION ONCOLOGY | Facility: HOSPITAL | Age: 52
End: 2022-10-05

## 2022-10-05 NOTE — PROGRESS NOTES
MULTIDISCIPLINARY BREAST CLINIC NOTE    She has a diagnosis of left central breast infiltrating ductal carcinoma, moderately differentiated, hormone receptor positive, with biopsy-proven adjacent satellite tumor, clinical stage IA.  She is status post lumpectomy and sentinel lymph node biopsy 9/16/2022 with pathologic stage IA (pT1c, N1a, M0).  She had a positive anterior margin involved by DCIS.  She had a single sentinel lymph node macrometastasis with a small 4 mm focus but with extracapsular karena extension.     PET/CT showed low-level axillary and subpectoral lymph node activity most consistent with reactive change rather than other lymphatic metastasis.    On 9/30/2022 she underwent reexcision of the lumpectomy cavity and biopsy of 1 additional axillary lymph node at the time of port placement.  Final tumor margin in the breast were negative with no residual neoplasm identified.  A single lymph node taken in the axilla was also negative for metastasis.    She is healing well with expected mild soreness in the breast area and moderate left axillary seroma.    Exam reveals no arm lymphedema and no suspicious change.    Impression: Stage IA left central breast cancer, status post completion of conservation surgery.  Final surgical margins were negative.  No additional regional metastasis was identified on axillary sample.  No evidence of distant metastasis noted on PET/CT evaluation.    Plan: She will soon initiate adjuvant systemic chemotherapy.  I will await referral back to me following chemotherapy for initiation of adjuvant radiotherapy to deliver a dose of approximately 45-50 Torrez over 5 weeks to the left breast and regional lymphatics.

## 2022-10-06 ENCOUNTER — TELEPHONE (OUTPATIENT)
Dept: ONCOLOGY | Facility: CLINIC | Age: 52
End: 2022-10-06

## 2022-10-06 NOTE — TELEPHONE ENCOUNTER
Caller: Debra Beckman    Relationship: Self    Best call back number: [phone]    What is the best time to reach you: ANYTIME AFTER 2PM TODAY     CAN ALSO LEAVE VOICEMAIL IF UNABLE TO REACH     Who are you requesting to speak with (clinical staff, provider,  specific staff member): FMLA PAPER WORK         What was the call regarding:     HAD DROPPED FMLA PAPER WORK FOR EMPLOYER 10/04  AT THE  , AND CAME BACK 10/05 WAS TOLD IT WAS FAXED BACK,     EMPLOYER IS STATING THEY DID NOT RECEIVE THIS BACK  WAS NOT SURE WHERE IT WAS FAXED BACK TO,     IF CAN NEEDING THE FMLA PAPER WORK RE-FAXED TO     FAX: [phone]      PUT ATTENTION TO : SCOTTY SEYMOUR     Do you require a callback: YES ONCE FAXED OVER TO CONFIRM.

## 2022-10-07 NOTE — TELEPHONE ENCOUNTER
Informed pt that pw is completed, it is just waiting for MD signature.   Confirmed F#.  Pt v/u and appreciation.

## 2022-10-10 ENCOUNTER — OFFICE VISIT (OUTPATIENT)
Dept: PSYCHIATRY | Facility: CLINIC | Age: 52
End: 2022-10-10

## 2022-10-10 DIAGNOSIS — G47.9 SLEEP DISTURBANCE: ICD-10-CM

## 2022-10-10 DIAGNOSIS — F41.1 GENERALIZED ANXIETY DISORDER: Primary | ICD-10-CM

## 2022-10-10 PROCEDURE — 99442 PR PHYS/QHP TELEPHONE EVALUATION 11-20 MIN: CPT | Performed by: NURSE PRACTITIONER

## 2022-10-10 NOTE — PROGRESS NOTES
"  Subjective   Debra Beckman is a 52 y.o. female who is here today for medication management follow up. You have chosen to receive care through a telephone visit. Do you consent to use a telephone visit for your medical care today? Yes    TIME IN:103  TIME OUT: 1:18    I spent 15 minutes in patient care: reviewing records prior to the visit, assessing the patient, entering orders and documentation.    PATIENT AT: THEIR PLACE OF RESIDENCE    PROVIDER AT:   [facility]  [address] , [address]  [place] [address]        Chief Complaint: general anxiety disorder, sleep disturbance     History of Present Illness Patient reports tolerating change to venlafaxine XR 37.5mg off escitalopram. She doesn't have the low energy she was experiencing with escitalopram but is sleeping better and able to get \"things done preparing for chemotherapy\". PET scan was good. Was able to get her port placed with re excision of breast margins and are clear. Will begin chemotherapy in 10 days .  Denies adverse effects from medications.   (Scales based on 0 - 10 with 10 being the worst)        The following portions of the patient's history were reviewed and updated as appropriate: allergies, current medications, past family history, past medical history, past social history, past surgical history and problem list.    Review of Systems  A 14 point review of systems was performed and is negative except as noted above.    Objective   Physical Exam  There were no vitals taken for this visit.    No Known Allergies    Current Medications:   Current Outpatient Medications   Medication Sig Dispense Refill   • albuterol sulfate  (90 Base) MCG/ACT inhaler Inhale 2 puffs Every 4 (Four) Hours As Needed for Wheezing. 18 g 11   • celecoxib (CeleBREX) 200 MG capsule Take 1 capsule by mouth Daily. (Patient taking differently: Take 200 mg by mouth As Needed.) 30 capsule 11   • " dexamethasone (DECADRON) 4 MG tablet Take 2 tablets oral twice a day for 3 consecutive days beginning the day before chemotherapy and continue for 6 doses. 12 tablet 3   • lidocaine-prilocaine (EMLA) 2.5-2.5 % cream Apply 1 application topically to the appropriate area as directed As Needed (45-60 minutes prior to port access.  Cover with saran/plastic wrap.). 30 g 3   • ondansetron (ZOFRAN) 8 MG tablet Take 1 tablet by mouth 3 (Three) Times a Day As Needed for Nausea or Vomiting. 30 tablet 5   • Symbicort 160-4.5 MCG/ACT inhaler INHALE 2 PUFFS BY MOUTH TWICE DAILY 10.2 g 2   • traMADol (ULTRAM) 50 MG tablet Take 1 tablet by mouth Every 6 (Six) Hours As Needed. 7 tablet 0   • venlafaxine XR (EFFEXOR-XR) 37.5 MG 24 hr capsule Take 1 capsule by mouth Daily. 30 capsule 1   • vitamin B-12 (CYANOCOBALAMIN) 1000 MCG tablet Take 1,000 mcg by mouth Daily.     • vitamin B-6 (PYRIDOXINE) 50 MG tablet Take 50 mg by mouth Daily.     • Vitamin D, Cholecalciferol, (CHOLECALCIFEROL) 10 MCG (400 UNIT) tablet Take 400 Units by mouth Daily.       Current Facility-Administered Medications   Medication Dose Route Frequency Provider Last Rate Last Admin   • lidocaine (XYLOCAINE) 1 % injection 10 mL  10 mL Infiltration Once Connie Marie MD       • lidocaine (XYLOCAINE) 1 % injection 5 mL  5 mL Infiltration Once Connie Marie MD           Lab and imaging reviewed    Appearance: na  Hygiene:  REPORTS good  Cooperation:  Cooperative  Eye Contact:  na  Psychomotor Behavior:  denies psychomotor agitation/retardation, No EPS, No motor tics  Mood:  Situational anxiety r/t preparing for chemotherapy/work load/childcare  Affect:  na  Hopelessness: Denies  Speech:  Normal  Thought Process:  Linear  Thought Content:  Normal  Concentration: Normal   Suicidal: denies  Homicidal:  None  Hallucinations:  None  Delusion:  None  Memory:  Intact  Orientation:  Person, Place, Time and Situation  Reliability:  good  Insight:  Fair  Judgement: good  Impulse  Control: good  Estimated Intelligence: average range    BEREKET REVIEWED NO RED FLAGS    Assessment & Plan   Diagnoses and all orders for this visit:    1. Generalized anxiety disorder (Primary)    2. Sleep disturbance          IMPRESSION: Improved sleep, lower anxiety and improved tolerance to venlafaxine XR 37.5mg daily    PLAN: cont venlafaxine XR 37.5 mg daily    We discussed risks, benefits, and side effects of the above medications and the patient was agreeable with the plan. Patient was educated on the importance of compliance with treatment and follow-up appointments.     As needed Provide Cognitive Behavioral Therapy and Solution Focused Therapy to improve functioning, maintain stability, and avoid decompensation and the need for higher level of care.    Treatment Plan: stabilize mood, patient will stay out of psychiatric hospital and be at optimal level of functioning with therapy and take all medication as prescribed. Patient verbalized  understanding and agreement to plan.    Instructed to call for questions or concerns and return early if necessary.     Greater than 50% time was spent in coordination of care, and counseling the patient regarding current assessment, symptoms, plan of care going forward, supportive therapy.  Answered any questions patient had regarding medications and plan of care.    Return in about 3 weeks (around 10/31/2022).

## 2022-10-19 ENCOUNTER — APPOINTMENT (OUTPATIENT)
Dept: ONCOLOGY | Facility: HOSPITAL | Age: 52
End: 2022-10-19

## 2022-10-19 ENCOUNTER — HOSPITAL ENCOUNTER (OUTPATIENT)
Dept: ONCOLOGY | Facility: HOSPITAL | Age: 52
Setting detail: INFUSION SERIES
Discharge: HOME | End: 2022-10-19

## 2022-10-19 VITALS
RESPIRATION RATE: 18 BRPM | HEART RATE: 65 BPM | SYSTOLIC BLOOD PRESSURE: 119 MMHG | BODY MASS INDEX: 23.23 KG/M2 | DIASTOLIC BLOOD PRESSURE: 78 MMHG | WEIGHT: 143.9 LBS | TEMPERATURE: 96.5 F

## 2022-10-19 DIAGNOSIS — Z17.0 MALIGNANT NEOPLASM OF CENTRAL PORTION OF LEFT BREAST IN FEMALE, ESTROGEN RECEPTOR POSITIVE: Primary | ICD-10-CM

## 2022-10-19 DIAGNOSIS — Z45.2 ENCOUNTER FOR CARE RELATED TO PORT-A-CATH: ICD-10-CM

## 2022-10-19 DIAGNOSIS — C50.112 MALIGNANT NEOPLASM OF CENTRAL PORTION OF LEFT BREAST IN FEMALE, ESTROGEN RECEPTOR POSITIVE: Primary | ICD-10-CM

## 2022-10-19 LAB
ALBUMIN SERPL-MCNC: 4 G/DL (ref 3.5–5.2)
ALBUMIN/GLOB SERPL: 1.6 G/DL
ALP SERPL-CCNC: 64 U/L (ref 39–117)
ALT SERPL W P-5'-P-CCNC: 64 U/L (ref 1–33)
ANION GAP SERPL CALCULATED.3IONS-SCNC: 10 MMOL/L (ref 5–15)
AST SERPL-CCNC: 68 U/L (ref 1–32)
B-HCG UR QL: NEGATIVE
BASOPHILS # BLD AUTO: 0.04 10*3/MM3 (ref 0–0.2)
BASOPHILS NFR BLD AUTO: 0.8 % (ref 0–1.5)
BILIRUB SERPL-MCNC: 0.4 MG/DL (ref 0–1.2)
BUN SERPL-MCNC: 12 MG/DL (ref 6–20)
BUN/CREAT SERPL: 18.2 (ref 7–25)
CALCIUM SPEC-SCNC: 8.9 MG/DL (ref 8.6–10.5)
CHLORIDE SERPL-SCNC: 104 MMOL/L (ref 98–107)
CO2 SERPL-SCNC: 25 MMOL/L (ref 22–29)
CREAT SERPL-MCNC: 0.66 MG/DL (ref 0.57–1)
DEPRECATED RDW RBC AUTO: 45.7 FL (ref 37–54)
EGFRCR SERPLBLD CKD-EPI 2021: 107 ML/MIN/1.73
EOSINOPHIL # BLD AUTO: 0.08 10*3/MM3 (ref 0–0.4)
EOSINOPHIL NFR BLD AUTO: 1.7 % (ref 0.3–6.2)
ERYTHROCYTE [DISTWIDTH] IN BLOOD BY AUTOMATED COUNT: 12.7 % (ref 12.3–15.4)
GLOBULIN UR ELPH-MCNC: 2.5 GM/DL
GLUCOSE SERPL-MCNC: 100 MG/DL (ref 65–99)
HCT VFR BLD AUTO: 41.7 % (ref 34–46.6)
HGB BLD-MCNC: 14.2 G/DL (ref 12–15.9)
IMM GRANULOCYTES # BLD AUTO: 0 10*3/MM3 (ref 0–0.05)
IMM GRANULOCYTES NFR BLD AUTO: 0 % (ref 0–0.5)
LYMPHOCYTES # BLD AUTO: 1.62 10*3/MM3 (ref 0.7–3.1)
LYMPHOCYTES NFR BLD AUTO: 34 % (ref 19.6–45.3)
MCH RBC QN AUTO: 32.9 PG (ref 26.6–33)
MCHC RBC AUTO-ENTMCNC: 34.1 G/DL (ref 31.5–35.7)
MCV RBC AUTO: 96.5 FL (ref 79–97)
MONOCYTES # BLD AUTO: 0.7 10*3/MM3 (ref 0.1–0.9)
MONOCYTES NFR BLD AUTO: 14.7 % (ref 5–12)
NEUTROPHILS NFR BLD AUTO: 2.33 10*3/MM3 (ref 1.7–7)
NEUTROPHILS NFR BLD AUTO: 48.8 % (ref 42.7–76)
PLATELET # BLD AUTO: 178 10*3/MM3 (ref 140–450)
PMV BLD AUTO: 8.8 FL (ref 6–12)
POTASSIUM SERPL-SCNC: 4.1 MMOL/L (ref 3.5–5.2)
PROT SERPL-MCNC: 6.5 G/DL (ref 6–8.5)
RBC # BLD AUTO: 4.32 10*6/MM3 (ref 3.77–5.28)
SODIUM SERPL-SCNC: 139 MMOL/L (ref 136–145)
WBC NRBC COR # BLD: 4.77 10*3/MM3 (ref 3.4–10.8)

## 2022-10-19 PROCEDURE — 36591 DRAW BLOOD OFF VENOUS DEVICE: CPT

## 2022-10-19 PROCEDURE — 81025 URINE PREGNANCY TEST: CPT | Performed by: INTERNAL MEDICINE

## 2022-10-19 PROCEDURE — 80053 COMPREHEN METABOLIC PANEL: CPT | Performed by: INTERNAL MEDICINE

## 2022-10-19 PROCEDURE — 85025 COMPLETE CBC W/AUTO DIFF WBC: CPT | Performed by: INTERNAL MEDICINE

## 2022-10-19 RX ORDER — SODIUM CHLORIDE 0.9 % (FLUSH) 0.9 %
10 SYRINGE (ML) INJECTION AS NEEDED
Status: DISCONTINUED | OUTPATIENT
Start: 2022-10-19 | End: 2022-10-20 | Stop reason: HOSPADM

## 2022-10-19 RX ORDER — HEPARIN SODIUM (PORCINE) LOCK FLUSH IV SOLN 100 UNIT/ML 100 UNIT/ML
500 SOLUTION INTRAVENOUS AS NEEDED
Status: CANCELLED | OUTPATIENT
Start: 2022-10-19

## 2022-10-19 RX ORDER — HEPARIN SODIUM (PORCINE) LOCK FLUSH IV SOLN 100 UNIT/ML 100 UNIT/ML
500 SOLUTION INTRAVENOUS AS NEEDED
Status: DISCONTINUED | OUTPATIENT
Start: 2022-10-19 | End: 2022-10-20 | Stop reason: HOSPADM

## 2022-10-19 RX ORDER — SODIUM CHLORIDE 0.9 % (FLUSH) 0.9 %
10 SYRINGE (ML) INJECTION AS NEEDED
Status: CANCELLED | OUTPATIENT
Start: 2022-10-19

## 2022-10-20 ENCOUNTER — OFFICE VISIT (OUTPATIENT)
Dept: ONCOLOGY | Facility: CLINIC | Age: 52
End: 2022-10-20

## 2022-10-20 ENCOUNTER — HOSPITAL ENCOUNTER (OUTPATIENT)
Dept: ONCOLOGY | Facility: HOSPITAL | Age: 52
Setting detail: INFUSION SERIES
Discharge: HOME | End: 2022-10-20

## 2022-10-20 ENCOUNTER — DOCUMENTATION (OUTPATIENT)
Dept: NUTRITION | Facility: HOSPITAL | Age: 52
End: 2022-10-20

## 2022-10-20 VITALS
OXYGEN SATURATION: 97 % | SYSTOLIC BLOOD PRESSURE: 141 MMHG | WEIGHT: 146 LBS | RESPIRATION RATE: 16 BRPM | TEMPERATURE: 97.5 F | HEIGHT: 66 IN | BODY MASS INDEX: 23.46 KG/M2 | DIASTOLIC BLOOD PRESSURE: 76 MMHG | HEART RATE: 77 BPM

## 2022-10-20 DIAGNOSIS — C50.112 MALIGNANT NEOPLASM OF CENTRAL PORTION OF LEFT BREAST IN FEMALE, ESTROGEN RECEPTOR POSITIVE: Primary | ICD-10-CM

## 2022-10-20 DIAGNOSIS — Z51.11 CHEMOTHERAPY MANAGEMENT, ENCOUNTER FOR: Primary | ICD-10-CM

## 2022-10-20 DIAGNOSIS — Z45.2 ENCOUNTER FOR CARE RELATED TO PORT-A-CATH: ICD-10-CM

## 2022-10-20 DIAGNOSIS — Z17.0 MALIGNANT NEOPLASM OF CENTRAL PORTION OF LEFT BREAST IN FEMALE, ESTROGEN RECEPTOR POSITIVE: Primary | ICD-10-CM

## 2022-10-20 PROCEDURE — 99214 OFFICE O/P EST MOD 30 MIN: CPT | Performed by: INTERNAL MEDICINE

## 2022-10-20 PROCEDURE — 25010000002 PALONOSETRON 0.25 MG/5ML SOLUTION PREFILLED SYRINGE: Performed by: INTERNAL MEDICINE

## 2022-10-20 PROCEDURE — 96413 CHEMO IV INFUSION 1 HR: CPT

## 2022-10-20 PROCEDURE — 96417 CHEMO IV INFUS EACH ADDL SEQ: CPT

## 2022-10-20 PROCEDURE — 96415 CHEMO IV INFUSION ADDL HR: CPT

## 2022-10-20 PROCEDURE — 96375 TX/PRO/DX INJ NEW DRUG ADDON: CPT

## 2022-10-20 PROCEDURE — 25010000002 CYCLOPHOSPHAMIDE 1 GM/5ML SOLUTION 5 ML VIAL: Performed by: INTERNAL MEDICINE

## 2022-10-20 PROCEDURE — 25010000002 DOCETAXEL 20 MG/ML SOLUTION 8 ML VIAL: Performed by: INTERNAL MEDICINE

## 2022-10-20 PROCEDURE — 25010000002 HEPARIN LOCK FLUSH PER 10 UNITS: Performed by: INTERNAL MEDICINE

## 2022-10-20 RX ORDER — SODIUM CHLORIDE 9 MG/ML
250 INJECTION, SOLUTION INTRAVENOUS ONCE
Status: DISCONTINUED | OUTPATIENT
Start: 2022-10-20 | End: 2022-10-21 | Stop reason: HOSPADM

## 2022-10-20 RX ORDER — FAMOTIDINE 10 MG/ML
20 INJECTION, SOLUTION INTRAVENOUS AS NEEDED
Status: CANCELLED | OUTPATIENT
Start: 2022-10-20

## 2022-10-20 RX ORDER — SODIUM CHLORIDE 0.9 % (FLUSH) 0.9 %
10 SYRINGE (ML) INJECTION AS NEEDED
Status: CANCELLED | OUTPATIENT
Start: 2022-10-20

## 2022-10-20 RX ORDER — PALONOSETRON 0.05 MG/ML
0.25 INJECTION, SOLUTION INTRAVENOUS ONCE
Status: COMPLETED | OUTPATIENT
Start: 2022-10-20 | End: 2022-10-20

## 2022-10-20 RX ORDER — DIPHENHYDRAMINE HYDROCHLORIDE 50 MG/ML
50 INJECTION INTRAMUSCULAR; INTRAVENOUS AS NEEDED
Status: CANCELLED | OUTPATIENT
Start: 2022-10-20

## 2022-10-20 RX ORDER — HEPARIN SODIUM (PORCINE) LOCK FLUSH IV SOLN 100 UNIT/ML 100 UNIT/ML
500 SOLUTION INTRAVENOUS AS NEEDED
Status: CANCELLED | OUTPATIENT
Start: 2022-10-20

## 2022-10-20 RX ORDER — HEPARIN SODIUM (PORCINE) LOCK FLUSH IV SOLN 100 UNIT/ML 100 UNIT/ML
500 SOLUTION INTRAVENOUS AS NEEDED
Status: DISCONTINUED | OUTPATIENT
Start: 2022-10-20 | End: 2022-10-21 | Stop reason: HOSPADM

## 2022-10-20 RX ADMIN — HEPARIN 500 UNITS: 100 SYRINGE at 11:49

## 2022-10-20 RX ADMIN — DOCETAXEL 130 MG: 20 INJECTION, SOLUTION, CONCENTRATE INTRAVENOUS at 10:05

## 2022-10-20 RX ADMIN — CYCLOPHOSPHAMIDE 1020 MG: 200 INJECTION, SOLUTION INTRAVENOUS at 11:11

## 2022-10-20 RX ADMIN — PALONOSETRON 0.25 MG: 0.25 INJECTION, SOLUTION INTRAVENOUS at 09:26

## 2022-10-20 NOTE — PROGRESS NOTES
"Outpatient Oncology Nutrition     Reason for Visit:    Oncology Nutrition Screening and Patient Education / Met with patient during her initial chemotherapy infusion appointment.     Patient Name:  Debra Beckman    :  1970    MRN:  [id number]    Date of Encounter: 10/20/2022    Nutrition Assessment     Diagnosis: stage IA ER+, CT+, HER2- left breast cancer     Surgery: lumpectomy and sentinel lymph node biopsy (2022) + reexcision of the lumpectomy cavity and biopsy of 1 additional axillary lymph node at the time of port placement (22)    Chemotherapy: OP BREAST TC DOCEtaxel / Cyclophosphamide - every 21 days     Radiation: approximately 45-50 Torrez over 5 weeks to the left breast and regional lymphatics (after chemo is completed)    Patient Active Problem List:    Patient Active Problem List   Diagnosis   • Low back pain   • Radiculopathy   • Malignant neoplasm of central portion of left breast in female, estrogen receptor positive (HCC)   • Encounter for care related to Port-a-Cath       Food / Nutrition Related History       Hydration Status   Discussed the importance of hydration, reviewed hydrating fluids, and recommended she increase her intake.    Goal: 64-80 ounces     How many 8 ounces glasses of water do you consume per day? Patient reports she struggles with drinking water.     Enteral Feeding       Anthropometric Measurements     Height:    Ht Readings from Last 1 Encounters:   10/20/22 167.6 cm (65.98\")       Weight:    Wt Readings from Last 1 Encounters:   10/20/22 66.2 kg (146 lb)       BMI:  23.6 - Normal     Weight Change: no weight loss reported     Review of Lab Data (Time Frame - 1 month / 2 month)   Labs reviewed - 10/19/22    Medication Review   MAR reviewed     Nutrition Focused Physical Findings       Nutrition Impact Symptoms   No problems with eating     Physical Activity   Normal with no limitations    Current Nutritional Intake     Oral diet:  Regular     Intake: " "oral intake has been normal     Malnutrition Risk Assessment     Recent weight loss over the past 6 months:  0 = No    Eating poorly because of a decreased appetite:  0 = No    Malnutrition Screening Score:     MST = 0 or 1 Patient not at risk for malnutrition    Nutrition Diagnosis     Problem    Etiology    Signs / Symptoms      Nutrition Intervention   Discussed the importance of good nutrition during her treatment course focusing on adequate calorie, protein, nutrient and fluid intake.  Advised her to be consuming smaller more frequent meals/snacks throughout the day to aid with potential nausea management.  Emphasized the importance of protein and its role in the diet; reviewed high protein foods; and recommended she have a protein source at each meal/snack.  Offered several high protein snack ideas she may find more appealing during treatment.  Reviewed the ACS nutrition booklet and suggested she use it as needed to aid with symptom management.  Provided and reviewed written diet material \"Nutritional Considerations in Breast Cancer\" and discussed basics of survivorship nutrition.     Goal   To achieve adequate nutritional and hydration intake.  To aid with nutrition impact symptom management as needed.     Monitoring / Evaluation   Answered her questions and she voiced understanding of information discussed.  RD's contact information provided and encouraged to call with questions.  Will follow up as indicated.    Falguni Sanders MS, RD, LD   "

## 2022-10-20 NOTE — PROGRESS NOTES
[facility] [place]  Office number [phone]    Fax number [phone]     Follow up     Date: 10/20/22      Patient Name: Debra Beckman  MRN: [id number]  : 1970    Referring Physician: Dr. Goran Russell      Chief Complaint: follow up/treatment    Cancer Staging:   Cancer Staging  Stage IA (cT1b, cN0, cM0, G1, ER+, NJ+, HER2-      History of Present Illness: Debra Beckman is a pleasant 52 y.o. female who presents today for evaluation of left breast cancer.  She is accompanied by her supportive father who is a retired Religious neurosurgeon, Dr. Beckman.    Screening mammogram 2022 demonstrated an asymmetry in the anterior left medial breast.  Diagnostic mammogram on 2022 demonstrated a persistent ill-defined asymmetry with architectural distortion in the left 9:00 periareolar region.  On ultrasound, this corresponded to an 8 mm mass in the 9:00 left breast.    Left breast 9:00 biopsy on 8/10/2022 demonstrated grade 1 invasive ductal carcinoma with associated DCIS (%, NJ 60%, HER2/kathleen negative, 0+).    She proceeded with bilateral breast MRI on 8/15/2022.  This demonstrated a 1.4 cm 9:00 left breast enhancing mass, with a 1.3 cm area of clumped non-mass enhancement versus postbiopsy change 1 cm anterior lateral to this.  There is additionally a dominant focus of enhancement suspicious for a satellite lesion spanning 0.4 cm.  She underwent second look ultrasound and biopsy of the second lesion which corresponded to a subcentimeteric mass on ultrasound.    Left breast 9:00 biopsy on 2022 showed grade 2 invasive ductal carcinoma (ER greater than 90%, NJ greater than 80%, HER2/kathleen negative, 0+)    Left breast lumpectomy, sentinel lymph node biopsy on 2022 demonstrated grade 2 invasive ductal carcinoma spanning 1.4 cm with associated DCIS.  Margin positive.  [place] lymph node positive () with a macrometastasis spanning 4 mm with extracapsular  extension.    Breast cancer risk profile:  G3, P2; Age of first live birth 37  Premenopausal, LMP was 2022.  She discontinued OCPs at the time of her cancer diagnosis.  Family history of breast, ovarian, prostate or pancreatic cancer: Maternal aunt with breast cancer.  Genetics: [place] cancer next panel was negative in .    Treatment history:  TC x 4-6 cycles    Interval history:  She is here for consideration of cycle #1 of chemotherapy.  She is feeling overall well.  She is understandably anxious regarding treatment start, but physically is feeling well.  She denies any chest pain, shortness of breath, nausea, vomiting, fever, sore throat, or cough.        Past Medical History:   Past Medical History:   Diagnosis Date   • Allergic    • Asthma    • Low back pain    • Malignant neoplasm of central portion of left breast in female, estrogen receptor positive (HCC) 2022       Past Surgical History:   Past Surgical History:   Procedure Laterality Date   • BREAST BIOPSY  08/10/2022   • BREAST BIOPSY Left 2022   • BREAST LUMPECTOMY WITH AXILLARY NODE DISSECTION Left 2022    clean dread   • BREAST LUMPECTOMY WITH SENTINEL NODE BIOPSY Left 2022   •  SECTION     • VENOUS ACCESS DEVICE (PORT) INSERTION Right 2022       Family History:   Family History   Problem Relation Age of Onset   • Hypertension Mother    • No Known Problems Father    • Breast cancer Maternal Aunt    • Ovarian cancer Neg Hx        Social History:   Social History     Socioeconomic History   • Marital status: Single   Tobacco Use   • Smoking status: Former   • Smokeless tobacco: Never   Vaping Use   • Vaping Use: Former   Substance and Sexual Activity   • Alcohol use: Yes     Comment: occasional   • Drug use: No   • Sexual activity: Defer   Works as a .  She is running for [employer] in November.    Medications:     Current Outpatient Medications:   •  albuterol sulfate  (90 Base) MCG/ACT  inhaler, Inhale 2 puffs Every 4 (Four) Hours As Needed for Wheezing., Disp: 18 g, Rfl: 11  •  celecoxib (CeleBREX) 200 MG capsule, Take 1 capsule by mouth Daily. (Patient taking differently: Take 200 mg by mouth As Needed.), Disp: 30 capsule, Rfl: 11  •  dexamethasone (DECADRON) 4 MG tablet, Take 2 tablets oral twice a day for 3 consecutive days beginning the day before chemotherapy and continue for 6 doses., Disp: 12 tablet, Rfl: 3  •  lidocaine-prilocaine (EMLA) 2.5-2.5 % cream, Apply 1 application topically to the appropriate area as directed As Needed (45-60 minutes prior to port access.  Cover with saran/plastic wrap.)., Disp: 30 g, Rfl: 3  •  ondansetron (ZOFRAN) 8 MG tablet, Take 1 tablet by mouth 3 (Three) Times a Day As Needed for Nausea or Vomiting., Disp: 30 tablet, Rfl: 5  •  Symbicort 160-4.5 MCG/ACT inhaler, INHALE 2 PUFFS BY MOUTH TWICE DAILY, Disp: 10.2 g, Rfl: 2  •  traMADol (ULTRAM) 50 MG tablet, Take 1 tablet by mouth Every 6 (Six) Hours As Needed., Disp: 7 tablet, Rfl: 0  •  venlafaxine XR (EFFEXOR-XR) 37.5 MG 24 hr capsule, Take 1 capsule by mouth Daily., Disp: 30 capsule, Rfl: 1  •  vitamin B-12 (CYANOCOBALAMIN) 1000 MCG tablet, Take 1,000 mcg by mouth Daily., Disp: , Rfl:   •  vitamin B-6 (PYRIDOXINE) 50 MG tablet, Take 50 mg by mouth Daily., Disp: , Rfl:   •  Vitamin D, Cholecalciferol, (CHOLECALCIFEROL) 10 MCG (400 UNIT) tablet, Take 400 Units by mouth Daily., Disp: , Rfl:     Current Facility-Administered Medications:   •  lidocaine (XYLOCAINE) 1 % injection 10 mL, 10 mL, Infiltration, Once, Connie Marie MD  •  lidocaine (XYLOCAINE) 1 % injection 5 mL, 5 mL, Infiltration, Once, Connie Marie MD    Facility-Administered Medications Ordered in Other Visits:   •  heparin injection 500 Units, 500 Units, Intravenous, PRN, Rama Cano MD, 500 Units at 10/20/22 1149  •  sodium chloride 0.9 % infusion 250 mL, 250 mL, Intravenous, Once, Rama Cano MD    Allergies:   No Known  "Allergies    Objective     Vital Signs:   Vitals:    10/20/22 0813   BP: 141/76   Pulse: 77   Resp: 16   Temp: 97.5 °F (36.4 °C)   TempSrc: Temporal   SpO2: 97%   Weight: 66.2 kg (146 lb)   Height: 167.6 cm (65.98\")   PainSc: 0-No pain    Body mass index is 23.58 kg/m².   Pain Score    10/20/22 0813   PainSc: 0-No pain       ECOG Performance Status: 0    Physical Exam:  General: No acute distress. Well appearing   HEENT: Normocephalic, atraumatic. Sclera anicteric. Masked.  Neck: supple, no adenopathy.   Cardiovascular: regular rate and rhythm,. No murmurs.   Respiratory: Normal rate. Clear to auscultation bilaterally  Abdomen: Soft, nontender, non distended with normoactive bowel sounds  Lymph: no cervical, supraclavicular or axillary adenopathy  Neuro: Alert and oriented x 3. No focal deficits.   Ext: Symmetric, no swelling.   Psych: Euthymic      Laboratory/Imaging Reviewed:   Hospital Outpatient Visit on 10/19/2022   Component Date Value Ref Range Status   • Glucose 10/19/2022 100 (H)  65 - 99 mg/dL Final   • BUN 10/19/2022 12  6 - 20 mg/dL Final   • Creatinine 10/19/2022 0.66  0.57 - 1.00 mg/dL Final   • Sodium 10/19/2022 139  136 - 145 mmol/L Final   • Potassium 10/19/2022 4.1  3.5 - 5.2 mmol/L Final    Slight hemolysis detected by analyzer. Results may be affected.   • Chloride 10/19/2022 104  98 - 107 mmol/L Final   • CO2 10/19/2022 25.0  22.0 - 29.0 mmol/L Final   • Calcium 10/19/2022 8.9  8.6 - 10.5 mg/dL Final   • Total Protein 10/19/2022 6.5  6.0 - 8.5 g/dL Final   • Albumin 10/19/2022 4.00  3.50 - 5.20 g/dL Final   • ALT (SGPT) 10/19/2022 64 (H)  1 - 33 U/L Final   • AST (SGOT) 10/19/2022 68 (H)  1 - 32 U/L Final   • Alkaline Phosphatase 10/19/2022 64  39 - 117 U/L Final   • Total Bilirubin 10/19/2022 0.4  0.0 - 1.2 mg/dL Final   • Globulin 10/19/2022 2.5  gm/dL Final    Calculated Result   • A/G Ratio 10/19/2022 1.6  g/dL Final   • BUN/Creatinine Ratio 10/19/2022 18.2  7.0 - 25.0 Final   • Anion Gap " 10/19/2022 10.0  5.0 - 15.0 mmol/L Final   • eGFR 10/19/2022 107.0  >60.0 mL/min/1.73 Final    National Kidney Foundation and American Society of Nephrology (ASN) Task Force recommended calculation based on the Chronic Kidney Disease Epidemiology Collaboration (CKD-EPI) equation refit without adjustment for race.   • HCG, Urine QL 10/19/2022 Negative  Negative Final   • WBC 10/19/2022 4.77  3.40 - 10.80 10*3/mm3 Final   • RBC 10/19/2022 4.32  3.77 - 5.28 10*6/mm3 Final   • Hemoglobin 10/19/2022 14.2  12.0 - 15.9 g/dL Final   • Hematocrit 10/19/2022 41.7  34.0 - 46.6 % Final   • MCV 10/19/2022 96.5  79.0 - 97.0 fL Final   • MCH 10/19/2022 32.9  26.6 - 33.0 pg Final   • MCHC 10/19/2022 34.1  31.5 - 35.7 g/dL Final   • RDW 10/19/2022 12.7  12.3 - 15.4 % Final   • RDW-SD 10/19/2022 45.7  37.0 - 54.0 fl Final   • MPV 10/19/2022 8.8  6.0 - 12.0 fL Final   • Platelets 10/19/2022 178  140 - 450 10*3/mm3 Final   • Neutrophil % 10/19/2022 48.8  42.7 - 76.0 % Final   • Lymphocyte % 10/19/2022 34.0  19.6 - 45.3 % Final   • Monocyte % 10/19/2022 14.7 (H)  5.0 - 12.0 % Final   • Eosinophil % 10/19/2022 1.7  0.3 - 6.2 % Final   • Basophil % 10/19/2022 0.8  0.0 - 1.5 % Final   • Immature Grans % 10/19/2022 0.0  0.0 - 0.5 % Final   • Neutrophils, Absolute 10/19/2022 2.33  1.70 - 7.00 10*3/mm3 Final   • Lymphocytes, Absolute 10/19/2022 1.62  0.70 - 3.10 10*3/mm3 Final   • Monocytes, Absolute 10/19/2022 0.70  0.10 - 0.90 10*3/mm3 Final   • Eosinophils, Absolute 10/19/2022 0.08  0.00 - 0.40 10*3/mm3 Final   • Basophils, Absolute 10/19/2022 0.04  0.00 - 0.20 10*3/mm3 Final   • Immature Grans, Absolute 10/19/2022 0.00  0.00 - 0.05 10*3/mm3 Final       XR Chest 1 View    Result Date: 9/30/2022  Narrative:  DATE OF EXAM: 9/30/2022 9:15 AM  PROCEDURE: XR CHEST 1 VW-  INDICATIONS: C50.812; C50.812-Malignant neoplasm of overlapping sites of left female breast  COMPARISON: No Comparisons Available  TECHNIQUE: Portable Chest  FINDINGS:   Right-sided port is in place from IJ approach with the tip projecting over the SVC.  No pneumothorax or immediate postprocedure complication identified. The heart and mediastinal contours are within normal limits. Lungs are grossly clear. Subcutaneous emphysema is seen overlying the left axilla. Osseous structures demonstrate no acute abnormality.      Impression: IMPRESSION :  1. Right-sided IJ port projects over the SVC. No pneumothorax or immediate postprocedure complication noted. 2. Subcutaneous emphysema over the left axilla. On these represent recent procedure. Please correlate with history[  This report was finalized on 9/30/2022 9:35 AM by Braeden Rutherford.      NM PET/CT Skull Base to Mid Thigh    Result Date: 9/29/2022  Narrative: NM PET/CT SKULL BASE TO MID THIGH-  Date of Exam: 9/29/2022 8:00 AM  Indication: Breast cancer, invasive, stage I/II/III, initial workup; C50.112-Malignant neoplasm of central portion of left female breast; Z17.0-Estrogen receptor positive status (ER+)  Comparison: None available.  Technique:  Whole body PET/CT imaging was performed with positron emission tomography (PET with concurrently acquired computed tomography (CT) for attenuation correction and anatomical localization); field of view imaged was the skull base to midthigh.  Fasting Blood glucose level: 82 mg/dl.  FDG dosage: 13.36 mCi F-18 Images were obtained after one hour of equilibrium.  FINDINGS:  Base of Skull/Neck: No suspicious metabolic activity.  Physiologic activity is present.  Thorax: There is indistinct soft tissue within the left axilla with low level FDG activity with an SUV maximum of 2. Findings are consistent with recent left axillary lymph node dissection. There are a few nonenlarged subpectoral lymph nodes, the largest and most FDG avid has an SUV maximum of 1.8 and measures 1.3 cm in diameter. This finding is nonspecific and favored to be reactive, though metastatic disease is not excluded. There are  nonenlarged mildly metabolically active right axillary lymph nodes as well. No mediastinal adenopathy. No suspicious pulmonary nodules are identified. There are calcified nodules present consistent with prior granulomatous infection.  Abdomen and Pelvis: No suspicious metabolic activity.  Physiologic activity is present.  Musculoskeletal System and Extremities:  No suspicious metabolic activity.  Physiologic activity is present.  Background mediastinal blood pool activity has an SUV maximum of 1.9.      Impression:  1. Status post left axillary node dissection. 2. There are bilateral nonenlarged, mildly FDG avid axillary and subpectoral lymph nodes, which are favored to be reactive, metastatic disease is much less favored.  This report was finalized on 9/29/2022 9:50 AM by Ken Moore MD.          Assessment / Plan      Assessment/Plan:     1. Malignant neoplasm of central portion of left breast in female, estrogen receptor positive  -She has an early stage hormone sensitive breast cancer with a single positive lymph node.  She has undergone curative intent surgery, and is planning to undergo reexcision for a positive margin.  She has been evaluated for consideration of adjuvant radiation.   -We discussed the potential role of adjuvant systemic therapy.  Historically, premenopausal lymph node positive breast cancer has been treated with adjuvant chemotherapy in addition to endocrine therapy. More recently, genomic testing has been shown to help to stratify the potential added benefit for an individual patient and identify patients who are less likely to derive significant additional benefit from chemotherapy.  For patients with N1 disease and age > 50, and low risk genomic signature with either Oncotype or MammaPrint has been shown to predict lack of benefit for adjuvant chemotherapy.  However, for patients less than 50, there is a minimum 5% absolute benefit in the lymph node positive setting from the addition of  adjuvant chemotherapy even with a low risk genomic signature.  The patient is age 52, but premenopausal.  We discussed that this is a gray zone.  It is postulated that the potential benefit of adjuvant chemotherapy in premenopausal women may be at least in part explained by the effective ovarian suppression from chemotherapy.  We discussed the potential role of genomic testing to guide chemotherapy decision.  However the patient is healthy and wishes to be aggressive with her cancer treatment.  She declines genomic testing and wishes to proceed with adjuvant chemotherapy. We discussed standard treatment options of 1) dose dense Adriamycin and Cytoxan followed by weekly Taxol or 2) Taxotere and Cytoxan. We discussed differences in schedule and toxicities. We specifically discussed an increased risk for cardiotoxicity and late bone marrow disorders with the addition of adriamycin.  Given strongly ER positive disease and N1 I would favor adjuvant TC over dose dense AC followed by weekly taxol. We discussed the goals of chemotherapy being cure.  We reviewed the chemotherapy schedule and its side effects including but not limited to alopecia, myelosuppression, infection, fevers, nausea, vomiting, diarrhea, mucositis, dehydration, fatigue, heart disease, neuropathy, and late cancers.  Informed consent was obtained, and the patient elected to proceed.   -We discussed 4 vs 6 cycles of TC in LN positive setting.   -She will benefit from adjuvant endocrine therapy.  In the context of lymph node positive disease, would prefer ovarian suppression or BSO plus AI if she does not experience chemotherapy induced menopause.  -Ki-67 20% suggesting benefit from adjuvant Verzenio.  -In light of her systemic symptoms of fatigue, malaise, and backslash flank pain, we proceeded with a staging PET/CT which shows no metastatic disease.   -CBC and CMP are reviewed and adequate to proceed with the first cycle of chemotherapy.  Side effects  and schedule reviewed with the patient.  We will monitor closely for adverse events with a 1 week toxicity check.    2.  Transaminitis  -Mild and improving.  Her labs 1 month ago had been obtained within a couple of days of heavy alcohol use.  They were improved on most recent check.  She is not using Tylenol frequently.  Discussed continuing to monitor during chemotherapy initiation.    3 anticipated chemotherapy induced nausea..  -Reviewed scheduled antiemetic regimen.  Follow Up:   1 week toxicity check    Rama Cano MD  Hematology and Oncology

## 2022-10-21 ENCOUNTER — HOSPITAL ENCOUNTER (OUTPATIENT)
Dept: ONCOLOGY | Facility: HOSPITAL | Age: 52
Setting detail: INFUSION SERIES
Discharge: HOME | End: 2022-10-21

## 2022-10-21 VITALS
DIASTOLIC BLOOD PRESSURE: 77 MMHG | TEMPERATURE: 98 F | SYSTOLIC BLOOD PRESSURE: 117 MMHG | HEIGHT: 66 IN | BODY MASS INDEX: 23.14 KG/M2 | RESPIRATION RATE: 16 BRPM | WEIGHT: 144 LBS | HEART RATE: 81 BPM

## 2022-10-21 DIAGNOSIS — Z17.0 MALIGNANT NEOPLASM OF CENTRAL PORTION OF LEFT BREAST IN FEMALE, ESTROGEN RECEPTOR POSITIVE: Primary | ICD-10-CM

## 2022-10-21 DIAGNOSIS — C50.112 MALIGNANT NEOPLASM OF CENTRAL PORTION OF LEFT BREAST IN FEMALE, ESTROGEN RECEPTOR POSITIVE: Primary | ICD-10-CM

## 2022-10-21 PROCEDURE — 25010000002 PEGFILGRASTIM-CBQV 6 MG/0.6ML SOLUTION PREFILLED SYRINGE: Performed by: INTERNAL MEDICINE

## 2022-10-21 PROCEDURE — 96372 THER/PROPH/DIAG INJ SC/IM: CPT

## 2022-10-21 RX ADMIN — PEGFILGRASTIM-CBQV 6 MG: 6 INJECTION, SOLUTION SUBCUTANEOUS at 13:06

## 2022-10-24 DIAGNOSIS — C50.112 MALIGNANT NEOPLASM OF CENTRAL PORTION OF LEFT BREAST IN FEMALE, ESTROGEN RECEPTOR POSITIVE: Primary | ICD-10-CM

## 2022-10-24 DIAGNOSIS — Z17.0 MALIGNANT NEOPLASM OF CENTRAL PORTION OF LEFT BREAST IN FEMALE, ESTROGEN RECEPTOR POSITIVE: Primary | ICD-10-CM

## 2022-10-26 ENCOUNTER — HOSPITAL ENCOUNTER (OUTPATIENT)
Dept: ONCOLOGY | Facility: HOSPITAL | Age: 52
Setting detail: INFUSION SERIES
Discharge: HOME | End: 2022-10-26

## 2022-10-26 ENCOUNTER — OFFICE VISIT (OUTPATIENT)
Dept: ONCOLOGY | Facility: CLINIC | Age: 52
End: 2022-10-26

## 2022-10-26 VITALS
DIASTOLIC BLOOD PRESSURE: 63 MMHG | RESPIRATION RATE: 16 BRPM | SYSTOLIC BLOOD PRESSURE: 112 MMHG | WEIGHT: 145 LBS | HEIGHT: 66 IN | BODY MASS INDEX: 23.3 KG/M2 | TEMPERATURE: 97.5 F | HEART RATE: 71 BPM | OXYGEN SATURATION: 96 %

## 2022-10-26 DIAGNOSIS — Z51.11 CHEMOTHERAPY MANAGEMENT, ENCOUNTER FOR: Primary | ICD-10-CM

## 2022-10-26 DIAGNOSIS — C50.112 MALIGNANT NEOPLASM OF CENTRAL PORTION OF LEFT BREAST IN FEMALE, ESTROGEN RECEPTOR POSITIVE: Primary | ICD-10-CM

## 2022-10-26 DIAGNOSIS — Z45.2 ENCOUNTER FOR CARE RELATED TO PORT-A-CATH: ICD-10-CM

## 2022-10-26 DIAGNOSIS — Z17.0 MALIGNANT NEOPLASM OF CENTRAL PORTION OF LEFT BREAST IN FEMALE, ESTROGEN RECEPTOR POSITIVE: Primary | ICD-10-CM

## 2022-10-26 LAB
ANION GAP SERPL CALCULATED.3IONS-SCNC: 8 MMOL/L (ref 5–15)
BASOPHILS # BLD AUTO: 0.08 10*3/MM3 (ref 0–0.2)
BASOPHILS NFR BLD AUTO: 0.8 % (ref 0–1.5)
BUN SERPL-MCNC: 12 MG/DL (ref 6–20)
BUN/CREAT SERPL: 21.4 (ref 7–25)
CALCIUM SPEC-SCNC: 9.1 MG/DL (ref 8.6–10.5)
CHLORIDE SERPL-SCNC: 101 MMOL/L (ref 98–107)
CO2 SERPL-SCNC: 27 MMOL/L (ref 22–29)
CREAT SERPL-MCNC: 0.56 MG/DL (ref 0.57–1)
DEPRECATED RDW RBC AUTO: 42.9 FL (ref 37–54)
EGFRCR SERPLBLD CKD-EPI 2021: 111.3 ML/MIN/1.73
EOSINOPHIL # BLD AUTO: 0.12 10*3/MM3 (ref 0–0.4)
EOSINOPHIL NFR BLD AUTO: 1.2 % (ref 0.3–6.2)
ERYTHROCYTE [DISTWIDTH] IN BLOOD BY AUTOMATED COUNT: 12.1 % (ref 12.3–15.4)
GLUCOSE SERPL-MCNC: 54 MG/DL (ref 65–99)
HCT VFR BLD AUTO: 38.9 % (ref 34–46.6)
HGB BLD-MCNC: 13.8 G/DL (ref 12–15.9)
IMM GRANULOCYTES # BLD AUTO: 0.97 10*3/MM3 (ref 0–0.05)
IMM GRANULOCYTES NFR BLD AUTO: 9.6 % (ref 0–0.5)
LYMPHOCYTES # BLD AUTO: 2.65 10*3/MM3 (ref 0.7–3.1)
LYMPHOCYTES NFR BLD AUTO: 26.2 % (ref 19.6–45.3)
MCH RBC QN AUTO: 33.6 PG (ref 26.6–33)
MCHC RBC AUTO-ENTMCNC: 35.5 G/DL (ref 31.5–35.7)
MCV RBC AUTO: 94.6 FL (ref 79–97)
MONOCYTES # BLD AUTO: 3.05 10*3/MM3 (ref 0.1–0.9)
MONOCYTES NFR BLD AUTO: 30.1 % (ref 5–12)
NEUTROPHILS NFR BLD AUTO: 3.25 10*3/MM3 (ref 1.7–7)
NEUTROPHILS NFR BLD AUTO: 32.1 % (ref 42.7–76)
PLATELET # BLD AUTO: 146 10*3/MM3 (ref 140–450)
PMV BLD AUTO: 9.2 FL (ref 6–12)
POTASSIUM SERPL-SCNC: 4.1 MMOL/L (ref 3.5–5.2)
RBC # BLD AUTO: 4.11 10*6/MM3 (ref 3.77–5.28)
SODIUM SERPL-SCNC: 136 MMOL/L (ref 136–145)
WBC NRBC COR # BLD: 10.12 10*3/MM3 (ref 3.4–10.8)

## 2022-10-26 PROCEDURE — 99214 OFFICE O/P EST MOD 30 MIN: CPT | Performed by: INTERNAL MEDICINE

## 2022-10-26 PROCEDURE — 36591 DRAW BLOOD OFF VENOUS DEVICE: CPT

## 2022-10-26 PROCEDURE — 85025 COMPLETE CBC W/AUTO DIFF WBC: CPT | Performed by: INTERNAL MEDICINE

## 2022-10-26 PROCEDURE — 80048 BASIC METABOLIC PNL TOTAL CA: CPT | Performed by: INTERNAL MEDICINE

## 2022-10-26 PROCEDURE — 25010000002 HEPARIN LOCK FLUSH PER 10 UNITS: Performed by: INTERNAL MEDICINE

## 2022-10-26 RX ORDER — HEPARIN SODIUM (PORCINE) LOCK FLUSH IV SOLN 100 UNIT/ML 100 UNIT/ML
500 SOLUTION INTRAVENOUS AS NEEDED
Status: DISCONTINUED | OUTPATIENT
Start: 2022-10-26 | End: 2022-10-27 | Stop reason: HOSPADM

## 2022-10-26 RX ORDER — DIPHENHYDRAMINE, LIDOCAINE, NYSTATIN
KIT ORAL
Qty: 240 ML | Refills: 3 | Status: SHIPPED | OUTPATIENT
Start: 2022-10-26

## 2022-10-26 RX ORDER — HEPARIN SODIUM (PORCINE) LOCK FLUSH IV SOLN 100 UNIT/ML 100 UNIT/ML
500 SOLUTION INTRAVENOUS AS NEEDED
Status: CANCELLED | OUTPATIENT
Start: 2022-10-26

## 2022-10-26 RX ORDER — SODIUM CHLORIDE 0.9 % (FLUSH) 0.9 %
10 SYRINGE (ML) INJECTION AS NEEDED
Status: CANCELLED | OUTPATIENT
Start: 2022-10-26

## 2022-10-26 RX ORDER — SODIUM CHLORIDE 0.9 % (FLUSH) 0.9 %
10 SYRINGE (ML) INJECTION AS NEEDED
Status: DISCONTINUED | OUTPATIENT
Start: 2022-10-26 | End: 2022-10-27 | Stop reason: HOSPADM

## 2022-10-26 RX ADMIN — Medication 10 ML: at 14:05

## 2022-10-26 RX ADMIN — HEPARIN 500 UNITS: 100 SYRINGE at 14:05

## 2022-10-29 NOTE — PROGRESS NOTES
Hematology and Oncology [place]  Office number [phone]    Fax number [phone]     Follow up     Date: 10/29/22      Patient Name: Debra Beckman  MRN: [id number]  : 1970    Referring Physician: Dr. Goran Russell    Chief Complaint: follow up/treatment    Cancer Staging:   Cancer Staging  Stage IA (cT1b, cN0, cM0, G1, ER+, TN+, HER2-      History of Present Illness: Debra Beckman is a pleasant 52 y.o. female who presents today for evaluation of left breast cancer.  She is accompanied by her supportive father who is a retired [employer] neurosurgeon, Dr. Beckman.    Screening mammogram 2022 demonstrated an asymmetry in the anterior left medial breast.  Diagnostic mammogram on 2022 demonstrated a persistent ill-defined asymmetry with architectural distortion in the left 9:00 periareolar region.  On ultrasound, this corresponded to an 8 mm mass in the 9:00 left breast.    Left breast 9:00 biopsy on 8/10/2022 demonstrated grade 1 invasive ductal carcinoma with associated DCIS (%, TN 60%, HER2/kathleen negative, 0+).    She proceeded with bilateral breast MRI on 8/15/2022.  This demonstrated a 1.4 cm 9:00 left breast enhancing mass, with a 1.3 cm area of clumped non-mass enhancement versus postbiopsy change 1 cm anterior lateral to this.  There is additionally a dominant focus of enhancement suspicious for a satellite lesion spanning 0.4 cm.  She underwent second look ultrasound and biopsy of the second lesion which corresponded to a subcentimeteric mass on ultrasound.    Left breast 9:00 biopsy on 2022 showed grade 2 invasive ductal carcinoma (ER greater than 90%, TN greater than 80%, HER2/kathleen negative, 0+)    Left breast lumpectomy, sentinel lymph node biopsy on 2022 demonstrated grade 2 invasive ductal carcinoma spanning 1.4 cm with associated DCIS.  Margin positive.  Garvin lymph node positive () with a macrometastasis spanning 4 mm with extracapsular  extension.    Breast cancer risk profile:  G3, P2; Age of first live birth 37  Premenopausal, LMP was 2022.  She discontinued OCPs at the time of her cancer diagnosis.  Family history of breast, ovarian, prostate or pancreatic cancer: Maternal aunt with breast cancer.  Genetics: Sayda cancer next panel was negative in .    Treatment history:  TC x 4-6 cycles    Interval history:  She is here for follow-up and toxicity check following cycle #1 of chemotherapy.  She felt lightheaded for 1 to 2 days following chemotherapy.  She is not sleeping through the night because of bone pain primarily her low back and legs/thighs.  She was taking Tylenol and Celebrex without complete relief.  Nausea was managed with frequent food intake.  She did experience some cramping in her abdomen associated with bowel movements but no specific diarrhea or constipation.  She is very mild sores in her mouth.  She is otherwise feeling well.  No fever or infectious symptoms.     Past Medical History:   Past Medical History:   Diagnosis Date   • Allergic    • Asthma    • Low back pain    • Malignant neoplasm of central portion of left breast in female, estrogen receptor positive (HCC) 2022       Past Surgical History:   Past Surgical History:   Procedure Laterality Date   • BREAST BIOPSY  08/10/2022   • BREAST BIOPSY Left 2022   • BREAST LUMPECTOMY WITH AXILLARY NODE DISSECTION Left 2022    clean dread   • BREAST LUMPECTOMY WITH SENTINEL NODE BIOPSY Left 2022   •  SECTION     • VENOUS ACCESS DEVICE (PORT) INSERTION Right 2022       Family History:   Family History   Problem Relation Age of Onset   • Hypertension Mother    • No Known Problems Father    • Breast cancer Maternal Aunt    • Ovarian cancer Neg Hx        Social History:   Social History     Socioeconomic History   • Marital status: Single   Tobacco Use   • Smoking status: Former   • Smokeless tobacco: Never   Vaping Use   • Vaping Use:  Former   Substance and Sexual Activity   • Alcohol use: Yes     Comment: occasional   • Drug use: No   • Sexual activity: Claribel   Works as a .  She is running for [employer] in November.    Medications:     Current Outpatient Medications:   •  albuterol sulfate  (90 Base) MCG/ACT inhaler, Inhale 2 puffs Every 4 (Four) Hours As Needed for Wheezing., Disp: 18 g, Rfl: 11  •  celecoxib (CeleBREX) 200 MG capsule, Take 1 capsule by mouth Daily. (Patient taking differently: Take 200 mg by mouth As Needed.), Disp: 30 capsule, Rfl: 11  •  dexamethasone (DECADRON) 4 MG tablet, Take 2 tablets oral twice a day for 3 consecutive days beginning the day before chemotherapy and continue for 6 doses., Disp: 12 tablet, Rfl: 3  •  lidocaine-prilocaine (EMLA) 2.5-2.5 % cream, Apply 1 application topically to the appropriate area as directed As Needed (45-60 minutes prior to port access.  Cover with saran/plastic wrap.)., Disp: 30 g, Rfl: 3  •  nystatin susp + lidocaine viscous (MAGIC MOUTHWASH) oral suspension, 5-10 ml swish and spit or swallow QID prn  Indications: Please file. Patient will call when ready to fill, Disp: 240 mL, Rfl: 3  •  ondansetron (ZOFRAN) 8 MG tablet, Take 1 tablet by mouth 3 (Three) Times a Day As Needed for Nausea or Vomiting., Disp: 30 tablet, Rfl: 5  •  Symbicort 160-4.5 MCG/ACT inhaler, INHALE 2 PUFFS BY MOUTH TWICE DAILY, Disp: 10.2 g, Rfl: 2  •  traMADol (ULTRAM) 50 MG tablet, Take 1 tablet by mouth Every 6 (Six) Hours As Needed., Disp: 7 tablet, Rfl: 0  •  venlafaxine XR (EFFEXOR-XR) 37.5 MG 24 hr capsule, Take 1 capsule by mouth Daily., Disp: 30 capsule, Rfl: 1  •  vitamin B-12 (CYANOCOBALAMIN) 1000 MCG tablet, Take 1,000 mcg by mouth Daily., Disp: , Rfl:   •  vitamin B-6 (PYRIDOXINE) 50 MG tablet, Take 50 mg by mouth Daily., Disp: , Rfl:   •  Vitamin D, Cholecalciferol, (CHOLECALCIFEROL) 10 MCG (400 UNIT) tablet, Take 400 Units by mouth Daily., Disp: , Rfl:     Current  "Facility-Administered Medications:   •  lidocaine (XYLOCAINE) 1 % injection 10 mL, 10 mL, Infiltration, Once, Connie Marie MD  •  lidocaine (XYLOCAINE) 1 % injection 5 mL, 5 mL, Infiltration, Once, Connie Marie MD    Allergies:   No Known Allergies    Objective     Vital Signs:   Vitals:    10/26/22 1412   BP: 112/63   Pulse: 71   Resp: 16   Temp: 97.5 °F (36.4 °C)   TempSrc: Temporal   SpO2: 96%   Weight: 65.8 kg (145 lb)   Height: 167.6 cm (65.98\")   PainSc:   4    Body mass index is 23.41 kg/m².   Pain Score    10/26/22 1412   PainSc:   4       ECOG Performance Status: 0    Physical Exam:  General: No acute distress. Well appearing   HEENT: Normocephalic, atraumatic. Sclera anicteric.  OP no visible mucositis.  Neck: supple, no adenopathy.   Cardiovascular: regular rate and rhythm,. No murmurs.   Respiratory: Normal rate. Clear to auscultation bilaterally  Abdomen: Soft, nontender, non distended with normoactive bowel sounds  Lymph: no cervical, supraclavicular or axillary adenopathy  Neuro: Alert and oriented x 3. No focal deficits.   Ext: Symmetric, no swelling.   Psych: Euthymic      Laboratory/Imaging Reviewed:   Hospital Outpatient Visit on 10/26/2022   Component Date Value Ref Range Status   • Glucose 10/26/2022 54 (L)  65 - 99 mg/dL Final   • BUN 10/26/2022 12  6 - 20 mg/dL Final   • Creatinine 10/26/2022 0.56 (L)  0.57 - 1.00 mg/dL Final   • Sodium 10/26/2022 136  136 - 145 mmol/L Final   • Potassium 10/26/2022 4.1  3.5 - 5.2 mmol/L Final   • Chloride 10/26/2022 101  98 - 107 mmol/L Final   • CO2 10/26/2022 27.0  22.0 - 29.0 mmol/L Final   • Calcium 10/26/2022 9.1  8.6 - 10.5 mg/dL Final   • BUN/Creatinine Ratio 10/26/2022 21.4  7.0 - 25.0 Final   • Anion Gap 10/26/2022 8.0  5.0 - 15.0 mmol/L Final   • eGFR 10/26/2022 111.3  >60.0 mL/min/1.73 Final    National Kidney Foundation and American Society of Nephrology (ASN) Task Force recommended calculation based on the Chronic Kidney Disease Epidemiology " Collaboration (CKD-EPI) equation refit without adjustment for race.   • WBC 10/26/2022 10.12  3.40 - 10.80 10*3/mm3 Final   • RBC 10/26/2022 4.11  3.77 - 5.28 10*6/mm3 Final   • Hemoglobin 10/26/2022 13.8  12.0 - 15.9 g/dL Final   • Hematocrit 10/26/2022 38.9  34.0 - 46.6 % Final   • MCV 10/26/2022 94.6  79.0 - 97.0 fL Final   • MCH 10/26/2022 33.6 (H)  26.6 - 33.0 pg Final   • MCHC 10/26/2022 35.5  31.5 - 35.7 g/dL Final   • RDW 10/26/2022 12.1 (L)  12.3 - 15.4 % Final   • RDW-SD 10/26/2022 42.9  37.0 - 54.0 fl Final   • MPV 10/26/2022 9.2  6.0 - 12.0 fL Final   • Platelets 10/26/2022 146  140 - 450 10*3/mm3 Final   • Neutrophil % 10/26/2022 32.1 (L)  42.7 - 76.0 % Final   • Lymphocyte % 10/26/2022 26.2  19.6 - 45.3 % Final   • Monocyte % 10/26/2022 30.1 (H)  5.0 - 12.0 % Final   • Eosinophil % 10/26/2022 1.2  0.3 - 6.2 % Final   • Basophil % 10/26/2022 0.8  0.0 - 1.5 % Final   • Immature Grans % 10/26/2022 9.6 (H)  0.0 - 0.5 % Final   • Neutrophils, Absolute 10/26/2022 3.25  1.70 - 7.00 10*3/mm3 Final   • Lymphocytes, Absolute 10/26/2022 2.65  0.70 - 3.10 10*3/mm3 Final   • Monocytes, Absolute 10/26/2022 3.05 (H)  0.10 - 0.90 10*3/mm3 Final   • Eosinophils, Absolute 10/26/2022 0.12  0.00 - 0.40 10*3/mm3 Final   • Basophils, Absolute 10/26/2022 0.08  0.00 - 0.20 10*3/mm3 Final   • Immature Grans, Absolute 10/26/2022 0.97 (H)  0.00 - 0.05 10*3/mm3 Final   Hospital Outpatient Visit on 10/19/2022   Component Date Value Ref Range Status   • Glucose 10/19/2022 100 (H)  65 - 99 mg/dL Final   • BUN 10/19/2022 12  6 - 20 mg/dL Final   • Creatinine 10/19/2022 0.66  0.57 - 1.00 mg/dL Final   • Sodium 10/19/2022 139  136 - 145 mmol/L Final   • Potassium 10/19/2022 4.1  3.5 - 5.2 mmol/L Final    Slight hemolysis detected by analyzer. Results may be affected.   • Chloride 10/19/2022 104  98 - 107 mmol/L Final   • CO2 10/19/2022 25.0  22.0 - 29.0 mmol/L Final   • Calcium 10/19/2022 8.9  8.6 - 10.5 mg/dL Final   • Total Protein  10/19/2022 6.5  6.0 - 8.5 g/dL Final   • Albumin 10/19/2022 4.00  3.50 - 5.20 g/dL Final   • ALT (SGPT) 10/19/2022 64 (H)  1 - 33 U/L Final   • AST (SGOT) 10/19/2022 68 (H)  1 - 32 U/L Final   • Alkaline Phosphatase 10/19/2022 64  39 - 117 U/L Final   • Total Bilirubin 10/19/2022 0.4  0.0 - 1.2 mg/dL Final   • Globulin 10/19/2022 2.5  gm/dL Final    Calculated Result   • A/G Ratio 10/19/2022 1.6  g/dL Final   • BUN/Creatinine Ratio 10/19/2022 18.2  7.0 - 25.0 Final   • Anion Gap 10/19/2022 10.0  5.0 - 15.0 mmol/L Final   • eGFR 10/19/2022 107.0  >60.0 mL/min/1.73 Final    National Kidney Foundation and American Society of Nephrology (ASN) Task Force recommended calculation based on the Chronic Kidney Disease Epidemiology Collaboration (CKD-EPI) equation refit without adjustment for race.   • HCG, Urine QL 10/19/2022 Negative  Negative Final   • WBC 10/19/2022 4.77  3.40 - 10.80 10*3/mm3 Final   • RBC 10/19/2022 4.32  3.77 - 5.28 10*6/mm3 Final   • Hemoglobin 10/19/2022 14.2  12.0 - 15.9 g/dL Final   • Hematocrit 10/19/2022 41.7  34.0 - 46.6 % Final   • MCV 10/19/2022 96.5  79.0 - 97.0 fL Final   • MCH 10/19/2022 32.9  26.6 - 33.0 pg Final   • MCHC 10/19/2022 34.1  31.5 - 35.7 g/dL Final   • RDW 10/19/2022 12.7  12.3 - 15.4 % Final   • RDW-SD 10/19/2022 45.7  37.0 - 54.0 fl Final   • MPV 10/19/2022 8.8  6.0 - 12.0 fL Final   • Platelets 10/19/2022 178  140 - 450 10*3/mm3 Final   • Neutrophil % 10/19/2022 48.8  42.7 - 76.0 % Final   • Lymphocyte % 10/19/2022 34.0  19.6 - 45.3 % Final   • Monocyte % 10/19/2022 14.7 (H)  5.0 - 12.0 % Final   • Eosinophil % 10/19/2022 1.7  0.3 - 6.2 % Final   • Basophil % 10/19/2022 0.8  0.0 - 1.5 % Final   • Immature Grans % 10/19/2022 0.0  0.0 - 0.5 % Final   • Neutrophils, Absolute 10/19/2022 2.33  1.70 - 7.00 10*3/mm3 Final   • Lymphocytes, Absolute 10/19/2022 1.62  0.70 - 3.10 10*3/mm3 Final   • Monocytes, Absolute 10/19/2022 0.70  0.10 - 0.90 10*3/mm3 Final   • Eosinophils,  Absolute 10/19/2022 0.08  0.00 - 0.40 10*3/mm3 Final   • Basophils, Absolute 10/19/2022 0.04  0.00 - 0.20 10*3/mm3 Final   • Immature Grans, Absolute 10/19/2022 0.00  0.00 - 0.05 10*3/mm3 Final       XR Chest 1 View    Result Date: 9/30/2022  Narrative:  DATE OF EXAM: 9/30/2022 9:15 AM  PROCEDURE: XR CHEST 1 VW-  INDICATIONS: C50.812; C50.812-Malignant neoplasm of overlapping sites of left female breast  COMPARISON: No Comparisons Available  TECHNIQUE: Portable Chest  FINDINGS:  Right-sided port is in place from IJ approach with the tip projecting over the SVC.  No pneumothorax or immediate postprocedure complication identified. The heart and mediastinal contours are within normal limits. Lungs are grossly clear. Subcutaneous emphysema is seen overlying the left axilla. Osseous structures demonstrate no acute abnormality.      Impression: IMPRESSION :  1. Right-sided IJ port projects over the SVC. No pneumothorax or immediate postprocedure complication noted. 2. Subcutaneous emphysema over the left axilla. On these represent recent procedure. Please correlate with history[  This report was finalized on 9/30/2022 9:35 AM by Braeden Rutherford.          Assessment / Plan      Assessment/Plan:     1. Malignant neoplasm of central portion of left breast in female, estrogen receptor positive  -She has an early stage hormone sensitive breast cancer with a single positive lymph node.  She has undergone curative intent surgery, and is planning to undergo reexcision for a positive margin.  She has been evaluated for consideration of adjuvant radiation.   -We discussed the potential role of adjuvant systemic therapy.  Historically, premenopausal lymph node positive breast cancer has been treated with adjuvant chemotherapy in addition to endocrine therapy. More recently, genomic testing has been shown to help to stratify the potential added benefit for an individual patient and identify patients who are less likely to derive  significant additional benefit from chemotherapy.  For patients with N1 disease and age > 50, and low risk genomic signature with either Oncotype or MammaPrint has been shown to predict lack of benefit for adjuvant chemotherapy.  However, for patients less than 50, there is a minimum 5% absolute benefit in the lymph node positive setting from the addition of adjuvant chemotherapy even with a low risk genomic signature.  The patient is age 52, but premenopausal.  We discussed that this is a gray zone.  It is postulated that the potential benefit of adjuvant chemotherapy in premenopausal women may be at least in part explained by the effective ovarian suppression from chemotherapy.  We discussed the potential role of genomic testing to guide chemotherapy decision.  However the patient is healthy and wishes to be aggressive with her cancer treatment.  She declines genomic testing and wishes to proceed with adjuvant chemotherapy. We discussed standard treatment options of 1) dose dense Adriamycin and Cytoxan followed by weekly Taxol or 2) Taxotere and Cytoxan. We discussed differences in schedule and toxicities. We specifically discussed an increased risk for cardiotoxicity and late bone marrow disorders with the addition of adriamycin.  Given strongly ER positive disease and N1 I would favor adjuvant TC over dose dense AC followed by weekly taxol. We discussed the goals of chemotherapy being cure.  We reviewed the chemotherapy schedule and its side effects including but not limited to alopecia, myelosuppression, infection, fevers, nausea, vomiting, diarrhea, mucositis, dehydration, fatigue, heart disease, neuropathy, and late cancers.  Informed consent was obtained, and the patient elected to proceed.   -We discussed 4 vs 6 cycles of TC in LN positive setting.   -She will benefit from adjuvant endocrine therapy.  In the context of lymph node positive disease, would prefer ovarian suppression or BSO plus AI if she does  not experience chemotherapy induced menopause.  -Ki-67 20% suggesting benefit from adjuvant Verzenio.  -In light of her systemic symptoms of fatigue, malaise, and backslash flank pain, we proceeded with a staging PET/CT which shows no metastatic disease.   -Tolerating therapy reasonably well.  Day 8 CBC shows a white blood cell count 10 and normal electrolytes and renal function with the exception of a low blood glucose.  Encouraged more frequent meals.  If she has recurrent episodes of lightheadedness, will need to consider symptomatic hypoglycemia.    2.  Bone pain secondary to Neulasta  -Continue Claritin.  Okay to schedule Tylenol and ibuprofen.  We discussed the addition of tramadol to her next cycle if desired.    3. Chemotherapy induced nausea..  -Reviewed scheduled antiemetic regimen.    4.  Early mucositis  -Encouraged frequent rinsing.  -Prescription for Magic mouthwash provided in case symptoms worsen.    Follow Up:   2 weeks    Rama Cano MD  Hematology and Oncology       Time spent on the day of service was30 min inclusive of time before, during, and after office visit on record review, medically appropriate history and physical, counseling patient, ordering tests, documenting in the medical record.

## 2022-10-31 ENCOUNTER — OFFICE VISIT (OUTPATIENT)
Dept: PSYCHIATRY | Facility: CLINIC | Age: 52
End: 2022-10-31

## 2022-10-31 DIAGNOSIS — G47.9 SLEEP DISTURBANCE: ICD-10-CM

## 2022-10-31 DIAGNOSIS — F41.1 GENERALIZED ANXIETY DISORDER: Primary | ICD-10-CM

## 2022-10-31 PROCEDURE — 99442 PR PHYS/QHP TELEPHONE EVALUATION 11-20 MIN: CPT | Performed by: NURSE PRACTITIONER

## 2022-10-31 RX ORDER — VENLAFAXINE HYDROCHLORIDE 37.5 MG/1
37.5 CAPSULE, EXTENDED RELEASE ORAL DAILY
Qty: 90 CAPSULE | Refills: 1 | Status: SHIPPED | OUTPATIENT
Start: 2022-10-31 | End: 2022-11-11 | Stop reason: SDUPTHER

## 2022-11-08 DIAGNOSIS — Z17.0 MALIGNANT NEOPLASM OF CENTRAL PORTION OF LEFT BREAST IN FEMALE, ESTROGEN RECEPTOR POSITIVE: Primary | ICD-10-CM

## 2022-11-08 DIAGNOSIS — C50.112 MALIGNANT NEOPLASM OF CENTRAL PORTION OF LEFT BREAST IN FEMALE, ESTROGEN RECEPTOR POSITIVE: Primary | ICD-10-CM

## 2022-11-09 ENCOUNTER — HOSPITAL ENCOUNTER (OUTPATIENT)
Dept: ONCOLOGY | Facility: HOSPITAL | Age: 52
Setting detail: INFUSION SERIES
Discharge: HOME | End: 2022-11-09

## 2022-11-09 VITALS
RESPIRATION RATE: 18 BRPM | HEART RATE: 91 BPM | TEMPERATURE: 97.4 F | BODY MASS INDEX: 23.78 KG/M2 | SYSTOLIC BLOOD PRESSURE: 134 MMHG | HEIGHT: 66 IN | WEIGHT: 148 LBS | DIASTOLIC BLOOD PRESSURE: 84 MMHG

## 2022-11-09 DIAGNOSIS — Z45.2 ENCOUNTER FOR CARE RELATED TO PORT-A-CATH: Primary | ICD-10-CM

## 2022-11-09 DIAGNOSIS — C50.112 MALIGNANT NEOPLASM OF CENTRAL PORTION OF LEFT BREAST IN FEMALE, ESTROGEN RECEPTOR POSITIVE: ICD-10-CM

## 2022-11-09 DIAGNOSIS — Z17.0 MALIGNANT NEOPLASM OF CENTRAL PORTION OF LEFT BREAST IN FEMALE, ESTROGEN RECEPTOR POSITIVE: ICD-10-CM

## 2022-11-09 LAB
ALBUMIN SERPL-MCNC: 4.2 G/DL (ref 3.5–5.2)
ALBUMIN/GLOB SERPL: 1.8 G/DL
ALP SERPL-CCNC: 73 U/L (ref 39–117)
ALT SERPL W P-5'-P-CCNC: 47 U/L (ref 1–33)
ANION GAP SERPL CALCULATED.3IONS-SCNC: 9 MMOL/L (ref 5–15)
AST SERPL-CCNC: 33 U/L (ref 1–32)
BASOPHILS # BLD AUTO: 0.06 10*3/MM3 (ref 0–0.2)
BASOPHILS NFR BLD AUTO: 1.1 % (ref 0–1.5)
BILIRUB SERPL-MCNC: 0.3 MG/DL (ref 0–1.2)
BUN SERPL-MCNC: 21 MG/DL (ref 6–20)
BUN/CREAT SERPL: 40.4 (ref 7–25)
CALCIUM SPEC-SCNC: 9.1 MG/DL (ref 8.6–10.5)
CHLORIDE SERPL-SCNC: 99 MMOL/L (ref 98–107)
CO2 SERPL-SCNC: 28 MMOL/L (ref 22–29)
CREAT SERPL-MCNC: 0.52 MG/DL (ref 0.57–1)
DEPRECATED RDW RBC AUTO: 47.1 FL (ref 37–54)
EGFRCR SERPLBLD CKD-EPI 2021: 113.4 ML/MIN/1.73
EOSINOPHIL # BLD AUTO: 0 10*3/MM3 (ref 0–0.4)
EOSINOPHIL NFR BLD AUTO: 0 % (ref 0.3–6.2)
ERYTHROCYTE [DISTWIDTH] IN BLOOD BY AUTOMATED COUNT: 13.4 % (ref 12.3–15.4)
GLOBULIN UR ELPH-MCNC: 2.3 GM/DL
GLUCOSE SERPL-MCNC: 86 MG/DL (ref 65–99)
HCT VFR BLD AUTO: 36.1 % (ref 34–46.6)
HGB BLD-MCNC: 12.4 G/DL (ref 12–15.9)
IMM GRANULOCYTES # BLD AUTO: 0.01 10*3/MM3 (ref 0–0.05)
IMM GRANULOCYTES NFR BLD AUTO: 0.2 % (ref 0–0.5)
LYMPHOCYTES # BLD AUTO: 1.4 10*3/MM3 (ref 0.7–3.1)
LYMPHOCYTES NFR BLD AUTO: 25.6 % (ref 19.6–45.3)
MCH RBC QN AUTO: 33.2 PG (ref 26.6–33)
MCHC RBC AUTO-ENTMCNC: 34.3 G/DL (ref 31.5–35.7)
MCV RBC AUTO: 96.5 FL (ref 79–97)
MONOCYTES # BLD AUTO: 0.56 10*3/MM3 (ref 0.1–0.9)
MONOCYTES NFR BLD AUTO: 10.2 % (ref 5–12)
NEUTROPHILS NFR BLD AUTO: 3.44 10*3/MM3 (ref 1.7–7)
NEUTROPHILS NFR BLD AUTO: 62.9 % (ref 42.7–76)
PLATELET # BLD AUTO: 363 10*3/MM3 (ref 140–450)
PMV BLD AUTO: 8.8 FL (ref 6–12)
POTASSIUM SERPL-SCNC: 4 MMOL/L (ref 3.5–5.2)
PROT SERPL-MCNC: 6.5 G/DL (ref 6–8.5)
RBC # BLD AUTO: 3.74 10*6/MM3 (ref 3.77–5.28)
SODIUM SERPL-SCNC: 136 MMOL/L (ref 136–145)
WBC NRBC COR # BLD: 5.47 10*3/MM3 (ref 3.4–10.8)

## 2022-11-09 PROCEDURE — 80053 COMPREHEN METABOLIC PANEL: CPT | Performed by: INTERNAL MEDICINE

## 2022-11-09 PROCEDURE — 25010000002 HEPARIN LOCK FLUSH PER 10 UNITS: Performed by: INTERNAL MEDICINE

## 2022-11-09 PROCEDURE — 36591 DRAW BLOOD OFF VENOUS DEVICE: CPT

## 2022-11-09 PROCEDURE — 85025 COMPLETE CBC W/AUTO DIFF WBC: CPT | Performed by: INTERNAL MEDICINE

## 2022-11-09 RX ORDER — HEPARIN SODIUM (PORCINE) LOCK FLUSH IV SOLN 100 UNIT/ML 100 UNIT/ML
500 SOLUTION INTRAVENOUS AS NEEDED
Status: DISCONTINUED | OUTPATIENT
Start: 2022-11-09 | End: 2022-11-10 | Stop reason: HOSPADM

## 2022-11-09 RX ORDER — PALONOSETRON 0.05 MG/ML
0.25 INJECTION, SOLUTION INTRAVENOUS ONCE
Status: CANCELLED | OUTPATIENT
Start: 2022-11-10

## 2022-11-09 RX ORDER — HEPARIN SODIUM (PORCINE) LOCK FLUSH IV SOLN 100 UNIT/ML 100 UNIT/ML
500 SOLUTION INTRAVENOUS AS NEEDED
Status: CANCELLED | OUTPATIENT
Start: 2022-11-09

## 2022-11-09 RX ORDER — SODIUM CHLORIDE 9 MG/ML
250 INJECTION, SOLUTION INTRAVENOUS ONCE
Status: CANCELLED | OUTPATIENT
Start: 2022-11-10

## 2022-11-09 RX ORDER — SODIUM CHLORIDE 0.9 % (FLUSH) 0.9 %
10 SYRINGE (ML) INJECTION AS NEEDED
Status: CANCELLED | OUTPATIENT
Start: 2022-11-09

## 2022-11-09 RX ORDER — DIPHENHYDRAMINE HYDROCHLORIDE 50 MG/ML
50 INJECTION INTRAMUSCULAR; INTRAVENOUS AS NEEDED
Status: CANCELLED | OUTPATIENT
Start: 2022-11-10

## 2022-11-09 RX ORDER — FAMOTIDINE 10 MG/ML
20 INJECTION, SOLUTION INTRAVENOUS AS NEEDED
Status: CANCELLED | OUTPATIENT
Start: 2022-11-10

## 2022-11-09 RX ADMIN — HEPARIN 500 UNITS: 100 SYRINGE at 13:48

## 2022-11-10 ENCOUNTER — HOSPITAL ENCOUNTER (OUTPATIENT)
Dept: ONCOLOGY | Facility: HOSPITAL | Age: 52
Setting detail: INFUSION SERIES
Discharge: HOME | End: 2022-11-10

## 2022-11-10 ENCOUNTER — OFFICE VISIT (OUTPATIENT)
Dept: ONCOLOGY | Facility: CLINIC | Age: 52
End: 2022-11-10

## 2022-11-10 VITALS
OXYGEN SATURATION: 96 % | SYSTOLIC BLOOD PRESSURE: 114 MMHG | HEART RATE: 104 BPM | TEMPERATURE: 96.9 F | RESPIRATION RATE: 16 BRPM | DIASTOLIC BLOOD PRESSURE: 85 MMHG | BODY MASS INDEX: 23.78 KG/M2 | WEIGHT: 148 LBS | HEIGHT: 66 IN

## 2022-11-10 DIAGNOSIS — Z45.2 ENCOUNTER FOR CARE RELATED TO PORT-A-CATH: Primary | ICD-10-CM

## 2022-11-10 DIAGNOSIS — Z17.0 MALIGNANT NEOPLASM OF CENTRAL PORTION OF LEFT BREAST IN FEMALE, ESTROGEN RECEPTOR POSITIVE: ICD-10-CM

## 2022-11-10 DIAGNOSIS — C50.112 MALIGNANT NEOPLASM OF CENTRAL PORTION OF LEFT BREAST IN FEMALE, ESTROGEN RECEPTOR POSITIVE: ICD-10-CM

## 2022-11-10 DIAGNOSIS — Z51.11 CHEMOTHERAPY MANAGEMENT, ENCOUNTER FOR: Primary | ICD-10-CM

## 2022-11-10 PROCEDURE — 25010000002 PALONOSETRON 0.25 MG/5ML SOLUTION PREFILLED SYRINGE: Performed by: INTERNAL MEDICINE

## 2022-11-10 PROCEDURE — 99215 OFFICE O/P EST HI 40 MIN: CPT | Performed by: INTERNAL MEDICINE

## 2022-11-10 PROCEDURE — 25010000002 DOCETAXEL 20 MG/ML SOLUTION 8 ML VIAL: Performed by: INTERNAL MEDICINE

## 2022-11-10 PROCEDURE — 96375 TX/PRO/DX INJ NEW DRUG ADDON: CPT

## 2022-11-10 PROCEDURE — 96417 CHEMO IV INFUS EACH ADDL SEQ: CPT

## 2022-11-10 PROCEDURE — 25010000002 HEPARIN LOCK FLUSH PER 10 UNITS: Performed by: INTERNAL MEDICINE

## 2022-11-10 PROCEDURE — 25010000002 CYCLOPHOSPHAMIDE 1 GM/5ML SOLUTION 5 ML VIAL: Performed by: INTERNAL MEDICINE

## 2022-11-10 PROCEDURE — 96413 CHEMO IV INFUSION 1 HR: CPT

## 2022-11-10 RX ORDER — TRAMADOL HYDROCHLORIDE 50 MG/1
50-100 TABLET ORAL EVERY 6 HOURS PRN
Qty: 30 TABLET | Refills: 0 | Status: SHIPPED | OUTPATIENT
Start: 2022-11-10

## 2022-11-10 RX ORDER — SODIUM CHLORIDE 0.9 % (FLUSH) 0.9 %
10 SYRINGE (ML) INJECTION AS NEEDED
Status: CANCELLED | OUTPATIENT
Start: 2022-11-10

## 2022-11-10 RX ORDER — HEPARIN SODIUM (PORCINE) LOCK FLUSH IV SOLN 100 UNIT/ML 100 UNIT/ML
500 SOLUTION INTRAVENOUS AS NEEDED
Status: CANCELLED | OUTPATIENT
Start: 2022-11-10

## 2022-11-10 RX ORDER — HEPARIN SODIUM (PORCINE) LOCK FLUSH IV SOLN 100 UNIT/ML 100 UNIT/ML
500 SOLUTION INTRAVENOUS AS NEEDED
Status: DISCONTINUED | OUTPATIENT
Start: 2022-11-10 | End: 2022-11-11 | Stop reason: HOSPADM

## 2022-11-10 RX ORDER — DIPHENHYDRAMINE HYDROCHLORIDE 50 MG/ML
50 INJECTION INTRAMUSCULAR; INTRAVENOUS AS NEEDED
Status: DISCONTINUED | OUTPATIENT
Start: 2022-11-10 | End: 2022-11-11 | Stop reason: HOSPADM

## 2022-11-10 RX ORDER — SODIUM CHLORIDE 0.9 % (FLUSH) 0.9 %
10 SYRINGE (ML) INJECTION AS NEEDED
Status: DISCONTINUED | OUTPATIENT
Start: 2022-11-10 | End: 2022-11-11 | Stop reason: HOSPADM

## 2022-11-10 RX ORDER — PALONOSETRON 0.05 MG/ML
0.25 INJECTION, SOLUTION INTRAVENOUS ONCE
Status: COMPLETED | OUTPATIENT
Start: 2022-11-10 | End: 2022-11-10

## 2022-11-10 RX ORDER — FAMOTIDINE 10 MG/ML
20 INJECTION, SOLUTION INTRAVENOUS AS NEEDED
Status: DISCONTINUED | OUTPATIENT
Start: 2022-11-10 | End: 2022-11-11 | Stop reason: HOSPADM

## 2022-11-10 RX ORDER — SODIUM CHLORIDE 9 MG/ML
250 INJECTION, SOLUTION INTRAVENOUS ONCE
Status: COMPLETED | OUTPATIENT
Start: 2022-11-10 | End: 2022-11-10

## 2022-11-10 RX ADMIN — HEPARIN 500 UNITS: 100 SYRINGE at 12:13

## 2022-11-10 RX ADMIN — DOCETAXEL 130 MG: 20 INJECTION, SOLUTION, CONCENTRATE INTRAVENOUS at 10:25

## 2022-11-10 RX ADMIN — CYCLOPHOSPHAMIDE 1020 MG: 200 INJECTION, SOLUTION INTRAVENOUS at 11:34

## 2022-11-10 RX ADMIN — SODIUM CHLORIDE 250 ML: 9 INJECTION, SOLUTION INTRAVENOUS at 10:02

## 2022-11-10 RX ADMIN — Medication 10 ML: at 12:13

## 2022-11-10 RX ADMIN — PALONOSETRON 0.25 MG: 0.25 INJECTION, SOLUTION INTRAVENOUS at 10:04

## 2022-11-10 NOTE — PROGRESS NOTES
Hematology and Oncology [place]  Office number [phone]    Fax number [phone]     Follow up     Date: 11/10/22      Patient Name: Debra Beckman  MRN: [id number]  : 1970    Referring Physician: Dr. Goran Russell    Chief Complaint: follow up/treatment    Cancer Staging:   Cancer Staging  Stage IA (cT1b, cN0, cM0, G1, ER+, MS+, HER2-      History of Present Illness: Debra Beckman is a pleasant 52 y.o. female who presents today for evaluation of left breast cancer.  She is accompanied by her supportive father who is a retired [employer] neurosurgeon, Dr. Beckman.    Screening mammogram 2022 demonstrated an asymmetry in the anterior left medial breast.  Diagnostic mammogram on 2022 demonstrated a persistent ill-defined asymmetry with architectural distortion in the left 9:00 periareolar region.  On ultrasound, this corresponded to an 8 mm mass in the 9:00 left breast.    Left breast 9:00 biopsy on 8/10/2022 demonstrated grade 1 invasive ductal carcinoma with associated DCIS (%, MS 60%, HER2/kathleen negative, 0+).    She proceeded with bilateral breast MRI on 8/15/2022.  This demonstrated a 1.4 cm 9:00 left breast enhancing mass, with a 1.3 cm area of clumped non-mass enhancement versus postbiopsy change 1 cm anterior lateral to this.  There is additionally a dominant focus of enhancement suspicious for a satellite lesion spanning 0.4 cm.  She underwent second look ultrasound and biopsy of the second lesion which corresponded to a subcentimeteric mass on ultrasound.    Left breast 9:00 biopsy on 2022 showed grade 2 invasive ductal carcinoma (ER greater than 90%, MS greater than 80%, HER2/kathleen negative, 0+)    Left breast lumpectomy, sentinel lymph node biopsy on 2022 demonstrated grade 2 invasive ductal carcinoma spanning 1.4 cm with associated DCIS.  Margin positive.  Morrison lymph node positive () with a macrometastasis spanning 4 mm with extracapsular  extension.    Breast cancer risk profile:  G3, P2; Age of first live birth 37  Premenopausal, LMP was 2022.  She discontinued OCPs at the time of her cancer diagnosis.  Family history of breast, ovarian, prostate or pancreatic cancer: Maternal aunt with breast cancer.  Genetics: Sayda cancer next panel was negative in .    Treatment history:  TC x 4-6 cycles    Interval history:  She is here with her supportive dad for consideration of cycle 2.   Tramadol helped bone pain and that has now resolved.   Mucositis resolved. Nose bleed x 1 minute associated with rhinorrhea.   Mild headache gets better with drinking.  Mild nausea was managed with frequent food intake. Using antiemetics sparingly.  No diarrhea or constipation.  Wants to increase Effexor, because of increased anxiety and negative self talk, planning to discuss with Yesica.     Past Medical History:   Past Medical History:   Diagnosis Date   • Allergic    • Asthma    • Low back pain    • Malignant neoplasm of central portion of left breast in female, estrogen receptor positive (HCC) 2022       Past Surgical History:   Past Surgical History:   Procedure Laterality Date   • BREAST BIOPSY  08/10/2022   • BREAST BIOPSY Left 2022   • BREAST LUMPECTOMY WITH AXILLARY NODE DISSECTION Left 2022    clean dread   • BREAST LUMPECTOMY WITH SENTINEL NODE BIOPSY Left 2022   •  SECTION     • VENOUS ACCESS DEVICE (PORT) INSERTION Right 2022       Family History:   Family History   Problem Relation Age of Onset   • Hypertension Mother    • No Known Problems Father    • Breast cancer Maternal Aunt    • Ovarian cancer Neg Hx        Social History:   Social History     Socioeconomic History   • Marital status: Single   Tobacco Use   • Smoking status: Former   • Smokeless tobacco: Never   Vaping Use   • Vaping Use: Former   Substance and Sexual Activity   • Alcohol use: Yes     Comment: occasional   • Drug use: No   • Sexual activity:  Claribel   Works as a .  She is running for [employer] in November.    Medications:     Current Outpatient Medications:   •  albuterol sulfate  (90 Base) MCG/ACT inhaler, Inhale 2 puffs Every 4 (Four) Hours As Needed for Wheezing., Disp: 18 g, Rfl: 11  •  celecoxib (CeleBREX) 200 MG capsule, Take 1 capsule by mouth Daily. (Patient taking differently: Take 200 mg by mouth As Needed.), Disp: 30 capsule, Rfl: 11  •  dexamethasone (DECADRON) 4 MG tablet, Take 2 tablets oral twice a day for 3 consecutive days beginning the day before chemotherapy and continue for 6 doses., Disp: 12 tablet, Rfl: 3  •  lidocaine-prilocaine (EMLA) 2.5-2.5 % cream, Apply 1 application topically to the appropriate area as directed As Needed (45-60 minutes prior to port access.  Cover with saran/plastic wrap.)., Disp: 30 g, Rfl: 3  •  nystatin susp + lidocaine viscous (MAGIC MOUTHWASH) oral suspension, 5-10 ml swish and spit or swallow QID prn  Indications: Please file. Patient will call when ready to fill, Disp: 240 mL, Rfl: 3  •  ondansetron (ZOFRAN) 8 MG tablet, Take 1 tablet by mouth 3 (Three) Times a Day As Needed for Nausea or Vomiting., Disp: 30 tablet, Rfl: 5  •  Symbicort 160-4.5 MCG/ACT inhaler, INHALE 2 PUFFS BY MOUTH TWICE DAILY, Disp: 10.2 g, Rfl: 2  •  traMADol (ULTRAM) 50 MG tablet, Take 1 tablet by mouth Every 6 (Six) Hours As Needed., Disp: 7 tablet, Rfl: 0  •  venlafaxine XR (EFFEXOR-XR) 37.5 MG 24 hr capsule, Take 1 capsule by mouth Daily., Disp: 90 capsule, Rfl: 1  •  vitamin B-12 (CYANOCOBALAMIN) 1000 MCG tablet, Take 1,000 mcg by mouth Daily., Disp: , Rfl:   •  vitamin B-6 (PYRIDOXINE) 50 MG tablet, Take 50 mg by mouth Daily., Disp: , Rfl:   •  Vitamin D, Cholecalciferol, (CHOLECALCIFEROL) 10 MCG (400 UNIT) tablet, Take 400 Units by mouth Daily., Disp: , Rfl:     Current Facility-Administered Medications:   •  lidocaine (XYLOCAINE) 1 % injection 10 mL, 10 mL, Infiltration, Once, Connie Marie MD  •   "lidocaine (XYLOCAINE) 1 % injection 5 mL, 5 mL, Infiltration, Once, Connie Marie MD    Allergies:   No Known Allergies    Objective     Vital Signs:   Vitals:    11/10/22 0845   BP: 114/85   Pulse: 104   Resp: 16   Temp: 96.9 °F (36.1 °C)   TempSrc: Temporal   SpO2: 96%   Weight: 67.1 kg (148 lb)   Height: 167.6 cm (65.98\")   PainSc: 0-No pain    Body mass index is 23.9 kg/m².   Pain Score    11/10/22 0845   PainSc: 0-No pain       ECOG Performance Status: 0    Physical Exam:  General: No acute distress. Well appearing   HEENT: Normocephalic, atraumatic. Sclera anicteric.  OP no visible mucositis.  Neck: supple, no adenopathy.   Cardiovascular: regular rate and rhythm,. No murmurs.   Respiratory: Normal rate. Clear to auscultation bilaterally  Abdomen: Soft, nontender, non distended with normoactive bowel sounds  Lymph: no cervical, supraclavicular or axillary adenopathy  Neuro: Alert and oriented x 3. No focal deficits.   Ext: Symmetric, no swelling.   Psych: Euthymic  Breast: incisions well healed. Small suture granuloma      Laboratory/Imaging Reviewed:   Hospital Outpatient Visit on 11/09/2022   Component Date Value Ref Range Status   • Glucose 11/09/2022 86  65 - 99 mg/dL Final   • BUN 11/09/2022 21 (H)  6 - 20 mg/dL Final   • Creatinine 11/09/2022 0.52 (L)  0.57 - 1.00 mg/dL Final   • Sodium 11/09/2022 136  136 - 145 mmol/L Final   • Potassium 11/09/2022 4.0  3.5 - 5.2 mmol/L Final    Slight hemolysis detected by analyzer. Results may be affected.   • Chloride 11/09/2022 99  98 - 107 mmol/L Final   • CO2 11/09/2022 28.0  22.0 - 29.0 mmol/L Final   • Calcium 11/09/2022 9.1  8.6 - 10.5 mg/dL Final   • Total Protein 11/09/2022 6.5  6.0 - 8.5 g/dL Final   • Albumin 11/09/2022 4.20  3.50 - 5.20 g/dL Final   • ALT (SGPT) 11/09/2022 47 (H)  1 - 33 U/L Final   • AST (SGOT) 11/09/2022 33 (H)  1 - 32 U/L Final   • Alkaline Phosphatase 11/09/2022 73  39 - 117 U/L Final   • Total Bilirubin 11/09/2022 0.3  0.0 - 1.2 mg/dL " Final   • Globulin 11/09/2022 2.3  gm/dL Final    Calculated Result   • A/G Ratio 11/09/2022 1.8  g/dL Final   • BUN/Creatinine Ratio 11/09/2022 40.4 (H)  7.0 - 25.0 Final   • Anion Gap 11/09/2022 9.0  5.0 - 15.0 mmol/L Final   • eGFR 11/09/2022 113.4  >60.0 mL/min/1.73 Final    National Kidney Foundation and American Society of Nephrology (ASN) Task Force recommended calculation based on the Chronic Kidney Disease Epidemiology Collaboration (CKD-EPI) equation refit without adjustment for race.   • WBC 11/09/2022 5.47  3.40 - 10.80 10*3/mm3 Final   • RBC 11/09/2022 3.74 (L)  3.77 - 5.28 10*6/mm3 Final   • Hemoglobin 11/09/2022 12.4  12.0 - 15.9 g/dL Final   • Hematocrit 11/09/2022 36.1  34.0 - 46.6 % Final   • MCV 11/09/2022 96.5  79.0 - 97.0 fL Final   • MCH 11/09/2022 33.2 (H)  26.6 - 33.0 pg Final   • MCHC 11/09/2022 34.3  31.5 - 35.7 g/dL Final   • RDW 11/09/2022 13.4  12.3 - 15.4 % Final   • RDW-SD 11/09/2022 47.1  37.0 - 54.0 fl Final   • MPV 11/09/2022 8.8  6.0 - 12.0 fL Final   • Platelets 11/09/2022 363  140 - 450 10*3/mm3 Final   • Neutrophil % 11/09/2022 62.9  42.7 - 76.0 % Final   • Lymphocyte % 11/09/2022 25.6  19.6 - 45.3 % Final   • Monocyte % 11/09/2022 10.2  5.0 - 12.0 % Final   • Eosinophil % 11/09/2022 0.0 (L)  0.3 - 6.2 % Final   • Basophil % 11/09/2022 1.1  0.0 - 1.5 % Final   • Immature Grans % 11/09/2022 0.2  0.0 - 0.5 % Final   • Neutrophils, Absolute 11/09/2022 3.44  1.70 - 7.00 10*3/mm3 Final   • Lymphocytes, Absolute 11/09/2022 1.40  0.70 - 3.10 10*3/mm3 Final   • Monocytes, Absolute 11/09/2022 0.56  0.10 - 0.90 10*3/mm3 Final   • Eosinophils, Absolute 11/09/2022 0.00  0.00 - 0.40 10*3/mm3 Final   • Basophils, Absolute 11/09/2022 0.06  0.00 - 0.20 10*3/mm3 Final   • Immature Grans, Absolute 11/09/2022 0.01  0.00 - 0.05 10*3/mm3 Final         Assessment / Plan        1. Malignant neoplasm of central portion of left breast in female, estrogen receptor positive  -She has an early stage  hormone sensitive breast cancer with a single positive lymph node.  She has undergone curative intent surgery, and is planning to undergo reexcision for a positive margin.  She has been evaluated for consideration of adjuvant radiation.   -We discussed the potential role of adjuvant systemic therapy.  Historically, premenopausal lymph node positive breast cancer has been treated with adjuvant chemotherapy in addition to endocrine therapy. More recently, genomic testing has been shown to help to stratify the potential added benefit for an individual patient and identify patients who are less likely to derive significant additional benefit from chemotherapy.  For patients with N1 disease and age > 50, and low risk genomic signature with either Oncotype or MammaPrint has been shown to predict lack of benefit for adjuvant chemotherapy.  However, for patients less than 50, there is a minimum 5% absolute benefit in the lymph node positive setting from the addition of adjuvant chemotherapy even with a low risk genomic signature.  The patient is age 52, but premenopausal.  We discussed that this is a gray zone.  It is postulated that the potential benefit of adjuvant chemotherapy in premenopausal women may be at least in part explained by the effective ovarian suppression from chemotherapy.  We discussed the potential role of genomic testing to guide chemotherapy decision.  However the patient is healthy and wishes to be aggressive with her cancer treatment.  She declines genomic testing and wishes to proceed with adjuvant chemotherapy. We discussed standard treatment options of 1) dose dense Adriamycin and Cytoxan followed by weekly Taxol or 2) Taxotere and Cytoxan. We discussed differences in schedule and toxicities. We specifically discussed an increased risk for cardiotoxicity and late bone marrow disorders with the addition of adriamycin.  Given strongly ER positive disease and N1 I would favor adjuvant TC over dose  dense AC followed by weekly taxol. We discussed the goals of chemotherapy being cure.  We reviewed the chemotherapy schedule and its side effects including but not limited to alopecia, myelosuppression, infection, fevers, nausea, vomiting, diarrhea, mucositis, dehydration, fatigue, heart disease, neuropathy, and late cancers.  Informed consent was obtained, and the patient elected to proceed.   -We discussed 4 vs 6 cycles of TC in LN positive setting.   -She will benefit from adjuvant endocrine therapy.  In the context of lymph node positive disease, would prefer ovarian suppression or BSO plus AI if she does not experience chemotherapy induced menopause.  -Ki-67 20% suggesting benefit from adjuvant Verzenio.  -Tolerating therapy well.   -I reviewed her labs which show a normal white blood cell count, hemoglobin and platelet in an acceptable range for treatment with normal creatinine and mild stable transaminitis.  Labs are adequate for treatment.  Continue to monitor for adverse side effects of therapy.  Continue to monitor CBC and CMP with each cycle of treatment.  -ROGERIO on exam    2.  Bone pain secondary to Neulasta  -Continue Claritin.  Okay to schedule Tylenol and ibuprofen.   -Scheduling tramadol helped.  Refill provided.    3. Chemotherapy induced nausea..  -Well-controlled on as needed antiemetics    4.  Mucositis  -Encouraged frequent rinsing.  -Prescription for Magic mouthwash available in case symptoms worsen.    5. Mild epistaxis/Rhinorrhea  -Trial of saline mist spray and cool mist humidifier at night.  -Discussed urgent evaluation for any prolonged or severe episodes of epistaxis that do not resolve with pressure  -Platelet count is adequate today.      Follow Up:   2 weeks    Rama Cano MD  Hematology and Oncology       Time spent on the day of service was 40 min inclusive of time before, during, and after office visit on record review, medically appropriate history and physical, counseling  patient, ordering tests, documenting in the medical record.

## 2022-11-11 ENCOUNTER — HOSPITAL ENCOUNTER (OUTPATIENT)
Dept: ONCOLOGY | Facility: HOSPITAL | Age: 52
Setting detail: INFUSION SERIES
Discharge: HOME | End: 2022-11-11

## 2022-11-11 VITALS
BODY MASS INDEX: 23.78 KG/M2 | DIASTOLIC BLOOD PRESSURE: 65 MMHG | TEMPERATURE: 97.1 F | RESPIRATION RATE: 16 BRPM | HEART RATE: 64 BPM | WEIGHT: 148 LBS | SYSTOLIC BLOOD PRESSURE: 109 MMHG | HEIGHT: 66 IN

## 2022-11-11 DIAGNOSIS — C50.112 MALIGNANT NEOPLASM OF CENTRAL PORTION OF LEFT BREAST IN FEMALE, ESTROGEN RECEPTOR POSITIVE: Primary | ICD-10-CM

## 2022-11-11 DIAGNOSIS — Z17.0 MALIGNANT NEOPLASM OF CENTRAL PORTION OF LEFT BREAST IN FEMALE, ESTROGEN RECEPTOR POSITIVE: Primary | ICD-10-CM

## 2022-11-11 PROCEDURE — 25010000002 PEGFILGRASTIM-CBQV 6 MG/0.6ML SOLUTION PREFILLED SYRINGE: Performed by: INTERNAL MEDICINE

## 2022-11-11 PROCEDURE — 96372 THER/PROPH/DIAG INJ SC/IM: CPT

## 2022-11-11 RX ORDER — VENLAFAXINE HYDROCHLORIDE 75 MG/1
75 CAPSULE, EXTENDED RELEASE ORAL DAILY
Qty: 90 CAPSULE | Refills: 1 | Status: SHIPPED | OUTPATIENT
Start: 2022-11-11 | End: 2023-03-02 | Stop reason: SDUPTHER

## 2022-11-11 RX ADMIN — PEGFILGRASTIM-CBQV 6 MG: 6 INJECTION, SOLUTION SUBCUTANEOUS at 13:12

## 2022-11-30 ENCOUNTER — HOSPITAL ENCOUNTER (OUTPATIENT)
Dept: ONCOLOGY | Facility: HOSPITAL | Age: 52
Setting detail: INFUSION SERIES
Discharge: HOME | End: 2022-11-30

## 2022-11-30 VITALS
SYSTOLIC BLOOD PRESSURE: 113 MMHG | DIASTOLIC BLOOD PRESSURE: 71 MMHG | WEIGHT: 148 LBS | TEMPERATURE: 97 F | BODY MASS INDEX: 23.78 KG/M2 | RESPIRATION RATE: 18 BRPM | HEIGHT: 66 IN | HEART RATE: 113 BPM

## 2022-11-30 DIAGNOSIS — Z17.0 MALIGNANT NEOPLASM OF CENTRAL PORTION OF LEFT BREAST IN FEMALE, ESTROGEN RECEPTOR POSITIVE: Primary | ICD-10-CM

## 2022-11-30 DIAGNOSIS — Z45.2 ENCOUNTER FOR CARE RELATED TO PORT-A-CATH: ICD-10-CM

## 2022-11-30 DIAGNOSIS — C50.112 MALIGNANT NEOPLASM OF CENTRAL PORTION OF LEFT BREAST IN FEMALE, ESTROGEN RECEPTOR POSITIVE: Primary | ICD-10-CM

## 2022-11-30 LAB
ALBUMIN SERPL-MCNC: 4 G/DL (ref 3.5–5.2)
ALBUMIN/GLOB SERPL: 1.5 G/DL
ALP SERPL-CCNC: 78 U/L (ref 39–117)
ALT SERPL W P-5'-P-CCNC: 50 U/L (ref 1–33)
ANION GAP SERPL CALCULATED.3IONS-SCNC: 10 MMOL/L (ref 5–15)
AST SERPL-CCNC: 36 U/L (ref 1–32)
BASOPHILS # BLD AUTO: 0.02 10*3/MM3 (ref 0–0.2)
BASOPHILS NFR BLD AUTO: 0.3 % (ref 0–1.5)
BILIRUB SERPL-MCNC: 0.4 MG/DL (ref 0–1.2)
BUN SERPL-MCNC: 18 MG/DL (ref 6–20)
BUN/CREAT SERPL: 31.6 (ref 7–25)
CALCIUM SPEC-SCNC: 9.5 MG/DL (ref 8.6–10.5)
CHLORIDE SERPL-SCNC: 102 MMOL/L (ref 98–107)
CO2 SERPL-SCNC: 26 MMOL/L (ref 22–29)
CREAT SERPL-MCNC: 0.57 MG/DL (ref 0.57–1)
DEPRECATED RDW RBC AUTO: 51.6 FL (ref 37–54)
EGFRCR SERPLBLD CKD-EPI 2021: 110.9 ML/MIN/1.73
EOSINOPHIL # BLD AUTO: 0 10*3/MM3 (ref 0–0.4)
EOSINOPHIL NFR BLD AUTO: 0 % (ref 0.3–6.2)
ERYTHROCYTE [DISTWIDTH] IN BLOOD BY AUTOMATED COUNT: 14.6 % (ref 12.3–15.4)
GLOBULIN UR ELPH-MCNC: 2.7 GM/DL
GLUCOSE SERPL-MCNC: 112 MG/DL (ref 65–99)
HCT VFR BLD AUTO: 35.1 % (ref 34–46.6)
HGB BLD-MCNC: 12.1 G/DL (ref 12–15.9)
IMM GRANULOCYTES # BLD AUTO: 0.01 10*3/MM3 (ref 0–0.05)
IMM GRANULOCYTES NFR BLD AUTO: 0.2 % (ref 0–0.5)
LYMPHOCYTES # BLD AUTO: 0.47 10*3/MM3 (ref 0.7–3.1)
LYMPHOCYTES NFR BLD AUTO: 7.5 % (ref 19.6–45.3)
MCH RBC QN AUTO: 33.6 PG (ref 26.6–33)
MCHC RBC AUTO-ENTMCNC: 34.5 G/DL (ref 31.5–35.7)
MCV RBC AUTO: 97.5 FL (ref 79–97)
MONOCYTES # BLD AUTO: 0.05 10*3/MM3 (ref 0.1–0.9)
MONOCYTES NFR BLD AUTO: 0.8 % (ref 5–12)
NEUTROPHILS NFR BLD AUTO: 5.7 10*3/MM3 (ref 1.7–7)
NEUTROPHILS NFR BLD AUTO: 91.2 % (ref 42.7–76)
PLATELET # BLD AUTO: 250 10*3/MM3 (ref 140–450)
PMV BLD AUTO: 9 FL (ref 6–12)
POTASSIUM SERPL-SCNC: 4.4 MMOL/L (ref 3.5–5.2)
PROT SERPL-MCNC: 6.7 G/DL (ref 6–8.5)
RBC # BLD AUTO: 3.6 10*6/MM3 (ref 3.77–5.28)
SODIUM SERPL-SCNC: 138 MMOL/L (ref 136–145)
WBC NRBC COR # BLD: 6.25 10*3/MM3 (ref 3.4–10.8)

## 2022-11-30 PROCEDURE — 80053 COMPREHEN METABOLIC PANEL: CPT | Performed by: INTERNAL MEDICINE

## 2022-11-30 PROCEDURE — 25010000002 HEPARIN LOCK FLUSH PER 10 UNITS: Performed by: INTERNAL MEDICINE

## 2022-11-30 PROCEDURE — 36591 DRAW BLOOD OFF VENOUS DEVICE: CPT

## 2022-11-30 PROCEDURE — 85025 COMPLETE CBC W/AUTO DIFF WBC: CPT | Performed by: INTERNAL MEDICINE

## 2022-11-30 RX ORDER — SODIUM CHLORIDE 0.9 % (FLUSH) 0.9 %
10 SYRINGE (ML) INJECTION AS NEEDED
Status: CANCELLED | OUTPATIENT
Start: 2022-11-30

## 2022-11-30 RX ORDER — HEPARIN SODIUM (PORCINE) LOCK FLUSH IV SOLN 100 UNIT/ML 100 UNIT/ML
500 SOLUTION INTRAVENOUS AS NEEDED
Status: CANCELLED | OUTPATIENT
Start: 2022-11-30

## 2022-11-30 RX ORDER — SODIUM CHLORIDE 0.9 % (FLUSH) 0.9 %
10 SYRINGE (ML) INJECTION AS NEEDED
Status: DISCONTINUED | OUTPATIENT
Start: 2022-11-30 | End: 2022-12-01 | Stop reason: HOSPADM

## 2022-11-30 RX ORDER — HEPARIN SODIUM (PORCINE) LOCK FLUSH IV SOLN 100 UNIT/ML 100 UNIT/ML
500 SOLUTION INTRAVENOUS AS NEEDED
Status: DISCONTINUED | OUTPATIENT
Start: 2022-11-30 | End: 2022-12-01 | Stop reason: HOSPADM

## 2022-11-30 RX ADMIN — Medication 10 ML: at 15:44

## 2022-11-30 RX ADMIN — HEPARIN 500 UNITS: 100 SYRINGE at 15:44

## 2022-12-01 ENCOUNTER — HOSPITAL ENCOUNTER (OUTPATIENT)
Dept: ONCOLOGY | Facility: HOSPITAL | Age: 52
Setting detail: INFUSION SERIES
Discharge: HOME | End: 2022-12-01
Payer: COMMERCIAL

## 2022-12-01 ENCOUNTER — APPOINTMENT (OUTPATIENT)
Dept: ONCOLOGY | Facility: HOSPITAL | Age: 52
End: 2022-12-01
Payer: COMMERCIAL

## 2022-12-01 ENCOUNTER — OFFICE VISIT (OUTPATIENT)
Dept: ONCOLOGY | Facility: CLINIC | Age: 52
End: 2022-12-01

## 2022-12-01 VITALS
HEIGHT: 66 IN | DIASTOLIC BLOOD PRESSURE: 72 MMHG | OXYGEN SATURATION: 95 % | TEMPERATURE: 97.5 F | HEART RATE: 94 BPM | BODY MASS INDEX: 23.95 KG/M2 | WEIGHT: 149 LBS | RESPIRATION RATE: 16 BRPM | SYSTOLIC BLOOD PRESSURE: 105 MMHG

## 2022-12-01 DIAGNOSIS — Z17.0 MALIGNANT NEOPLASM OF CENTRAL PORTION OF LEFT BREAST IN FEMALE, ESTROGEN RECEPTOR POSITIVE: ICD-10-CM

## 2022-12-01 DIAGNOSIS — Z45.2 ENCOUNTER FOR CARE RELATED TO PORT-A-CATH: Primary | ICD-10-CM

## 2022-12-01 DIAGNOSIS — C50.112 MALIGNANT NEOPLASM OF CENTRAL PORTION OF LEFT BREAST IN FEMALE, ESTROGEN RECEPTOR POSITIVE: ICD-10-CM

## 2022-12-01 DIAGNOSIS — Z51.11 CHEMOTHERAPY MANAGEMENT, ENCOUNTER FOR: Primary | ICD-10-CM

## 2022-12-01 PROCEDURE — 25010000002 PALONOSETRON 0.25 MG/5ML SOLUTION PREFILLED SYRINGE: Performed by: INTERNAL MEDICINE

## 2022-12-01 PROCEDURE — 25010000002 HEPARIN LOCK FLUSH PER 10 UNITS: Performed by: INTERNAL MEDICINE

## 2022-12-01 PROCEDURE — 96413 CHEMO IV INFUSION 1 HR: CPT

## 2022-12-01 PROCEDURE — 25010000002 CYCLOPHOSPHAMIDE 1 GM/5ML SOLUTION 5 ML VIAL: Performed by: INTERNAL MEDICINE

## 2022-12-01 PROCEDURE — 99215 OFFICE O/P EST HI 40 MIN: CPT | Performed by: INTERNAL MEDICINE

## 2022-12-01 PROCEDURE — 96375 TX/PRO/DX INJ NEW DRUG ADDON: CPT

## 2022-12-01 PROCEDURE — 25010000002 DOCETAXEL 20 MG/ML SOLUTION 8 ML VIAL: Performed by: INTERNAL MEDICINE

## 2022-12-01 PROCEDURE — 96417 CHEMO IV INFUS EACH ADDL SEQ: CPT

## 2022-12-01 RX ORDER — PALONOSETRON 0.05 MG/ML
0.25 INJECTION, SOLUTION INTRAVENOUS ONCE
Status: COMPLETED | OUTPATIENT
Start: 2022-12-01 | End: 2022-12-01

## 2022-12-01 RX ORDER — SODIUM CHLORIDE 0.9 % (FLUSH) 0.9 %
10 SYRINGE (ML) INJECTION AS NEEDED
Status: DISCONTINUED | OUTPATIENT
Start: 2022-12-01 | End: 2022-12-02 | Stop reason: HOSPADM

## 2022-12-01 RX ORDER — FAMOTIDINE 10 MG/ML
20 INJECTION, SOLUTION INTRAVENOUS AS NEEDED
Status: CANCELLED | OUTPATIENT
Start: 2022-12-01

## 2022-12-01 RX ORDER — SODIUM CHLORIDE 0.9 % (FLUSH) 0.9 %
10 SYRINGE (ML) INJECTION AS NEEDED
Status: CANCELLED | OUTPATIENT
Start: 2022-12-01

## 2022-12-01 RX ORDER — HEPARIN SODIUM (PORCINE) LOCK FLUSH IV SOLN 100 UNIT/ML 100 UNIT/ML
500 SOLUTION INTRAVENOUS AS NEEDED
Status: CANCELLED | OUTPATIENT
Start: 2022-12-01

## 2022-12-01 RX ORDER — HEPARIN SODIUM (PORCINE) LOCK FLUSH IV SOLN 100 UNIT/ML 100 UNIT/ML
500 SOLUTION INTRAVENOUS AS NEEDED
Status: DISCONTINUED | OUTPATIENT
Start: 2022-12-01 | End: 2022-12-02 | Stop reason: HOSPADM

## 2022-12-01 RX ORDER — DIPHENHYDRAMINE HYDROCHLORIDE 50 MG/ML
50 INJECTION INTRAMUSCULAR; INTRAVENOUS AS NEEDED
Status: CANCELLED | OUTPATIENT
Start: 2022-12-01

## 2022-12-01 RX ORDER — SODIUM CHLORIDE 9 MG/ML
250 INJECTION, SOLUTION INTRAVENOUS ONCE
Status: COMPLETED | OUTPATIENT
Start: 2022-12-01 | End: 2022-12-01

## 2022-12-01 RX ORDER — PROCHLORPERAZINE MALEATE 10 MG
5 TABLET ORAL EVERY 6 HOURS PRN
Qty: 60 TABLET | Refills: 3 | Status: SHIPPED | OUTPATIENT
Start: 2022-12-01

## 2022-12-01 RX ADMIN — DOCETAXEL 130 MG: 20 INJECTION, SOLUTION, CONCENTRATE INTRAVENOUS at 12:49

## 2022-12-01 RX ADMIN — PALONOSETRON 0.25 MG: 0.25 INJECTION, SOLUTION INTRAVENOUS at 12:27

## 2022-12-01 RX ADMIN — Medication 10 ML: at 14:46

## 2022-12-01 RX ADMIN — SODIUM CHLORIDE 250 ML: 9 INJECTION, SOLUTION INTRAVENOUS at 12:26

## 2022-12-01 RX ADMIN — CYCLOPHOSPHAMIDE 1020 MG: 200 INJECTION, SOLUTION INTRAVENOUS at 14:06

## 2022-12-01 RX ADMIN — HEPARIN 500 UNITS: 100 SYRINGE at 14:46

## 2022-12-01 NOTE — PROGRESS NOTES
Hematology and Oncology [place]  Office number [phone]    Fax number [phone]     Follow up     Date: 22      Patient Name: Debra Beckman  MRN: [id number]  : 1970    Referring Physician: Dr. Goran Russell    Chief Complaint: follow up/treatment    Cancer Staging:   Cancer Staging  Stage IA (cT1b, cN0, cM0, G1, ER+, NV+, HER2-      History of Present Illness: Debra Beckman is a pleasant 52 y.o. female who presents today for evaluation of left breast cancer.  She is accompanied by her supportive father who is a retired [employer] neuro[employer]geon, Dr. Beckman.    Screening mammogram 2022 demonstrated an asymmetry in the anterior left medial breast.  Diagnostic mammogram on 2022 demonstrated a persistent ill-defined asymmetry with architectural distortion in the left 9:00 periareolar region.  On ultrasound, this corresponded to an 8 mm mass in the 9:00 left breast.    Left breast 9:00 biopsy on 8/10/2022 demonstrated grade 1 invasive ductal carcinoma with associated DCIS (%, NV 60%, HER2/kathleen negative, 0+).    She proceeded with bilateral breast MRI on 8/15/2022.  This demonstrated a 1.4 cm 9:00 left breast enhancing mass, with a 1.3 cm area of clumped non-mass enhancement versus postbiopsy change 1 cm anterior lateral to this.  There is additionally a dominant focus of enhancement suspicious for a satellite lesion spanning 0.4 cm.  She underwent second look ultrasound and biopsy of the second lesion which corresponded to a subcentimeteric mass on ultrasound.    Left breast 9:00 biopsy on 2022 showed grade 2 invasive ductal carcinoma (ER greater than 90%, NV greater than 80%, HER2/kathleen negative, 0+)    Left breast lumpectomy, sentinel lymph node biopsy on 2022 demonstrated grade 2 invasive ductal carcinoma spanning 1.4 cm with associated DCIS.  Margin positive.  [place] lymph node positive () with a macrometastasis spanning 4 mm with extracapsular  extension.    Breast cancer risk profile:  G3, P2; Age of first live birth 37  Premenopausal, LMP was 2022.  She discontinued OCPs at the time of her cancer diagnosis.  Family history of breast, ovarian, prostate or pancreatic cancer: Maternal aunt with breast cancer.  Genetics: Sayda cancer next panel was negative in .    Treatment history:  TC x 4-6 cycles    Interval history:  She is here with her supportive dad for consideration of cycle 3.  She had a severe headache that was throbbing and left-sided behind her left eye and woke her from sleep approximately twice during the first week of chemotherapy.  She recalls a similar headache in the past after drinking too much alcohol, and thinks that she was experiencing some dehydration.  She does note that she had had ongoing mild nausea that was at a lower level.  She avoided medicating for it because the Zofran caused too much constipation, but reports she was not getting enough fluids on those days.  She thinks this contributed to her headache.  She denies any current headache.  She had mild mucositis for which she did not require Magic mouthwash.  Epistaxis has mildly improved since using cold-mist humidifier.  She denies any substantial neuropathy symptoms.No fever or infectious symptoms.  Sores on tongue, epistaxis  Met with cancer psychology yesterday.    Past Medical History:   Past Medical History:   Diagnosis Date   • Allergic    • Asthma    • Low back pain    • Malignant neoplasm of central portion of left breast in female, estrogen receptor positive (HCC) 2022       Past Surgical History:   Past Surgical History:   Procedure Laterality Date   • BREAST BIOPSY  08/10/2022   • BREAST BIOPSY Left 2022   • BREAST LUMPECTOMY WITH AXILLARY NODE DISSECTION Left 2022    clean dread   • BREAST LUMPECTOMY WITH SENTINEL NODE BIOPSY Left 2022   •  SECTION     • VENOUS ACCESS DEVICE (PORT) INSERTION Right 2022       Family  History:   Family History   Problem Relation Age of Onset   • Hypertension Mother    • No Known Problems Father    • Breast cancer Maternal Aunt    • Ovarian cancer Neg Hx        Social History:   Social History     Socioeconomic History   • Marital status: Single   Tobacco Use   • Smoking status: Former   • Smokeless tobacco: Never   Vaping Use   • Vaping Use: Former   Substance and Sexual Activity   • Alcohol use: Yes     Comment: occasional   • Drug use: No   • Sexual activity: Defer   Works as a .  She is running for [employer] in November.    Medications:     Current Outpatient Medications:   •  venlafaxine XR (EFFEXOR-XR) 75 MG 24 hr capsule, Take 1 capsule by mouth Daily., Disp: 90 capsule, Rfl: 1  •  albuterol sulfate  (90 Base) MCG/ACT inhaler, Inhale 2 puffs Every 4 (Four) Hours As Needed for Wheezing., Disp: 18 g, Rfl: 11  •  celecoxib (CeleBREX) 200 MG capsule, Take 1 capsule by mouth Daily. (Patient taking differently: Take 200 mg by mouth As Needed.), Disp: 30 capsule, Rfl: 11  •  dexamethasone (DECADRON) 4 MG tablet, Take 2 tablets oral twice a day for 3 consecutive days beginning the day before chemotherapy and continue for 6 doses., Disp: 12 tablet, Rfl: 3  •  lidocaine-prilocaine (EMLA) 2.5-2.5 % cream, Apply 1 application topically to the appropriate area as directed As Needed (45-60 minutes prior to port access.  Cover with saran/plastic wrap.)., Disp: 30 g, Rfl: 3  •  nystatin susp + lidocaine viscous (MAGIC MOUTHWASH) oral suspension, 5-10 ml swish and spit or swallow QID prn  Indications: Please file. Patient will call when ready to fill, Disp: 240 mL, Rfl: 3  •  ondansetron (ZOFRAN) 8 MG tablet, Take 1 tablet by mouth 3 (Three) Times a Day As Needed for Nausea or Vomiting., Disp: 30 tablet, Rfl: 5  •  Symbicort 160-4.5 MCG/ACT inhaler, INHALE 2 PUFFS BY MOUTH TWICE DAILY, Disp: 10.2 g, Rfl: 2  •  traMADol (ULTRAM) 50 MG tablet, Take 1-2 tablets by mouth Every 6 (Six) Hours  "As Needed (pain)., Disp: 30 tablet, Rfl: 0  •  vitamin B-12 (CYANOCOBALAMIN) 1000 MCG tablet, Take 1,000 mcg by mouth Daily., Disp: , Rfl:   •  vitamin B-6 (PYRIDOXINE) 50 MG tablet, Take 50 mg by mouth Daily., Disp: , Rfl:   •  Vitamin D, Cholecalciferol, (CHOLECALCIFEROL) 10 MCG (400 UNIT) tablet, Take 400 Units by mouth Daily., Disp: , Rfl:     Current Facility-Administered Medications:   •  lidocaine (XYLOCAINE) 1 % injection 10 mL, 10 mL, Infiltration, Once, Connie Marie MD  •  lidocaine (XYLOCAINE) 1 % injection 5 mL, 5 mL, Infiltration, Once, Connie Marie MD    Allergies:   No Known Allergies    Objective     Vital Signs:   Vitals:    12/01/22 1023   BP: 105/72   Pulse: 94   Resp: 16   Temp: 97.5 °F (36.4 °C)   TempSrc: Temporal   SpO2: 95%   Weight: 67.6 kg (149 lb)   Height: 167.6 cm (65.98\")   PainSc: 0-No pain    Body mass index is 24.06 kg/m².   Pain Score    12/01/22 1023   PainSc: 0-No pain       ECOG Performance Status: 0    Physical Exam:  General: No acute distress. Well appearing   HEENT: Normocephalic, atraumatic. Sclera anicteric.  OP no visible mucositis.  Neck: supple, no adenopathy.   Cardiovascular: regular rate and rhythm,. No murmurs.   Respiratory: Normal rate. Clear to auscultation bilaterally  Abdomen: Soft, nontender, non distended with normoactive bowel sounds  Lymph: no cervical, supraclavicular or axillary adenopathy  Neuro: Alert and oriented x 3. No focal deficits.   Ext: Symmetric, no swelling.   Psych: Euthymic  Breast: incisions well healed. Small suture granuloma      Laboratory/Imaging Reviewed:   Hospital Outpatient Visit on 11/30/2022   Component Date Value Ref Range Status   • Glucose 11/30/2022 112 (H)  65 - 99 mg/dL Final   • BUN 11/30/2022 18  6 - 20 mg/dL Final   • Creatinine 11/30/2022 0.57  0.57 - 1.00 mg/dL Final   • Sodium 11/30/2022 138  136 - 145 mmol/L Final   • Potassium 11/30/2022 4.4  3.5 - 5.2 mmol/L Final   • Chloride 11/30/2022 102  98 - 107 mmol/L Final "   • CO2 11/30/2022 26.0  22.0 - 29.0 mmol/L Final   • Calcium 11/30/2022 9.5  8.6 - 10.5 mg/dL Final   • Total Protein 11/30/2022 6.7  6.0 - 8.5 g/dL Final   • Albumin 11/30/2022 4.00  3.50 - 5.20 g/dL Final   • ALT (SGPT) 11/30/2022 50 (H)  1 - 33 U/L Final   • AST (SGOT) 11/30/2022 36 (H)  1 - 32 U/L Final   • Alkaline Phosphatase 11/30/2022 78  39 - 117 U/L Final   • Total Bilirubin 11/30/2022 0.4  0.0 - 1.2 mg/dL Final   • Globulin 11/30/2022 2.7  gm/dL Final    Calculated Result   • A/G Ratio 11/30/2022 1.5  g/dL Final   • BUN/Creatinine Ratio 11/30/2022 31.6 (H)  7.0 - 25.0 Final   • Anion Gap 11/30/2022 10.0  5.0 - 15.0 mmol/L Final   • eGFR 11/30/2022 110.9  >60.0 mL/min/1.73 Final    National Kidney Foundation and American Society of Nephrology (ASN) Task Force recommended calculation based on the Chronic Kidney Disease Epidemiology Collaboration (CKD-EPI) equation refit without adjustment for race.   • WBC 11/30/2022 6.25  3.40 - 10.80 10*3/mm3 Final   • RBC 11/30/2022 3.60 (L)  3.77 - 5.28 10*6/mm3 Final   • Hemoglobin 11/30/2022 12.1  12.0 - 15.9 g/dL Final   • Hematocrit 11/30/2022 35.1  34.0 - 46.6 % Final   • MCV 11/30/2022 97.5 (H)  79.0 - 97.0 fL Final   • MCH 11/30/2022 33.6 (H)  26.6 - 33.0 pg Final   • MCHC 11/30/2022 34.5  31.5 - 35.7 g/dL Final   • RDW 11/30/2022 14.6  12.3 - 15.4 % Final   • RDW-SD 11/30/2022 51.6  37.0 - 54.0 fl Final   • MPV 11/30/2022 9.0  6.0 - 12.0 fL Final   • Platelets 11/30/2022 250  140 - 450 10*3/mm3 Final   • Neutrophil % 11/30/2022 91.2 (H)  42.7 - 76.0 % Final   • Lymphocyte % 11/30/2022 7.5 (L)  19.6 - 45.3 % Final   • Monocyte % 11/30/2022 0.8 (L)  5.0 - 12.0 % Final   • Eosinophil % 11/30/2022 0.0 (L)  0.3 - 6.2 % Final   • Basophil % 11/30/2022 0.3  0.0 - 1.5 % Final   • Immature Grans % 11/30/2022 0.2  0.0 - 0.5 % Final   • Neutrophils, Absolute 11/30/2022 5.70  1.70 - 7.00 10*3/mm3 Final   • Lymphocytes, Absolute 11/30/2022 0.47 (L)  0.70 - 3.10 10*3/mm3  Final   • Monocytes, Absolute 11/30/2022 0.05 (L)  0.10 - 0.90 10*3/mm3 Final   • Eosinophils, Absolute 11/30/2022 0.00  0.00 - 0.40 10*3/mm3 Final   • Basophils, Absolute 11/30/2022 0.02  0.00 - 0.20 10*3/mm3 Final   • Immature Grans, Absolute 11/30/2022 0.01  0.00 - 0.05 10*3/mm3 Final         Assessment / Plan        1. Malignant neoplasm of central portion of left breast in female, estrogen receptor positive  -She has an early stage hormone sensitive breast cancer with a single positive lymph node.  She has undergone curative intent surgery, and is planning to undergo reexcision for a positive margin.  She has been evaluated for consideration of adjuvant radiation.   -We discussed the potential role of adjuvant systemic therapy.  Historically, premenopausal lymph node positive breast cancer has been treated with adjuvant chemotherapy in addition to endocrine therapy. More recently, genomic testing has been shown to help to stratify the potential added benefit for an individual patient and identify patients who are less likely to derive significant additional benefit from chemotherapy.  For patients with N1 disease and age > 50, and low risk genomic signature with either Oncotype or MammaPrint has been shown to predict lack of benefit for adjuvant chemotherapy.  However, for patients less than 50, there is a minimum 5% absolute benefit in the lymph node positive setting from the addition of adjuvant chemotherapy even with a low risk genomic signature.  The patient is age 52, but premenopausal.  We discussed that this is a gray zone.  It is postulated that the potential benefit of adjuvant chemotherapy in premenopausal women may be at least in part explained by the effective ovarian suppression from chemotherapy.  We discussed the potential role of genomic testing to guide chemotherapy decision.  However the patient is healthy and wishes to be aggressive with her cancer treatment.  She declines genomic testing and  wishes to proceed with adjuvant chemotherapy. We discussed standard treatment options of 1) dose dense Adriamycin and Cytoxan followed by weekly Taxol or 2) Taxotere and Cytoxan. We discussed differences in schedule and toxicities. We specifically discussed an increased risk for cardiotoxicity and late bone marrow disorders with the addition of adriamycin.  Given strongly ER positive disease and N1 I would favor adjuvant TC over dose dense AC followed by weekly taxol. We discussed the goals of chemotherapy being cure.  We reviewed the chemotherapy schedule and its side effects including but not limited to alopecia, myelosuppression, infection, fevers, nausea, vomiting, diarrhea, mucositis, dehydration, fatigue, heart disease, neuropathy, and late cancers.  Informed consent was obtained, and the patient elected to proceed.   -We reviewed recommendation for 4 to 6 cycles of TC in LN positive setting, where as in the LN negative setting, 4 cycles are considered adequate. She is tolerating therapy well with no neuropathy and would like to proceed with 6 cycles.   -She will benefit from adjuvant endocrine therapy.  In the context of lymph node positive disease, would prefer ovarian suppression or BSO plus AI if she does not experience chemotherapy induced menopause.  -Ki-67 20% suggesting benefit from adjuvant Verzenio.  -Tolerating therapy well.   -I reviewed her labs which show a normal white blood cell count, hemoglobin and platelet in an acceptable range for treatment with normal creatinine and mild stable liver function.  Labs are adequate for treatment today.  Continue to monitor for adverse side effects of therapy.  Continue to monitor CBC and CMP with each cycle.  -ROGERIO on exam today.    2.  Bone pain secondary to Neulasta  -Continue Claritin.  Okay to schedule Tylenol and ibuprofen.   -Scheduling tramadol helped.  Well controlled    3. Chemotherapy induced nausea..  -Change PRN zofran to PRN compazine  -Add IVF  day 2 and 5    4.  Mucositis  -Encouraged frequent rinsing.  -MMW available but has not needed.     5. Mild epistaxis/Rhinorrhea  -Trial of saline mist spray and cool mist humidifier at night.  -Better.  -Platelet count is adequate today.      Follow Up:   3 weeks    Rama Cano MD  Hematology and Oncology       Time spent on the day of service was 45 min inclusive of time before, during, and after office visit on record review, medically appropriate history and physical, counseling patient, ordering treatments, documenting in the medical record.

## 2022-12-02 ENCOUNTER — HOSPITAL ENCOUNTER (OUTPATIENT)
Dept: ONCOLOGY | Facility: HOSPITAL | Age: 52
Setting detail: INFUSION SERIES
Discharge: HOME | End: 2022-12-02
Payer: COMMERCIAL

## 2022-12-02 VITALS
BODY MASS INDEX: 23.78 KG/M2 | TEMPERATURE: 97.7 F | HEART RATE: 101 BPM | HEIGHT: 66 IN | RESPIRATION RATE: 16 BRPM | DIASTOLIC BLOOD PRESSURE: 73 MMHG | SYSTOLIC BLOOD PRESSURE: 124 MMHG | WEIGHT: 148 LBS

## 2022-12-02 DIAGNOSIS — C50.112 MALIGNANT NEOPLASM OF CENTRAL PORTION OF LEFT BREAST IN FEMALE, ESTROGEN RECEPTOR POSITIVE: Primary | ICD-10-CM

## 2022-12-02 DIAGNOSIS — Z45.2 ENCOUNTER FOR CARE RELATED TO PORT-A-CATH: ICD-10-CM

## 2022-12-02 DIAGNOSIS — Z17.0 MALIGNANT NEOPLASM OF CENTRAL PORTION OF LEFT BREAST IN FEMALE, ESTROGEN RECEPTOR POSITIVE: Primary | ICD-10-CM

## 2022-12-02 PROCEDURE — 96372 THER/PROPH/DIAG INJ SC/IM: CPT

## 2022-12-02 PROCEDURE — 25010000002 PEGFILGRASTIM-CBQV 6 MG/0.6ML SOLUTION PREFILLED SYRINGE: Performed by: INTERNAL MEDICINE

## 2022-12-02 PROCEDURE — 96360 HYDRATION IV INFUSION INIT: CPT

## 2022-12-02 PROCEDURE — 25010000002 HEPARIN LOCK FLUSH PER 10 UNITS: Performed by: INTERNAL MEDICINE

## 2022-12-02 RX ORDER — HEPARIN SODIUM (PORCINE) LOCK FLUSH IV SOLN 100 UNIT/ML 100 UNIT/ML
500 SOLUTION INTRAVENOUS AS NEEDED
Status: DISCONTINUED | OUTPATIENT
Start: 2022-12-02 | End: 2022-12-03 | Stop reason: HOSPADM

## 2022-12-02 RX ORDER — HEPARIN SODIUM (PORCINE) LOCK FLUSH IV SOLN 100 UNIT/ML 100 UNIT/ML
500 SOLUTION INTRAVENOUS AS NEEDED
Status: CANCELLED | OUTPATIENT
Start: 2022-12-02

## 2022-12-02 RX ORDER — SODIUM CHLORIDE 0.9 % (FLUSH) 0.9 %
10 SYRINGE (ML) INJECTION AS NEEDED
Status: CANCELLED | OUTPATIENT
Start: 2022-12-02

## 2022-12-02 RX ORDER — SODIUM CHLORIDE 9 MG/ML
1000 INJECTION, SOLUTION INTRAVENOUS ONCE
Status: COMPLETED | OUTPATIENT
Start: 2022-12-02 | End: 2022-12-02

## 2022-12-02 RX ORDER — SODIUM CHLORIDE 9 MG/ML
1000 INJECTION, SOLUTION INTRAVENOUS ONCE
Status: CANCELLED
Start: 2022-12-02 | End: 2022-12-02

## 2022-12-02 RX ADMIN — SODIUM CHLORIDE 1000 ML: 9 INJECTION, SOLUTION INTRAVENOUS at 13:45

## 2022-12-02 RX ADMIN — PEGFILGRASTIM-CBQV 6 MG: 6 INJECTION, SOLUTION SUBCUTANEOUS at 14:48

## 2022-12-02 RX ADMIN — HEPARIN 500 UNITS: 100 SYRINGE at 14:48

## 2022-12-06 ENCOUNTER — HOSPITAL ENCOUNTER (OUTPATIENT)
Dept: ONCOLOGY | Facility: HOSPITAL | Age: 52
Setting detail: INFUSION SERIES
Discharge: HOME | End: 2022-12-06
Payer: COMMERCIAL

## 2022-12-06 VITALS
TEMPERATURE: 97.3 F | DIASTOLIC BLOOD PRESSURE: 65 MMHG | BODY MASS INDEX: 23.46 KG/M2 | HEART RATE: 94 BPM | HEIGHT: 66 IN | WEIGHT: 146 LBS | SYSTOLIC BLOOD PRESSURE: 100 MMHG

## 2022-12-06 DIAGNOSIS — Z45.2 ENCOUNTER FOR CARE RELATED TO PORT-A-CATH: ICD-10-CM

## 2022-12-06 DIAGNOSIS — Z17.0 MALIGNANT NEOPLASM OF CENTRAL PORTION OF LEFT BREAST IN FEMALE, ESTROGEN RECEPTOR POSITIVE: Primary | ICD-10-CM

## 2022-12-06 DIAGNOSIS — C50.112 MALIGNANT NEOPLASM OF CENTRAL PORTION OF LEFT BREAST IN FEMALE, ESTROGEN RECEPTOR POSITIVE: Primary | ICD-10-CM

## 2022-12-06 PROCEDURE — 25010000002 HEPARIN LOCK FLUSH PER 10 UNITS: Performed by: INTERNAL MEDICINE

## 2022-12-06 PROCEDURE — 96360 HYDRATION IV INFUSION INIT: CPT

## 2022-12-06 RX ORDER — SODIUM CHLORIDE 9 MG/ML
250 INJECTION, SOLUTION INTRAVENOUS ONCE
Status: CANCELLED | OUTPATIENT
Start: 2022-12-22

## 2022-12-06 RX ORDER — HEPARIN SODIUM (PORCINE) LOCK FLUSH IV SOLN 100 UNIT/ML 100 UNIT/ML
500 SOLUTION INTRAVENOUS AS NEEDED
Status: CANCELLED | OUTPATIENT
Start: 2022-12-06

## 2022-12-06 RX ORDER — HEPARIN SODIUM (PORCINE) LOCK FLUSH IV SOLN 100 UNIT/ML 100 UNIT/ML
500 SOLUTION INTRAVENOUS AS NEEDED
Status: DISCONTINUED | OUTPATIENT
Start: 2022-12-06 | End: 2022-12-07 | Stop reason: HOSPADM

## 2022-12-06 RX ORDER — SODIUM CHLORIDE 0.9 % (FLUSH) 0.9 %
10 SYRINGE (ML) INJECTION AS NEEDED
Status: CANCELLED | OUTPATIENT
Start: 2022-12-06

## 2022-12-06 RX ORDER — PALONOSETRON 0.05 MG/ML
0.25 INJECTION, SOLUTION INTRAVENOUS ONCE
Status: CANCELLED | OUTPATIENT
Start: 2022-12-22

## 2022-12-06 RX ORDER — SODIUM CHLORIDE 9 MG/ML
1000 INJECTION, SOLUTION INTRAVENOUS ONCE
Status: CANCELLED
Start: 2022-12-06 | End: 2022-12-06

## 2022-12-06 RX ORDER — SODIUM CHLORIDE 9 MG/ML
1000 INJECTION, SOLUTION INTRAVENOUS ONCE
Status: CANCELLED
Start: 2022-12-23 | End: 2022-12-23

## 2022-12-06 RX ORDER — DIPHENHYDRAMINE HYDROCHLORIDE 50 MG/ML
50 INJECTION INTRAMUSCULAR; INTRAVENOUS AS NEEDED
Status: CANCELLED | OUTPATIENT
Start: 2022-12-22

## 2022-12-06 RX ORDER — SODIUM CHLORIDE 9 MG/ML
1000 INJECTION, SOLUTION INTRAVENOUS ONCE
Status: COMPLETED | OUTPATIENT
Start: 2022-12-06 | End: 2022-12-06

## 2022-12-06 RX ORDER — FAMOTIDINE 10 MG/ML
20 INJECTION, SOLUTION INTRAVENOUS AS NEEDED
Status: CANCELLED | OUTPATIENT
Start: 2022-12-22

## 2022-12-06 RX ADMIN — SODIUM CHLORIDE 1000 ML: 9 INJECTION, SOLUTION INTRAVENOUS at 15:16

## 2022-12-06 RX ADMIN — HEPARIN 500 UNITS: 100 SYRINGE at 16:20

## 2022-12-21 ENCOUNTER — HOSPITAL ENCOUNTER (OUTPATIENT)
Dept: ONCOLOGY | Facility: HOSPITAL | Age: 52
Setting detail: INFUSION SERIES
Discharge: HOME | End: 2022-12-21
Payer: COMMERCIAL

## 2022-12-21 VITALS
TEMPERATURE: 98.3 F | SYSTOLIC BLOOD PRESSURE: 129 MMHG | BODY MASS INDEX: 24.2 KG/M2 | RESPIRATION RATE: 18 BRPM | WEIGHT: 150.6 LBS | HEIGHT: 66 IN | DIASTOLIC BLOOD PRESSURE: 67 MMHG | HEART RATE: 83 BPM

## 2022-12-21 DIAGNOSIS — Z45.2 ENCOUNTER FOR CARE RELATED TO PORT-A-CATH: ICD-10-CM

## 2022-12-21 DIAGNOSIS — Z17.0 MALIGNANT NEOPLASM OF CENTRAL PORTION OF LEFT BREAST IN FEMALE, ESTROGEN RECEPTOR POSITIVE: Primary | ICD-10-CM

## 2022-12-21 DIAGNOSIS — C50.112 MALIGNANT NEOPLASM OF CENTRAL PORTION OF LEFT BREAST IN FEMALE, ESTROGEN RECEPTOR POSITIVE: Primary | ICD-10-CM

## 2022-12-21 LAB
ALBUMIN SERPL-MCNC: 4.4 G/DL (ref 3.5–5.2)
ALBUMIN/GLOB SERPL: 1.7 G/DL
ALP SERPL-CCNC: 71 U/L (ref 39–117)
ALT SERPL W P-5'-P-CCNC: 34 U/L (ref 1–33)
ANION GAP SERPL CALCULATED.3IONS-SCNC: 8 MMOL/L (ref 5–15)
AST SERPL-CCNC: 30 U/L (ref 1–32)
BASOPHILS # BLD AUTO: 0.02 10*3/MM3 (ref 0–0.2)
BASOPHILS NFR BLD AUTO: 0.3 % (ref 0–1.5)
BILIRUB SERPL-MCNC: 0.4 MG/DL (ref 0–1.2)
BUN SERPL-MCNC: 10 MG/DL (ref 6–20)
BUN/CREAT SERPL: 19.6 (ref 7–25)
CALCIUM SPEC-SCNC: 9.7 MG/DL (ref 8.6–10.5)
CHLORIDE SERPL-SCNC: 103 MMOL/L (ref 98–107)
CO2 SERPL-SCNC: 27 MMOL/L (ref 22–29)
CREAT SERPL-MCNC: 0.51 MG/DL (ref 0.57–1)
DEPRECATED RDW RBC AUTO: 58.7 FL (ref 37–54)
EGFRCR SERPLBLD CKD-EPI 2021: 113.9 ML/MIN/1.73
EOSINOPHIL # BLD AUTO: 0 10*3/MM3 (ref 0–0.4)
EOSINOPHIL NFR BLD AUTO: 0 % (ref 0.3–6.2)
ERYTHROCYTE [DISTWIDTH] IN BLOOD BY AUTOMATED COUNT: 15.7 % (ref 12.3–15.4)
GLOBULIN UR ELPH-MCNC: 2.6 GM/DL
GLUCOSE SERPL-MCNC: 124 MG/DL (ref 65–99)
HCT VFR BLD AUTO: 37.3 % (ref 34–46.6)
HGB BLD-MCNC: 12.4 G/DL (ref 12–15.9)
IMM GRANULOCYTES # BLD AUTO: 0.01 10*3/MM3 (ref 0–0.05)
IMM GRANULOCYTES NFR BLD AUTO: 0.1 % (ref 0–0.5)
LYMPHOCYTES # BLD AUTO: 0.38 10*3/MM3 (ref 0.7–3.1)
LYMPHOCYTES NFR BLD AUTO: 5.7 % (ref 19.6–45.3)
MCH RBC QN AUTO: 33.6 PG (ref 26.6–33)
MCHC RBC AUTO-ENTMCNC: 33.2 G/DL (ref 31.5–35.7)
MCV RBC AUTO: 101.1 FL (ref 79–97)
MONOCYTES # BLD AUTO: 0.05 10*3/MM3 (ref 0.1–0.9)
MONOCYTES NFR BLD AUTO: 0.7 % (ref 5–12)
NEUTROPHILS NFR BLD AUTO: 6.21 10*3/MM3 (ref 1.7–7)
NEUTROPHILS NFR BLD AUTO: 93.2 % (ref 42.7–76)
PLATELET # BLD AUTO: 273 10*3/MM3 (ref 140–450)
PMV BLD AUTO: 9 FL (ref 6–12)
POTASSIUM SERPL-SCNC: 4 MMOL/L (ref 3.5–5.2)
PROT SERPL-MCNC: 7 G/DL (ref 6–8.5)
RBC # BLD AUTO: 3.69 10*6/MM3 (ref 3.77–5.28)
SODIUM SERPL-SCNC: 138 MMOL/L (ref 136–145)
WBC NRBC COR # BLD: 6.67 10*3/MM3 (ref 3.4–10.8)

## 2022-12-21 PROCEDURE — 83735 ASSAY OF MAGNESIUM: CPT | Performed by: INTERNAL MEDICINE

## 2022-12-21 PROCEDURE — 25010000002 HEPARIN LOCK FLUSH PER 10 UNITS: Performed by: INTERNAL MEDICINE

## 2022-12-21 PROCEDURE — 36591 DRAW BLOOD OFF VENOUS DEVICE: CPT

## 2022-12-21 PROCEDURE — 80053 COMPREHEN METABOLIC PANEL: CPT | Performed by: INTERNAL MEDICINE

## 2022-12-21 PROCEDURE — 85025 COMPLETE CBC W/AUTO DIFF WBC: CPT | Performed by: INTERNAL MEDICINE

## 2022-12-21 RX ORDER — HEPARIN SODIUM (PORCINE) LOCK FLUSH IV SOLN 100 UNIT/ML 100 UNIT/ML
500 SOLUTION INTRAVENOUS AS NEEDED
Status: DISCONTINUED | OUTPATIENT
Start: 2022-12-21 | End: 2022-12-22 | Stop reason: HOSPADM

## 2022-12-21 RX ORDER — HEPARIN SODIUM (PORCINE) LOCK FLUSH IV SOLN 100 UNIT/ML 100 UNIT/ML
500 SOLUTION INTRAVENOUS AS NEEDED
Status: CANCELLED | OUTPATIENT
Start: 2022-12-21

## 2022-12-21 RX ORDER — SODIUM CHLORIDE 0.9 % (FLUSH) 0.9 %
10 SYRINGE (ML) INJECTION AS NEEDED
Status: CANCELLED | OUTPATIENT
Start: 2022-12-21

## 2022-12-21 RX ORDER — SODIUM CHLORIDE 0.9 % (FLUSH) 0.9 %
10 SYRINGE (ML) INJECTION AS NEEDED
Status: DISCONTINUED | OUTPATIENT
Start: 2022-12-21 | End: 2022-12-22 | Stop reason: HOSPADM

## 2022-12-21 RX ADMIN — HEPARIN SODIUM (PORCINE) LOCK FLUSH IV SOLN 100 UNIT/ML 500 UNITS: 100 SOLUTION at 14:23

## 2022-12-21 RX ADMIN — Medication 10 ML: at 14:23

## 2022-12-22 ENCOUNTER — HOSPITAL ENCOUNTER (OUTPATIENT)
Dept: ONCOLOGY | Facility: HOSPITAL | Age: 52
Setting detail: INFUSION SERIES
Discharge: HOME | End: 2022-12-22
Payer: COMMERCIAL

## 2022-12-22 ENCOUNTER — OFFICE VISIT (OUTPATIENT)
Dept: ONCOLOGY | Facility: CLINIC | Age: 52
End: 2022-12-22

## 2022-12-22 VITALS
BODY MASS INDEX: 24.72 KG/M2 | TEMPERATURE: 98.2 F | SYSTOLIC BLOOD PRESSURE: 118 MMHG | HEART RATE: 84 BPM | WEIGHT: 153.8 LBS | OXYGEN SATURATION: 98 % | DIASTOLIC BLOOD PRESSURE: 78 MMHG | RESPIRATION RATE: 20 BRPM | HEIGHT: 66 IN

## 2022-12-22 DIAGNOSIS — C50.112 MALIGNANT NEOPLASM OF CENTRAL PORTION OF LEFT BREAST IN FEMALE, ESTROGEN RECEPTOR POSITIVE: ICD-10-CM

## 2022-12-22 DIAGNOSIS — Z17.0 MALIGNANT NEOPLASM OF CENTRAL PORTION OF LEFT BREAST IN FEMALE, ESTROGEN RECEPTOR POSITIVE: Primary | ICD-10-CM

## 2022-12-22 DIAGNOSIS — Z17.0 MALIGNANT NEOPLASM OF CENTRAL PORTION OF LEFT BREAST IN FEMALE, ESTROGEN RECEPTOR POSITIVE: ICD-10-CM

## 2022-12-22 DIAGNOSIS — Z45.2 ENCOUNTER FOR CARE RELATED TO PORT-A-CATH: Primary | ICD-10-CM

## 2022-12-22 DIAGNOSIS — C50.112 MALIGNANT NEOPLASM OF CENTRAL PORTION OF LEFT BREAST IN FEMALE, ESTROGEN RECEPTOR POSITIVE: Primary | ICD-10-CM

## 2022-12-22 LAB — MAGNESIUM SERPL-MCNC: 2.3 MG/DL (ref 1.6–2.6)

## 2022-12-22 PROCEDURE — 99215 OFFICE O/P EST HI 40 MIN: CPT | Performed by: INTERNAL MEDICINE

## 2022-12-22 PROCEDURE — 96375 TX/PRO/DX INJ NEW DRUG ADDON: CPT

## 2022-12-22 PROCEDURE — 25010000002 PALONOSETRON PER 25 MCG: Performed by: INTERNAL MEDICINE

## 2022-12-22 PROCEDURE — 96413 CHEMO IV INFUSION 1 HR: CPT

## 2022-12-22 PROCEDURE — 25010000002 DOCETAXEL 20 MG/ML SOLUTION 8 ML VIAL: Performed by: INTERNAL MEDICINE

## 2022-12-22 PROCEDURE — 25010000002 HEPARIN LOCK FLUSH PER 10 UNITS: Performed by: INTERNAL MEDICINE

## 2022-12-22 PROCEDURE — 96417 CHEMO IV INFUS EACH ADDL SEQ: CPT

## 2022-12-22 PROCEDURE — 25010000002 CYCLOPHOSPHAMIDE 1 GM/5ML SOLUTION 5 ML VIAL: Performed by: INTERNAL MEDICINE

## 2022-12-22 RX ORDER — PALONOSETRON 0.05 MG/ML
0.25 INJECTION, SOLUTION INTRAVENOUS ONCE
Status: COMPLETED | OUTPATIENT
Start: 2022-12-22 | End: 2022-12-22

## 2022-12-22 RX ORDER — SODIUM CHLORIDE 0.9 % (FLUSH) 0.9 %
10 SYRINGE (ML) INJECTION AS NEEDED
Status: DISCONTINUED | OUTPATIENT
Start: 2022-12-22 | End: 2022-12-23 | Stop reason: HOSPADM

## 2022-12-22 RX ORDER — SODIUM CHLORIDE 0.9 % (FLUSH) 0.9 %
10 SYRINGE (ML) INJECTION AS NEEDED
Status: CANCELLED | OUTPATIENT
Start: 2022-12-22

## 2022-12-22 RX ORDER — HEPARIN SODIUM (PORCINE) LOCK FLUSH IV SOLN 100 UNIT/ML 100 UNIT/ML
500 SOLUTION INTRAVENOUS AS NEEDED
Status: CANCELLED | OUTPATIENT
Start: 2022-12-22

## 2022-12-22 RX ORDER — HEPARIN SODIUM (PORCINE) LOCK FLUSH IV SOLN 100 UNIT/ML 100 UNIT/ML
500 SOLUTION INTRAVENOUS AS NEEDED
Status: DISCONTINUED | OUTPATIENT
Start: 2022-12-22 | End: 2022-12-23 | Stop reason: HOSPADM

## 2022-12-22 RX ORDER — SODIUM CHLORIDE 9 MG/ML
1000 INJECTION, SOLUTION INTRAVENOUS ONCE
Status: CANCELLED
Start: 2022-12-28 | End: 2022-12-28

## 2022-12-22 RX ORDER — SODIUM CHLORIDE 9 MG/ML
250 INJECTION, SOLUTION INTRAVENOUS ONCE
Status: COMPLETED | OUTPATIENT
Start: 2022-12-22 | End: 2022-12-22

## 2022-12-22 RX ADMIN — PALONOSETRON HYDROCHLORIDE 0.25 MG: 0.25 INJECTION INTRAVENOUS at 11:28

## 2022-12-22 RX ADMIN — HEPARIN 500 UNITS: 100 SYRINGE at 13:44

## 2022-12-22 RX ADMIN — SODIUM CHLORIDE 250 ML: 9 INJECTION, SOLUTION INTRAVENOUS at 11:28

## 2022-12-22 RX ADMIN — DOCETAXEL 130 MG: 20 INJECTION, SOLUTION, CONCENTRATE INTRAVENOUS at 11:56

## 2022-12-22 RX ADMIN — CYCLOPHOSPHAMIDE 1070 MG: 200 INJECTION, SOLUTION INTRAVENOUS at 13:08

## 2022-12-22 RX ADMIN — Medication 10 ML: at 13:44

## 2022-12-22 NOTE — PROGRESS NOTES
[facility] [place]  Office number [phone]    Fax number [phone]     Follow up     Date: 22      Patient Name: Debra Beckman  MRN: [id number]  : 1970    Referring Physician: Dr. Goran Russell    Chief Complaint: follow up/treatment    Cancer Staging:   Cancer Staging  Stage IA (cT1b, cN0, cM0, G1, ER+, HI+, HER2-      History of Present Illness: Debra Beckman is a pleasant 52 y.o. female who presents today for evaluation of left breast cancer.  She is accompanied by her supportive father who is a retired [employer] neurosurgeon, Dr. Beckman.    Screening mammogram 2022 demonstrated an asymmetry in the anterior left medial breast.  Diagnostic mammogram on 2022 demonstrated a persistent ill-defined asymmetry with architectural distortion in the left 9:00 periareolar region.  On ultrasound, this corresponded to an 8 mm mass in the 9:00 left breast.    Left breast 9:00 biopsy on 8/10/2022 demonstrated grade 1 invasive ductal carcinoma with associated DCIS (%, HI 60%, HER2/kathleen negative, 0+).    She proceeded with bilateral breast MRI on 8/15/2022.  This demonstrated a 1.4 cm 9:00 left breast enhancing mass, with a 1.3 cm area of clumped non-mass enhancement versus postbiopsy change 1 cm anterior lateral to this.  There is additionally a dominant focus of enhancement suspicious for a satellite lesion spanning 0.4 cm.  She underwent second look ultrasound and biopsy of the second lesion which corresponded to a subcentimeteric mass on ultrasound.    Left breast 9:00 biopsy on 2022 showed grade 2 invasive ductal carcinoma (ER greater than 90%, HI greater than 80%, HER2/kathleen negative, 0+)    Left breast lumpectomy, sentinel lymph node biopsy on 2022 demonstrated grade 2 invasive ductal carcinoma spanning 1.4 cm with associated DCIS.  Margin positive.  [place] lymph node positive () with a macrometastasis spanning 4 mm with extracapsular  extension.    Breast cancer risk profile:  G3, P2; Age of first live birth 37  Premenopausal, LMP was 2022.  She discontinued OCPs at the time of her cancer diagnosis.  Family history of breast, ovarian, prostate or pancreatic cancer: Maternal aunt with breast cancer.  Genetics: [place] cancer next panel was negative in .    Treatment history:  TC x 6 cycles    Interval history:  She is here with her supportive dad for consideration of cycle 4.    Nausea has been well controlled and feels better on compazine vs zofran. Had one episode of emesis when she waited too long between doses. IVF helped her dehydration symptoms and she wants to continue this.   No numbness. No further headaches or mucositis  Effexor is helping her mood/anxiety.  Hot flashes, mild.     Past Medical History:   Past Medical History:   Diagnosis Date   • Allergic    • Asthma    • Low back pain    • Malignant neoplasm of central portion of left breast in female, estrogen receptor positive (HCC) 2022       Past Surgical History:   Past Surgical History:   Procedure Laterality Date   • BREAST BIOPSY  08/10/2022   • BREAST BIOPSY Left 2022   • BREAST LUMPECTOMY WITH AXILLARY NODE DISSECTION Left 2022    clean dread   • BREAST LUMPECTOMY WITH SENTINEL NODE BIOPSY Left 2022   •  SECTION     • VENOUS ACCESS DEVICE (PORT) INSERTION Right 2022       Family History:   Family History   Problem Relation Age of Onset   • Hypertension Mother    • No Known Problems Father    • Breast cancer Maternal Aunt    • Ovarian cancer Neg Hx        Social History:   Social History     Socioeconomic History   • Marital status: Single   Tobacco Use   • Smoking status: Former   • Smokeless tobacco: Never   Vaping Use   • Vaping Use: Former   Substance and Sexual Activity   • Alcohol use: Yes     Comment: occasional   • Drug use: No   • Sexual activity: Defer   Works as a .      Medications:     Current Outpatient  Medications:   •  venlafaxine XR (EFFEXOR-XR) 75 MG 24 hr capsule, Take 1 capsule by mouth Daily., Disp: 90 capsule, Rfl: 1  •  albuterol sulfate  (90 Base) MCG/ACT inhaler, Inhale 2 puffs Every 4 (Four) Hours As Needed for Wheezing., Disp: 18 g, Rfl: 11  •  celecoxib (CeleBREX) 200 MG capsule, Take 1 capsule by mouth Daily. (Patient taking differently: Take 200 mg by mouth As Needed.), Disp: 30 capsule, Rfl: 11  •  dexamethasone (DECADRON) 4 MG tablet, Take 2 tablets oral twice a day for 3 consecutive days beginning the day before chemotherapy and continue for 6 doses., Disp: 12 tablet, Rfl: 3  •  lidocaine-prilocaine (EMLA) 2.5-2.5 % cream, Apply 1 application topically to the appropriate area as directed As Needed (45-60 minutes prior to port access.  Cover with saran/plastic wrap.)., Disp: 30 g, Rfl: 3  •  nystatin susp + lidocaine viscous (MAGIC MOUTHWASH) oral suspension, 5-10 ml swish and spit or swallow QID prn  Indications: Please file. Patient will call when ready to fill, Disp: 240 mL, Rfl: 3  •  ondansetron (ZOFRAN) 8 MG tablet, Take 1 tablet by mouth 3 (Three) Times a Day As Needed for Nausea or Vomiting., Disp: 30 tablet, Rfl: 5  •  prochlorperazine (COMPAZINE) 10 MG tablet, Take 0.5 tablets by mouth Every 6 (Six) Hours As Needed for Nausea or Vomiting (nausea) for up to 60 doses., Disp: 60 tablet, Rfl: 3  •  Symbicort 160-4.5 MCG/ACT inhaler, INHALE 2 PUFFS BY MOUTH TWICE DAILY, Disp: 10.2 g, Rfl: 2  •  traMADol (ULTRAM) 50 MG tablet, Take 1-2 tablets by mouth Every 6 (Six) Hours As Needed (pain)., Disp: 30 tablet, Rfl: 0  •  vitamin B-12 (CYANOCOBALAMIN) 1000 MCG tablet, Take 1,000 mcg by mouth Daily., Disp: , Rfl:   •  vitamin B-6 (PYRIDOXINE) 50 MG tablet, Take 50 mg by mouth Daily., Disp: , Rfl:   •  Vitamin D, Cholecalciferol, (CHOLECALCIFEROL) 10 MCG (400 UNIT) tablet, Take 400 Units by mouth Daily., Disp: , Rfl:     Current Facility-Administered Medications:   •  lidocaine (XYLOCAINE) 1  "% injection 10 mL, 10 mL, Infiltration, Once, Connie Marie MD  •  lidocaine (XYLOCAINE) 1 % injection 5 mL, 5 mL, Infiltration, Once, Connie Marie MD    Allergies:   No Known Allergies    Objective     Vital Signs:   Vitals:    12/22/22 1008   BP: 118/78   Pulse: 84   Resp: 20   Temp: 98.2 °F (36.8 °C)   SpO2: 98%   Weight: 69.8 kg (153 lb 12.8 oz)   Height: 167.6 cm (65.98\")    Body mass index is 24.84 kg/m².   There were no vitals filed for this visit.    ECOG Performance Status: 0    Physical Exam:  General: No acute distress. Well appearing   HEENT: Normocephalic, atraumatic. Sclera anicteric.  OP no visible mucositis.  Neck: supple, no adenopathy.   Cardiovascular: regular rate and rhythm. No murmurs.   Respiratory: Normal rate. Clear to auscultation bilaterally  Abdomen: Soft, nontender, non distended with normoactive bowel sounds  Lymph: no cervical, supraclavicular or axillary adenopathy  Neuro: Alert and oriented x 3. No focal deficits.   Ext: Symmetric, no swelling.   Psych: Euthymic  Breast: incisions well healed. Small suture granuloma      Laboratory/Imaging Reviewed:   Hospital Outpatient Visit on 12/21/2022   Component Date Value Ref Range Status   • Glucose 12/21/2022 124 (H)  65 - 99 mg/dL Final   • BUN 12/21/2022 10  6 - 20 mg/dL Final   • Creatinine 12/21/2022 0.51 (L)  0.57 - 1.00 mg/dL Final   • Sodium 12/21/2022 138  136 - 145 mmol/L Final   • Potassium 12/21/2022 4.0  3.5 - 5.2 mmol/L Final   • Chloride 12/21/2022 103  98 - 107 mmol/L Final   • CO2 12/21/2022 27.0  22.0 - 29.0 mmol/L Final   • Calcium 12/21/2022 9.7  8.6 - 10.5 mg/dL Final   • Total Protein 12/21/2022 7.0  6.0 - 8.5 g/dL Final   • Albumin 12/21/2022 4.40  3.50 - 5.20 g/dL Final   • ALT (SGPT) 12/21/2022 34 (H)  1 - 33 U/L Final   • AST (SGOT) 12/21/2022 30  1 - 32 U/L Final   • Alkaline Phosphatase 12/21/2022 71  39 - 117 U/L Final   • Total Bilirubin 12/21/2022 0.4  0.0 - 1.2 mg/dL Final   • Globulin 12/21/2022 2.6  gm/dL " Final    Calculated Result   • A/G Ratio 12/21/2022 1.7  g/dL Final   • BUN/Creatinine Ratio 12/21/2022 19.6  7.0 - 25.0 Final   • Anion Gap 12/21/2022 8.0  5.0 - 15.0 mmol/L Final   • eGFR 12/21/2022 113.9  >60.0 mL/min/1.73 Final    National Kidney Foundation and American Society of Nephrology (ASN) Task Force recommended calculation based on the Chronic Kidney Disease Epidemiology Collaboration (CKD-EPI) equation refit without adjustment for race.   • WBC 12/21/2022 6.67  3.40 - 10.80 10*3/mm3 Final   • RBC 12/21/2022 3.69 (L)  3.77 - 5.28 10*6/mm3 Final   • Hemoglobin 12/21/2022 12.4  12.0 - 15.9 g/dL Final   • Hematocrit 12/21/2022 37.3  34.0 - 46.6 % Final   • MCV 12/21/2022 101.1 (H)  79.0 - 97.0 fL Final   • MCH 12/21/2022 33.6 (H)  26.6 - 33.0 pg Final   • MCHC 12/21/2022 33.2  31.5 - 35.7 g/dL Final   • RDW 12/21/2022 15.7 (H)  12.3 - 15.4 % Final   • RDW-SD 12/21/2022 58.7 (H)  37.0 - 54.0 fl Final   • MPV 12/21/2022 9.0  6.0 - 12.0 fL Final   • Platelets 12/21/2022 273  140 - 450 10*3/mm3 Final   • Neutrophil % 12/21/2022 93.2 (H)  42.7 - 76.0 % Final   • Lymphocyte % 12/21/2022 5.7 (L)  19.6 - 45.3 % Final   • Monocyte % 12/21/2022 0.7 (L)  5.0 - 12.0 % Final   • Eosinophil % 12/21/2022 0.0 (L)  0.3 - 6.2 % Final   • Basophil % 12/21/2022 0.3  0.0 - 1.5 % Final   • Immature Grans % 12/21/2022 0.1  0.0 - 0.5 % Final   • Neutrophils, Absolute 12/21/2022 6.21  1.70 - 7.00 10*3/mm3 Final   • Lymphocytes, Absolute 12/21/2022 0.38 (L)  0.70 - 3.10 10*3/mm3 Final   • Monocytes, Absolute 12/21/2022 0.05 (L)  0.10 - 0.90 10*3/mm3 Final   • Eosinophils, Absolute 12/21/2022 0.00  0.00 - 0.40 10*3/mm3 Final   • Basophils, Absolute 12/21/2022 0.02  0.00 - 0.20 10*3/mm3 Final   • Immature Grans, Absolute 12/21/2022 0.01  0.00 - 0.05 10*3/mm3 Final         Assessment / Plan        1. Malignant neoplasm of central portion of left breast in female, estrogen receptor positive  -She has an early stage hormone sensitive  breast cancer with a single positive lymph node.  She has undergone curative intent surgery, and is planning to undergo reexcision for a positive margin.  She has been evaluated for consideration of adjuvant radiation.   -We discussed the potential role of adjuvant systemic therapy.  Historically, premenopausal lymph node positive breast cancer has been treated with adjuvant chemotherapy in addition to endocrine therapy. More recently, genomic testing has been shown to help to stratify the potential added benefit for an individual patient and identify patients who are less likely to derive significant additional benefit from chemotherapy.  For patients with N1 disease and age > 50, and low risk genomic signature with either Oncotype or MammaPrint has been shown to predict lack of benefit for adjuvant chemotherapy.  However, for patients less than 50, there is a minimum 5% absolute benefit in the lymph node positive setting from the addition of adjuvant chemotherapy even with a low risk genomic signature.  The patient is age 52, but premenopausal.  We discussed that this is a gray zone.  It is postulated that the potential benefit of adjuvant chemotherapy in premenopausal women may be at least in part explained by the effective ovarian suppression from chemotherapy.  We discussed the potential role of genomic testing to guide chemotherapy decision.  However the patient is healthy and wishes to be aggressive with her cancer treatment.  She declines genomic testing and wishes to proceed with adjuvant chemotherapy. We discussed standard treatment options of 1) dose dense Adriamycin and Cytoxan followed by weekly Taxol or 2) Taxotere and Cytoxan. We discussed differences in schedule and toxicities. We specifically discussed an increased risk for cardiotoxicity and late bone marrow disorders with the addition of adriamycin.  Given strongly ER positive disease and N1 I would favor adjuvant TC over dose dense AC followed by  weekly taxol. We discussed the goals of chemotherapy being cure.  We reviewed the chemotherapy schedule and its side effects including but not limited to alopecia, myelosuppression, infection, fevers, nausea, vomiting, diarrhea, mucositis, dehydration, fatigue, heart disease, neuropathy, and late cancers.  Informed consent was obtained, and the patient elected to proceed.   -We reviewed recommendation for 4 to 6 cycles of TC in LN positive setting, where as in the LN negative setting, 4 cycles are considered adequate. She is tolerating therapy well with no neuropathy and would like to proceed with 6 cycles. Reviewed this today.   -She will benefit from adjuvant endocrine therapy.  In the context of lymph node positive disease, would prefer ovarian suppression or BSO plus AI if she does not experience chemotherapy induced menopause.  -Ki-67 20% suggesting benefit from adjuvant Verzenio.  -Tolerating therapy well.   -I reviewed her labs which show a normal white blood cell count, hemoglobin and platelet count with normal renal and hepatic function, adequate for today's treatment. Continue to monitor for adverse side effects of therapy.  Continue to monitor CBC and CMP with each cycle.  -ROGERIO on exam today.    2.  Bone pain secondary to Neulasta  -Continue Claritin and tramadol.    -Scheduling tramadol helped.  Well controlled    3. Chemotherapy induced nausea.  -Better since she changed PRN zofran to PRN compazine  -Continue IVF day 2 and 6    4. Anxiety  -Continue Effexor. Better    Follow Up:   3 weeks    Rama Cano MD  Hematology and Oncology       I have spent a total of 40 min on reviewing test results/preparing to see patient, counseling patient, performing medically appropriate exam, ordering chemotherapy and IVF and documenting clinical information in the electronic or other health record

## 2022-12-23 ENCOUNTER — HOSPITAL ENCOUNTER (OUTPATIENT)
Dept: ONCOLOGY | Facility: HOSPITAL | Age: 52
Setting detail: INFUSION SERIES
Discharge: HOME | End: 2022-12-23
Payer: COMMERCIAL

## 2022-12-23 VITALS
HEART RATE: 80 BPM | DIASTOLIC BLOOD PRESSURE: 68 MMHG | SYSTOLIC BLOOD PRESSURE: 108 MMHG | RESPIRATION RATE: 16 BRPM | BODY MASS INDEX: 24.8 KG/M2 | WEIGHT: 153.6 LBS | TEMPERATURE: 97.6 F

## 2022-12-23 DIAGNOSIS — Z17.0 MALIGNANT NEOPLASM OF CENTRAL PORTION OF LEFT BREAST IN FEMALE, ESTROGEN RECEPTOR POSITIVE: Primary | ICD-10-CM

## 2022-12-23 DIAGNOSIS — C50.112 MALIGNANT NEOPLASM OF CENTRAL PORTION OF LEFT BREAST IN FEMALE, ESTROGEN RECEPTOR POSITIVE: Primary | ICD-10-CM

## 2022-12-23 DIAGNOSIS — Z45.2 ENCOUNTER FOR CARE RELATED TO PORT-A-CATH: ICD-10-CM

## 2022-12-23 PROCEDURE — 96372 THER/PROPH/DIAG INJ SC/IM: CPT

## 2022-12-23 PROCEDURE — 96360 HYDRATION IV INFUSION INIT: CPT

## 2022-12-23 PROCEDURE — 25010000002 PEGFILGRASTIM-CBQV 6 MG/0.6ML SOLUTION PREFILLED SYRINGE: Performed by: INTERNAL MEDICINE

## 2022-12-23 PROCEDURE — 25010000002 HEPARIN LOCK FLUSH PER 10 UNITS: Performed by: INTERNAL MEDICINE

## 2022-12-23 RX ORDER — SODIUM CHLORIDE 0.9 % (FLUSH) 0.9 %
10 SYRINGE (ML) INJECTION AS NEEDED
Status: DISCONTINUED | OUTPATIENT
Start: 2022-12-23 | End: 2022-12-24 | Stop reason: HOSPADM

## 2022-12-23 RX ORDER — SODIUM CHLORIDE 0.9 % (FLUSH) 0.9 %
10 SYRINGE (ML) INJECTION AS NEEDED
Status: CANCELLED | OUTPATIENT
Start: 2022-12-23

## 2022-12-23 RX ORDER — HEPARIN SODIUM (PORCINE) LOCK FLUSH IV SOLN 100 UNIT/ML 100 UNIT/ML
500 SOLUTION INTRAVENOUS AS NEEDED
Status: DISCONTINUED | OUTPATIENT
Start: 2022-12-23 | End: 2022-12-24 | Stop reason: HOSPADM

## 2022-12-23 RX ORDER — SODIUM CHLORIDE 9 MG/ML
1000 INJECTION, SOLUTION INTRAVENOUS ONCE
Status: COMPLETED | OUTPATIENT
Start: 2022-12-23 | End: 2022-12-23

## 2022-12-23 RX ORDER — HEPARIN SODIUM (PORCINE) LOCK FLUSH IV SOLN 100 UNIT/ML 100 UNIT/ML
500 SOLUTION INTRAVENOUS AS NEEDED
Status: CANCELLED | OUTPATIENT
Start: 2022-12-23

## 2022-12-23 RX ADMIN — HEPARIN 500 UNITS: 100 SYRINGE at 14:07

## 2022-12-23 RX ADMIN — Medication 10 ML: at 14:07

## 2022-12-23 RX ADMIN — SODIUM CHLORIDE 1000 ML: 9 INJECTION, SOLUTION INTRAVENOUS at 13:01

## 2022-12-23 RX ADMIN — PEGFILGRASTIM-CBQV 6 MG: 6 INJECTION, SOLUTION SUBCUTANEOUS at 13:58

## 2022-12-29 ENCOUNTER — HOSPITAL ENCOUNTER (OUTPATIENT)
Dept: ONCOLOGY | Facility: HOSPITAL | Age: 52
Setting detail: INFUSION SERIES
Discharge: HOME | End: 2022-12-29
Payer: COMMERCIAL

## 2022-12-29 VITALS
SYSTOLIC BLOOD PRESSURE: 130 MMHG | DIASTOLIC BLOOD PRESSURE: 77 MMHG | TEMPERATURE: 97.2 F | WEIGHT: 147 LBS | BODY MASS INDEX: 23.63 KG/M2 | HEART RATE: 82 BPM | RESPIRATION RATE: 16 BRPM | HEIGHT: 66 IN

## 2022-12-29 DIAGNOSIS — Z17.0 MALIGNANT NEOPLASM OF CENTRAL PORTION OF LEFT BREAST IN FEMALE, ESTROGEN RECEPTOR POSITIVE: ICD-10-CM

## 2022-12-29 DIAGNOSIS — C50.112 MALIGNANT NEOPLASM OF CENTRAL PORTION OF LEFT BREAST IN FEMALE, ESTROGEN RECEPTOR POSITIVE: ICD-10-CM

## 2022-12-29 DIAGNOSIS — Z45.2 ENCOUNTER FOR CARE RELATED TO PORT-A-CATH: Primary | ICD-10-CM

## 2022-12-29 PROCEDURE — 96360 HYDRATION IV INFUSION INIT: CPT

## 2022-12-29 PROCEDURE — 25010000002 HEPARIN LOCK FLUSH PER 10 UNITS: Performed by: INTERNAL MEDICINE

## 2022-12-29 RX ORDER — SODIUM CHLORIDE 0.9 % (FLUSH) 0.9 %
10 SYRINGE (ML) INJECTION AS NEEDED
Status: CANCELLED | OUTPATIENT
Start: 2022-12-29

## 2022-12-29 RX ORDER — HEPARIN SODIUM (PORCINE) LOCK FLUSH IV SOLN 100 UNIT/ML 100 UNIT/ML
500 SOLUTION INTRAVENOUS AS NEEDED
Status: CANCELLED | OUTPATIENT
Start: 2022-12-29

## 2022-12-29 RX ORDER — SODIUM CHLORIDE 9 MG/ML
1000 INJECTION, SOLUTION INTRAVENOUS ONCE
Status: COMPLETED | OUTPATIENT
Start: 2022-12-29 | End: 2022-12-29

## 2022-12-29 RX ORDER — HEPARIN SODIUM (PORCINE) LOCK FLUSH IV SOLN 100 UNIT/ML 100 UNIT/ML
500 SOLUTION INTRAVENOUS AS NEEDED
Status: DISCONTINUED | OUTPATIENT
Start: 2022-12-29 | End: 2022-12-30 | Stop reason: HOSPADM

## 2022-12-29 RX ADMIN — SODIUM CHLORIDE 1000 ML: 9 INJECTION, SOLUTION INTRAVENOUS at 15:10

## 2022-12-29 RX ADMIN — HEPARIN 500 UNITS: 100 SYRINGE at 16:12

## 2023-01-10 DIAGNOSIS — C50.112 MALIGNANT NEOPLASM OF CENTRAL PORTION OF LEFT BREAST IN FEMALE, ESTROGEN RECEPTOR POSITIVE: Primary | ICD-10-CM

## 2023-01-10 DIAGNOSIS — Z17.0 MALIGNANT NEOPLASM OF CENTRAL PORTION OF LEFT BREAST IN FEMALE, ESTROGEN RECEPTOR POSITIVE: Primary | ICD-10-CM

## 2023-01-11 ENCOUNTER — HOSPITAL ENCOUNTER (OUTPATIENT)
Dept: ONCOLOGY | Facility: HOSPITAL | Age: 53
Setting detail: INFUSION SERIES
Discharge: HOME | End: 2023-01-11
Payer: COMMERCIAL

## 2023-01-11 VITALS
SYSTOLIC BLOOD PRESSURE: 123 MMHG | TEMPERATURE: 96.8 F | DIASTOLIC BLOOD PRESSURE: 66 MMHG | HEIGHT: 66 IN | BODY MASS INDEX: 24.11 KG/M2 | WEIGHT: 150 LBS | RESPIRATION RATE: 18 BRPM | HEART RATE: 87 BPM

## 2023-01-11 DIAGNOSIS — Z45.2 ENCOUNTER FOR CARE RELATED TO PORT-A-CATH: Primary | ICD-10-CM

## 2023-01-11 DIAGNOSIS — Z17.0 MALIGNANT NEOPLASM OF CENTRAL PORTION OF LEFT BREAST IN FEMALE, ESTROGEN RECEPTOR POSITIVE: ICD-10-CM

## 2023-01-11 DIAGNOSIS — C50.112 MALIGNANT NEOPLASM OF CENTRAL PORTION OF LEFT BREAST IN FEMALE, ESTROGEN RECEPTOR POSITIVE: ICD-10-CM

## 2023-01-11 LAB
ALBUMIN SERPL-MCNC: 4.2 G/DL (ref 3.5–5.2)
ALBUMIN/GLOB SERPL: 2.1 G/DL
ALP SERPL-CCNC: 67 U/L (ref 39–117)
ALT SERPL W P-5'-P-CCNC: 29 U/L (ref 1–33)
ANION GAP SERPL CALCULATED.3IONS-SCNC: 9 MMOL/L (ref 5–15)
AST SERPL-CCNC: 27 U/L (ref 1–32)
BASOPHILS # BLD AUTO: 0.03 10*3/MM3 (ref 0–0.2)
BASOPHILS NFR BLD AUTO: 0.9 % (ref 0–1.5)
BILIRUB SERPL-MCNC: 0.4 MG/DL (ref 0–1.2)
BUN SERPL-MCNC: 8 MG/DL (ref 6–20)
BUN/CREAT SERPL: 13.6 (ref 7–25)
CALCIUM SPEC-SCNC: 9.3 MG/DL (ref 8.6–10.5)
CHLORIDE SERPL-SCNC: 104 MMOL/L (ref 98–107)
CO2 SERPL-SCNC: 26 MMOL/L (ref 22–29)
CREAT SERPL-MCNC: 0.59 MG/DL (ref 0.57–1)
DEPRECATED RDW RBC AUTO: 61 FL (ref 37–54)
EGFRCR SERPLBLD CKD-EPI 2021: 110 ML/MIN/1.73
EOSINOPHIL # BLD AUTO: 0 10*3/MM3 (ref 0–0.4)
EOSINOPHIL NFR BLD AUTO: 0 % (ref 0.3–6.2)
ERYTHROCYTE [DISTWIDTH] IN BLOOD BY AUTOMATED COUNT: 15.9 % (ref 12.3–15.4)
GLOBULIN UR ELPH-MCNC: 2 GM/DL
GLUCOSE SERPL-MCNC: 57 MG/DL (ref 65–99)
HCT VFR BLD AUTO: 36.2 % (ref 34–46.6)
HGB BLD-MCNC: 11.7 G/DL (ref 12–15.9)
IMM GRANULOCYTES # BLD AUTO: 0 10*3/MM3 (ref 0–0.05)
IMM GRANULOCYTES NFR BLD AUTO: 0 % (ref 0–0.5)
LYMPHOCYTES # BLD AUTO: 1.05 10*3/MM3 (ref 0.7–3.1)
LYMPHOCYTES NFR BLD AUTO: 31.5 % (ref 19.6–45.3)
MCH RBC QN AUTO: 33.4 PG (ref 26.6–33)
MCHC RBC AUTO-ENTMCNC: 32.3 G/DL (ref 31.5–35.7)
MCV RBC AUTO: 103.4 FL (ref 79–97)
MONOCYTES # BLD AUTO: 0.54 10*3/MM3 (ref 0.1–0.9)
MONOCYTES NFR BLD AUTO: 16.2 % (ref 5–12)
NEUTROPHILS NFR BLD AUTO: 1.71 10*3/MM3 (ref 1.7–7)
NEUTROPHILS NFR BLD AUTO: 51.4 % (ref 42.7–76)
PLATELET # BLD AUTO: 243 10*3/MM3 (ref 140–450)
PMV BLD AUTO: 8.7 FL (ref 6–12)
POTASSIUM SERPL-SCNC: 4 MMOL/L (ref 3.5–5.2)
PROT SERPL-MCNC: 6.2 G/DL (ref 6–8.5)
RBC # BLD AUTO: 3.5 10*6/MM3 (ref 3.77–5.28)
SODIUM SERPL-SCNC: 139 MMOL/L (ref 136–145)
WBC NRBC COR # BLD: 3.33 10*3/MM3 (ref 3.4–10.8)

## 2023-01-11 PROCEDURE — 36591 DRAW BLOOD OFF VENOUS DEVICE: CPT

## 2023-01-11 PROCEDURE — 85025 COMPLETE CBC W/AUTO DIFF WBC: CPT | Performed by: INTERNAL MEDICINE

## 2023-01-11 PROCEDURE — 25010000002 HEPARIN LOCK FLUSH PER 10 UNITS: Performed by: INTERNAL MEDICINE

## 2023-01-11 PROCEDURE — 80053 COMPREHEN METABOLIC PANEL: CPT | Performed by: INTERNAL MEDICINE

## 2023-01-11 RX ORDER — HEPARIN SODIUM (PORCINE) LOCK FLUSH IV SOLN 100 UNIT/ML 100 UNIT/ML
500 SOLUTION INTRAVENOUS AS NEEDED
Status: CANCELLED | OUTPATIENT
Start: 2023-01-11

## 2023-01-11 RX ORDER — SODIUM CHLORIDE 0.9 % (FLUSH) 0.9 %
10 SYRINGE (ML) INJECTION AS NEEDED
Status: DISCONTINUED | OUTPATIENT
Start: 2023-01-11 | End: 2023-01-12 | Stop reason: HOSPADM

## 2023-01-11 RX ORDER — SODIUM CHLORIDE 0.9 % (FLUSH) 0.9 %
10 SYRINGE (ML) INJECTION AS NEEDED
Status: CANCELLED | OUTPATIENT
Start: 2023-01-11

## 2023-01-11 RX ORDER — HEPARIN SODIUM (PORCINE) LOCK FLUSH IV SOLN 100 UNIT/ML 100 UNIT/ML
500 SOLUTION INTRAVENOUS AS NEEDED
Status: DISCONTINUED | OUTPATIENT
Start: 2023-01-11 | End: 2023-01-12 | Stop reason: HOSPADM

## 2023-01-11 RX ADMIN — Medication 10 ML: at 14:02

## 2023-01-11 RX ADMIN — HEPARIN SODIUM (PORCINE) LOCK FLUSH IV SOLN 100 UNIT/ML 500 UNITS: 100 SOLUTION at 14:02

## 2023-01-12 ENCOUNTER — OFFICE VISIT (OUTPATIENT)
Dept: ONCOLOGY | Facility: CLINIC | Age: 53
End: 2023-01-12
Payer: COMMERCIAL

## 2023-01-12 ENCOUNTER — HOSPITAL ENCOUNTER (OUTPATIENT)
Dept: ONCOLOGY | Facility: HOSPITAL | Age: 53
Setting detail: INFUSION SERIES
Discharge: HOME | End: 2023-01-12
Payer: COMMERCIAL

## 2023-01-12 VITALS
WEIGHT: 148 LBS | SYSTOLIC BLOOD PRESSURE: 156 MMHG | OXYGEN SATURATION: 98 % | DIASTOLIC BLOOD PRESSURE: 83 MMHG | BODY MASS INDEX: 23.78 KG/M2 | RESPIRATION RATE: 16 BRPM | HEART RATE: 107 BPM | HEIGHT: 66 IN | TEMPERATURE: 97.1 F

## 2023-01-12 DIAGNOSIS — Z45.2 ENCOUNTER FOR CARE RELATED TO PORT-A-CATH: ICD-10-CM

## 2023-01-12 DIAGNOSIS — C50.112 MALIGNANT NEOPLASM OF CENTRAL PORTION OF LEFT BREAST IN FEMALE, ESTROGEN RECEPTOR POSITIVE: Primary | ICD-10-CM

## 2023-01-12 DIAGNOSIS — Z51.11 CHEMOTHERAPY MANAGEMENT, ENCOUNTER FOR: Primary | ICD-10-CM

## 2023-01-12 DIAGNOSIS — Z17.0 MALIGNANT NEOPLASM OF CENTRAL PORTION OF LEFT BREAST IN FEMALE, ESTROGEN RECEPTOR POSITIVE: Primary | ICD-10-CM

## 2023-01-12 PROCEDURE — 25010000002 HEPARIN LOCK FLUSH PER 10 UNITS: Performed by: INTERNAL MEDICINE

## 2023-01-12 PROCEDURE — 25010000002 CYCLOPHOSPHAMIDE 1 GM/5ML SOLUTION 5 ML VIAL: Performed by: INTERNAL MEDICINE

## 2023-01-12 PROCEDURE — 96413 CHEMO IV INFUSION 1 HR: CPT

## 2023-01-12 PROCEDURE — 96375 TX/PRO/DX INJ NEW DRUG ADDON: CPT

## 2023-01-12 PROCEDURE — 96374 THER/PROPH/DIAG INJ IV PUSH: CPT

## 2023-01-12 PROCEDURE — 96417 CHEMO IV INFUS EACH ADDL SEQ: CPT

## 2023-01-12 PROCEDURE — 25010000002 DOCETAXEL 20 MG/ML SOLUTION 8 ML VIAL: Performed by: INTERNAL MEDICINE

## 2023-01-12 PROCEDURE — 99215 OFFICE O/P EST HI 40 MIN: CPT | Performed by: INTERNAL MEDICINE

## 2023-01-12 PROCEDURE — 25010000002 PALONOSETRON PER 25 MCG: Performed by: INTERNAL MEDICINE

## 2023-01-12 RX ORDER — HEPARIN SODIUM (PORCINE) LOCK FLUSH IV SOLN 100 UNIT/ML 100 UNIT/ML
500 SOLUTION INTRAVENOUS AS NEEDED
Status: DISCONTINUED | OUTPATIENT
Start: 2023-01-12 | End: 2023-01-13 | Stop reason: HOSPADM

## 2023-01-12 RX ORDER — SODIUM CHLORIDE 9 MG/ML
250 INJECTION, SOLUTION INTRAVENOUS ONCE
Status: COMPLETED | OUTPATIENT
Start: 2023-01-12 | End: 2023-01-12

## 2023-01-12 RX ORDER — VENLAFAXINE HYDROCHLORIDE 37.5 MG/1
37.5 CAPSULE, EXTENDED RELEASE ORAL DAILY
Qty: 30 CAPSULE | Refills: 3 | Status: SHIPPED | OUTPATIENT
Start: 2023-01-12 | End: 2023-03-02

## 2023-01-12 RX ORDER — SODIUM CHLORIDE 9 MG/ML
1000 INJECTION, SOLUTION INTRAVENOUS ONCE
Status: CANCELLED
Start: 2023-01-13 | End: 2023-01-13

## 2023-01-12 RX ORDER — PALONOSETRON 0.05 MG/ML
0.25 INJECTION, SOLUTION INTRAVENOUS ONCE
Status: CANCELLED | OUTPATIENT
Start: 2023-01-12

## 2023-01-12 RX ORDER — HEPARIN SODIUM (PORCINE) LOCK FLUSH IV SOLN 100 UNIT/ML 100 UNIT/ML
500 SOLUTION INTRAVENOUS AS NEEDED
Status: CANCELLED | OUTPATIENT
Start: 2023-01-12

## 2023-01-12 RX ORDER — SODIUM CHLORIDE 0.9 % (FLUSH) 0.9 %
10 SYRINGE (ML) INJECTION AS NEEDED
Status: CANCELLED | OUTPATIENT
Start: 2023-01-12

## 2023-01-12 RX ORDER — OMEPRAZOLE 20 MG/1
20 CAPSULE, DELAYED RELEASE ORAL DAILY
Qty: 30 CAPSULE | Refills: 1 | Status: SHIPPED | OUTPATIENT
Start: 2023-01-12

## 2023-01-12 RX ORDER — SODIUM CHLORIDE 9 MG/ML
250 INJECTION, SOLUTION INTRAVENOUS ONCE
Status: CANCELLED | OUTPATIENT
Start: 2023-01-12

## 2023-01-12 RX ORDER — PALONOSETRON 0.05 MG/ML
0.25 INJECTION, SOLUTION INTRAVENOUS ONCE
Status: COMPLETED | OUTPATIENT
Start: 2023-01-12 | End: 2023-01-12

## 2023-01-12 RX ORDER — OMEPRAZOLE 20 MG/1
20 CAPSULE, DELAYED RELEASE ORAL DAILY
Qty: 90 CAPSULE | OUTPATIENT
Start: 2023-01-12

## 2023-01-12 RX ORDER — DIPHENHYDRAMINE HYDROCHLORIDE 50 MG/ML
50 INJECTION INTRAMUSCULAR; INTRAVENOUS AS NEEDED
Status: CANCELLED | OUTPATIENT
Start: 2023-01-12

## 2023-01-12 RX ORDER — FAMOTIDINE 10 MG/ML
20 INJECTION, SOLUTION INTRAVENOUS AS NEEDED
Status: CANCELLED | OUTPATIENT
Start: 2023-01-12

## 2023-01-12 RX ADMIN — DOCETAXEL 135 MG: 20 INJECTION, SOLUTION, CONCENTRATE INTRAVENOUS at 10:18

## 2023-01-12 RX ADMIN — PALONOSETRON HYDROCHLORIDE 0.25 MG: 0.25 INJECTION INTRAVENOUS at 10:01

## 2023-01-12 RX ADMIN — HEPARIN SODIUM (PORCINE) LOCK FLUSH IV SOLN 100 UNIT/ML 500 UNITS: 100 SOLUTION at 12:02

## 2023-01-12 RX ADMIN — CYCLOPHOSPHAMIDE 1070 MG: 200 INJECTION, SOLUTION INTRAVENOUS at 11:22

## 2023-01-12 RX ADMIN — SODIUM CHLORIDE 250 ML: 9 INJECTION, SOLUTION INTRAVENOUS at 10:01

## 2023-01-12 NOTE — PROGRESS NOTES
Hematology and Oncology [place]  Office number [phone]    Fax number [phone]     Follow up     Date: 23      Patient Name: Debra Beckman  MRN: [id number]  : 1970    Referring Physician: Dr. Goran Russell    Chief Complaint: follow up/treatment    Cancer Staging:   Cancer Staging  Stage IA (cT1b, cN0, cM0, G1, ER+, NM+, HER2-      History of Present Illness: Debra Beckman is a pleasant 52 y.o. female who presents today for evaluation of left breast cancer.  She is accompanied by her supportive father who is a retired [employer] neurosurgeon, Dr. Beckman.    Screening mammogram 2022 demonstrated an asymmetry in the anterior left medial breast.  Diagnostic mammogram on 2022 demonstrated a persistent ill-defined asymmetry with architectural distortion in the left 9:00 periareolar region.  On ultrasound, this corresponded to an 8 mm mass in the 9:00 left breast.    Left breast 9:00 biopsy on 8/10/2022 demonstrated grade 1 invasive ductal carcinoma with associated DCIS (%, NM 60%, HER2/kathleen negative, 0+).    She proceeded with bilateral breast MRI on 8/15/2022.  This demonstrated a 1.4 cm 9:00 left breast enhancing mass, with a 1.3 cm area of clumped non-mass enhancement versus postbiopsy change 1 cm anterior lateral to this.  There is additionally a dominant focus of enhancement suspicious for a satellite lesion spanning 0.4 cm.  She underwent second look ultrasound and biopsy of the second lesion which corresponded to a subcentimeteric mass on ultrasound.    Left breast 9:00 biopsy on 2022 showed grade 2 invasive ductal carcinoma (ER greater than 90%, NM greater than 80%, HER2/kathleen negative, 0+)    Left breast lumpectomy, sentinel lymph node biopsy on 2022 demonstrated grade 2 invasive ductal carcinoma spanning 1.4 cm with associated DCIS.  Margin positive.  [place] lymph node positive () with a macrometastasis spanning 4 mm with extracapsular  extension.    Breast cancer risk profile:  G3, P2; Age of first live birth 37  Premenopausal, LMP was 2022.  She discontinued OCPs at the time of her cancer diagnosis.  Family history of breast, ovarian, prostate or pancreatic cancer: Maternal aunt with breast cancer.  Genetics: Sayda cancer next panel was negative in .    Treatment history:  TC x 6 cycles    Interval history:  She is here for consideration of cycle 5.   Her primary complaint is lingering nausea, lasting much longer into her rest weeks, worse after dinner.   Eyes are twitching, excess tearing, sometimes film over eyes. Gets hazy film in vision transiently    Notes some positional tingling in feet and hands that resolves. No fine motor difficulty.  No falls or unsteadiness.   Feels puffy. 5 lb weight gain.   Hot flashes persistent. Still some negative self talk not controlled by effexor  Fatigued, napping more after holidays  Past Medical History:   Past Medical History:   Diagnosis Date   • Allergic    • Asthma    • Low back pain    • Malignant neoplasm of central portion of left breast in female, estrogen receptor positive (HCC) 2022       Past Surgical History:   Past Surgical History:   Procedure Laterality Date   • BREAST BIOPSY  08/10/2022   • BREAST BIOPSY Left 2022   • BREAST LUMPECTOMY WITH AXILLARY NODE DISSECTION Left 2022    clean dread   • BREAST LUMPECTOMY WITH SENTINEL NODE BIOPSY Left 2022   •  SECTION     • VENOUS ACCESS DEVICE (PORT) INSERTION Right 2022       Family History:   Family History   Problem Relation Age of Onset   • Hypertension Mother    • No Known Problems Father    • Breast cancer Maternal Aunt    • Ovarian cancer Neg Hx        Social History:   Social History     Socioeconomic History   • Marital status: Single   Tobacco Use   • Smoking status: Former   • Smokeless tobacco: Never   Vaping Use   • Vaping Use: Former   Substance and Sexual Activity   • Alcohol use: Yes      Comment: occasional   • Drug use: No   • Sexual activity: Defer   Works as a .      Medications:     Current Outpatient Medications:   •  venlafaxine XR (EFFEXOR-XR) 75 MG 24 hr capsule, Take 1 capsule by mouth Daily., Disp: 90 capsule, Rfl: 1  •  albuterol sulfate  (90 Base) MCG/ACT inhaler, Inhale 2 puffs Every 4 (Four) Hours As Needed for Wheezing., Disp: 18 g, Rfl: 11  •  celecoxib (CeleBREX) 200 MG capsule, Take 1 capsule by mouth Daily. (Patient taking differently: Take 200 mg by mouth As Needed.), Disp: 30 capsule, Rfl: 11  •  dexamethasone (DECADRON) 4 MG tablet, Take 2 tablets oral twice a day for 3 consecutive days beginning the day before chemotherapy and continue for 6 doses., Disp: 12 tablet, Rfl: 3  •  lidocaine-prilocaine (EMLA) 2.5-2.5 % cream, Apply 1 application topically to the appropriate area as directed As Needed (45-60 minutes prior to port access.  Cover with saran/plastic wrap.)., Disp: 30 g, Rfl: 3  •  nystatin susp + lidocaine viscous (MAGIC MOUTHWASH) oral suspension, 5-10 ml swish and spit or swallow QID prn  Indications: Please file. Patient will call when ready to fill, Disp: 240 mL, Rfl: 3  •  ondansetron (ZOFRAN) 8 MG tablet, Take 1 tablet by mouth 3 (Three) Times a Day As Needed for Nausea or Vomiting., Disp: 30 tablet, Rfl: 5  •  prochlorperazine (COMPAZINE) 10 MG tablet, Take 0.5 tablets by mouth Every 6 (Six) Hours As Needed for Nausea or Vomiting (nausea) for up to 60 doses., Disp: 60 tablet, Rfl: 3  •  Symbicort 160-4.5 MCG/ACT inhaler, INHALE 2 PUFFS BY MOUTH TWICE DAILY, Disp: 10.2 g, Rfl: 2  •  traMADol (ULTRAM) 50 MG tablet, Take 1-2 tablets by mouth Every 6 (Six) Hours As Needed (pain)., Disp: 30 tablet, Rfl: 0  •  vitamin B-12 (CYANOCOBALAMIN) 1000 MCG tablet, Take 1,000 mcg by mouth Daily., Disp: , Rfl:   •  vitamin B-6 (PYRIDOXINE) 50 MG tablet, Take 50 mg by mouth Daily., Disp: , Rfl:   •  Vitamin D, Cholecalciferol, (CHOLECALCIFEROL) 10 MCG  "(400 UNIT) tablet, Take 400 Units by mouth Daily., Disp: , Rfl:     Current Facility-Administered Medications:   •  lidocaine (XYLOCAINE) 1 % injection 10 mL, 10 mL, Infiltration, Once, Connie Marie MD  •  lidocaine (XYLOCAINE) 1 % injection 5 mL, 5 mL, Infiltration, Once, Connie Marie MD    Allergies:   No Known Allergies    Objective     Vital Signs:   Vitals:    01/12/23 0830   BP: 156/83   Pulse: 107   Resp: 16   Temp: 97.1 °F (36.2 °C)   TempSrc: Infrared   SpO2: 98%   Weight: 67.1 kg (148 lb)   Height: 167.6 cm (65.98\")   PainSc: 0-No pain    Body mass index is 23.9 kg/m².   Pain Score    01/12/23 0830   PainSc: 0-No pain       ECOG Performance Status: 0    Physical Exam:  General: No acute distress. Well appearing.  HEENT: Normocephalic, atraumatic. Sclera anicteric. Masked.  Neck: supple, no adenopathy.   Cardiovascular: regular rate and rhythm. No murmurs.   Respiratory: Normal rate. Clear to auscultation bilaterally  Abdomen: Soft, nontender, non distended with normoactive bowel sounds.  Lymph: no cervical, supraclavicular or axillary adenopathy  Neuro: Alert and oriented x 3. No focal deficits.   Ext: Symmetric, no swelling.   Psych: Euthymic  Breast: incisions well healed. No palpable masses.      Laboratory/Imaging Reviewed:   Hospital Outpatient Visit on 01/11/2023   Component Date Value Ref Range Status   • Glucose 01/11/2023 57 (L)  65 - 99 mg/dL Final   • BUN 01/11/2023 8  6 - 20 mg/dL Final   • Creatinine 01/11/2023 0.59  0.57 - 1.00 mg/dL Final   • Sodium 01/11/2023 139  136 - 145 mmol/L Final   • Potassium 01/11/2023 4.0  3.5 - 5.2 mmol/L Final   • Chloride 01/11/2023 104  98 - 107 mmol/L Final   • CO2 01/11/2023 26.0  22.0 - 29.0 mmol/L Final   • Calcium 01/11/2023 9.3  8.6 - 10.5 mg/dL Final   • Total Protein 01/11/2023 6.2  6.0 - 8.5 g/dL Final   • Albumin 01/11/2023 4.2  3.5 - 5.2 g/dL Final   • ALT (SGPT) 01/11/2023 29  1 - 33 U/L Final   • AST (SGOT) 01/11/2023 27  1 - 32 U/L Final   • " Alkaline Phosphatase 01/11/2023 67  39 - 117 U/L Final   • Total Bilirubin 01/11/2023 0.4  0.0 - 1.2 mg/dL Final   • Globulin 01/11/2023 2.0  gm/dL Final    Calculated Result   • A/G Ratio 01/11/2023 2.1  g/dL Final   • BUN/Creatinine Ratio 01/11/2023 13.6  7.0 - 25.0 Final   • Anion Gap 01/11/2023 9.0  5.0 - 15.0 mmol/L Final   • eGFR 01/11/2023 110.0  >60.0 mL/min/1.73 Final    National Kidney Foundation and American Society of Nephrology (ASN) Task Force recommended calculation based on the Chronic Kidney Disease Epidemiology Collaboration (CKD-EPI) equation refit without adjustment for race.   • WBC 01/11/2023 3.33 (L)  3.40 - 10.80 10*3/mm3 Final   • RBC 01/11/2023 3.50 (L)  3.77 - 5.28 10*6/mm3 Final   • Hemoglobin 01/11/2023 11.7 (L)  12.0 - 15.9 g/dL Final   • Hematocrit 01/11/2023 36.2  34.0 - 46.6 % Final   • MCV 01/11/2023 103.4 (H)  79.0 - 97.0 fL Final   • MCH 01/11/2023 33.4 (H)  26.6 - 33.0 pg Final   • MCHC 01/11/2023 32.3  31.5 - 35.7 g/dL Final   • RDW 01/11/2023 15.9 (H)  12.3 - 15.4 % Final   • RDW-SD 01/11/2023 61.0 (H)  37.0 - 54.0 fl Final   • MPV 01/11/2023 8.7  6.0 - 12.0 fL Final   • Platelets 01/11/2023 243  140 - 450 10*3/mm3 Final   • Neutrophil % 01/11/2023 51.4  42.7 - 76.0 % Final   • Lymphocyte % 01/11/2023 31.5  19.6 - 45.3 % Final   • Monocyte % 01/11/2023 16.2 (H)  5.0 - 12.0 % Final   • Eosinophil % 01/11/2023 0.0 (L)  0.3 - 6.2 % Final   • Basophil % 01/11/2023 0.9  0.0 - 1.5 % Final   • Immature Grans % 01/11/2023 0.0  0.0 - 0.5 % Final   • Neutrophils, Absolute 01/11/2023 1.71  1.70 - 7.00 10*3/mm3 Final   • Lymphocytes, Absolute 01/11/2023 1.05  0.70 - 3.10 10*3/mm3 Final   • Monocytes, Absolute 01/11/2023 0.54  0.10 - 0.90 10*3/mm3 Final   • Eosinophils, Absolute 01/11/2023 0.00  0.00 - 0.40 10*3/mm3 Final   • Basophils, Absolute 01/11/2023 0.03  0.00 - 0.20 10*3/mm3 Final   • Immature Grans, Absolute 01/11/2023 0.00  0.00 - 0.05 10*3/mm3 Final         Assessment / Plan         1. Malignant neoplasm of central portion of left breast in female, estrogen receptor positive  -She has an early stage hormone sensitive breast cancer with a single positive lymph node.  She has undergone curative intent surgery, and is planning to undergo reexcision for a positive margin.  She has been evaluated for consideration of adjuvant radiation.   -We discussed the potential role of adjuvant systemic therapy.  Historically, premenopausal lymph node positive breast cancer has been treated with adjuvant chemotherapy in addition to endocrine therapy. More recently, genomic testing has been shown to help to stratify the potential added benefit for an individual patient and identify patients who are less likely to derive significant additional benefit from chemotherapy.  For patients with N1 disease and age > 50, and low risk genomic signature with either Oncotype or MammaPrint has been shown to predict lack of benefit for adjuvant chemotherapy.  However, for patients less than 50, there is a minimum 5% absolute benefit in the lymph node positive setting from the addition of adjuvant chemotherapy even with a low risk genomic signature.  The patient is age 52, but premenopausal.  We discussed that this is a gray zone.  It is postulated that the potential benefit of adjuvant chemotherapy in premenopausal women may be at least in part explained by the effective ovarian suppression from chemotherapy.  We discussed the potential role of genomic testing to guide chemotherapy decision.  However the patient is healthy and wishes to be aggressive with her cancer treatment.  She declines genomic testing and wishes to proceed with adjuvant chemotherapy. We discussed standard treatment options of 1) dose dense Adriamycin and Cytoxan followed by weekly Taxol or 2) Taxotere and Cytoxan. We discussed differences in schedule and toxicities. We specifically discussed an increased risk for cardiotoxicity and late bone marrow  disorders with the addition of adriamycin.  Given strongly ER positive disease and N1 I would favor adjuvant TC over dose dense AC followed by weekly taxol. We discussed the goals of chemotherapy being cure.  We reviewed the chemotherapy schedule and its side effects including but not limited to alopecia, myelosuppression, infection, fevers, nausea, vomiting, diarrhea, mucositis, dehydration, fatigue, heart disease, neuropathy, and late cancers.  Informed consent was obtained, and the patient elected to proceed.   -We reviewed recommendation for 4 to 6 cycles of TC in LN positive setting, where as in the LN negative setting, 4 cycles are considered adequate. She is tolerating therapy well with no neuropathy and would like to proceed with 6 cycles. Anticipate her final cycle in 3 weeks  -She will benefit from adjuvant endocrine therapy.  In the context of lymph node positive disease, would prefer ovarian suppression or BSO plus AI if she does not experience chemotherapy induced menopause. We reviewed these options in more detail today.  -Ki-67 20% suggesting benefit from adjuvant Verzenio. Discussed and provided drug information sheets for both Arimidex and Verzenio.  -I reviewed her labs which show a mild anemia and leukopenia with adequate ANC and platelet count, normal renal and hepatic function, adequate for today's treatment. Continue to monitor for adverse side effects of therapy.  Continue to monitor CBC and CMP with each cycle.  -ROGERIO on exam today.    2.  Bone pain secondary to Neulasta  -Continue Claritin and tramadol.    -Scheduling tramadol helping.    3. Chemotherapy induced nausea.  -Poorly controlled this cycle.   -Add PPI for possible chemical gastritis.Continue scheduled zofran and compazine.  -Continue IVF day 2 and 8    4. Anxiety  5. Hot flashes  -Poorly controlled  -Increase effexor to 112.5 mg daily.    Follow Up:   3 weeks    Rama Cano MD  Hematology and Oncology     I have spent a total  of 40 min on reviewing test results/preparing to see patient, counseling patient, performing medically appropriate exam, ordering treatments and documenting clinical information in the electronic or other health record

## 2023-01-13 ENCOUNTER — HOSPITAL ENCOUNTER (OUTPATIENT)
Dept: ONCOLOGY | Facility: HOSPITAL | Age: 53
Setting detail: INFUSION SERIES
Discharge: HOME | End: 2023-01-13
Payer: COMMERCIAL

## 2023-01-13 VITALS
BODY MASS INDEX: 23.95 KG/M2 | RESPIRATION RATE: 16 BRPM | DIASTOLIC BLOOD PRESSURE: 61 MMHG | HEART RATE: 80 BPM | TEMPERATURE: 97.2 F | HEIGHT: 66 IN | WEIGHT: 149 LBS | SYSTOLIC BLOOD PRESSURE: 100 MMHG

## 2023-01-13 DIAGNOSIS — Z17.0 MALIGNANT NEOPLASM OF CENTRAL PORTION OF LEFT BREAST IN FEMALE, ESTROGEN RECEPTOR POSITIVE: Primary | ICD-10-CM

## 2023-01-13 DIAGNOSIS — C50.112 MALIGNANT NEOPLASM OF CENTRAL PORTION OF LEFT BREAST IN FEMALE, ESTROGEN RECEPTOR POSITIVE: Primary | ICD-10-CM

## 2023-01-13 DIAGNOSIS — Z45.2 ENCOUNTER FOR CARE RELATED TO PORT-A-CATH: ICD-10-CM

## 2023-01-13 PROCEDURE — 96372 THER/PROPH/DIAG INJ SC/IM: CPT

## 2023-01-13 PROCEDURE — 25010000002 PEGFILGRASTIM-CBQV 6 MG/0.6ML SOLUTION PREFILLED SYRINGE: Performed by: INTERNAL MEDICINE

## 2023-01-13 PROCEDURE — 96360 HYDRATION IV INFUSION INIT: CPT

## 2023-01-13 PROCEDURE — 25010000002 HEPARIN LOCK FLUSH PER 10 UNITS: Performed by: INTERNAL MEDICINE

## 2023-01-13 RX ORDER — SODIUM CHLORIDE 9 MG/ML
1000 INJECTION, SOLUTION INTRAVENOUS ONCE
Status: COMPLETED | OUTPATIENT
Start: 2023-01-13 | End: 2023-01-13

## 2023-01-13 RX ORDER — HEPARIN SODIUM (PORCINE) LOCK FLUSH IV SOLN 100 UNIT/ML 100 UNIT/ML
500 SOLUTION INTRAVENOUS AS NEEDED
Status: DISCONTINUED | OUTPATIENT
Start: 2023-01-13 | End: 2023-01-14 | Stop reason: HOSPADM

## 2023-01-13 RX ORDER — HEPARIN SODIUM (PORCINE) LOCK FLUSH IV SOLN 100 UNIT/ML 100 UNIT/ML
500 SOLUTION INTRAVENOUS AS NEEDED
Status: CANCELLED | OUTPATIENT
Start: 2023-01-13

## 2023-01-13 RX ORDER — SODIUM CHLORIDE 0.9 % (FLUSH) 0.9 %
10 SYRINGE (ML) INJECTION AS NEEDED
Status: CANCELLED | OUTPATIENT
Start: 2023-01-13

## 2023-01-13 RX ADMIN — HEPARIN 500 UNITS: 100 SYRINGE at 16:10

## 2023-01-13 RX ADMIN — PEGFILGRASTIM-CBQV 6 MG: 6 INJECTION, SOLUTION SUBCUTANEOUS at 15:08

## 2023-01-13 RX ADMIN — SODIUM CHLORIDE 1000 ML: 9 INJECTION, SOLUTION INTRAVENOUS at 15:07

## 2023-01-18 DIAGNOSIS — C50.112 MALIGNANT NEOPLASM OF CENTRAL PORTION OF LEFT BREAST IN FEMALE, ESTROGEN RECEPTOR POSITIVE: Primary | ICD-10-CM

## 2023-01-18 DIAGNOSIS — Z17.0 MALIGNANT NEOPLASM OF CENTRAL PORTION OF LEFT BREAST IN FEMALE, ESTROGEN RECEPTOR POSITIVE: Primary | ICD-10-CM

## 2023-01-18 RX ORDER — SODIUM CHLORIDE 9 MG/ML
1000 INJECTION, SOLUTION INTRAVENOUS ONCE
Status: CANCELLED
Start: 2023-01-19 | End: 2023-01-19

## 2023-01-19 ENCOUNTER — HOSPITAL ENCOUNTER (OUTPATIENT)
Dept: ONCOLOGY | Facility: HOSPITAL | Age: 53
Setting detail: INFUSION SERIES
Discharge: HOME | End: 2023-01-19
Payer: COMMERCIAL

## 2023-01-19 VITALS
HEART RATE: 89 BPM | BODY MASS INDEX: 23.95 KG/M2 | RESPIRATION RATE: 16 BRPM | SYSTOLIC BLOOD PRESSURE: 112 MMHG | WEIGHT: 149 LBS | DIASTOLIC BLOOD PRESSURE: 59 MMHG | TEMPERATURE: 97 F | HEIGHT: 66 IN

## 2023-01-19 DIAGNOSIS — Z17.0 MALIGNANT NEOPLASM OF CENTRAL PORTION OF LEFT BREAST IN FEMALE, ESTROGEN RECEPTOR POSITIVE: Primary | ICD-10-CM

## 2023-01-19 DIAGNOSIS — Z45.2 ENCOUNTER FOR CARE RELATED TO PORT-A-CATH: ICD-10-CM

## 2023-01-19 DIAGNOSIS — C50.112 MALIGNANT NEOPLASM OF CENTRAL PORTION OF LEFT BREAST IN FEMALE, ESTROGEN RECEPTOR POSITIVE: Primary | ICD-10-CM

## 2023-01-19 PROCEDURE — 96360 HYDRATION IV INFUSION INIT: CPT

## 2023-01-19 PROCEDURE — 25010000002 HEPARIN LOCK FLUSH PER 10 UNITS: Performed by: INTERNAL MEDICINE

## 2023-01-19 RX ORDER — HEPARIN SODIUM (PORCINE) LOCK FLUSH IV SOLN 100 UNIT/ML 100 UNIT/ML
500 SOLUTION INTRAVENOUS AS NEEDED
Status: DISCONTINUED | OUTPATIENT
Start: 2023-01-19 | End: 2023-01-20 | Stop reason: HOSPADM

## 2023-01-19 RX ORDER — SODIUM CHLORIDE 0.9 % (FLUSH) 0.9 %
10 SYRINGE (ML) INJECTION AS NEEDED
Status: DISCONTINUED | OUTPATIENT
Start: 2023-01-19 | End: 2023-01-20 | Stop reason: HOSPADM

## 2023-01-19 RX ORDER — SODIUM CHLORIDE 9 MG/ML
1000 INJECTION, SOLUTION INTRAVENOUS ONCE
Status: COMPLETED | OUTPATIENT
Start: 2023-01-19 | End: 2023-01-19

## 2023-01-19 RX ORDER — HEPARIN SODIUM (PORCINE) LOCK FLUSH IV SOLN 100 UNIT/ML 100 UNIT/ML
500 SOLUTION INTRAVENOUS AS NEEDED
Status: CANCELLED | OUTPATIENT
Start: 2023-01-19

## 2023-01-19 RX ORDER — SODIUM CHLORIDE 0.9 % (FLUSH) 0.9 %
10 SYRINGE (ML) INJECTION AS NEEDED
Status: CANCELLED | OUTPATIENT
Start: 2023-01-19

## 2023-01-19 RX ADMIN — SODIUM CHLORIDE 1000 ML: 9 INJECTION, SOLUTION INTRAVENOUS at 15:20

## 2023-01-19 RX ADMIN — HEPARIN 500 UNITS: 100 SYRINGE at 16:23

## 2023-01-19 RX ADMIN — Medication 10 ML: at 16:23

## 2023-02-01 ENCOUNTER — HOSPITAL ENCOUNTER (OUTPATIENT)
Dept: ONCOLOGY | Facility: HOSPITAL | Age: 53
Discharge: HOME | End: 2023-02-01
Admitting: INTERNAL MEDICINE
Payer: COMMERCIAL

## 2023-02-01 VITALS
RESPIRATION RATE: 18 BRPM | DIASTOLIC BLOOD PRESSURE: 68 MMHG | BODY MASS INDEX: 24.83 KG/M2 | SYSTOLIC BLOOD PRESSURE: 117 MMHG | TEMPERATURE: 98.6 F | HEIGHT: 65 IN | HEART RATE: 76 BPM | WEIGHT: 149 LBS

## 2023-02-01 DIAGNOSIS — Z17.0 MALIGNANT NEOPLASM OF CENTRAL PORTION OF LEFT BREAST IN FEMALE, ESTROGEN RECEPTOR POSITIVE: Primary | ICD-10-CM

## 2023-02-01 DIAGNOSIS — C50.112 MALIGNANT NEOPLASM OF CENTRAL PORTION OF LEFT BREAST IN FEMALE, ESTROGEN RECEPTOR POSITIVE: Primary | ICD-10-CM

## 2023-02-01 DIAGNOSIS — Z45.2 ENCOUNTER FOR CARE RELATED TO PORT-A-CATH: ICD-10-CM

## 2023-02-01 LAB
ALBUMIN SERPL-MCNC: 4.1 G/DL (ref 3.5–5.2)
ALBUMIN/GLOB SERPL: 2.2 G/DL
ALP SERPL-CCNC: 62 U/L (ref 39–117)
ALT SERPL W P-5'-P-CCNC: 21 U/L (ref 1–33)
ANION GAP SERPL CALCULATED.3IONS-SCNC: 7 MMOL/L (ref 5–15)
AST SERPL-CCNC: 23 U/L (ref 1–32)
BASOPHILS # BLD AUTO: 0.03 10*3/MM3 (ref 0–0.2)
BASOPHILS NFR BLD AUTO: 0.9 % (ref 0–1.5)
BILIRUB SERPL-MCNC: 0.3 MG/DL (ref 0–1.2)
BUN SERPL-MCNC: 9 MG/DL (ref 6–20)
BUN/CREAT SERPL: 18 (ref 7–25)
CALCIUM SPEC-SCNC: 9.4 MG/DL (ref 8.6–10.5)
CHLORIDE SERPL-SCNC: 104 MMOL/L (ref 98–107)
CO2 SERPL-SCNC: 28 MMOL/L (ref 22–29)
CREAT SERPL-MCNC: 0.5 MG/DL (ref 0.57–1)
DEPRECATED RDW RBC AUTO: 60.1 FL (ref 37–54)
EGFRCR SERPLBLD CKD-EPI 2021: 114.4 ML/MIN/1.73
EOSINOPHIL # BLD AUTO: 0 10*3/MM3 (ref 0–0.4)
EOSINOPHIL NFR BLD AUTO: 0 % (ref 0.3–6.2)
ERYTHROCYTE [DISTWIDTH] IN BLOOD BY AUTOMATED COUNT: 15.9 % (ref 12.3–15.4)
GLOBULIN UR ELPH-MCNC: 1.9 GM/DL
GLUCOSE SERPL-MCNC: 80 MG/DL (ref 65–99)
HCT VFR BLD AUTO: 34.4 % (ref 34–46.6)
HGB BLD-MCNC: 11.1 G/DL (ref 12–15.9)
IMM GRANULOCYTES # BLD AUTO: 0 10*3/MM3 (ref 0–0.05)
IMM GRANULOCYTES NFR BLD AUTO: 0 % (ref 0–0.5)
LYMPHOCYTES # BLD AUTO: 0.99 10*3/MM3 (ref 0.7–3.1)
LYMPHOCYTES NFR BLD AUTO: 30.7 % (ref 19.6–45.3)
MCH RBC QN AUTO: 33.2 PG (ref 26.6–33)
MCHC RBC AUTO-ENTMCNC: 32.3 G/DL (ref 31.5–35.7)
MCV RBC AUTO: 103 FL (ref 79–97)
MONOCYTES # BLD AUTO: 0.56 10*3/MM3 (ref 0.1–0.9)
MONOCYTES NFR BLD AUTO: 17.3 % (ref 5–12)
NEUTROPHILS NFR BLD AUTO: 1.65 10*3/MM3 (ref 1.7–7)
NEUTROPHILS NFR BLD AUTO: 51.1 % (ref 42.7–76)
PLATELET # BLD AUTO: 235 10*3/MM3 (ref 140–450)
PMV BLD AUTO: 9 FL (ref 6–12)
POTASSIUM SERPL-SCNC: 3.9 MMOL/L (ref 3.5–5.2)
PROT SERPL-MCNC: 6 G/DL (ref 6–8.5)
RBC # BLD AUTO: 3.34 10*6/MM3 (ref 3.77–5.28)
SODIUM SERPL-SCNC: 139 MMOL/L (ref 136–145)
WBC NRBC COR # BLD: 3.23 10*3/MM3 (ref 3.4–10.8)

## 2023-02-01 PROCEDURE — 80053 COMPREHEN METABOLIC PANEL: CPT | Performed by: INTERNAL MEDICINE

## 2023-02-01 PROCEDURE — 36591 DRAW BLOOD OFF VENOUS DEVICE: CPT

## 2023-02-01 PROCEDURE — 85025 COMPLETE CBC W/AUTO DIFF WBC: CPT | Performed by: INTERNAL MEDICINE

## 2023-02-01 PROCEDURE — 25010000002 HEPARIN LOCK FLUSH PER 10 UNITS: Performed by: INTERNAL MEDICINE

## 2023-02-01 RX ORDER — HEPARIN SODIUM (PORCINE) LOCK FLUSH IV SOLN 100 UNIT/ML 100 UNIT/ML
500 SOLUTION INTRAVENOUS AS NEEDED
Status: CANCELLED | OUTPATIENT
Start: 2023-02-01

## 2023-02-01 RX ORDER — SODIUM CHLORIDE 0.9 % (FLUSH) 0.9 %
10 SYRINGE (ML) INJECTION AS NEEDED
Status: CANCELLED | OUTPATIENT
Start: 2023-02-01

## 2023-02-01 RX ORDER — HEPARIN SODIUM (PORCINE) LOCK FLUSH IV SOLN 100 UNIT/ML 100 UNIT/ML
500 SOLUTION INTRAVENOUS AS NEEDED
Status: DISCONTINUED | OUTPATIENT
Start: 2023-02-01 | End: 2023-02-02 | Stop reason: HOSPADM

## 2023-02-01 RX ORDER — SODIUM CHLORIDE 0.9 % (FLUSH) 0.9 %
10 SYRINGE (ML) INJECTION AS NEEDED
Status: DISCONTINUED | OUTPATIENT
Start: 2023-02-01 | End: 2023-02-02 | Stop reason: HOSPADM

## 2023-02-01 RX ADMIN — Medication 10 ML: at 14:36

## 2023-02-01 RX ADMIN — HEPARIN SODIUM (PORCINE) LOCK FLUSH IV SOLN 100 UNIT/ML 500 UNITS: 100 SOLUTION at 14:36

## 2023-02-02 ENCOUNTER — OFFICE VISIT (OUTPATIENT)
Dept: ONCOLOGY | Facility: CLINIC | Age: 53
End: 2023-02-02
Payer: COMMERCIAL

## 2023-02-02 ENCOUNTER — HOSPITAL ENCOUNTER (OUTPATIENT)
Dept: ONCOLOGY | Facility: HOSPITAL | Age: 53
Discharge: HOME | End: 2023-02-02
Admitting: INTERNAL MEDICINE
Payer: COMMERCIAL

## 2023-02-02 VITALS
OXYGEN SATURATION: 99 % | BODY MASS INDEX: 24.83 KG/M2 | SYSTOLIC BLOOD PRESSURE: 104 MMHG | DIASTOLIC BLOOD PRESSURE: 76 MMHG | WEIGHT: 149 LBS | TEMPERATURE: 97.1 F | HEART RATE: 91 BPM | HEIGHT: 65 IN

## 2023-02-02 DIAGNOSIS — C50.112 MALIGNANT NEOPLASM OF CENTRAL PORTION OF LEFT BREAST IN FEMALE, ESTROGEN RECEPTOR POSITIVE: Primary | ICD-10-CM

## 2023-02-02 DIAGNOSIS — Z17.0 MALIGNANT NEOPLASM OF CENTRAL PORTION OF LEFT BREAST IN FEMALE, ESTROGEN RECEPTOR POSITIVE: Primary | ICD-10-CM

## 2023-02-02 DIAGNOSIS — Z45.2 ENCOUNTER FOR CARE RELATED TO PORT-A-CATH: ICD-10-CM

## 2023-02-02 PROCEDURE — 25010000002 CYCLOPHOSPHAMIDE 2 GM/10ML SOLUTION 10 ML VIAL: Performed by: INTERNAL MEDICINE

## 2023-02-02 PROCEDURE — 25010000002 DOCETAXEL 20 MG/ML SOLUTION 8 ML VIAL: Performed by: INTERNAL MEDICINE

## 2023-02-02 PROCEDURE — 96417 CHEMO IV INFUS EACH ADDL SEQ: CPT

## 2023-02-02 PROCEDURE — 25010000002 HEPARIN LOCK FLUSH PER 10 UNITS: Performed by: INTERNAL MEDICINE

## 2023-02-02 PROCEDURE — 96375 TX/PRO/DX INJ NEW DRUG ADDON: CPT

## 2023-02-02 PROCEDURE — 99215 OFFICE O/P EST HI 40 MIN: CPT | Performed by: INTERNAL MEDICINE

## 2023-02-02 PROCEDURE — 96413 CHEMO IV INFUSION 1 HR: CPT

## 2023-02-02 PROCEDURE — 96415 CHEMO IV INFUSION ADDL HR: CPT

## 2023-02-02 PROCEDURE — 25010000002 PALONOSETRON 0.25 MG/5ML SOLUTION PREFILLED SYRINGE: Performed by: INTERNAL MEDICINE

## 2023-02-02 RX ORDER — SODIUM CHLORIDE 0.9 % (FLUSH) 0.9 %
10 SYRINGE (ML) INJECTION AS NEEDED
Status: CANCELLED | OUTPATIENT
Start: 2023-02-02

## 2023-02-02 RX ORDER — SODIUM CHLORIDE 9 MG/ML
1000 INJECTION, SOLUTION INTRAVENOUS ONCE
Status: CANCELLED
Start: 2023-02-09 | End: 2023-02-09

## 2023-02-02 RX ORDER — PALONOSETRON 0.05 MG/ML
0.25 INJECTION, SOLUTION INTRAVENOUS ONCE
Status: COMPLETED | OUTPATIENT
Start: 2023-02-02 | End: 2023-02-02

## 2023-02-02 RX ORDER — DIPHENHYDRAMINE HYDROCHLORIDE 50 MG/ML
50 INJECTION INTRAMUSCULAR; INTRAVENOUS AS NEEDED
Status: CANCELLED | OUTPATIENT
Start: 2023-02-02

## 2023-02-02 RX ORDER — HEPARIN SODIUM (PORCINE) LOCK FLUSH IV SOLN 100 UNIT/ML 100 UNIT/ML
500 SOLUTION INTRAVENOUS AS NEEDED
Status: CANCELLED | OUTPATIENT
Start: 2023-02-02

## 2023-02-02 RX ORDER — PALONOSETRON 0.05 MG/ML
0.25 INJECTION, SOLUTION INTRAVENOUS ONCE
Status: CANCELLED | OUTPATIENT
Start: 2023-02-02

## 2023-02-02 RX ORDER — FAMOTIDINE 10 MG/ML
20 INJECTION, SOLUTION INTRAVENOUS AS NEEDED
Status: CANCELLED | OUTPATIENT
Start: 2023-02-02

## 2023-02-02 RX ORDER — SODIUM CHLORIDE 9 MG/ML
250 INJECTION, SOLUTION INTRAVENOUS ONCE
Status: CANCELLED | OUTPATIENT
Start: 2023-02-02

## 2023-02-02 RX ORDER — SODIUM CHLORIDE 9 MG/ML
250 INJECTION, SOLUTION INTRAVENOUS ONCE
Status: COMPLETED | OUTPATIENT
Start: 2023-02-02 | End: 2023-02-02

## 2023-02-02 RX ORDER — SODIUM CHLORIDE 9 MG/ML
1000 INJECTION, SOLUTION INTRAVENOUS ONCE
Status: CANCELLED
Start: 2023-02-03 | End: 2023-02-03

## 2023-02-02 RX ORDER — HEPARIN SODIUM (PORCINE) LOCK FLUSH IV SOLN 100 UNIT/ML 100 UNIT/ML
500 SOLUTION INTRAVENOUS AS NEEDED
Status: DISCONTINUED | OUTPATIENT
Start: 2023-02-02 | End: 2023-02-03 | Stop reason: HOSPADM

## 2023-02-02 RX ADMIN — SODIUM CHLORIDE 250 ML: 9 INJECTION, SOLUTION INTRAVENOUS at 10:04

## 2023-02-02 RX ADMIN — PALONOSETRON 0.25 MG: 0.25 INJECTION, SOLUTION INTRAVENOUS at 10:06

## 2023-02-02 RX ADMIN — HEPARIN SODIUM (PORCINE) LOCK FLUSH IV SOLN 100 UNIT/ML 500 UNITS: 100 SOLUTION at 12:06

## 2023-02-02 RX ADMIN — CYCLOPHOSPHAMIDE 1070 MG: 200 INJECTION, SOLUTION INTRAVENOUS at 11:23

## 2023-02-02 RX ADMIN — DOCETAXEL 135 MG: 20 INJECTION, SOLUTION, CONCENTRATE INTRAVENOUS at 10:12

## 2023-02-02 NOTE — PROGRESS NOTES
Hematology and Oncology [place]  Office number [phone]    Fax number [phone]     Follow up     Date: 23      Patient Name: Debra Beckman  MRN: [id number]  : 1970    Referring Physician: Dr. Goran Russell    Chief Complaint: follow up/treatment    Cancer Staging:   Cancer Staging  Stage IA (cT1b, cN0, cM0, G1, ER+, AK+, HER2-      History of Present Illness: Debra Beckman is a pleasant 52 y.o. female who presents today for evaluation of left breast cancer.  She is accompanied by her supportive father who is a retired [employer] neurosurgeon, Dr. Beckman.    Screening mammogram 2022 demonstrated an asymmetry in the anterior left medial breast.  Diagnostic mammogram on 2022 demonstrated a persistent ill-defined asymmetry with architectural distortion in the left 9:00 periareolar region.  On ultrasound, this corresponded to an 8 mm mass in the 9:00 left breast.    Left breast 9:00 biopsy on 8/10/2022 demonstrated grade 1 invasive ductal carcinoma with associated DCIS (%, AK 60%, HER2/kathleen negative, 0+).    She proceeded with bilateral breast MRI on 8/15/2022.  This demonstrated a 1.4 cm 9:00 left breast enhancing mass, with a 1.3 cm area of clumped non-mass enhancement versus postbiopsy change 1 cm anterior lateral to this.  There is additionally a dominant focus of enhancement suspicious for a satellite lesion spanning 0.4 cm.  She underwent second look ultrasound and biopsy of the second lesion which corresponded to a subcentimeteric mass on ultrasound.    Left breast 9:00 biopsy on 2022 showed grade 2 invasive ductal carcinoma (ER greater than 90%, AK greater than 80%, HER2/kathleen negative, 0+)    Left breast lumpectomy, sentinel lymph node biopsy on 2022 demonstrated grade 2 invasive ductal carcinoma spanning 1.4 cm with associated DCIS.  Margin positive.  [place] lymph node positive () with a macrometastasis spanning 4 mm with extracapsular  extension. She underwent re-excision 22 with fat necrosis and no residual invasive or in situ carcinoma. Single left axillary LN excised and benign (total LN count 1/2).    Breast cancer risk profile:  G3, P2; Age of first live birth 37  Premenopausal, LMP was 2022.  She discontinued OCPs at the time of her cancer diagnosis.  Family history of breast, ovarian, prostate or pancreatic cancer: Maternal aunt with breast cancer.  Genetics: [facility] cancer next panel was negative in .    Treatment history:  TC x 6 cycles  Adjuvant radiation, planned.     Interval history:  She is here for consideration of cycle 6.   Nausea is better with the addition of scheduled PPI.   No persistent neuropathy.   Hot flashes and mood are better with higher dose effexor. Insomnia is better with scheduling this in AM.   Stable fatigue.   No diarrhea, constipation or mucositis  Has questions regarding adjuvant radiation and endocrine therapy.     Past Medical History:   Past Medical History:   Diagnosis Date   • Allergic    • Asthma    • Low back pain    • Malignant neoplasm of central portion of left breast in female, estrogen receptor positive (HCC) 2022       Past Surgical History:   Past Surgical History:   Procedure Laterality Date   • BREAST BIOPSY  08/10/2022   • BREAST BIOPSY Left 2022   • BREAST LUMPECTOMY WITH AXILLARY NODE DISSECTION Left 2022    clean dread   • BREAST LUMPECTOMY WITH SENTINEL NODE BIOPSY Left 2022   •  SECTION     • VENOUS ACCESS DEVICE (PORT) INSERTION Right 2022       Family History:   Family History   Problem Relation Age of Onset   • Hypertension Mother    • No Known Problems Father    • Breast cancer Maternal Aunt    • Ovarian cancer Neg Hx        Social History:   Social History     Socioeconomic History   • Marital status: Single   Tobacco Use   • Smoking status: Former   • Smokeless tobacco: Never   Vaping Use   • Vaping Use: Former   Substance and Sexual  Activity   • Alcohol use: Yes     Comment: occasional   • Drug use: No   • Sexual activity: Defer   Works as a .      Medications:     Current Outpatient Medications:   •  venlafaxine XR (EFFEXOR-XR) 75 MG 24 hr capsule, Take 1 capsule by mouth Daily., Disp: 90 capsule, Rfl: 1  •  albuterol sulfate  (90 Base) MCG/ACT inhaler, Inhale 2 puffs Every 4 (Four) Hours As Needed for Wheezing., Disp: 18 g, Rfl: 11  •  celecoxib (CeleBREX) 200 MG capsule, Take 1 capsule by mouth Daily. (Patient taking differently: Take 200 mg by mouth As Needed.), Disp: 30 capsule, Rfl: 11  •  dexamethasone (DECADRON) 4 MG tablet, Take 2 tablets oral twice a day for 3 consecutive days beginning the day before chemotherapy and continue for 6 doses., Disp: 12 tablet, Rfl: 3  •  lidocaine-prilocaine (EMLA) 2.5-2.5 % cream, Apply 1 application topically to the appropriate area as directed As Needed (45-60 minutes prior to port access.  Cover with saran/plastic wrap.)., Disp: 30 g, Rfl: 3  •  nystatin susp + lidocaine viscous (MAGIC MOUTHWASH) oral suspension, 5-10 ml swish and spit or swallow QID prn  Indications: Please file. Patient will call when ready to fill, Disp: 240 mL, Rfl: 3  •  omeprazole (priLOSEC) 20 MG capsule, Take 1 capsule by mouth Daily., Disp: 30 capsule, Rfl: 1  •  ondansetron (ZOFRAN) 8 MG tablet, Take 1 tablet by mouth 3 (Three) Times a Day As Needed for Nausea or Vomiting., Disp: 30 tablet, Rfl: 5  •  prochlorperazine (COMPAZINE) 10 MG tablet, Take 0.5 tablets by mouth Every 6 (Six) Hours As Needed for Nausea or Vomiting (nausea) for up to 60 doses., Disp: 60 tablet, Rfl: 3  •  Symbicort 160-4.5 MCG/ACT inhaler, INHALE 2 PUFFS BY MOUTH TWICE DAILY, Disp: 10.2 g, Rfl: 2  •  traMADol (ULTRAM) 50 MG tablet, Take 1-2 tablets by mouth Every 6 (Six) Hours As Needed (pain)., Disp: 30 tablet, Rfl: 0  •  venlafaxine XR (EFFEXOR-XR) 37.5 MG 24 hr capsule, Take 1 capsule by mouth Daily. Take with 75 mg for  "total daily dose 112.5 mg, Disp: 30 capsule, Rfl: 3  •  vitamin B-12 (CYANOCOBALAMIN) 1000 MCG tablet, Take 1,000 mcg by mouth Daily., Disp: , Rfl:   •  vitamin B-6 (PYRIDOXINE) 50 MG tablet, Take 50 mg by mouth Daily., Disp: , Rfl:   •  Vitamin D, Cholecalciferol, (CHOLECALCIFEROL) 10 MCG (400 UNIT) tablet, Take 400 Units by mouth Daily., Disp: , Rfl:     Current Facility-Administered Medications:   •  lidocaine (XYLOCAINE) 1 % injection 10 mL, 10 mL, Infiltration, Once, Connie Marie MD  •  lidocaine (XYLOCAINE) 1 % injection 5 mL, 5 mL, Infiltration, Once, Connie Marie MD    Allergies:   No Known Allergies    Objective     Vital Signs:   Vitals:    02/02/23 0848   BP: 104/76   Pulse: 91   Temp: 97.1 °F (36.2 °C)   TempSrc: Infrared   SpO2: 99%   Weight: 67.6 kg (149 lb)   Height: 165.1 cm (65\")   PainSc: 0-No pain    Body mass index is 24.79 kg/m².   Pain Score    02/02/23 0848   PainSc: 0-No pain       ECOG Performance Status: 0    Physical Exam:  General: No acute distress. Well appearing.  HEENT: Normocephalic, atraumatic. Sclera anicteric. Masked.  Neck: supple, no adenopathy.   Cardiovascular: regular rate and rhythm. No murmurs.   Respiratory: Normal rate. Clear to auscultation bilaterally.  Abdomen: Soft, nontender, non distended with normoactive bowel sounds.  Lymph: no cervical, supraclavicular or axillary adenopathy  Neuro: Alert and oriented x 3. No focal deficits.   Ext: Symmetric, no swelling.   Psych: Euthymic  Breast: incisions well healed. No palpable masses left breast or axilla.      Laboratory/Imaging Reviewed:   Hospital Outpatient Visit on 02/01/2023   Component Date Value Ref Range Status   • Glucose 02/01/2023 80  65 - 99 mg/dL Final   • BUN 02/01/2023 9  6 - 20 mg/dL Final   • Creatinine 02/01/2023 0.50 (L)  0.57 - 1.00 mg/dL Final   • Sodium 02/01/2023 139  136 - 145 mmol/L Final   • Potassium 02/01/2023 3.9  3.5 - 5.2 mmol/L Final   • Chloride 02/01/2023 104  98 - 107 mmol/L Final   • " CO2 02/01/2023 28.0  22.0 - 29.0 mmol/L Final   • Calcium 02/01/2023 9.4  8.6 - 10.5 mg/dL Final   • Total Protein 02/01/2023 6.0  6.0 - 8.5 g/dL Final   • Albumin 02/01/2023 4.1  3.5 - 5.2 g/dL Final   • ALT (SGPT) 02/01/2023 21  1 - 33 U/L Final   • AST (SGOT) 02/01/2023 23  1 - 32 U/L Final   • Alkaline Phosphatase 02/01/2023 62  39 - 117 U/L Final   • Total Bilirubin 02/01/2023 0.3  0.0 - 1.2 mg/dL Final   • Globulin 02/01/2023 1.9  gm/dL Final    Calculated Result   • A/G Ratio 02/01/2023 2.2  g/dL Final   • BUN/Creatinine Ratio 02/01/2023 18.0  7.0 - 25.0 Final   • Anion Gap 02/01/2023 7.0  5.0 - 15.0 mmol/L Final   • eGFR 02/01/2023 114.4  >60.0 mL/min/1.73 Final   • WBC 02/01/2023 3.23 (L)  3.40 - 10.80 10*3/mm3 Final   • RBC 02/01/2023 3.34 (L)  3.77 - 5.28 10*6/mm3 Final   • Hemoglobin 02/01/2023 11.1 (L)  12.0 - 15.9 g/dL Final   • Hematocrit 02/01/2023 34.4  34.0 - 46.6 % Final   • MCV 02/01/2023 103.0 (H)  79.0 - 97.0 fL Final   • MCH 02/01/2023 33.2 (H)  26.6 - 33.0 pg Final   • MCHC 02/01/2023 32.3  31.5 - 35.7 g/dL Final   • RDW 02/01/2023 15.9 (H)  12.3 - 15.4 % Final   • RDW-SD 02/01/2023 60.1 (H)  37.0 - 54.0 fl Final   • MPV 02/01/2023 9.0  6.0 - 12.0 fL Final   • Platelets 02/01/2023 235  140 - 450 10*3/mm3 Final   • Neutrophil % 02/01/2023 51.1  42.7 - 76.0 % Final   • Lymphocyte % 02/01/2023 30.7  19.6 - 45.3 % Final   • Monocyte % 02/01/2023 17.3 (H)  5.0 - 12.0 % Final   • Eosinophil % 02/01/2023 0.0 (L)  0.3 - 6.2 % Final   • Basophil % 02/01/2023 0.9  0.0 - 1.5 % Final   • Immature Grans % 02/01/2023 0.0  0.0 - 0.5 % Final   • Neutrophils, Absolute 02/01/2023 1.65 (L)  1.70 - 7.00 10*3/mm3 Final   • Lymphocytes, Absolute 02/01/2023 0.99  0.70 - 3.10 10*3/mm3 Final   • Monocytes, Absolute 02/01/2023 0.56  0.10 - 0.90 10*3/mm3 Final   • Eosinophils, Absolute 02/01/2023 0.00  0.00 - 0.40 10*3/mm3 Final   • Basophils, Absolute 02/01/2023 0.03  0.00 - 0.20 10*3/mm3 Final   • Immature Grans,  Absolute 02/01/2023 0.00  0.00 - 0.05 10*3/mm3 Final         Assessment / Plan        1. Malignant neoplasm of central portion of left breast in female, estrogen receptor positive  -She has an early stage hormone sensitive breast cancer with a single positive lymph node.  She has undergone curative intent surgery.  -We discussed the potential role of adjuvant systemic therapy.  Historically, premenopausal lymph node positive breast cancer has been treated with adjuvant chemotherapy in addition to endocrine therapy. More recently, genomic testing has been shown to help to stratify the potential added benefit for an individual patient and identify patients who are less likely to derive significant additional benefit from chemotherapy.  For patients with N1 disease and age > 50, and low risk genomic signature with either Oncotype or MammaPrint has been shown to predict lack of benefit for adjuvant chemotherapy.  However, for patients less than 50, there is a minimum 5% absolute benefit in the lymph node positive setting from the addition of adjuvant chemotherapy even with a low risk genomic signature.  The patient is age 52, but premenopausal.  We discussed that this is a gray zone.  It is postulated that the potential benefit of adjuvant chemotherapy in premenopausal women may be at least in part explained by the effective ovarian suppression from chemotherapy.  We discussed the potential role of genomic testing to guide chemotherapy decision.  However the patient is healthy and wishes to be aggressive with her cancer treatment.  She declines genomic testing and wishes to proceed with adjuvant chemotherapy. We discussed standard treatment options of 1) dose dense Adriamycin and Cytoxan followed by weekly Taxol or 2) Taxotere and Cytoxan. We discussed differences in schedule and toxicities. We specifically discussed an increased risk for cardiotoxicity and late bone marrow disorders with the addition of adriamycin.   Given strongly ER positive disease and N1 I would favor adjuvant TC over dose dense AC followed by weekly taxol. We discussed the goals of chemotherapy being cure.  We reviewed the chemotherapy schedule and its side effects including but not limited to alopecia, myelosuppression, infection, fevers, nausea, vomiting, diarrhea, mucositis, dehydration, fatigue, heart disease, neuropathy, and late cancers.  Informed consent was obtained, and the patient elected to proceed. She will complete her 6th cycle of chemotherapy today. Labs are adequate with mild treatment related anemia, adequate ANC, normal platelet count and adequate renal and hepatic function.   -Referral back to radiation for adjuvant treatment planning.   -She will benefit from adjuvant endocrine therapy.  In the context of lymph node positive disease, would prefer ovarian suppression or BSO plus AI if she does not experience chemotherapy induced menopause. Check labs in 4-6 weeks to assess baseline  -Ki-67 20% suggesting benefit from adjuvant Verzenio.  -ROGERIO on exam today.    2. Chemotherapy induced nausea  3. Gastritis.  -Added PPI for possible chemical gastritis.Continue scheduled zofran and compazine.  -Continue IVF day 2 and 8  -Better controlled    4. Anxiety  5. Hot flashes  -Better controlled on effexor 112.5 mg daily.    6. Bone health  -Ca/D recommended  -We discussed ASCO guidelines regarding adjuvant bisphosphonate use in early breast cancer. Will continue to discuss after radiation completion/endocrine initiation.       Follow Up:   4-6 weeks    Rama Cano MD  Hematology and Oncology       I have spent a total of 40 min on reviewing test results/preparing to see patient, counseling patient, performing medically appropriate exam, ordering chemotherapy and procedures, coordination of care and documenting clinical information in the electronic or other health record

## 2023-02-03 ENCOUNTER — HOSPITAL ENCOUNTER (OUTPATIENT)
Dept: ONCOLOGY | Facility: HOSPITAL | Age: 53
Discharge: HOME | End: 2023-02-03
Admitting: INTERNAL MEDICINE
Payer: COMMERCIAL

## 2023-02-03 VITALS
SYSTOLIC BLOOD PRESSURE: 98 MMHG | HEIGHT: 65 IN | RESPIRATION RATE: 16 BRPM | WEIGHT: 150 LBS | TEMPERATURE: 98.6 F | BODY MASS INDEX: 24.99 KG/M2 | DIASTOLIC BLOOD PRESSURE: 59 MMHG | HEART RATE: 110 BPM

## 2023-02-03 DIAGNOSIS — Z45.2 ENCOUNTER FOR CARE RELATED TO PORT-A-CATH: Primary | ICD-10-CM

## 2023-02-03 DIAGNOSIS — Z17.0 MALIGNANT NEOPLASM OF CENTRAL PORTION OF LEFT BREAST IN FEMALE, ESTROGEN RECEPTOR POSITIVE: ICD-10-CM

## 2023-02-03 DIAGNOSIS — C50.112 MALIGNANT NEOPLASM OF CENTRAL PORTION OF LEFT BREAST IN FEMALE, ESTROGEN RECEPTOR POSITIVE: ICD-10-CM

## 2023-02-03 PROCEDURE — 96360 HYDRATION IV INFUSION INIT: CPT

## 2023-02-03 PROCEDURE — 96372 THER/PROPH/DIAG INJ SC/IM: CPT

## 2023-02-03 PROCEDURE — 25010000002 HEPARIN LOCK FLUSH PER 10 UNITS: Performed by: INTERNAL MEDICINE

## 2023-02-03 PROCEDURE — 25010000002 PEGFILGRASTIM-CBQV 6 MG/0.6ML SOLUTION PREFILLED SYRINGE: Performed by: INTERNAL MEDICINE

## 2023-02-03 RX ORDER — HEPARIN SODIUM (PORCINE) LOCK FLUSH IV SOLN 100 UNIT/ML 100 UNIT/ML
500 SOLUTION INTRAVENOUS AS NEEDED
Status: CANCELLED | OUTPATIENT
Start: 2023-02-03

## 2023-02-03 RX ORDER — HEPARIN SODIUM (PORCINE) LOCK FLUSH IV SOLN 100 UNIT/ML 100 UNIT/ML
500 SOLUTION INTRAVENOUS AS NEEDED
Status: DISCONTINUED | OUTPATIENT
Start: 2023-02-03 | End: 2023-02-04 | Stop reason: HOSPADM

## 2023-02-03 RX ORDER — SODIUM CHLORIDE 9 MG/ML
1000 INJECTION, SOLUTION INTRAVENOUS ONCE
Status: COMPLETED | OUTPATIENT
Start: 2023-02-03 | End: 2023-02-03

## 2023-02-03 RX ORDER — SODIUM CHLORIDE 0.9 % (FLUSH) 0.9 %
10 SYRINGE (ML) INJECTION AS NEEDED
Status: CANCELLED | OUTPATIENT
Start: 2023-02-03

## 2023-02-03 RX ADMIN — PEGFILGRASTIM-CBQV 6 MG: 6 INJECTION, SOLUTION SUBCUTANEOUS at 15:44

## 2023-02-03 RX ADMIN — HEPARIN SODIUM (PORCINE) LOCK FLUSH IV SOLN 100 UNIT/ML 500 UNITS: 100 SOLUTION at 15:44

## 2023-02-03 RX ADMIN — SODIUM CHLORIDE 1000 ML: 9 INJECTION, SOLUTION INTRAVENOUS at 14:44

## 2023-02-08 ENCOUNTER — HOSPITAL ENCOUNTER (OUTPATIENT)
Dept: RADIATION ONCOLOGY | Facility: HOSPITAL | Age: 53
Setting detail: RADIATION/ONCOLOGY SERIES
Discharge: HOME | End: 2023-02-08
Payer: COMMERCIAL

## 2023-02-08 ENCOUNTER — OFFICE VISIT (OUTPATIENT)
Dept: RADIATION ONCOLOGY | Facility: HOSPITAL | Age: 53
End: 2023-02-08
Payer: COMMERCIAL

## 2023-02-08 ENCOUNTER — HOSPITAL ENCOUNTER (OUTPATIENT)
Dept: RADIATION ONCOLOGY | Facility: HOSPITAL | Age: 53
Discharge: HOME | End: 2023-02-08

## 2023-02-08 VITALS
RESPIRATION RATE: 16 BRPM | OXYGEN SATURATION: 96 % | WEIGHT: 149.9 LBS | DIASTOLIC BLOOD PRESSURE: 68 MMHG | SYSTOLIC BLOOD PRESSURE: 114 MMHG | TEMPERATURE: 97.2 F | HEART RATE: 73 BPM | BODY MASS INDEX: 24.09 KG/M2 | HEIGHT: 66 IN

## 2023-02-08 DIAGNOSIS — Z17.0 MALIGNANT NEOPLASM OF CENTRAL PORTION OF LEFT BREAST IN FEMALE, ESTROGEN RECEPTOR POSITIVE: Primary | ICD-10-CM

## 2023-02-08 DIAGNOSIS — C50.112 MALIGNANT NEOPLASM OF CENTRAL PORTION OF LEFT BREAST IN FEMALE, ESTROGEN RECEPTOR POSITIVE: Primary | ICD-10-CM

## 2023-02-08 PROCEDURE — 77290 THER RAD SIMULAJ FIELD CPLX: CPT | Performed by: RADIOLOGY

## 2023-02-08 PROCEDURE — G0463 HOSPITAL OUTPT CLINIC VISIT: HCPCS

## 2023-02-08 PROCEDURE — 77333 RADIATION TREATMENT AID(S): CPT | Performed by: RADIOLOGY

## 2023-02-08 NOTE — PROGRESS NOTES
"RE-EVALUATION    PATIENT:                                                      Debra Beckman  :                                                          1970  DATE:                          2023   DIAGNOSIS:     Malignant neoplasm of central portion of left breast in female, estrogen receptor positive (HCC)  Clinical- Stage IA (cT1b, cN0, cM0, G1, ER+, SD+, HER2-)  Pathologic- Stage IA (pT1c, pN1a, cM0, G2, ER+, SD+, HER2-)         BRIEF HISTORY:  The patient is a very pleasant 52 y.o. female  with left central breast cancer, grade 2 infiltrating ductal, hormone receptor positive, HER2 negative with associated intermediate grade DCIS.  Status post conservation surgery.  Final surgical margins were negative.  1 sentinel lymph node contained a 4 mm macrometastasis with extracapsular extension.  1 additional lymph node removed at the time of reexcision was negative.  She completed adjuvant chemotherapy 2023.  She returns today for initiation of treatment planning for adjuvant radiotherapy    No Known Allergies    Review of Systems   Constitutional: Positive for fatigue.   Musculoskeletal: Positive for arthralgias.   All other systems reviewed and are negative.          Objective   VITAL SIGNS:   Vitals:    23 0854   BP: 114/68   Pulse: 73   Resp: 16   Temp: 97.2 °F (36.2 °C)   SpO2: 96%  Comment: RA   Weight: 68 kg (149 lb 14.4 oz)   Height: 167.6 cm (66\")   PainSc:   7   PainLoc: Generalized        Karnofsky score: 80   KSP %:  80    Physical Exam  Vitals and nursing note reviewed.   Constitutional:       Appearance: She is well-developed.   HENT:      Head: Normocephalic and atraumatic.   Cardiovascular:      Rate and Rhythm: Normal rate and regular rhythm.      Heart sounds: Normal heart sounds. No murmur heard.  Pulmonary:      Effort: Pulmonary effort is normal.      Breath sounds: Normal breath sounds. No wheezing or rales.   Chest:   Breasts:     Left: No swelling, mass or " tenderness. Skin change:  Healed incisions.   Abdominal:      General: Bowel sounds are normal. There is no distension.      Palpations: Abdomen is soft.      Tenderness: There is no abdominal tenderness.   Musculoskeletal:         General: No tenderness. Normal range of motion.      Cervical back: Normal range of motion and neck supple.   Lymphadenopathy:      Cervical: No cervical adenopathy.      Upper Body:      Right upper body: No supraclavicular adenopathy.      Left upper body: No supraclavicular or axillary adenopathy.   Skin:     General: Skin is warm and dry.   Neurological:      Mental Status: She is alert and oriented to person, place, and time.      Sensory: No sensory deficit.   Psychiatric:         Behavior: Behavior normal.         Thought Content: Thought content normal.         Judgment: Judgment normal.              The following portions of the patient's history were reviewed and updated as appropriate: allergies, current medications, past family history, past medical history, past social history, past surgical history and problem list.    Diagnoses and all orders for this visit:    Malignant neoplasm of central portion of left breast in female, estrogen receptor positive (HCC)  -     Cancel: Pregnancy, Urine - Urine, Clean Catch; Future      IMPRESSION: Breast cancer, stage IA (pT1c, pN1a, cM0).  Status post conservation surgery.  She recently completed adjuvant chemotherapy.  Adjuvant radiotherapy is now planned and is an integral part of conservation treatment.  Comprehensive treatment of karena regions will be included since this tumor was behaving aggressively, as evidenced by axillary metastasis and extracapsular spread from a relatively small primary site and was located in the central breast, increasing risk of possible subclinical spread to internal mammary nodes.  We reviewed the role of radiotherapy in this situation in detail today.  All questions were answered.  Informed consent was  obtained.    RECOMMENDATIONS: She will undergo CT simulation today.  The left breast and comprehensive lymphatics will receive a dose of 45 [place] delivered in 25 fractions over 5 weeks.  Deep inspiration breath-hold technique will be utilized to further reduce the dose to the heart and lung.  She will likely begin treatment on 2/20/2023.  She requests early afternoon appointment times, if possible.       Raman Dunn MD     Approximately 15 minutes were spent during this visit with patient and family.

## 2023-02-09 ENCOUNTER — HOSPITAL ENCOUNTER (OUTPATIENT)
Dept: ONCOLOGY | Facility: HOSPITAL | Age: 53
Discharge: HOME | End: 2023-02-09
Admitting: INTERNAL MEDICINE
Payer: COMMERCIAL

## 2023-02-09 VITALS
SYSTOLIC BLOOD PRESSURE: 113 MMHG | BODY MASS INDEX: 23.53 KG/M2 | TEMPERATURE: 97.4 F | HEART RATE: 91 BPM | RESPIRATION RATE: 16 BRPM | DIASTOLIC BLOOD PRESSURE: 64 MMHG | WEIGHT: 145.8 LBS

## 2023-02-09 DIAGNOSIS — C50.112 MALIGNANT NEOPLASM OF CENTRAL PORTION OF LEFT BREAST IN FEMALE, ESTROGEN RECEPTOR POSITIVE: Primary | ICD-10-CM

## 2023-02-09 DIAGNOSIS — Z17.0 MALIGNANT NEOPLASM OF CENTRAL PORTION OF LEFT BREAST IN FEMALE, ESTROGEN RECEPTOR POSITIVE: Primary | ICD-10-CM

## 2023-02-09 DIAGNOSIS — Z45.2 ENCOUNTER FOR CARE RELATED TO PORT-A-CATH: ICD-10-CM

## 2023-02-09 PROCEDURE — 25010000002 HEPARIN LOCK FLUSH PER 10 UNITS: Performed by: INTERNAL MEDICINE

## 2023-02-09 PROCEDURE — 96360 HYDRATION IV INFUSION INIT: CPT

## 2023-02-09 RX ORDER — SODIUM CHLORIDE 9 MG/ML
1000 INJECTION, SOLUTION INTRAVENOUS ONCE
Status: COMPLETED | OUTPATIENT
Start: 2023-02-09 | End: 2023-02-09

## 2023-02-09 RX ORDER — SODIUM CHLORIDE 0.9 % (FLUSH) 0.9 %
10 SYRINGE (ML) INJECTION AS NEEDED
Status: CANCELLED | OUTPATIENT
Start: 2023-02-09

## 2023-02-09 RX ORDER — HEPARIN SODIUM (PORCINE) LOCK FLUSH IV SOLN 100 UNIT/ML 100 UNIT/ML
500 SOLUTION INTRAVENOUS AS NEEDED
Status: DISCONTINUED | OUTPATIENT
Start: 2023-02-09 | End: 2023-02-10 | Stop reason: HOSPADM

## 2023-02-09 RX ORDER — HEPARIN SODIUM (PORCINE) LOCK FLUSH IV SOLN 100 UNIT/ML 100 UNIT/ML
500 SOLUTION INTRAVENOUS AS NEEDED
Status: CANCELLED | OUTPATIENT
Start: 2023-02-09

## 2023-02-09 RX ADMIN — SODIUM CHLORIDE 1000 ML: 9 INJECTION, SOLUTION INTRAVENOUS at 14:12

## 2023-02-09 RX ADMIN — HEPARIN SODIUM (PORCINE) LOCK FLUSH IV SOLN 100 UNIT/ML 500 UNITS: 100 SOLUTION at 15:14

## 2023-02-14 PROCEDURE — 77300 RADIATION THERAPY DOSE PLAN: CPT | Performed by: RADIOLOGY

## 2023-02-14 PROCEDURE — 77334 RADIATION TREATMENT AID(S): CPT | Performed by: RADIOLOGY

## 2023-02-14 PROCEDURE — 77295 3-D RADIOTHERAPY PLAN: CPT | Performed by: RADIOLOGY

## 2023-02-15 ENCOUNTER — TELEPHONE (OUTPATIENT)
Dept: ONCOLOGY | Facility: CLINIC | Age: 53
End: 2023-02-15

## 2023-02-15 ENCOUNTER — OFFICE VISIT (OUTPATIENT)
Dept: ONCOLOGY | Facility: CLINIC | Age: 53
End: 2023-02-15
Payer: COMMERCIAL

## 2023-02-15 DIAGNOSIS — U07.1 COVID-19 VIRUS INFECTION: Primary | ICD-10-CM

## 2023-02-15 PROCEDURE — 99441 PR PHYS/QHP TELEPHONE EVALUATION 5-10 MIN: CPT | Performed by: INTERNAL MEDICINE

## 2023-02-15 NOTE — PROGRESS NOTES
Hematology and Oncology [place]  Office number [phone]    Fax number [phone]     Follow up     Date: 02/15/23      Patient Name: Debra Beckman  MRN: [id number]  : 1970    Referring Physician: Dr. Goran Russell    Chief Complaint: telephone call/ covid 19. She consents to receive care via phone.     Cancer Staging: Stage IA (cT1b, cN0, cM0, G1, ER+, CA+, HER2-)      History of Present Illness: Debra Beckman is a pleasant 52 y.o. female who presents today for evaluation of left breast cancer.  She is accompanied by her supportive father who is a retired [employer] neurosurgeon, Dr. Beckman.    Screening mammogram 2022 demonstrated an asymmetry in the anterior left medial breast.  Diagnostic mammogram on 2022 demonstrated a persistent ill-defined asymmetry with architectural distortion in the left 9:00 periareolar region.  On ultrasound, this corresponded to an 8 mm mass in the 9:00 left breast.    Left breast 9:00 biopsy on 8/10/2022 demonstrated grade 1 invasive ductal carcinoma with associated DCIS (%, CA 60%, HER2/kathleen negative, 0+).    She proceeded with bilateral breast MRI on 8/15/2022.  This demonstrated a 1.4 cm 9:00 left breast enhancing mass, with a 1.3 cm area of clumped non-mass enhancement versus postbiopsy change 1 cm anterior lateral to this.  There is additionally a dominant focus of enhancement suspicious for a satellite lesion spanning 0.4 cm.  She underwent second look ultrasound and biopsy of the second lesion which corresponded to a subcentimeteric mass on ultrasound.    Left breast 9:00 biopsy on 2022 showed grade 2 invasive ductal carcinoma (ER greater than 90%, CA greater than 80%, HER2/kathleen negative, 0+)    Left breast lumpectomy, sentinel lymph node biopsy on 2022 demonstrated grade 2 invasive ductal carcinoma spanning 1.4 cm with associated DCIS.  Margin positive.  [place] lymph node positive () with a macrometastasis spanning 4  mm with extracapsular extension. She underwent re-excision 22 with fat necrosis and no residual invasive or in situ carcinoma. Single left axillary LN excised and benign (total LN count 1/2).    Breast cancer risk profile:  G3, P2; Age of first live birth 37  Premenopausal, LMP was 2022.  She discontinued OCPs at the time of her cancer diagnosis.  Family history of breast, ovarian, prostate or pancreatic cancer: Maternal aunt with breast cancer.  Genetics: [facility] cancer next panel was negative in .    Treatment history:  TC x 6 cycles  Adjuvant radiation, planned.     Interval history:  Monday evening started to feel run down/malaise, low grade temp to 100 and body aches. Less achy today, using ibuprofen to control her temp. Productive cough, better today. KENT walking up and down stairs and mild chest tightness yesterday, not persistent. Does not have a pulse ox monitor at home.  Last chemo 2 weeks ago. Radiation to start next week.    Past Medical History:   Past Medical History:   Diagnosis Date   • Allergic    • Asthma    • Low back pain    • Malignant neoplasm of central portion of left breast in female, estrogen receptor positive (HCC) 2022       Past Surgical History:   Past Surgical History:   Procedure Laterality Date   • BREAST BIOPSY  08/10/2022   • BREAST BIOPSY Left 2022   • BREAST LUMPECTOMY WITH AXILLARY NODE DISSECTION Left 2022    clean dread   • BREAST LUMPECTOMY WITH SENTINEL NODE BIOPSY Left 2022   •  SECTION     • VENOUS ACCESS DEVICE (PORT) INSERTION Right 2022       Family History:   Family History   Problem Relation Age of Onset   • Hypertension Mother    • No Known Problems Father    • Breast cancer Maternal Aunt    • Ovarian cancer Neg Hx        Social History:   Social History     Socioeconomic History   • Marital status: Single   Tobacco Use   • Smoking status: Former   • Smokeless tobacco: Never   Vaping Use   • Vaping Use: Former    Substance and Sexual Activity   • Alcohol use: Yes     Comment: occasional   • Drug use: No   • Sexual activity: Defer   Works as a .      Medications:     Current Outpatient Medications:   •  venlafaxine XR (EFFEXOR-XR) 75 MG 24 hr capsule, Take 1 capsule by mouth Daily., Disp: 90 capsule, Rfl: 1  •  albuterol sulfate  (90 Base) MCG/ACT inhaler, Inhale 2 puffs Every 4 (Four) Hours As Needed for Wheezing., Disp: 18 g, Rfl: 11  •  celecoxib (CeleBREX) 200 MG capsule, Take 1 capsule by mouth Daily. (Patient taking differently: Take 200 mg by mouth As Needed.), Disp: 30 capsule, Rfl: 11  •  dexamethasone (DECADRON) 4 MG tablet, Take 2 tablets oral twice a day for 3 consecutive days beginning the day before chemotherapy and continue for 6 doses., Disp: 12 tablet, Rfl: 3  •  lidocaine-prilocaine (EMLA) 2.5-2.5 % cream, Apply 1 application topically to the appropriate area as directed As Needed (45-60 minutes prior to port access.  Cover with saran/plastic wrap.)., Disp: 30 g, Rfl: 3  •  nystatin susp + lidocaine viscous (MAGIC MOUTHWASH) oral suspension, 5-10 ml swish and spit or swallow QID prn  Indications: Please file. Patient will call when ready to fill, Disp: 240 mL, Rfl: 3  •  omeprazole (priLOSEC) 20 MG capsule, Take 1 capsule by mouth Daily., Disp: 30 capsule, Rfl: 1  •  ondansetron (ZOFRAN) 8 MG tablet, Take 1 tablet by mouth 3 (Three) Times a Day As Needed for Nausea or Vomiting., Disp: 30 tablet, Rfl: 5  •  prochlorperazine (COMPAZINE) 10 MG tablet, Take 0.5 tablets by mouth Every 6 (Six) Hours As Needed for Nausea or Vomiting (nausea) for up to 60 doses., Disp: 60 tablet, Rfl: 3  •  Symbicort 160-4.5 MCG/ACT inhaler, INHALE 2 PUFFS BY MOUTH TWICE DAILY, Disp: 10.2 g, Rfl: 2  •  traMADol (ULTRAM) 50 MG tablet, Take 1-2 tablets by mouth Every 6 (Six) Hours As Needed (pain)., Disp: 30 tablet, Rfl: 0  •  venlafaxine XR (EFFEXOR-XR) 37.5 MG 24 hr capsule, Take 1 capsule by mouth Daily.  Take with 75 mg for total daily dose 112.5 mg, Disp: 30 capsule, Rfl: 3  •  vitamin B-12 (CYANOCOBALAMIN) 1000 MCG tablet, Take 1,000 mcg by mouth Daily., Disp: , Rfl:   •  vitamin B-6 (PYRIDOXINE) 50 MG tablet, Take 50 mg by mouth Daily., Disp: , Rfl:   •  Vitamin D, Cholecalciferol, (CHOLECALCIFEROL) 10 MCG (400 UNIT) tablet, Take 400 Units by mouth Daily., Disp: , Rfl:     Current Facility-Administered Medications:   •  lidocaine (XYLOCAINE) 1 % injection 10 mL, 10 mL, Infiltration, Once, Connie Marie MD  •  lidocaine (XYLOCAINE) 1 % injection 5 mL, 5 mL, Infiltration, Once, Connie Marie MD    Allergies:   No Known Allergies    Objective     Vital Signs:   There were no vitals filed for this visit. There is no height or weight on file to calculate BMI.   There were no vitals filed for this visit.    ECOG Performance Status: 0    Physical Exam:  NA      Laboratory/Imaging Reviewed:   No visits with results within 2 Week(s) from this visit.   Latest known visit with results is:   Hospital Outpatient Visit on 02/01/2023   Component Date Value Ref Range Status   • Glucose 02/01/2023 80  65 - 99 mg/dL Final   • BUN 02/01/2023 9  6 - 20 mg/dL Final   • Creatinine 02/01/2023 0.50 (L)  0.57 - 1.00 mg/dL Final   • Sodium 02/01/2023 139  136 - 145 mmol/L Final   • Potassium 02/01/2023 3.9  3.5 - 5.2 mmol/L Final   • Chloride 02/01/2023 104  98 - 107 mmol/L Final   • CO2 02/01/2023 28.0  22.0 - 29.0 mmol/L Final   • Calcium 02/01/2023 9.4  8.6 - 10.5 mg/dL Final   • Total Protein 02/01/2023 6.0  6.0 - 8.5 g/dL Final   • Albumin 02/01/2023 4.1  3.5 - 5.2 g/dL Final   • ALT (SGPT) 02/01/2023 21  1 - 33 U/L Final   • AST (SGOT) 02/01/2023 23  1 - 32 U/L Final   • Alkaline Phosphatase 02/01/2023 62  39 - 117 U/L Final   • Total Bilirubin 02/01/2023 0.3  0.0 - 1.2 mg/dL Final   • Globulin 02/01/2023 1.9  gm/dL Final    Calculated Result   • A/G Ratio 02/01/2023 2.2  g/dL Final   • BUN/Creatinine Ratio 02/01/2023 18.0  7.0 -  25.0 Final   • Anion Gap 02/01/2023 7.0  5.0 - 15.0 mmol/L Final   • eGFR 02/01/2023 114.4  >60.0 mL/min/1.73 Final   • WBC 02/01/2023 3.23 (L)  3.40 - 10.80 10*3/mm3 Final   • RBC 02/01/2023 3.34 (L)  3.77 - 5.28 10*6/mm3 Final   • Hemoglobin 02/01/2023 11.1 (L)  12.0 - 15.9 g/dL Final   • Hematocrit 02/01/2023 34.4  34.0 - 46.6 % Final   • MCV 02/01/2023 103.0 (H)  79.0 - 97.0 fL Final   • MCH 02/01/2023 33.2 (H)  26.6 - 33.0 pg Final   • MCHC 02/01/2023 32.3  31.5 - 35.7 g/dL Final   • RDW 02/01/2023 15.9 (H)  12.3 - 15.4 % Final   • RDW-SD 02/01/2023 60.1 (H)  37.0 - 54.0 fl Final   • MPV 02/01/2023 9.0  6.0 - 12.0 fL Final   • Platelets 02/01/2023 235  140 - 450 10*3/mm3 Final   • Neutrophil % 02/01/2023 51.1  42.7 - 76.0 % Final   • Lymphocyte % 02/01/2023 30.7  19.6 - 45.3 % Final   • Monocyte % 02/01/2023 17.3 (H)  5.0 - 12.0 % Final   • Eosinophil % 02/01/2023 0.0 (L)  0.3 - 6.2 % Final   • Basophil % 02/01/2023 0.9  0.0 - 1.5 % Final   • Immature Grans % 02/01/2023 0.0  0.0 - 0.5 % Final   • Neutrophils, Absolute 02/01/2023 1.65 (L)  1.70 - 7.00 10*3/mm3 Final   • Lymphocytes, Absolute 02/01/2023 0.99  0.70 - 3.10 10*3/mm3 Final   • Monocytes, Absolute 02/01/2023 0.56  0.10 - 0.90 10*3/mm3 Final   • Eosinophils, Absolute 02/01/2023 0.00  0.00 - 0.40 10*3/mm3 Final   • Basophils, Absolute 02/01/2023 0.03  0.00 - 0.20 10*3/mm3 Final   • Immature Grans, Absolute 02/01/2023 0.00  0.00 - 0.05 10*3/mm3 Final         Assessment / Plan        1. Malignant neoplasm of central portion of left breast in female, estrogen receptor positive  2. Recent chemotherapy  3. Active COVID infection.     COVID 19 community outbreak  -Patients with cancer may be at higher risk for complications from Covid-19 and she is immunosuppressed by virtue of recent chemotherapy. She is within 5 days of symptom onset and has mild symptoms currently. I recommended obtaining a pulse ox and monitoring her levels. If oxygen saturations drop or  she develops recurrent/persistent dyspnea, recommend ED evaluation.     -I recommended consideration of Paxlovid to lower risk for progression to severe infection. We discussed EUA. Her creatinine is normal nad no drug interactions identified.     Follow Up:     Rama Cano MD  Hematology and Oncology       I have spent a total of 7 minutes of time in direct counseling.

## 2023-02-15 NOTE — TELEPHONE ENCOUNTER
Patient called she tested positive today for COVID she did a home test. She wants to know if paxlovid needs to be called in? Please call.

## 2023-02-15 NOTE — TELEPHONE ENCOUNTER
Patient stated she home tested positive today for COVID.  Stated symptoms started 2/13/23 of low grade fever, congestion.  Since then she stated has had body aches, fever (highest 100.0) and congestion.  Stated ibuprofen helps reduce fever.  Patient stated intermittent SOA with activity.  Denies SOA now.  Dr. Cano notified and telephone visit set up for now.  Patient notified.  Also, advised patient per oncology office policy, she will need to re-test at home on 2/23/23, let this office know results and if at that point she is negative, afebrile without use of antipyretics for 24 hours and improvement of symptoms, she will be able to RTC if needed.  Next appointment with Dr. Cano in person not until March.  Patient verbalized understanding and stated she will call this office and report home test results on 2/23/23.  Patient advised in the mean time if she has any questions, to call this office.  Patient verbalized understanding.  Patient awaiting phone call from MD.

## 2023-02-20 ENCOUNTER — HOSPITAL ENCOUNTER (OUTPATIENT)
Dept: RADIATION ONCOLOGY | Facility: HOSPITAL | Age: 53
Discharge: HOME | End: 2023-02-20

## 2023-02-20 PROCEDURE — 77280 THER RAD SIMULAJ FIELD SMPL: CPT | Performed by: RADIOLOGY

## 2023-02-20 PROCEDURE — 77333 RADIATION TREATMENT AID(S): CPT | Performed by: RADIOLOGY

## 2023-02-21 ENCOUNTER — HOSPITAL ENCOUNTER (OUTPATIENT)
Dept: RADIATION ONCOLOGY | Facility: HOSPITAL | Age: 53
Discharge: HOME | End: 2023-02-21

## 2023-02-21 PROCEDURE — 77387 GUIDANCE FOR RADJ TX DLVR: CPT | Performed by: RADIOLOGY

## 2023-02-21 PROCEDURE — 77412 RADIATION TX DELIVERY LVL 3: CPT | Performed by: RADIOLOGY

## 2023-02-22 ENCOUNTER — HOSPITAL ENCOUNTER (OUTPATIENT)
Dept: RADIATION ONCOLOGY | Facility: HOSPITAL | Age: 53
Discharge: HOME | End: 2023-02-22

## 2023-02-22 PROCEDURE — 77412 RADIATION TX DELIVERY LVL 3: CPT | Performed by: RADIOLOGY

## 2023-02-22 PROCEDURE — 77387 GUIDANCE FOR RADJ TX DLVR: CPT | Performed by: RADIOLOGY

## 2023-02-23 ENCOUNTER — HOSPITAL ENCOUNTER (OUTPATIENT)
Dept: RADIATION ONCOLOGY | Facility: HOSPITAL | Age: 53
Discharge: HOME | End: 2023-02-23

## 2023-02-23 PROCEDURE — 77412 RADIATION TX DELIVERY LVL 3: CPT | Performed by: RADIOLOGY

## 2023-02-23 PROCEDURE — 77336 RADIATION PHYSICS CONSULT: CPT | Performed by: RADIOLOGY

## 2023-02-23 PROCEDURE — 77387 GUIDANCE FOR RADJ TX DLVR: CPT | Performed by: RADIOLOGY

## 2023-02-24 ENCOUNTER — HOSPITAL ENCOUNTER (OUTPATIENT)
Dept: RADIATION ONCOLOGY | Facility: HOSPITAL | Age: 53
Discharge: HOME | End: 2023-02-24

## 2023-02-24 PROCEDURE — 77387 GUIDANCE FOR RADJ TX DLVR: CPT | Performed by: RADIOLOGY

## 2023-02-24 PROCEDURE — 77412 RADIATION TX DELIVERY LVL 3: CPT | Performed by: RADIOLOGY

## 2023-02-24 NOTE — TELEPHONE ENCOUNTER
Patient stated she had two negative home COVID test results yesterday.  Patient stated her symptoms are improving and has been afebrile > 24 hours without the use of antipyretics.

## 2023-02-27 ENCOUNTER — HOSPITAL ENCOUNTER (OUTPATIENT)
Dept: RADIATION ONCOLOGY | Facility: HOSPITAL | Age: 53
Discharge: HOME | End: 2023-02-27

## 2023-02-27 PROCEDURE — 77387 GUIDANCE FOR RADJ TX DLVR: CPT | Performed by: RADIOLOGY

## 2023-02-27 PROCEDURE — 77412 RADIATION TX DELIVERY LVL 3: CPT | Performed by: RADIOLOGY

## 2023-02-28 ENCOUNTER — HOSPITAL ENCOUNTER (OUTPATIENT)
Dept: RADIATION ONCOLOGY | Facility: HOSPITAL | Age: 53
Discharge: HOME | End: 2023-02-28
Payer: COMMERCIAL

## 2023-02-28 ENCOUNTER — TELEPHONE (OUTPATIENT)
Dept: ONCOLOGY | Facility: CLINIC | Age: 53
End: 2023-02-28
Payer: COMMERCIAL

## 2023-02-28 ENCOUNTER — TRANSCRIBE ORDERS (OUTPATIENT)
Dept: MAMMOGRAPHY | Facility: HOSPITAL | Age: 53
End: 2023-02-28
Payer: COMMERCIAL

## 2023-02-28 VITALS — BODY MASS INDEX: 23.57 KG/M2 | WEIGHT: 146 LBS

## 2023-02-28 DIAGNOSIS — C50.812 MALIGNANT NEOPLASM OF OVERLAPPING SITES OF LEFT FEMALE BREAST, UNSPECIFIED ESTROGEN RECEPTOR STATUS: Primary | ICD-10-CM

## 2023-02-28 PROCEDURE — 77387 GUIDANCE FOR RADJ TX DLVR: CPT | Performed by: RADIOLOGY

## 2023-02-28 PROCEDURE — 77412 RADIATION TX DELIVERY LVL 3: CPT | Performed by: RADIOLOGY

## 2023-02-28 NOTE — TELEPHONE ENCOUNTER
Notified patient she missed a port flush today.  Patient stated she thought it was on this Thursday.  Notified her that scheduling will reach out to her tomorrow to r/s appointment. Patient verbalized understanding.  Message sent to scheduling.

## 2023-03-01 ENCOUNTER — HOSPITAL ENCOUNTER (OUTPATIENT)
Dept: RADIATION ONCOLOGY | Facility: HOSPITAL | Age: 53
Discharge: HOME | End: 2023-03-01

## 2023-03-01 ENCOUNTER — HOSPITAL ENCOUNTER (OUTPATIENT)
Dept: RADIATION ONCOLOGY | Facility: HOSPITAL | Age: 53
Setting detail: RADIATION/ONCOLOGY SERIES
Discharge: HOME | End: 2023-03-01
Payer: COMMERCIAL

## 2023-03-01 PROCEDURE — 77387 GUIDANCE FOR RADJ TX DLVR: CPT | Performed by: RADIOLOGY

## 2023-03-01 PROCEDURE — 77412 RADIATION TX DELIVERY LVL 3: CPT | Performed by: RADIOLOGY

## 2023-03-02 ENCOUNTER — OFFICE VISIT (OUTPATIENT)
Dept: ONCOLOGY | Facility: CLINIC | Age: 53
End: 2023-03-02
Payer: COMMERCIAL

## 2023-03-02 ENCOUNTER — HOSPITAL ENCOUNTER (OUTPATIENT)
Dept: ONCOLOGY | Facility: HOSPITAL | Age: 53
Discharge: HOME | End: 2023-03-02
Admitting: INTERNAL MEDICINE
Payer: COMMERCIAL

## 2023-03-02 ENCOUNTER — HOSPITAL ENCOUNTER (OUTPATIENT)
Dept: RADIATION ONCOLOGY | Facility: HOSPITAL | Age: 53
Discharge: HOME | End: 2023-03-02

## 2023-03-02 VITALS
WEIGHT: 142.8 LBS | DIASTOLIC BLOOD PRESSURE: 75 MMHG | HEIGHT: 66 IN | HEART RATE: 78 BPM | BODY MASS INDEX: 22.95 KG/M2 | TEMPERATURE: 96.9 F | SYSTOLIC BLOOD PRESSURE: 109 MMHG | OXYGEN SATURATION: 98 % | RESPIRATION RATE: 16 BRPM

## 2023-03-02 DIAGNOSIS — Z45.2 ENCOUNTER FOR CARE RELATED TO PORT-A-CATH: Primary | ICD-10-CM

## 2023-03-02 DIAGNOSIS — R23.2 HOT FLASHES: ICD-10-CM

## 2023-03-02 DIAGNOSIS — C50.112 MALIGNANT NEOPLASM OF CENTRAL PORTION OF LEFT BREAST IN FEMALE, ESTROGEN RECEPTOR POSITIVE: Primary | ICD-10-CM

## 2023-03-02 DIAGNOSIS — Z17.0 MALIGNANT NEOPLASM OF CENTRAL PORTION OF LEFT BREAST IN FEMALE, ESTROGEN RECEPTOR POSITIVE: Primary | ICD-10-CM

## 2023-03-02 PROCEDURE — 77412 RADIATION TX DELIVERY LVL 3: CPT | Performed by: RADIOLOGY

## 2023-03-02 PROCEDURE — 77387 GUIDANCE FOR RADJ TX DLVR: CPT | Performed by: RADIOLOGY

## 2023-03-02 PROCEDURE — 96523 IRRIG DRUG DELIVERY DEVICE: CPT

## 2023-03-02 PROCEDURE — 99214 OFFICE O/P EST MOD 30 MIN: CPT | Performed by: INTERNAL MEDICINE

## 2023-03-02 PROCEDURE — 77336 RADIATION PHYSICS CONSULT: CPT | Performed by: RADIOLOGY

## 2023-03-02 PROCEDURE — 25010000002 HEPARIN LOCK FLUSH PER 10 UNITS: Performed by: INTERNAL MEDICINE

## 2023-03-02 RX ORDER — HEPARIN SODIUM (PORCINE) LOCK FLUSH IV SOLN 100 UNIT/ML 100 UNIT/ML
500 SOLUTION INTRAVENOUS AS NEEDED
Status: DISCONTINUED | OUTPATIENT
Start: 2023-03-02 | End: 2023-03-03 | Stop reason: HOSPADM

## 2023-03-02 RX ORDER — SODIUM CHLORIDE 0.9 % (FLUSH) 0.9 %
10 SYRINGE (ML) INJECTION AS NEEDED
Status: CANCELLED | OUTPATIENT
Start: 2023-03-02

## 2023-03-02 RX ORDER — VENLAFAXINE HYDROCHLORIDE 150 MG/1
150 CAPSULE, EXTENDED RELEASE ORAL DAILY
Qty: 30 CAPSULE | Refills: 1 | Status: SHIPPED | OUTPATIENT
Start: 2023-03-02

## 2023-03-02 RX ORDER — HEPARIN SODIUM (PORCINE) LOCK FLUSH IV SOLN 100 UNIT/ML 100 UNIT/ML
500 SOLUTION INTRAVENOUS AS NEEDED
Status: CANCELLED | OUTPATIENT
Start: 2023-03-02

## 2023-03-02 RX ADMIN — HEPARIN 500 UNITS: 100 SYRINGE at 10:46

## 2023-03-02 NOTE — PROGRESS NOTES
Hematology and Oncology [place]  Office number [phone]    Fax number [phone]     Follow up     Date: 23      Patient Name: Debra Beckman  MRN: [id number]  : 1970    Referring Physician: Dr. Goran Russell    Chief Complaint: follow up breast cancer    Cancer Staging: Stage IA (cT1b, cN0, cM0, G1, ER+, KS+, HER2-)      History of Present Illness: Debra Beckman is a pleasant 52 y.o. female who presents today for evaluation of left breast cancer.  She is accompanied by her supportive father who is a retired [employer] neurosurgeon, Dr. Beckman.    Screening mammogram 2022 demonstrated an asymmetry in the anterior left medial breast.  Diagnostic mammogram on 2022 demonstrated a persistent ill-defined asymmetry with architectural distortion in the left 9:00 periareolar region.  On ultrasound, this corresponded to an 8 mm mass in the 9:00 left breast.    Left breast 9:00 biopsy on 8/10/2022 demonstrated grade 1 invasive ductal carcinoma with associated DCIS (%, KS 60%, HER2/kathleen negative, 0+).    She proceeded with bilateral breast MRI on 8/15/2022.  This demonstrated a 1.4 cm 9:00 left breast enhancing mass, with a 1.3 cm area of clumped non-mass enhancement versus postbiopsy change 1 cm anterior lateral to this.  There is additionally a dominant focus of enhancement suspicious for a satellite lesion spanning 0.4 cm.  She underwent second look ultrasound and biopsy of the second lesion which corresponded to a subcentimeteric mass on ultrasound.    Left breast 9:00 biopsy on 2022 showed grade 2 invasive ductal carcinoma (ER greater than 90%, KS greater than 80%, HER2/kathleen negative, 0+)    Left breast lumpectomy, sentinel lymph node biopsy on 2022 demonstrated grade 2 invasive ductal carcinoma spanning 1.4 cm with associated DCIS.  Margin positive.  [place] lymph node positive () with a macrometastasis spanning 4 mm with extracapsular extension. She  underwent re-excision 22 with fat necrosis and no residual invasive or in situ carcinoma. Single left axillary LN excised and benign (total LN count 1/2).    Breast cancer risk profile:  G3, P2; Age of first live birth 37  Premenopausal, LMP was 2022.  She discontinued OCPs at the time of her cancer diagnosis.  Family history of breast, ovarian, prostate or pancreatic cancer: Maternal aunt with breast cancer.  Genetics: [facility] cancer next panel was negative in .    Treatment history:  TC x 6 cycles  Adjuvant radiation to complete 3/27/23     Interval history:  Worsening fatigue, naps up to 2 hours during the day. Back to work, for a few hours at a time. More anxiety and still with hot flashes. Added vitamin E which helps her hot flashes.  Still with hot flashes after showering, exercise. No menses since chemo initiation.  Twitching of eye is persistent but mildly improved. Lubricating drops help.   No neuropathy.  Recovered from active COVID infection.    Past Medical History:   Past Medical History:   Diagnosis Date   • Allergic    • Asthma    • Low back pain    • Malignant neoplasm of central portion of left breast in female, estrogen receptor positive (HCC) 2022       Past Surgical History:   Past Surgical History:   Procedure Laterality Date   • BREAST BIOPSY  08/10/2022   • BREAST BIOPSY Left 2022   • BREAST LUMPECTOMY WITH AXILLARY NODE DISSECTION Left 2022    clean dread   • BREAST LUMPECTOMY WITH SENTINEL NODE BIOPSY Left 2022   •  SECTION     • VENOUS ACCESS DEVICE (PORT) INSERTION Right 2022       Family History:   Family History   Problem Relation Age of Onset   • Hypertension Mother    • No Known Problems Father    • Breast cancer Maternal Aunt    • Ovarian cancer Neg Hx        Social History:   Social History     Socioeconomic History   • Marital status: Single   Tobacco Use   • Smoking status: Former   • Smokeless tobacco: Never   Vaping Use   • Vaping  Use: Former   Substance and Sexual Activity   • Alcohol use: Yes     Comment: occasional   • Drug use: No   • Sexual activity: Defer   Works as a .      Medications:     Current Outpatient Medications:   •  venlafaxine XR (EFFEXOR-XR) 75 MG 24 hr capsule, Take 1 capsule by mouth Daily., Disp: 90 capsule, Rfl: 1  •  albuterol sulfate  (90 Base) MCG/ACT inhaler, Inhale 2 puffs Every 4 (Four) Hours As Needed for Wheezing., Disp: 18 g, Rfl: 11  •  celecoxib (CeleBREX) 200 MG capsule, Take 1 capsule by mouth Daily. (Patient taking differently: Take 200 mg by mouth As Needed.), Disp: 30 capsule, Rfl: 11  •  dexamethasone (DECADRON) 4 MG tablet, Take 2 tablets oral twice a day for 3 consecutive days beginning the day before chemotherapy and continue for 6 doses., Disp: 12 tablet, Rfl: 3  •  lidocaine-prilocaine (EMLA) 2.5-2.5 % cream, Apply 1 application topically to the appropriate area as directed As Needed (45-60 minutes prior to port access.  Cover with saran/plastic wrap.)., Disp: 30 g, Rfl: 3  •  nystatin susp + lidocaine viscous (MAGIC MOUTHWASH) oral suspension, 5-10 ml swish and spit or swallow QID prn  Indications: Please file. Patient will call when ready to fill, Disp: 240 mL, Rfl: 3  •  omeprazole (priLOSEC) 20 MG capsule, Take 1 capsule by mouth Daily., Disp: 30 capsule, Rfl: 1  •  ondansetron (ZOFRAN) 8 MG tablet, Take 1 tablet by mouth 3 (Three) Times a Day As Needed for Nausea or Vomiting., Disp: 30 tablet, Rfl: 5  •  prochlorperazine (COMPAZINE) 10 MG tablet, Take 0.5 tablets by mouth Every 6 (Six) Hours As Needed for Nausea or Vomiting (nausea) for up to 60 doses., Disp: 60 tablet, Rfl: 3  •  Symbicort 160-4.5 MCG/ACT inhaler, INHALE 2 PUFFS BY MOUTH TWICE DAILY, Disp: 10.2 g, Rfl: 2  •  traMADol (ULTRAM) 50 MG tablet, Take 1-2 tablets by mouth Every 6 (Six) Hours As Needed (pain)., Disp: 30 tablet, Rfl: 0  •  venlafaxine XR (EFFEXOR-XR) 37.5 MG 24 hr capsule, Take 1 capsule by  "mouth Daily. Take with 75 mg for total daily dose 112.5 mg, Disp: 30 capsule, Rfl: 3  •  vitamin B-12 (CYANOCOBALAMIN) 1000 MCG tablet, Take 1,000 mcg by mouth Daily., Disp: , Rfl:   •  vitamin B-6 (PYRIDOXINE) 50 MG tablet, Take 50 mg by mouth Daily., Disp: , Rfl:   •  Vitamin D, Cholecalciferol, (CHOLECALCIFEROL) 10 MCG (400 UNIT) tablet, Take 400 Units by mouth Daily., Disp: , Rfl:     Current Facility-Administered Medications:   •  lidocaine (XYLOCAINE) 1 % injection 10 mL, 10 mL, Infiltration, Once, Connie Marie MD  •  lidocaine (XYLOCAINE) 1 % injection 5 mL, 5 mL, Infiltration, Once, Connie Marie MD    Allergies:   No Known Allergies    Objective     Vital Signs:   Vitals:    03/02/23 0840   BP: 109/75   Pulse: 78   Resp: 16   Temp: 96.9 °F (36.1 °C)   TempSrc: Infrared   SpO2: 98%   Weight: 64.8 kg (142 lb 12.8 oz)   Height: 167.6 cm (65.98\")   PainSc: 0-No pain    Body mass index is 23.06 kg/m².   Pain Score    03/02/23 0840   PainSc: 0-No pain       ECOG Performance Status: 0    Physical Exam:  General: No acute distress. Well appearing   HEENT: Normocephalic, atraumatic. Sclera anicteric. Masked.  Neck: supple, no adenopathy.   Cardiovascular: regular rate and rhythm,. No murmurs.   Respiratory: Normal rate. Clear to auscultation bilaterally  Abdomen: Soft, nontender, non distended with normoactive bowel sounds  Lymph: no cervical, supraclavicular or axillary adenopathy  Neuro: Alert and oriented x 3. No focal deficits.   Ext: Symmetric, no swelling.   Psych: Euthymic  Breast: Incisions well-healed.  No palpable masses.      Laboratory/Imaging Reviewed:   No visits with results within 2 Week(s) from this visit.   Latest known visit with results is:   Hospital Outpatient Visit on 02/01/2023   Component Date Value Ref Range Status   • Glucose 02/01/2023 80  65 - 99 mg/dL Final   • BUN 02/01/2023 9  6 - 20 mg/dL Final   • Creatinine 02/01/2023 0.50 (L)  0.57 - 1.00 mg/dL Final   • Sodium 02/01/2023 139  " 136 - 145 mmol/L Final   • Potassium 02/01/2023 3.9  3.5 - 5.2 mmol/L Final   • Chloride 02/01/2023 104  98 - 107 mmol/L Final   • CO2 02/01/2023 28.0  22.0 - 29.0 mmol/L Final   • Calcium 02/01/2023 9.4  8.6 - 10.5 mg/dL Final   • Total Protein 02/01/2023 6.0  6.0 - 8.5 g/dL Final   • Albumin 02/01/2023 4.1  3.5 - 5.2 g/dL Final   • ALT (SGPT) 02/01/2023 21  1 - 33 U/L Final   • AST (SGOT) 02/01/2023 23  1 - 32 U/L Final   • Alkaline Phosphatase 02/01/2023 62  39 - 117 U/L Final   • Total Bilirubin 02/01/2023 0.3  0.0 - 1.2 mg/dL Final   • Globulin 02/01/2023 1.9  gm/dL Final    Calculated Result   • A/G Ratio 02/01/2023 2.2  g/dL Final   • BUN/Creatinine Ratio 02/01/2023 18.0  7.0 - 25.0 Final   • Anion Gap 02/01/2023 7.0  5.0 - 15.0 mmol/L Final   • eGFR 02/01/2023 114.4  >60.0 mL/min/1.73 Final   • WBC 02/01/2023 3.23 (L)  3.40 - 10.80 10*3/mm3 Final   • RBC 02/01/2023 3.34 (L)  3.77 - 5.28 10*6/mm3 Final   • Hemoglobin 02/01/2023 11.1 (L)  12.0 - 15.9 g/dL Final   • Hematocrit 02/01/2023 34.4  34.0 - 46.6 % Final   • MCV 02/01/2023 103.0 (H)  79.0 - 97.0 fL Final   • MCH 02/01/2023 33.2 (H)  26.6 - 33.0 pg Final   • MCHC 02/01/2023 32.3  31.5 - 35.7 g/dL Final   • RDW 02/01/2023 15.9 (H)  12.3 - 15.4 % Final   • RDW-SD 02/01/2023 60.1 (H)  37.0 - 54.0 fl Final   • MPV 02/01/2023 9.0  6.0 - 12.0 fL Final   • Platelets 02/01/2023 235  140 - 450 10*3/mm3 Final   • Neutrophil % 02/01/2023 51.1  42.7 - 76.0 % Final   • Lymphocyte % 02/01/2023 30.7  19.6 - 45.3 % Final   • Monocyte % 02/01/2023 17.3 (H)  5.0 - 12.0 % Final   • Eosinophil % 02/01/2023 0.0 (L)  0.3 - 6.2 % Final   • Basophil % 02/01/2023 0.9  0.0 - 1.5 % Final   • Immature Grans % 02/01/2023 0.0  0.0 - 0.5 % Final   • Neutrophils, Absolute 02/01/2023 1.65 (L)  1.70 - 7.00 10*3/mm3 Final   • Lymphocytes, Absolute 02/01/2023 0.99  0.70 - 3.10 10*3/mm3 Final   • Monocytes, Absolute 02/01/2023 0.56  0.10 - 0.90 10*3/mm3 Final   • Eosinophils, Absolute  02/01/2023 0.00  0.00 - 0.40 10*3/mm3 Final   • Basophils, Absolute 02/01/2023 0.03  0.00 - 0.20 10*3/mm3 Final   • Immature Grans, Absolute 02/01/2023 0.00  0.00 - 0.05 10*3/mm3 Final         Assessment / Plan        1. Malignant neoplasm of central portion of left breast in female, estrogen receptor positive  -She has an early stage hormone sensitive breast cancer with a single positive lymph node.  She has undergone curative intent surgery.  -We discussed the potential role of adjuvant systemic therapy.  Historically, premenopausal lymph node positive breast cancer has been treated with adjuvant chemotherapy in addition to endocrine therapy. More recently, genomic testing has been shown to help to stratify the potential added benefit for an individual patient and identify patients who are less likely to derive significant additional benefit from chemotherapy.  For patients with N1 disease and age > 50, and low risk genomic signature with either Oncotype or MammaPrint has been shown to predict lack of benefit for adjuvant chemotherapy.  However, for patients less than 50, there is a minimum 5% absolute benefit in the lymph node positive setting from the addition of adjuvant chemotherapy even with a low risk genomic signature.  The patient is age 52, but premenopausal.  We discussed that this is a gray zone.  It is postulated that the potential benefit of adjuvant chemotherapy in premenopausal women may be at least in part explained by the effective ovarian suppression from chemotherapy.  We discussed the potential role of genomic testing to guide chemotherapy decision.  However the patient is healthy and wishes to be aggressive with her cancer treatment.  She declines genomic testing and wishes to proceed with adjuvant chemotherapy. We discussed standard treatment options of 1) dose dense Adriamycin and Cytoxan followed by weekly Taxol or 2) Taxotere and Cytoxan. We discussed differences in schedule and  toxicities. We specifically discussed an increased risk for cardiotoxicity and late bone marrow disorders with the addition of adriamycin.  Given strongly ER positive disease and N1 I would favor adjuvant TC over dose dense AC followed by weekly taxol. We discussed the goals of chemotherapy being cure.  We reviewed the chemotherapy schedule and its side effects including but not limited to alopecia, myelosuppression, infection, fevers, nausea, vomiting, diarrhea, mucositis, dehydration, fatigue, heart disease, neuropathy, and late cancers.  Informed consent was obtained, and the patient elected to proceed. She will complete her 6th cycle of chemotherapy today. Labs are adequate with mild treatment related anemia, adequate ANC, normal platelet count and adequate renal and hepatic function.   -Referral back to radiation for adjuvant treatment planning.   -She will benefit from adjuvant endocrine therapy.  In the context of lymph node positive disease, would prefer ovarian suppression or BSO plus AI if she does not experience chemotherapy induced menopause. Check labs at the end of radiation to assess baseline.  -Ki-67 20% suggesting benefit from adjuvant Verzenio.  -ROGERIO on exam today.     2. Anxiety  3. Hot flashes  -Increase Effexor to 150 mg daily.     4. Bone health  -Ca/D recommended  -We discussed ASCO guidelines regarding adjuvant bisphosphonate use in early breast cancer.   -Plan the addition of adjuvant Zometa after tolerating endocrine therapy.    5. Access  -Port in place.  We discussed timing for removal.  Discussed need for laboratory monitoring with Verzenio initiation as well as plan for twice yearly Zometa infusions.  Okay from my standpoint to remove her port when she is comfortable with that.  She is considering after radiation.  We will continue regular port flushes in the interval.    Follow Up:     Rama Cano MD  Hematology and Oncology       I have spent a total of 30 minutes on reviewing  test results, preparing to see patient, counseling patient, performing medically appropriate exam and documenting clinical information in the electronic health record.

## 2023-03-08 ENCOUNTER — HOSPITAL ENCOUNTER (OUTPATIENT)
Dept: RADIATION ONCOLOGY | Facility: HOSPITAL | Age: 53
Discharge: HOME | End: 2023-03-08

## 2023-03-08 VITALS — BODY MASS INDEX: 22.96 KG/M2 | WEIGHT: 142.2 LBS

## 2023-03-08 PROCEDURE — 77387 GUIDANCE FOR RADJ TX DLVR: CPT | Performed by: RADIOLOGY

## 2023-03-08 PROCEDURE — 77412 RADIATION TX DELIVERY LVL 3: CPT | Performed by: RADIOLOGY

## 2023-03-09 ENCOUNTER — HOSPITAL ENCOUNTER (OUTPATIENT)
Dept: RADIATION ONCOLOGY | Facility: HOSPITAL | Age: 53
Discharge: HOME | End: 2023-03-09

## 2023-03-09 PROCEDURE — 77387 GUIDANCE FOR RADJ TX DLVR: CPT | Performed by: RADIOLOGY

## 2023-03-09 PROCEDURE — 77417 THER RADIOLOGY PORT IMAGE(S): CPT | Performed by: RADIOLOGY

## 2023-03-09 PROCEDURE — 77412 RADIATION TX DELIVERY LVL 3: CPT | Performed by: RADIOLOGY

## 2023-03-10 ENCOUNTER — HOSPITAL ENCOUNTER (OUTPATIENT)
Dept: RADIATION ONCOLOGY | Facility: HOSPITAL | Age: 53
Discharge: HOME | End: 2023-03-10

## 2023-03-10 PROCEDURE — 77387 GUIDANCE FOR RADJ TX DLVR: CPT | Performed by: RADIOLOGY

## 2023-03-10 PROCEDURE — 77412 RADIATION TX DELIVERY LVL 3: CPT | Performed by: RADIOLOGY

## 2023-03-10 PROCEDURE — 77336 RADIATION PHYSICS CONSULT: CPT | Performed by: RADIOLOGY

## 2023-03-11 ENCOUNTER — HOSPITAL ENCOUNTER (OUTPATIENT)
Dept: RADIATION ONCOLOGY | Facility: HOSPITAL | Age: 53
Discharge: HOME | End: 2023-03-11

## 2023-03-11 PROCEDURE — 77387 GUIDANCE FOR RADJ TX DLVR: CPT | Performed by: RADIOLOGY

## 2023-03-11 PROCEDURE — 77412 RADIATION TX DELIVERY LVL 3: CPT | Performed by: RADIOLOGY

## 2023-03-13 ENCOUNTER — HOSPITAL ENCOUNTER (OUTPATIENT)
Dept: RADIATION ONCOLOGY | Facility: HOSPITAL | Age: 53
Discharge: HOME | End: 2023-03-13

## 2023-03-13 PROCEDURE — 77412 RADIATION TX DELIVERY LVL 3: CPT | Performed by: RADIOLOGY

## 2023-03-13 PROCEDURE — 77387 GUIDANCE FOR RADJ TX DLVR: CPT | Performed by: RADIOLOGY

## 2023-03-14 ENCOUNTER — HOSPITAL ENCOUNTER (OUTPATIENT)
Dept: RADIATION ONCOLOGY | Facility: HOSPITAL | Age: 53
Discharge: HOME | End: 2023-03-14

## 2023-03-14 VITALS — BODY MASS INDEX: 23.04 KG/M2 | WEIGHT: 142.7 LBS

## 2023-03-14 PROCEDURE — 77387 GUIDANCE FOR RADJ TX DLVR: CPT | Performed by: RADIOLOGY

## 2023-03-14 PROCEDURE — 77412 RADIATION TX DELIVERY LVL 3: CPT | Performed by: RADIOLOGY

## 2023-03-15 ENCOUNTER — HOSPITAL ENCOUNTER (OUTPATIENT)
Dept: RADIATION ONCOLOGY | Facility: HOSPITAL | Age: 53
Discharge: HOME | End: 2023-03-15

## 2023-03-15 PROCEDURE — 77412 RADIATION TX DELIVERY LVL 3: CPT | Performed by: RADIOLOGY

## 2023-03-15 PROCEDURE — 77387 GUIDANCE FOR RADJ TX DLVR: CPT | Performed by: RADIOLOGY

## 2023-03-15 PROCEDURE — 77417 THER RADIOLOGY PORT IMAGE(S): CPT | Performed by: RADIOLOGY

## 2023-03-16 ENCOUNTER — HOSPITAL ENCOUNTER (OUTPATIENT)
Dept: RADIATION ONCOLOGY | Facility: HOSPITAL | Age: 53
Discharge: HOME | End: 2023-03-16

## 2023-03-16 PROCEDURE — 77387 GUIDANCE FOR RADJ TX DLVR: CPT | Performed by: RADIOLOGY

## 2023-03-16 PROCEDURE — 77412 RADIATION TX DELIVERY LVL 3: CPT | Performed by: RADIOLOGY

## 2023-03-17 ENCOUNTER — HOSPITAL ENCOUNTER (OUTPATIENT)
Dept: RADIATION ONCOLOGY | Facility: HOSPITAL | Age: 53
Discharge: HOME | End: 2023-03-17

## 2023-03-17 PROCEDURE — 77336 RADIATION PHYSICS CONSULT: CPT | Performed by: RADIOLOGY

## 2023-03-17 PROCEDURE — 77412 RADIATION TX DELIVERY LVL 3: CPT | Performed by: RADIOLOGY

## 2023-03-17 PROCEDURE — 77387 GUIDANCE FOR RADJ TX DLVR: CPT | Performed by: RADIOLOGY

## 2023-03-20 ENCOUNTER — HOSPITAL ENCOUNTER (OUTPATIENT)
Dept: RADIATION ONCOLOGY | Facility: HOSPITAL | Age: 53
Discharge: HOME | End: 2023-03-20

## 2023-03-20 PROCEDURE — 77387 GUIDANCE FOR RADJ TX DLVR: CPT | Performed by: RADIOLOGY

## 2023-03-20 PROCEDURE — 77412 RADIATION TX DELIVERY LVL 3: CPT | Performed by: RADIOLOGY

## 2023-03-21 ENCOUNTER — HOSPITAL ENCOUNTER (OUTPATIENT)
Dept: RADIATION ONCOLOGY | Facility: HOSPITAL | Age: 53
Discharge: HOME | End: 2023-03-21

## 2023-03-21 VITALS — BODY MASS INDEX: 22.8 KG/M2 | WEIGHT: 141.2 LBS

## 2023-03-21 PROCEDURE — 77412 RADIATION TX DELIVERY LVL 3: CPT | Performed by: RADIOLOGY

## 2023-03-21 PROCEDURE — 77387 GUIDANCE FOR RADJ TX DLVR: CPT | Performed by: RADIOLOGY

## 2023-03-22 ENCOUNTER — HOSPITAL ENCOUNTER (OUTPATIENT)
Dept: RADIATION ONCOLOGY | Facility: HOSPITAL | Age: 53
Discharge: HOME | End: 2023-03-22

## 2023-03-22 PROCEDURE — 77387 GUIDANCE FOR RADJ TX DLVR: CPT | Performed by: RADIOLOGY

## 2023-03-22 PROCEDURE — 77412 RADIATION TX DELIVERY LVL 3: CPT | Performed by: RADIOLOGY

## 2023-03-23 ENCOUNTER — HOSPITAL ENCOUNTER (OUTPATIENT)
Dept: RADIATION ONCOLOGY | Facility: HOSPITAL | Age: 53
Discharge: HOME | End: 2023-03-23

## 2023-03-23 PROCEDURE — 77387 GUIDANCE FOR RADJ TX DLVR: CPT | Performed by: RADIOLOGY

## 2023-03-23 PROCEDURE — 77336 RADIATION PHYSICS CONSULT: CPT | Performed by: RADIOLOGY

## 2023-03-23 PROCEDURE — 77412 RADIATION TX DELIVERY LVL 3: CPT | Performed by: RADIOLOGY

## 2023-03-24 ENCOUNTER — HOSPITAL ENCOUNTER (OUTPATIENT)
Dept: RADIATION ONCOLOGY | Facility: HOSPITAL | Age: 53
Discharge: HOME | End: 2023-03-24

## 2023-03-24 PROCEDURE — 77387 GUIDANCE FOR RADJ TX DLVR: CPT | Performed by: RADIOLOGY

## 2023-03-24 PROCEDURE — 77412 RADIATION TX DELIVERY LVL 3: CPT | Performed by: RADIOLOGY

## 2023-03-27 ENCOUNTER — TELEPHONE (OUTPATIENT)
Dept: ONCOLOGY | Facility: CLINIC | Age: 53
End: 2023-03-27

## 2023-03-27 ENCOUNTER — HOSPITAL ENCOUNTER (OUTPATIENT)
Dept: RADIATION ONCOLOGY | Facility: HOSPITAL | Age: 53
Discharge: HOME | End: 2023-03-27

## 2023-03-27 ENCOUNTER — HOSPITAL ENCOUNTER (OUTPATIENT)
Dept: ONCOLOGY | Facility: HOSPITAL | Age: 53
Discharge: HOME | End: 2023-03-27
Admitting: INTERNAL MEDICINE
Payer: COMMERCIAL

## 2023-03-27 VITALS
DIASTOLIC BLOOD PRESSURE: 77 MMHG | TEMPERATURE: 98.7 F | WEIGHT: 142 LBS | HEIGHT: 65 IN | HEART RATE: 71 BPM | BODY MASS INDEX: 23.66 KG/M2 | RESPIRATION RATE: 18 BRPM | SYSTOLIC BLOOD PRESSURE: 122 MMHG

## 2023-03-27 DIAGNOSIS — C50.112 MALIGNANT NEOPLASM OF CENTRAL PORTION OF LEFT BREAST IN FEMALE, ESTROGEN RECEPTOR POSITIVE: ICD-10-CM

## 2023-03-27 DIAGNOSIS — Z17.0 MALIGNANT NEOPLASM OF CENTRAL PORTION OF LEFT BREAST IN FEMALE, ESTROGEN RECEPTOR POSITIVE: ICD-10-CM

## 2023-03-27 DIAGNOSIS — Z45.2 ENCOUNTER FOR CARE RELATED TO PORT-A-CATH: Primary | ICD-10-CM

## 2023-03-27 LAB
ALBUMIN SERPL-MCNC: 4.3 G/DL (ref 3.5–5.2)
ALBUMIN/GLOB SERPL: 2 G/DL
ALP SERPL-CCNC: 71 U/L (ref 39–117)
ALT SERPL W P-5'-P-CCNC: 14 U/L (ref 1–33)
ANION GAP SERPL CALCULATED.3IONS-SCNC: 10 MMOL/L (ref 5–15)
AST SERPL-CCNC: 20 U/L (ref 1–32)
BASOPHILS # BLD AUTO: 0.03 10*3/MM3 (ref 0–0.2)
BASOPHILS NFR BLD AUTO: 0.9 % (ref 0–1.5)
BILIRUB SERPL-MCNC: 0.4 MG/DL (ref 0–1.2)
BUN SERPL-MCNC: 8 MG/DL (ref 6–20)
BUN/CREAT SERPL: 14.3 (ref 7–25)
CALCIUM SPEC-SCNC: 9.5 MG/DL (ref 8.6–10.5)
CHLORIDE SERPL-SCNC: 103 MMOL/L (ref 98–107)
CO2 SERPL-SCNC: 27 MMOL/L (ref 22–29)
CREAT SERPL-MCNC: 0.56 MG/DL (ref 0.57–1)
DEPRECATED RDW RBC AUTO: 52.5 FL (ref 37–54)
EGFRCR SERPLBLD CKD-EPI 2021: 111.3 ML/MIN/1.73
EOSINOPHIL # BLD AUTO: 0.04 10*3/MM3 (ref 0–0.4)
EOSINOPHIL NFR BLD AUTO: 1.2 % (ref 0.3–6.2)
ERYTHROCYTE [DISTWIDTH] IN BLOOD BY AUTOMATED COUNT: 14.1 % (ref 12.3–15.4)
ESTRADIOL SERPL HS-MCNC: 11.4 PG/ML
FSH SERPL-ACNC: 93.4 MIU/ML
GLOBULIN UR ELPH-MCNC: 2.2 GM/DL
GLUCOSE SERPL-MCNC: 112 MG/DL (ref 65–99)
HCT VFR BLD AUTO: 38.2 % (ref 34–46.6)
HGB BLD-MCNC: 12.8 G/DL (ref 12–15.9)
IMM GRANULOCYTES # BLD AUTO: 0 10*3/MM3 (ref 0–0.05)
IMM GRANULOCYTES NFR BLD AUTO: 0 % (ref 0–0.5)
LH SERPL-ACNC: 58.6 MIU/ML
LYMPHOCYTES # BLD AUTO: 0.54 10*3/MM3 (ref 0.7–3.1)
LYMPHOCYTES NFR BLD AUTO: 16.5 % (ref 19.6–45.3)
MCH RBC QN AUTO: 33.3 PG (ref 26.6–33)
MCHC RBC AUTO-ENTMCNC: 33.5 G/DL (ref 31.5–35.7)
MCV RBC AUTO: 99.5 FL (ref 79–97)
MONOCYTES # BLD AUTO: 0.3 10*3/MM3 (ref 0.1–0.9)
MONOCYTES NFR BLD AUTO: 9.1 % (ref 5–12)
NEUTROPHILS NFR BLD AUTO: 2.37 10*3/MM3 (ref 1.7–7)
NEUTROPHILS NFR BLD AUTO: 72.3 % (ref 42.7–76)
PLATELET # BLD AUTO: 184 10*3/MM3 (ref 140–450)
PMV BLD AUTO: 8.9 FL (ref 6–12)
POTASSIUM SERPL-SCNC: 3.9 MMOL/L (ref 3.5–5.2)
PROT SERPL-MCNC: 6.5 G/DL (ref 6–8.5)
RBC # BLD AUTO: 3.84 10*6/MM3 (ref 3.77–5.28)
SODIUM SERPL-SCNC: 140 MMOL/L (ref 136–145)
WBC NRBC COR # BLD: 3.28 10*3/MM3 (ref 3.4–10.8)

## 2023-03-27 PROCEDURE — 80053 COMPREHEN METABOLIC PANEL: CPT | Performed by: INTERNAL MEDICINE

## 2023-03-27 PROCEDURE — 82670 ASSAY OF TOTAL ESTRADIOL: CPT | Performed by: INTERNAL MEDICINE

## 2023-03-27 PROCEDURE — 77387 GUIDANCE FOR RADJ TX DLVR: CPT | Performed by: RADIOLOGY

## 2023-03-27 PROCEDURE — 77412 RADIATION TX DELIVERY LVL 3: CPT | Performed by: RADIOLOGY

## 2023-03-27 PROCEDURE — 36591 DRAW BLOOD OFF VENOUS DEVICE: CPT

## 2023-03-27 PROCEDURE — 83002 ASSAY OF GONADOTROPIN (LH): CPT | Performed by: INTERNAL MEDICINE

## 2023-03-27 PROCEDURE — 85025 COMPLETE CBC W/AUTO DIFF WBC: CPT | Performed by: INTERNAL MEDICINE

## 2023-03-27 PROCEDURE — 25010000002 HEPARIN LOCK FLUSH PER 10 UNITS: Performed by: INTERNAL MEDICINE

## 2023-03-27 PROCEDURE — 96523 IRRIG DRUG DELIVERY DEVICE: CPT

## 2023-03-27 PROCEDURE — 83001 ASSAY OF GONADOTROPIN (FSH): CPT | Performed by: INTERNAL MEDICINE

## 2023-03-27 RX ORDER — SODIUM CHLORIDE 0.9 % (FLUSH) 0.9 %
10 SYRINGE (ML) INJECTION AS NEEDED
OUTPATIENT
Start: 2023-03-27

## 2023-03-27 RX ORDER — HEPARIN SODIUM (PORCINE) LOCK FLUSH IV SOLN 100 UNIT/ML 100 UNIT/ML
500 SOLUTION INTRAVENOUS AS NEEDED
OUTPATIENT
Start: 2023-03-27

## 2023-03-27 RX ORDER — SODIUM CHLORIDE 0.9 % (FLUSH) 0.9 %
10 SYRINGE (ML) INJECTION AS NEEDED
Status: DISCONTINUED | OUTPATIENT
Start: 2023-03-27 | End: 2023-03-28 | Stop reason: HOSPADM

## 2023-03-27 RX ORDER — HEPARIN SODIUM (PORCINE) LOCK FLUSH IV SOLN 100 UNIT/ML 100 UNIT/ML
500 SOLUTION INTRAVENOUS AS NEEDED
Status: DISCONTINUED | OUTPATIENT
Start: 2023-03-27 | End: 2023-03-28 | Stop reason: HOSPADM

## 2023-03-27 RX ADMIN — HEPARIN 500 UNITS: 100 SYRINGE at 13:03

## 2023-03-27 RX ADMIN — Medication 10 ML: at 13:03

## 2023-03-27 NOTE — TELEPHONE ENCOUNTER
Caller: Debra Beckman    Relationship to patient: Self    Best call back number: [phone]    Chief complaint: R/S    Type of visit: FOLLOW UP 1    Requested date: PT PREFERS DAY BEFORE 04/11/23 OR DAY AFTER 04/13/23, IF POSSIBLE. JUST CAN NOT DO 04/12/23.    If rescheduling, when is the original appointment: 04/12/23    CALL BACK: YES

## 2023-03-28 ENCOUNTER — HOSPITAL ENCOUNTER (OUTPATIENT)
Dept: RADIATION ONCOLOGY | Facility: HOSPITAL | Age: 53
Discharge: HOME | End: 2023-03-28

## 2023-03-28 VITALS — BODY MASS INDEX: 23.25 KG/M2 | WEIGHT: 139.7 LBS

## 2023-03-28 PROCEDURE — 77412 RADIATION TX DELIVERY LVL 3: CPT | Performed by: RADIOLOGY

## 2023-03-28 PROCEDURE — 77387 GUIDANCE FOR RADJ TX DLVR: CPT | Performed by: RADIOLOGY

## 2023-03-29 ENCOUNTER — HOSPITAL ENCOUNTER (OUTPATIENT)
Dept: RADIATION ONCOLOGY | Facility: HOSPITAL | Age: 53
Discharge: HOME | End: 2023-03-29
Payer: COMMERCIAL

## 2023-03-29 DIAGNOSIS — C50.112 MALIGNANT NEOPLASM OF CENTRAL PORTION OF LEFT BREAST IN FEMALE, ESTROGEN RECEPTOR POSITIVE: Primary | ICD-10-CM

## 2023-03-29 DIAGNOSIS — Z17.0 MALIGNANT NEOPLASM OF CENTRAL PORTION OF LEFT BREAST IN FEMALE, ESTROGEN RECEPTOR POSITIVE: Primary | ICD-10-CM

## 2023-03-29 PROCEDURE — 77412 RADIATION TX DELIVERY LVL 3: CPT | Performed by: RADIOLOGY

## 2023-03-29 PROCEDURE — 77387 GUIDANCE FOR RADJ TX DLVR: CPT | Performed by: RADIOLOGY

## 2023-04-11 ENCOUNTER — TELEPHONE (OUTPATIENT)
Dept: ONCOLOGY | Facility: CLINIC | Age: 53
End: 2023-04-11

## 2023-04-11 ENCOUNTER — OFFICE VISIT (OUTPATIENT)
Dept: ONCOLOGY | Facility: CLINIC | Age: 53
End: 2023-04-11
Payer: COMMERCIAL

## 2023-04-11 VITALS
HEIGHT: 65 IN | WEIGHT: 137.8 LBS | DIASTOLIC BLOOD PRESSURE: 85 MMHG | HEART RATE: 66 BPM | OXYGEN SATURATION: 98 % | TEMPERATURE: 97.1 F | SYSTOLIC BLOOD PRESSURE: 122 MMHG | BODY MASS INDEX: 22.96 KG/M2 | RESPIRATION RATE: 18 BRPM

## 2023-04-11 DIAGNOSIS — Z17.0 MALIGNANT NEOPLASM OF CENTRAL PORTION OF LEFT BREAST IN FEMALE, ESTROGEN RECEPTOR POSITIVE: ICD-10-CM

## 2023-04-11 DIAGNOSIS — R05.3 CHRONIC COUGH: ICD-10-CM

## 2023-04-11 DIAGNOSIS — Z51.11 CHEMOTHERAPY MANAGEMENT, ENCOUNTER FOR: Primary | ICD-10-CM

## 2023-04-11 DIAGNOSIS — C50.112 MALIGNANT NEOPLASM OF CENTRAL PORTION OF LEFT BREAST IN FEMALE, ESTROGEN RECEPTOR POSITIVE: ICD-10-CM

## 2023-04-11 DIAGNOSIS — Z45.2 ENCOUNTER FOR CARE RELATED TO PORT-A-CATH: Primary | ICD-10-CM

## 2023-04-11 PROCEDURE — 99215 OFFICE O/P EST HI 40 MIN: CPT | Performed by: INTERNAL MEDICINE

## 2023-04-11 RX ORDER — ANASTROZOLE 1 MG/1
1 TABLET ORAL DAILY
Qty: 30 TABLET | Refills: 2 | Status: SHIPPED | OUTPATIENT
Start: 2023-04-11

## 2023-04-11 NOTE — TELEPHONE ENCOUNTER
Caller: Debra Beckman    Relationship: Self    Best call back number: [phone]    What is the best time to reach you: ANYTIME    Who are you requesting to speak with (clinical staff, provider, specific staff member): CLINICAL TEAM    What was the call regarding: PT WANTED CLARIFICATION ON WHEN SHE SHOULD HAVE LABS DRAWN AS THIS WAS MENTIONED BUT NOT SCHEDULED. PLEASE CALL TO ADVISE.     Do you require a callback: YES

## 2023-04-11 NOTE — TELEPHONE ENCOUNTER
Left VM for patient stating labs can be drawn in clinic on 5/11/23 and that she can go to the lab window in the clinic just prior to her follow up appointment with Dr. Cano.  Asked her to call back if she has any questions, phone number provided.

## 2023-04-11 NOTE — PROGRESS NOTES
Hematology and Oncology [place]  Office number [phone]    Fax number [phone]     Follow up     Date: 23      Patient Name: Debra Beckman  MRN: [id number]  : 1970    Referring Physician: Dr. Goran Russell    Chief Complaint: follow up breast cancer    Cancer Staging: Stage IA (cT1b, cN0, cM0, G1, ER+, HI+, HER2-)      History of Present Illness: Debra Beckman is a pleasant 52 y.o. female who presents today for evaluation of left breast cancer.  She is accompanied by her supportive father who is a retired [employer] neurosurgeon, Dr. Beckman.    Screening mammogram 2022 demonstrated an asymmetry in the anterior left medial breast.  Diagnostic mammogram on 2022 demonstrated a persistent ill-defined asymmetry with architectural distortion in the left 9:00 periareolar region.  On ultrasound, this corresponded to an 8 mm mass in the 9:00 left breast.    Left breast 9:00 biopsy on 8/10/2022 demonstrated grade 1 invasive ductal carcinoma with associated DCIS (%, HI 60%, HER2/kathleen negative, 0+).    She proceeded with bilateral breast MRI on 8/15/2022.  This demonstrated a 1.4 cm 9:00 left breast enhancing mass, with a 1.3 cm area of clumped non-mass enhancement versus postbiopsy change 1 cm anterior lateral to this.  There is additionally a dominant focus of enhancement suspicious for a satellite lesion spanning 0.4 cm.  She underwent second look ultrasound and biopsy of the second lesion which corresponded to a subcentimeteric mass on ultrasound.    Left breast 9:00 biopsy on 2022 showed grade 2 invasive ductal carcinoma (ER greater than 90%, HI greater than 80%, HER2/kathleen negative, 0+)    Left breast lumpectomy, sentinel lymph node biopsy on 2022 demonstrated grade 2 invasive ductal carcinoma spanning 1.4 cm with associated DCIS.  Margin positive.  [place] lymph node positive () with a macrometastasis spanning 4 mm with extracapsular extension. She  underwent re-excision 9/30/22 with fat necrosis and no residual invasive or in situ carcinoma. Single left axillary LN excised and benign (total LN count 1/2).    Breast cancer risk profile:  G3, P2; Age of first live birth 37  Premenopausal, LMP was August 2022.  She discontinued OCPs at the time of her cancer diagnosis.  Family history of breast, ovarian, prostate or pancreatic cancer: Maternal aunt with breast cancer.  Genetics: [facility] cancer next panel was negative in 2022.    Treatment history:  TC x 6 cycles  Adjuvant radiation completed 3/27/23    Arimidex, planning 4/12/2023  Verzinio, planning 5/2023 (after port removal)  Zometa, planning pending endocrine tolerance     Interval history:  Debra returns accompanied by her supportive dad.  She is overall doing quite well.  She notes improvement in her brain fog and energy.  She is walking her dog regularly.  She is back to working part-time.  She does have a lingering cough that started just after radiation completed on 3/28/23.  She did have an intervening URI to which she has attributed this.  She does have some mild seasonal allergies, cough is productive of clear sputum.  Not worsening, but has not improved.  No dyspnea.  She continues to have substantial hot flashes.  Continues with a stable dose of Effexor and does not currently desire any therapy changes.  She is interested in having her port removed.  She has no substantial neuropathy.  She is going to have a vision exam because she has been needing her readers more frequently.  Eye watering has improved.    Past Medical History:   Past Medical History:   Diagnosis Date   • Allergic    • Asthma    • Low back pain    • Malignant neoplasm of central portion of left breast in female, estrogen receptor positive 9/21/2022       Past Surgical History:   Past Surgical History:   Procedure Laterality Date   • BREAST BIOPSY  08/10/2022   • BREAST BIOPSY Left 08/26/2022   • BREAST LUMPECTOMY WITH AXILLARY NODE  DISSECTION Left 2022    clean dread   • BREAST LUMPECTOMY WITH SENTINEL NODE BIOPSY Left 2022   •  SECTION     • VENOUS ACCESS DEVICE (PORT) INSERTION Right 2022       Family History:   Family History   Problem Relation Age of Onset   • Hypertension Mother    • No Known Problems Father    • Breast cancer Maternal Aunt    • Ovarian cancer Neg Hx        Social History:   Social History     Socioeconomic History   • Marital status: Single   Tobacco Use   • Smoking status: Former   • Smokeless tobacco: Never   Vaping Use   • Vaping Use: Former   Substance and Sexual Activity   • Alcohol use: Yes     Comment: occasional   • Drug use: No   • Sexual activity: Defer   Works as a .      Medications:     Current Outpatient Medications:   •  albuterol sulfate  (90 Base) MCG/ACT inhaler, Inhale 2 puffs Every 4 (Four) Hours As Needed for Wheezing., Disp: 18 g, Rfl: 11  •  celecoxib (CeleBREX) 200 MG capsule, Take 1 capsule by mouth Daily. (Patient taking differently: Take 200 mg by mouth As Needed.), Disp: 30 capsule, Rfl: 11  •  lidocaine-prilocaine (EMLA) 2.5-2.5 % cream, Apply 1 application topically to the appropriate area as directed As Needed (45-60 minutes prior to port access.  Cover with saran/plastic wrap.)., Disp: 30 g, Rfl: 3  •  Symbicort 160-4.5 MCG/ACT inhaler, INHALE 2 PUFFS BY MOUTH TWICE DAILY, Disp: 10.2 g, Rfl: 2  •  venlafaxine XR (EFFEXOR-XR) 150 MG 24 hr capsule, Take 1 capsule by mouth Daily., Disp: 30 capsule, Rfl: 1  •  vitamin B-12 (CYANOCOBALAMIN) 1000 MCG tablet, Take 1,000 mcg by mouth Daily., Disp: , Rfl:   •  vitamin B-6 (PYRIDOXINE) 50 MG tablet, Take 50 mg by mouth Daily., Disp: , Rfl:   •  Vitamin D, Cholecalciferol, (CHOLECALCIFEROL) 10 MCG (400 UNIT) tablet, Take 400 Units by mouth Daily., Disp: , Rfl:     Current Facility-Administered Medications:   •  lidocaine (XYLOCAINE) 1 % injection 10 mL, 10 mL, Infiltration, Once, Connie Marie MD  •   "lidocaine (XYLOCAINE) 1 % injection 5 mL, 5 mL, Infiltration, Once, Connie Marie MD    Allergies:   No Known Allergies    Objective     Vital Signs:   Vitals:    04/11/23 0849   BP: 122/85   Pulse: 66   Resp: 18   Temp: 97.1 °F (36.2 °C)   TempSrc: Infrared   SpO2: 98%   Weight: 62.5 kg (137 lb 12.8 oz)   Height: 165.1 cm (65\")   PainSc: 0-No pain    Body mass index is 22.93 kg/m².   Pain Score    04/11/23 0849   PainSc: 0-No pain       ECOG Performance Status: 0    Physical Exam:  General: No acute distress. Well appearing   HEENT: Normocephalic, atraumatic. Sclera anicteric.   Neck: supple, no adenopathy.   Cardiovascular: regular rate and rhythm,. No murmurs.   Respiratory: Normal rate. Clear to auscultation bilaterally  Abdomen: Soft, nontender, non distended with normoactive bowel sounds  Lymph: no cervical, supraclavicular or axillary adenopathy  Neuro: Alert and oriented x 3. No focal deficits.   Ext: Symmetric, no swelling.   Psych: Euthymic  Breast: Incisions well-healed.  No palpable masses.  Mild radiation dermatitis left scapula.  Otherwise skin appears normal.      Laboratory/Imaging Reviewed:   No visits with results within 2 Week(s) from this visit.   Latest known visit with results is:   Hospital Outpatient Visit on 03/27/2023   Component Date Value Ref Range Status   • Estradiol 03/27/2023 11.4  pg/mL Final   • LH 03/27/2023 58.60  mIU/mL Final   • FSH 03/27/2023 93.40  mIU/mL Final   • Glucose 03/27/2023 112 (H)  65 - 99 mg/dL Final   • BUN 03/27/2023 8  6 - 20 mg/dL Final   • Creatinine 03/27/2023 0.56 (L)  0.57 - 1.00 mg/dL Final   • Sodium 03/27/2023 140  136 - 145 mmol/L Final   • Potassium 03/27/2023 3.9  3.5 - 5.2 mmol/L Final   • Chloride 03/27/2023 103  98 - 107 mmol/L Final   • CO2 03/27/2023 27.0  22.0 - 29.0 mmol/L Final   • Calcium 03/27/2023 9.5  8.6 - 10.5 mg/dL Final   • Total Protein 03/27/2023 6.5  6.0 - 8.5 g/dL Final   • Albumin 03/27/2023 4.3  3.5 - 5.2 g/dL Final   • ALT " (SGPT) 03/27/2023 14  1 - 33 U/L Final   • AST (SGOT) 03/27/2023 20  1 - 32 U/L Final   • Alkaline Phosphatase 03/27/2023 71  39 - 117 U/L Final   • Total Bilirubin 03/27/2023 0.4  0.0 - 1.2 mg/dL Final   • Globulin 03/27/2023 2.2  gm/dL Final    Calculated Result   • A/G Ratio 03/27/2023 2.0  g/dL Final   • BUN/Creatinine Ratio 03/27/2023 14.3  7.0 - 25.0 Final   • Anion Gap 03/27/2023 10.0  5.0 - 15.0 mmol/L Final   • eGFR 03/27/2023 111.3  >60.0 mL/min/1.73 Final   • WBC 03/27/2023 3.28 (L)  3.40 - 10.80 10*3/mm3 Final   • RBC 03/27/2023 3.84  3.77 - 5.28 10*6/mm3 Final   • Hemoglobin 03/27/2023 12.8  12.0 - 15.9 g/dL Final   • Hematocrit 03/27/2023 38.2  34.0 - 46.6 % Final   • MCV 03/27/2023 99.5 (H)  79.0 - 97.0 fL Final   • MCH 03/27/2023 33.3 (H)  26.6 - 33.0 pg Final   • MCHC 03/27/2023 33.5  31.5 - 35.7 g/dL Final   • RDW 03/27/2023 14.1  12.3 - 15.4 % Final   • RDW-SD 03/27/2023 52.5  37.0 - 54.0 fl Final   • MPV 03/27/2023 8.9  6.0 - 12.0 fL Final   • Platelets 03/27/2023 184  140 - 450 10*3/mm3 Final   • Neutrophil % 03/27/2023 72.3  42.7 - 76.0 % Final   • Lymphocyte % 03/27/2023 16.5 (L)  19.6 - 45.3 % Final   • Monocyte % 03/27/2023 9.1  5.0 - 12.0 % Final   • Eosinophil % 03/27/2023 1.2  0.3 - 6.2 % Final   • Basophil % 03/27/2023 0.9  0.0 - 1.5 % Final   • Immature Grans % 03/27/2023 0.0  0.0 - 0.5 % Final   • Neutrophils, Absolute 03/27/2023 2.37  1.70 - 7.00 10*3/mm3 Final   • Lymphocytes, Absolute 03/27/2023 0.54 (L)  0.70 - 3.10 10*3/mm3 Final   • Monocytes, Absolute 03/27/2023 0.30  0.10 - 0.90 10*3/mm3 Final   • Eosinophils, Absolute 03/27/2023 0.04  0.00 - 0.40 10*3/mm3 Final   • Basophils, Absolute 03/27/2023 0.03  0.00 - 0.20 10*3/mm3 Final   • Immature Grans, Absolute 03/27/2023 0.00  0.00 - 0.05 10*3/mm3 Final         Assessment / Plan        1. Malignant neoplasm of central portion of left breast in female, estrogen receptor positive  -She has an early stage hormone sensitive breast  cancer with a single positive lymph node.  She has undergone curative intent surgery.  -Historically, premenopausal lymph node positive breast cancer has been treated with adjuvant chemotherapy in addition to endocrine therapy. More recently, genomic testing has been shown to help to stratify the potential added benefit for an individual patient and identify patients who are less likely to derive significant additional benefit from chemotherapy.  For patients with N1 disease and age > 50, and low risk genomic signature with either Oncotype or MammaPrint has been shown to predict lack of benefit for adjuvant chemotherapy.  However, for patients less than 50, there is a minimum 5% absolute benefit in the lymph node positive setting from the addition of adjuvant chemotherapy even with a low risk genomic signature.  The patient is age 52, but premenopausal.  We discussed that this is a gray zone.  It is postulated that the potential benefit of adjuvant chemotherapy in premenopausal women may be at least in part explained by the effective ovarian suppression from chemotherapy.  We discussed the potential role of genomic testing to guide chemotherapy decision.  However the patient is healthy and wishes to be aggressive with her cancer treatment.  She declines genomic testing and wishes to proceed with adjuvant chemotherapy. We discussed standard treatment options of 1) dose dense Adriamycin and Cytoxan followed by weekly Taxol or 2) Taxotere and Cytoxan. We discussed differences in schedule and toxicities. We specifically discussed an increased risk for cardiotoxicity and late bone marrow disorders with the addition of adriamycin.  Given strongly ER positive disease and N1 I would favor adjuvant TC over dose dense AC followed by weekly taxol and she completed 6 cycles  -She has now completed adjuvant radiation.  -She returns for endocrine therapy initiation.  We discussed options of tamoxifen, aromatase inhibitor  including side effects and schedule.  -We discussed the possible addition of BSO or ovarian suppression if she has ovarian rebound.  However at age 52 with labs including FSH, LH, and estradiol reviewed today and consistent with postmenopausal state, will plan for initial AI with monitoring for ovarian rebound with every 8-week labs over the next 6 months.  -Ki-67 20% suggesting benefit from adjuvant Verzenio.  We reviewed schedule and side effects in detail.  She has been provided drug information handouts.  We will initiate prior auth for Verzenio, however as she plans to schedule port removal in the upcoming weeks, will delay Verzenio start until her return in 4 weeks.  -Plan adjuvant Zometa when tolerating endocrine therapy.  -ROGERIO on exam today.     2. Anxiety  3. Hot flashes  -Continue Effexor 150 mg daily.     4. Bone health  -Ca/D recommended  -Plan the addition of adjuvant Zometa after tolerating endocrine therapy.    5. Access  -Port in place.  Labs are adequate for removal.  Will refer back to Dr. Goran Russell for this.      Follow Up:   1 mo with labs and jada Cano MD  Hematology and Oncology     I have spent a total of 40 min on reviewing test results/preparing to see patient, counseling patient, performing medically appropriate exam, orders, coordinating care and documenting clinical information in the electronic or other health record

## 2023-04-12 ENCOUNTER — SPECIALTY PHARMACY (OUTPATIENT)
Dept: ONCOLOGY | Facility: HOSPITAL | Age: 53
End: 2023-04-12
Payer: COMMERCIAL

## 2023-04-12 DIAGNOSIS — C50.112 MALIGNANT NEOPLASM OF CENTRAL PORTION OF LEFT BREAST IN FEMALE, ESTROGEN RECEPTOR POSITIVE: Primary | ICD-10-CM

## 2023-04-12 DIAGNOSIS — Z17.0 MALIGNANT NEOPLASM OF CENTRAL PORTION OF LEFT BREAST IN FEMALE, ESTROGEN RECEPTOR POSITIVE: Primary | ICD-10-CM

## 2023-04-12 NOTE — PROGRESS NOTES
Oral Chemotherapy - New Referral    Received a referral from Dr. Cano    Treatment Plan: Verzenio (abemaciclib), Arimidex (anastrozole)  Start date of treatment planned for: 5/11/23  Indication: stage IA left breast cancer  Relevant past treatments: s/p lumpectomy, docetaxel/cyclophosphamide x 6 cycles, adjuvant radiation therapy  Is the therapy appropriate based on treatment guidelines and FDA labeling?: Yes, high risk disease  Therapeutic Goals: Continue treatment until progression or intolerable toxicity  Patient can self-administer oral medications: Yes    • Drug-Drug Interactions: The current medication list was reviewed and there are no relevant drug-drug interactions.  • Medication Allergies: The patient has NKDA.  • Review of Labs/Dose Adjustments: The patient's most recent labs were reviewed and all are WNL to start treatment at this dose. Note Ki-67 20% (collected 9/16/22). Estradiol, FSH, LH levels all suggestive of post-menopausal state.  • Dr. Cano has already sent anastrozole to [facility] in [place]. Patient was instructed to start this therapy upon receipt.    A prescription was released to [facility] for   Drug: Verzenio (abemaciclib)  Strength: 150 mg  Directions: Take 1 tablet by mouth twice a day  Quantity: 60  Refills: 11    Pharmacy education is scheduled for 5/11/23. CCA and consent will be signed at that time.    Ann Cowden Mayer, PharmD, BCPS  Clinical Oncology Pharmacy Specialist  Phone [phone]    4/12/2023  14:08 EDT       Addendum  Due to insurance restrictions, prescription was sent to [facility].    Ann Cowden Mayer, PharmD, BCPS  Clinical Oncology Pharmacy Specialist  Phone [phone]    4/13/2023  08:51 EDT

## 2023-04-13 NOTE — PROGRESS NOTES
Oral Chemotherapy - Double Check    Drug: abemaciclib  Strength: 150mg tablet  Directions: Take 1 tablet PO BID  QTY: 60  RF:11    Released to pharmacy: [facility]    Completed independent double check on medication order/RX.    Swetha Bernabe PharmD, [facility]  Clinical Oncology Pharmacy Specialist  Phone: [phone]    4/13/2023  09:08 EDT

## 2023-04-24 ENCOUNTER — OFFICE VISIT (OUTPATIENT)
Dept: RADIATION ONCOLOGY | Facility: HOSPITAL | Age: 53
End: 2023-04-24
Payer: COMMERCIAL

## 2023-04-24 ENCOUNTER — HOSPITAL ENCOUNTER (OUTPATIENT)
Dept: RADIATION ONCOLOGY | Facility: HOSPITAL | Age: 53
Setting detail: RADIATION/ONCOLOGY SERIES
Discharge: HOME | End: 2023-04-24
Payer: COMMERCIAL

## 2023-04-24 VITALS
RESPIRATION RATE: 16 BRPM | TEMPERATURE: 96.9 F | OXYGEN SATURATION: 98 % | BODY MASS INDEX: 2.63 KG/M2 | HEART RATE: 68 BPM | WEIGHT: 15.8 LBS | SYSTOLIC BLOOD PRESSURE: 128 MMHG | DIASTOLIC BLOOD PRESSURE: 83 MMHG

## 2023-04-24 DIAGNOSIS — C50.112 MALIGNANT NEOPLASM OF CENTRAL PORTION OF LEFT BREAST IN FEMALE, ESTROGEN RECEPTOR POSITIVE: Primary | ICD-10-CM

## 2023-04-24 DIAGNOSIS — Z17.0 MALIGNANT NEOPLASM OF CENTRAL PORTION OF LEFT BREAST IN FEMALE, ESTROGEN RECEPTOR POSITIVE: Primary | ICD-10-CM

## 2023-04-24 PROCEDURE — G0463 HOSPITAL OUTPT CLINIC VISIT: HCPCS

## 2023-04-24 NOTE — RADIATION COMPLETION NOTES
RADIATION ONCOLOGY COMPLETION NOTE    PATIENT:   Debra Beckman  MEDICAL RECORD:  [id number]  :    1970  COMPLETION DATE: 3/29/2023  DIAGNOSIS:   Breast cancer  Clinical stage IA (cT1b, cN0, cM0, G1, ER+, LA+, HER2-)  Pathologic stage IA (pT1c, pN1a, cM0, G2, ER+, LA+, HER2-)      BRIEF HISTORY:  This 52 y.o. patient completed radiotherapy.  She has left central breast cancer, grade 2 infiltrating ductal, hormone receptor positive, HER2 negative with associated intermediate grade DCIS.  Status post conservation surgery.  Final surgical margins were negative.  1 sentinel lymph node contained a 4 mm macrometastasis with extracapsular extension.  1 additional lymph node removed at the time of reexcision was negative.  She completed adjuvant chemotherapy 2023.      TREATMENT COURSE:  The left breast tissue and comprehensive karena regions at risk received a dose of 45 Gray delivered in 25 daily fractions of 1.8 Gray using 10 MV photons with custom shaped abutting fields.  Forward planning IMRT technique allowed for improved and more conformal treatment delivery.  Deep inspiration breath-hold technique was utilized to further protect the heart.    DATES OF TREATMENT: 2023 through 3/29/2023    TOLERANCE:   no unexpected difficulties     STATUS:  no evidence of disease recurrence    DISPOSITION:  Follow up in Radiation Oncology in approximately 1 month.        Raman Dunn MD

## 2023-04-24 NOTE — PROGRESS NOTES
FOLLOW UP NOTE    PATIENT:                                                      Debra Beckman  MEDICAL RECORD #:                        [id number]  :                                                          1970  COMPLETION DATE:   3/29/2023  DIAGNOSIS:     Malignant neoplasm of central portion of left breast in female, estrogen receptor positive  - clinical: Stage IA (cT1b, cN0, cM0, G1, ER+, NJ+, HER2-)  - pathological: Stage IA (pT1c, pN1a, cM0, G2, ER+, NJ+, HER2-)      BRIEF HISTORY:    Initial follow-up visit.  She has a recent diagnosis of left central breast cancer, grade 2 infiltrating ductal, hormone receptor positive, HER2 negative with associated intermediate grade DCIS.    She is status post conservation lumpectomy.  She had a positive anterior margin involved by DCIS.  She had a single sentinel lymph node macrometastasis with a small 4 mm focus but with extracapsular karena extension.    She underwent reexcision of the lumpectomy cavity and biopsy of 1 additional axillary lymph node.  Final surgical margins were negative with no residual neoplasm identified.  A single left axillary lymph node was also negative for metastasis (total of 1/2 involved lymph nodes).  PET/CT showed low-level axillary and subpectoral lymph node activity most consistent with reactive change rather than other lymphatic metastasis.  Genetics testing was negative.  She underwent adjuvant chemotherapy of TC x6 cycles with Dr. Cano.  She then underwent adjuvant radiotherapy to the left breast and comprehensive regional lymphatics to a dose of 45 Gray in 25 fractions utilizing DIBH technique, completing 3/29/2023.  She tolerated treatment well.  Posttreatment fatigue continues to subside.  She did develop some dry desquamation and miliaria rubra within the left breast and left upper back.  This was managed with application of topical skin creams with relief.  She did not develop moist desquamation.  She endorses  occasional, intermittent left breast pain.  She denies lymphedema of the breast or upper extremity.  No issues with stiffness or range of motion.  She denies dyspnea or chest pain.  No new or progressive focal bony pains.  She continues adjuvant anastrozole and does note some associated hot flashes, though notably improved since starting vitamin E and Effexor 150 mg daily.  She started adjuvant Verzenio on 4/23/2023.  Overall, she feels well and denies additional acute concerns today.          MEDICATIONS: Medication reconciliation for the patient was reviewed and confirmed in the electronic medical record.    Review of Systems   Constitutional: Positive for fatigue.   Endocrine: Positive for hot flashes.   Skin: Positive for itching.   All other systems reviewed and are negative.          KPS 90%      Physical Exam  Vitals and nursing note reviewed.   Constitutional:       General: She is not in acute distress.     Appearance: Normal appearance. She is well-developed.   HENT:      Head: Normocephalic and atraumatic.   Eyes:      Conjunctiva/sclera: Conjunctivae normal.      Pupils: Pupils are equal, round, and reactive to light.   Cardiovascular:      Rate and Rhythm: Normal rate and regular rhythm.      Heart sounds: No murmur heard.    No friction rub.   Pulmonary:      Effort: Pulmonary effort is normal.      Breath sounds: Normal breath sounds. No wheezing.   Chest:      Comments: The breast exam reveals symmetric breasts bilaterally.  The left breast reveals a well-healed surgical incision at the nipple areolar complex and within the left axilla.  Skin appears nonerythematous, healthy, and intact.  Minimal residual miliaria rubra noted.  There are no suspicious palpable masses or nodules of the left breast.  No lymphedema or axillary adenopathy on the left.  The right breast was not examined.  Musculoskeletal:         General: Normal range of motion.      Cervical back: Normal range of motion and neck supple.    Lymphadenopathy:      Cervical: No cervical adenopathy.   Skin:     General: Skin is warm and dry.   Neurological:      Mental Status: She is alert and oriented to person, place, and time.   Psychiatric:         Behavior: Behavior normal.         Thought Content: Thought content normal.         Judgment: Judgment normal.         VITAL SIGNS:   Vitals:    04/24/23 1448   BP: 128/83   Pulse: 68   Resp: 16   Temp: 96.9 °F (36.1 °C)   TempSrc: Temporal   SpO2: 98%   Weight: 7.167 kg (15 lb 12.8 oz)   PainSc: 0-No pain             The following portions of the patient's history were reviewed and updated as appropriate: allergies, current medications, past family history, past medical history, past social history, past surgical history and problem list.         Diagnoses and all orders for this visit:    1. Malignant neoplasm of central portion of left breast in female, estrogen receptor positive (Primary)         IMPRESSION:  Left central breast infiltrating ductal carcinoma, moderately differentiated, hormone receptor positive, with biopsy-proven adjacent satellite tumor, clinical stage IA.  Status post lumpectomy and sentinel lymph node biopsy 9/16/2022 with Dr. MACIAS, with pathologic stage IA (pT1c, N1a, M0).  Final surgical margins were negative.  1 sentinel lymph node contained a 4 mm macrometastasis with extracapsular extension.  1 additional lymph node removed at the time of reexcision was negative.  No evidence of distant metastasis noted on PET/CT evaluation.  She completed adjuvant chemotherapy 2/2/2023.  1 month status post adjuvant radiation therapy to the left breast and regional lymphatics.  She tolerated treatment well.  She developed the anticipated grade 1 fatigue and grade 1 radiation dermatitis, which are appropriately subsiding.  Clinical exam of the left breast today is benign.    We discussed the role of local breast massage, as well as stretching and range of motion exercises of the left upper  extremity to minimize the risk of treatment related fibrosis or lymphedema.    She is now on adjuvant endocrine therapy with anastrozole, which she is tolerating.  She recently began Verzenio as well.  She is scheduled for repeat CT scans of the chest, abdomen pelvis on 5/1/2023 with Dr. Cano.  She is scheduled for repeat diagnostic bilateral mammogram 10/28/2023.  We discussed follow-up intervals, including biannual mammograms to alternate between the left and bilateral breasts, biannual clinical breast exams, and monthly self breast exams.      RECOMMENDATIONS:  Debra Beckman continues routine oncologic surveillance in the survivorship clinic with Dr. Cano.  She also continues to see Dr. MACIAS in the surgical oncology breast clinic.  We will schedule the patient to return to our clinic in 1 year, or sooner as needed.    Return in about 1 year (around 4/24/2024) for Office Visit.    KADE Rodríguez spent a total of 40 minutes on today's visit, with more than 20 minutes in direct face to face communication, and the remainder of the time spent in reviewing the relevant history, records, available imaging, and for documentation.

## 2023-05-01 ENCOUNTER — HOSPITAL ENCOUNTER (OUTPATIENT)
Dept: CT IMAGING | Facility: HOSPITAL | Age: 53
Discharge: HOME | End: 2023-05-01
Admitting: INTERNAL MEDICINE
Payer: COMMERCIAL

## 2023-05-01 DIAGNOSIS — R05.3 CHRONIC COUGH: ICD-10-CM

## 2023-05-01 DIAGNOSIS — Z45.2 ENCOUNTER FOR CARE RELATED TO PORT-A-CATH: ICD-10-CM

## 2023-05-01 PROCEDURE — 74177 CT ABD & PELVIS W/CONTRAST: CPT

## 2023-05-01 PROCEDURE — 71260 CT THORAX DX C+: CPT

## 2023-05-01 PROCEDURE — 25510000001 IOPAMIDOL 61 % SOLUTION: Performed by: INTERNAL MEDICINE

## 2023-05-01 RX ADMIN — IOPAMIDOL 98 ML: 612 INJECTION, SOLUTION INTRAVENOUS at 10:18

## 2023-05-02 NOTE — PROGRESS NOTES
Called Debra and reviewed her scan results there is a small area of inflammation which could be secondary to prior radiation or infection.  She is recovering from a recent cough with green sputum, but feels that is actually improved in the last couple of days.  She denies any substantial shortness of breath.  If she develops any worsening symptoms she will call me and we will consider an antibiotic course.  Otherwise we will monitor this area on serial scan.  She has a follow-up next week .

## 2023-05-11 ENCOUNTER — SPECIALTY PHARMACY (OUTPATIENT)
Dept: ONCOLOGY | Facility: HOSPITAL | Age: 53
End: 2023-05-11
Payer: COMMERCIAL

## 2023-05-11 ENCOUNTER — HOSPITAL ENCOUNTER (OUTPATIENT)
Dept: ONCOLOGY | Facility: HOSPITAL | Age: 53
Discharge: HOME | End: 2023-05-11
Payer: COMMERCIAL

## 2023-05-11 ENCOUNTER — LAB (OUTPATIENT)
Dept: LAB | Facility: HOSPITAL | Age: 53
End: 2023-05-11
Payer: COMMERCIAL

## 2023-05-11 ENCOUNTER — OFFICE VISIT (OUTPATIENT)
Dept: ONCOLOGY | Facility: CLINIC | Age: 53
End: 2023-05-11
Payer: COMMERCIAL

## 2023-05-11 VITALS
TEMPERATURE: 97.1 F | RESPIRATION RATE: 16 BRPM | OXYGEN SATURATION: 100 % | BODY MASS INDEX: 21.66 KG/M2 | DIASTOLIC BLOOD PRESSURE: 83 MMHG | SYSTOLIC BLOOD PRESSURE: 125 MMHG | HEIGHT: 65 IN | WEIGHT: 130 LBS | HEART RATE: 77 BPM

## 2023-05-11 DIAGNOSIS — Z17.0 MALIGNANT NEOPLASM OF CENTRAL PORTION OF LEFT BREAST IN FEMALE, ESTROGEN RECEPTOR POSITIVE: ICD-10-CM

## 2023-05-11 DIAGNOSIS — Z17.0 MALIGNANT NEOPLASM OF CENTRAL PORTION OF LEFT BREAST IN FEMALE, ESTROGEN RECEPTOR POSITIVE: Primary | ICD-10-CM

## 2023-05-11 DIAGNOSIS — C50.112 MALIGNANT NEOPLASM OF CENTRAL PORTION OF LEFT BREAST IN FEMALE, ESTROGEN RECEPTOR POSITIVE: ICD-10-CM

## 2023-05-11 DIAGNOSIS — C50.112 MALIGNANT NEOPLASM OF CENTRAL PORTION OF LEFT BREAST IN FEMALE, ESTROGEN RECEPTOR POSITIVE: Primary | ICD-10-CM

## 2023-05-11 DIAGNOSIS — Z79.899 HIGH RISK MEDICATION USE: ICD-10-CM

## 2023-05-11 LAB
ALBUMIN SERPL-MCNC: 4.5 G/DL (ref 3.5–5.2)
ALBUMIN/GLOB SERPL: 1.7 G/DL
ALP SERPL-CCNC: 83 U/L (ref 39–117)
ALT SERPL W P-5'-P-CCNC: 15 U/L (ref 1–33)
ANION GAP SERPL CALCULATED.3IONS-SCNC: 7 MMOL/L (ref 5–15)
AST SERPL-CCNC: 21 U/L (ref 1–32)
B-HCG UR QL: NEGATIVE
BASOPHILS # BLD AUTO: 0.02 10*3/MM3 (ref 0–0.2)
BASOPHILS NFR BLD AUTO: 0.9 % (ref 0–1.5)
BILIRUB SERPL-MCNC: 0.5 MG/DL (ref 0–1.2)
BUN SERPL-MCNC: 11 MG/DL (ref 6–20)
BUN/CREAT SERPL: 13.1 (ref 7–25)
CALCIUM SPEC-SCNC: 10.3 MG/DL (ref 8.6–10.5)
CHLORIDE SERPL-SCNC: 102 MMOL/L (ref 98–107)
CO2 SERPL-SCNC: 29 MMOL/L (ref 22–29)
CREAT SERPL-MCNC: 0.84 MG/DL (ref 0.57–1)
DEPRECATED RDW RBC AUTO: 46.3 FL (ref 37–54)
EGFRCR SERPLBLD CKD-EPI 2021: 84.8 ML/MIN/1.73
EOSINOPHIL # BLD AUTO: 0.04 10*3/MM3 (ref 0–0.4)
EOSINOPHIL NFR BLD AUTO: 1.9 % (ref 0.3–6.2)
ERYTHROCYTE [DISTWIDTH] IN BLOOD BY AUTOMATED COUNT: 13 % (ref 12.3–15.4)
ESTRADIOL SERPL HS-MCNC: <5 PG/ML
FSH SERPL-ACNC: 90 MIU/ML
GLOBULIN UR ELPH-MCNC: 2.6 GM/DL
GLUCOSE SERPL-MCNC: 83 MG/DL (ref 65–99)
HCT VFR BLD AUTO: 41.5 % (ref 34–46.6)
HGB BLD-MCNC: 14.1 G/DL (ref 12–15.9)
IMM GRANULOCYTES # BLD AUTO: 0 10*3/MM3 (ref 0–0.05)
IMM GRANULOCYTES NFR BLD AUTO: 0 % (ref 0–0.5)
LYMPHOCYTES # BLD AUTO: 0.6 10*3/MM3 (ref 0.7–3.1)
LYMPHOCYTES NFR BLD AUTO: 28.3 % (ref 19.6–45.3)
MCH RBC QN AUTO: 33.3 PG (ref 26.6–33)
MCHC RBC AUTO-ENTMCNC: 34 G/DL (ref 31.5–35.7)
MCV RBC AUTO: 97.9 FL (ref 79–97)
MONOCYTES # BLD AUTO: 0.17 10*3/MM3 (ref 0.1–0.9)
MONOCYTES NFR BLD AUTO: 8 % (ref 5–12)
NEUTROPHILS NFR BLD AUTO: 1.29 10*3/MM3 (ref 1.7–7)
NEUTROPHILS NFR BLD AUTO: 60.9 % (ref 42.7–76)
PLATELET # BLD AUTO: 176 10*3/MM3 (ref 140–450)
PMV BLD AUTO: 8.7 FL (ref 6–12)
POTASSIUM SERPL-SCNC: 4.2 MMOL/L (ref 3.5–5.2)
PROT SERPL-MCNC: 7.1 G/DL (ref 6–8.5)
RBC # BLD AUTO: 4.24 10*6/MM3 (ref 3.77–5.28)
SODIUM SERPL-SCNC: 138 MMOL/L (ref 136–145)
WBC NRBC COR # BLD: 2.12 10*3/MM3 (ref 3.4–10.8)

## 2023-05-11 PROCEDURE — 82670 ASSAY OF TOTAL ESTRADIOL: CPT

## 2023-05-11 PROCEDURE — 85025 COMPLETE CBC W/AUTO DIFF WBC: CPT

## 2023-05-11 PROCEDURE — 36415 COLL VENOUS BLD VENIPUNCTURE: CPT

## 2023-05-11 PROCEDURE — 83001 ASSAY OF GONADOTROPIN (FSH): CPT

## 2023-05-11 PROCEDURE — 80053 COMPREHEN METABOLIC PANEL: CPT

## 2023-05-11 PROCEDURE — G0463 HOSPITAL OUTPT CLINIC VISIT: HCPCS

## 2023-05-11 PROCEDURE — 81025 URINE PREGNANCY TEST: CPT

## 2023-05-11 RX ORDER — OMEPRAZOLE 20 MG/1
20 CAPSULE, DELAYED RELEASE ORAL DAILY
Qty: 30 CAPSULE | Refills: 4 | Status: SHIPPED | OUTPATIENT
Start: 2023-05-11

## 2023-05-11 NOTE — PROGRESS NOTES
Specialty Pharmacy Patient Management Program  Oncology Initial Assessment       Debra Beckman is a 52 y.o. female with stage Ia breast cancer, ER/NM positive, HER2 negative seen by an Oncology provider and enrolled in the Oncology Patient Management program offered by [facility].  An initial outreach was conducted, including assessment of therapy appropriateness and specialty medication education for Verzenio (abemaciclib), (Arimidex) anastrozole . The patient was introduced to services offered by [facility], including: regular assessments, refill coordination, curbside pick-up or mail order delivery options, prior authorization maintenance, and financial assistance programs as applicable. The patient was also provided with contact information for the pharmacy team.     Regimen:   -Verzenio (abemaciclib) 150 mg tablets - Take 1 tablet by mouth twice a day  -Anastrozole 1 mg - Take 1 tablet by mouth daily    Start date of oral specialty medication: She received the Verzenio from [facility] specialty about 2 weeks ago and started at that time.  She also started the anastrozole which came from a local pharmacy.  Estimated start date 4/22/23.    Relevant Past Medical History, Comorbidities, and Vaccines  Relevant medical history and concomitant health conditions were discussed with the patient. The patient's chart has been reviewed for relevant past medical history and comorbid health conditions and updated as necessary.  Vaccines are coordinated by the patient's oncologist and primary care provider.  Past Medical History:   Diagnosis Date   • Allergic    • Asthma    • History of radiation therapy 03/27/2023    left breast/regional LNs   • Low back pain    • Malignant neoplasm of central portion of left breast in female, estrogen receptor positive 09/21/2022     Social History     Socioeconomic History   • Marital status:    Tobacco Use   • Smoking status: Former    • Smokeless tobacco: Never   Vaping Use   • Vaping Use: Former   Substance and Sexual Activity   • Alcohol use: Yes     Comment: occasional   • Drug use: No   • Sexual activity: Defer       Allergies  Known allergies and reactions were discussed with the patient. The patient's chart has been reviewed for allergy information and updated as necessary.   No Known Allergies     Current Medication List  This medication list has been reviewed with the patient and evaluated for any interactions or necessary modifications/recommendations, and updated to include all prescription medications, OTC medications, and supplements the patient is currently taking.  This list reflects what is contained in the patient's profile, which has also been marked as reviewed to communicate to other providers it is the most up to date version of the patient's current medication therapy.   Prior to Admission medications    Medication Sig Start Date End Date Taking? Authorizing Provider   Abemaciclib 150 MG tablet Take 1 tablet by mouth 2 (Two) Times a Day. 4/13/23   Rama Cano MD   albuterol sulfate  (90 Base) MCG/ACT inhaler Inhale 2 puffs Every 4 (Four) Hours As Needed for Wheezing. 7/26/22   Jos Centeno MD   anastrozole (ARIMIDEX) 1 MG tablet Take 1 tablet by mouth Daily. 4/11/23   Rama Cano MD   celecoxib (CeleBREX) 200 MG capsule Take 1 capsule by mouth Daily.  Patient taking differently: Take 200 mg by mouth As Needed. 7/8/21   Jos Centeno MD   lidocaine-prilocaine (EMLA) 2.5-2.5 % cream Apply 1 application topically to the appropriate area as directed As Needed (45-60 minutes prior to port access.  Cover with saran/plastic wrap.). 10/4/22   Unique Ellison APRN   omeprazole (priLOSEC) 20 MG capsule Take 1 capsule by mouth Daily. 5/11/23   Rama Cano MD   Symbicort 160-4.5 MCG/ACT inhaler INHALE 2 PUFFS BY MOUTH TWICE DAILY 10/4/22   Jos Centeno MD   venlafaxine  XR (EFFEXOR-XR) 150 MG 24 hr capsule Take 1 capsule by mouth Daily. 3/2/23   Rama Cano MD   vitamin B-12 (CYANOCOBALAMIN) 1000 MCG tablet Take 1,000 mcg by mouth Daily.    ProviderPauline MD   vitamin B-6 (PYRIDOXINE) 50 MG tablet Take 50 mg by mouth Daily.    Provider, MD Pauline   Vitamin D, Cholecalciferol, (CHOLECALCIFEROL) 10 MCG (400 UNIT) tablet Take 400 Units by mouth Daily.    Provider, MD Pauline       Drug Interactions  • Reviewed concomitant medications, allergies, labs, comorbidities/medical history, and immunization history.   • Drug-drug interactions noted and discussed during education: no significant drug interactions noted. . Reminded the patient to let us know before making any changes or starting any new prescription or OTC medications so we can first assess drug interactions.  • Drug-food interactions noted and discussed during education: Patient was instructed to avoid eating grapefruit and drinking grapefruit juice    Recommended Medications Assessment  • Bone Health (such as calcium/vitamin D, bisphosphonate, RANKL inhibitor) - Currently Taking The patient is currently on vitamin D.  She does not take calcium, but agreed to start taking that.  I recommended she purchase over the counter and start taking calcium 1000 mg PO daily and to make sure the amount of vitamin D is 800-1000 international units by mouth daily.  • VTE prophylaxis - Not Indicated   • Prophylactic antimicrobials - Not Indicated     Relevant Laboratory Values  Lab Results   Component Value Date    GLUCOSE 112 (H) 03/27/2023    CALCIUM 9.5 03/27/2023     03/27/2023    K 3.9 03/27/2023    CO2 27.0 03/27/2023     03/27/2023    BUN 8 03/27/2023    CREATININE 0.56 (L) 03/27/2023    EGFRIFNONA 91 11/06/2019    BCR 14.3 03/27/2023    ANIONGAP 10.0 03/27/2023     Lab Results   Component Value Date    WBC 2.12 (L) 05/11/2023    RBC 4.24 05/11/2023    HGB 14.1 05/11/2023    HCT 41.5 05/11/2023    MCV  97.9 (H) 05/11/2023    MCH 33.3 (H) 05/11/2023    MCHC 34.0 05/11/2023    RDW 13.0 05/11/2023    RDWSD 46.3 05/11/2023    MPV 8.7 05/11/2023     05/11/2023    NEUTRORELPCT 60.9 05/11/2023    LYMPHORELPCT 28.3 05/11/2023    MONORELPCT 8.0 05/11/2023    EOSRELPCT 1.9 05/11/2023    BASORELPCT 0.9 05/11/2023    AUTOIGPER 0.0 05/11/2023    NEUTROABS 1.29 (L) 05/11/2023    LYMPHSABS 0.60 (L) 05/11/2023    MONOSABS 0.17 05/11/2023    EOSABS 0.04 05/11/2023    BASOSABS 0.02 05/11/2023    AUTOIGNUM 0.00 05/11/2023    NRBC 0.0 09/14/2022       The above labs have been reviewed. No dose adjustments are needed for the oral specialty medication(s) based on the labs.    Initial Education Provided for Specialty Medication  The patient has been provided with the following education. All questions and concerns have been addressed prior to the patient receiving the medication, and the patient has verbalized understanding of the education and any materials provided.  Additional patient education shall be provided and documented upon request by the patient, provider or payer.      Provided patient with:   Chemo calendar to help improve adherence., Education sheets about the medication, 24-hour clinic phone number and my contact information and instructions to call should additional questions arise.     Medication Education Sheets Provided: (select all that apply)  • Oral Specialty Medication: Verzenio (abemaciclib), Arimidex (anastrozole)    Other Education Sheets Provided: (select all that apply)  Adherence, CINV, Diarrhea and Symptom Tracker Sheet and JONH Information    TOPICS COMMENTS   Storage and Handling of Oral Specialty Medication Both-Store in the original container, in a dry location out of direct sunlight, and out of reach of children or pets. and Store at room temperature. Discussed safe handling and what to do with any unused medication.   Administration of Oral Specialty Medication Both-Take with or without food at  the same time(s) each day., Take with a full glass of water. and Do not crush or chew tablets.   Adherence to Oral Specialty Regimen and Handling Missed Doses Both-Patient is likely to have good treatment adherence; reinforced the importance of adherence. Reviewed how to address missed doses and to let us know of any missed doses.   Anemia: role of RBC, cause, s/s, ways to manage, role of transfusion Verzenio-Reviewed the role of RBC and the use of transfusions if hemoglobin decreases too much.  Patient to notify us if they experience shortness of breath, dizziness, or palpitations.  Also let patient know they could feel more tired than usual and to try to stay active, but rest if they need to.    Thrombocytopenia: role of platelet, cause, s/s, ways to prevent bleeding, things to avoid, when to seek help Verzenio-Reviewed the role of platelets in blood clotting and when to call clinic (bloody nose that bleeds for 5 mins despite pressure, a cut that won't stop bleeding despite pressure, gums that bleed excessively with brushing or flossing). Recommended using an electric razor, soft bristle toothbrush, and blowing your nose gently.    Neutropenia: role of WBC, cause, infection precautions, s/s of infection, when to call MD Casrto-Reviewed the role of WBC, good infection prevention practices, and when to call the clinic (temperature 100.4F, sore throat, burning urination, etc)  • COVID Vaccines: 2 doses plus 2 boosters  • Flu Vaccine: 2022 flu vaccine received   Nutrition and Appetite Changes:  importance of maintaining healthy diet & weight, ways to manage to improve intake, dietary consult, exercise regimen, electrolyte and/or blood glucose abnormalities Verzenio-Discussed risk of decreased appetite. Recommended eating smaller, more frequent meals. Instructed the patient to contact clinic if they were losing weight or having difficulty eating enough to maintain their energy level.    Anastrozole-can raise the  cholesterol values, which should be checked periodically (like with an annual physical).   Diarrhea: causes, s/s of dehydration, ways to manage, dietary changes, when to call MD Verzenio-Chemotherapy - Discussed risk of diarrhea. Instructed patient that they can use OTC loperamide at first presentation of diarrhea, but call MD if 4-6 episodes in 24 hours not relieved by OTC loperamide.  She has been on this for a few weeks and sometimes has loose stools but hasn't needed to use loperamide.   Nausea/Vomiting: cause, use of antiemetics, dietary changes, when to call MD • Emetic risk: Low (both)  • Premeds: None  • Scheduled meds: None  • PRN home meds: Ondansetron - has this at home    Instructed the patient to take a dose of the PRN medication at the first onset of nausea and if it's not working to call us for additional medications.  Also provided non-drug measures to mitigate nausea.   Mouth Sores: causes, oral care, ways to manage Mouth sores can be prevented by making a mouth wash mixture of salt, baking soda, and water. The patient was instructed to swish and spit four times daily after meals and before bedtime.  Use of a soft bristle toothbrush was recommended.  The patient was instructed to avoid alcohol-containing OTC mouthwashes.    Alopecia: cause, ways to manage, resources Discussed the possibility of hair loss with the patient. Informed patient that they could request a prescription for a wig if desired and most of the cost is usually covered by insurance. Recommended covering the head with a hat and/or protecting the skin on the head with SPF 30 or higher.    Nervous System Changes: causes, s/s, neuropathies, cognitive changes, ways to manage Anastrozole-discussed the possibility of hot flashes as well as mitigation strategies.  She already takes venlafaxine, which should help.   Pain: causes, ways to manage Anastrozole-Chemo - Discussed muscle and joint aches/pains and recommended the use of OTC pain  relief with ibuprofen or acetaminophen if needed.   Organ Toxicities: cause, s/s, need for diagnostic tests, labs, when to notify MD Discussed potential effects on organ systems, monitoring, diagnostic tests, labs, and when to notify their MD. Discussed the signs/symptoms of the following: hepatotoxicity, pulmonary toxicity, and nephrotoxicity (Verzenio)   Miscellaneous • Financial Issues: Verzenio copay $0, she has contact information for [facility] and [facility] specialty pharmacy.  • Lab Draws: On days 1 and 15 of cycles 1 and 2, then starting with cycle 3 labs on day 1 of each cycle thereafter  • Miscellaneous: Blood Clots: Explained the rare, but possible risk of VTE or PE.  Reviewed the signs and symptoms and stressed the urgency to call 911 immediately. (Verzenio)   Infertility and Sexuality:  causes, fertility preservation options, sexuality changes, ways to manage, importance of birth control Oral Oncology Therapy: Reviewed safe sex practices and minimizing exposure to body fluids while on oral oncology therapy. and The patient is not of childbearing potential.  Dr. Cano tested FSH and LH and the patient is post-menopausal.   Home Care: how to manage bodily fluids Counseled on management of soiled linens and proper flush technique.  Discussed how to manage all the side effects at home and advised when to contact the MD office     Adherence and Self-Administration  • Barriers to Patient Adherence and/or Self-Administration: None  • Methods for Supporting Patient Adherence and/or Self-Administration: dosing calendar  • Expected duration of therapy: Until disease progression or intolerable toxicity and for a planned 2 years to prevent disease recurrence in this patient who is at a high-risk of recurrence    Goals of Therapy  • Patient Goals of Therapy:   o Consistently take medications as prescribed  o Patient will adhere to medication regimen  o Patient will report any medication side effects to healthcare  provider  • Clinical Goals:    Goals     • Specialty Pharmacy General Goal      Clinical goal/therapeutic target: disease control           o Support patient understanding of medication regimen  o Ensure patient knows the pharmacy contact information     Attestation  I attest that the initiated specialty medications are appropriate for the patient based on my assessment.  If the prescribed therapy is at any point deemed not appropriate based on the current or future assessments, a consultation will be initiated with the patient's specialty care provider to determine the best course of action. The revised plan of therapy will be documented along with any additional patient education provided.     Amber Ellison, PharmD, [facility]  Oncology Clinical Pharmacist   Phone: [phone]    Date and Time: 5/11/2023  11:18 EDT

## 2023-05-11 NOTE — PROGRESS NOTES
[facility]ematology and [facility]  Office number [phone]    Fax number [phone]     Follow up     Date: 23      Patient Name: Debra Beckman  MRN: [id number]  : 1970    Referring Physician: Dr. Goran Russell    Chief Complaint: follow up breast cancer    Cancer Staging: Stage IA (cT1b, cN0, cM0, G1, ER+, IL+, HER2-)      History of Present Illness: Debra Beckman is a pleasant 52 y.o. female who presents today for evaluation of left breast cancer.  She is accompanied by her supportive father who is a retired [employer] neurosurgeon, Dr. Beckman.    Screening mammogram 2022 demonstrated an asymmetry in the anterior left medial breast.  Diagnostic mammogram on 2022 demonstrated a persistent ill-defined asymmetry with architectural distortion in the left 9:00 periareolar region.  On ultrasound, this corresponded to an 8 mm mass in the 9:00 left breast.    Left breast 9:00 biopsy on 8/10/2022 demonstrated grade 1 invasive ductal carcinoma with associated DCIS (%, IL 60%, HER2/kathleen negative, 0+).    She proceeded with bilateral breast MRI on 8/15/2022.  This demonstrated a 1.4 cm 9:00 left breast enhancing mass, with a 1.3 cm area of clumped non-mass enhancement versus postbiopsy change 1 cm anterior lateral to this.  There is additionally a dominant focus of enhancement suspicious for a satellite lesion spanning 0.4 cm.  She underwent second look ultrasound and biopsy of the second lesion which corresponded to a subcentimeteric mass on ultrasound.    Left breast 9:00 biopsy on 2022 showed grade 2 invasive ductal carcinoma (ER greater than 90%, IL greater than 80%, HER2/kathleen negative, 0+)    Left breast lumpectomy, sentinel lymph node biopsy on 2022 demonstrated grade 2 invasive ductal carcinoma spanning 1.4 cm with associated DCIS.  Margin positive.  [place] lymph node positive () with a macrometastasis spanning 4 mm with extracapsular extension. She  underwent re-excision 22 with fat necrosis and no residual invasive or in situ carcinoma. Single left axillary LN excised and benign (total LN count 1/2).    Breast cancer risk profile:  G3, P2; Age of first live birth 37  Premenopausal, LMP was 2022.  She discontinued OCPs at the time of her cancer diagnosis.  Family history of breast, ovarian, prostate or pancreatic cancer: Maternal aunt with breast cancer.  Genetics: [facility] cancer next panel was negative in .    Treatment history:  TC x 6 cycles  Adjuvant radiation completed 3/27/23    Arimidex, planning 2023  Verzinio, C1 23  Zometa, planning pending endocrine tolerance     Interval history:  Debra returns accompanied by her supportive dad.    Intermittent GERD taking ppi an average of once per week and tums intermittently. Dry cough at night X 2 weeks. Her previous postviral cough that had resolved  No nausea.   Occasional diarrhea, not medicating for this. Last episode 1 week ago.   Energy is good.   Hot flashes well controlled.   No fever  Suture granule at port removal site.     Past Medical History:   Past Medical History:   Diagnosis Date   • Allergic    • Asthma    • History of radiation therapy 2023    left breast/regional LNs   • Low back pain    • Malignant neoplasm of central portion of left breast in female, estrogen receptor positive 2022       Past Surgical History:   Past Surgical History:   Procedure Laterality Date   • BREAST BIOPSY  08/10/2022   • BREAST BIOPSY Left 2022   • BREAST LUMPECTOMY WITH AXILLARY NODE DISSECTION Left 2022    clean dread   • BREAST LUMPECTOMY WITH SENTINEL NODE BIOPSY Left 2022   •  SECTION     • VENOUS ACCESS DEVICE (PORT) INSERTION Right 2022       Family History:   Family History   Problem Relation Age of Onset   • Hypertension Mother    • No Known Problems Father    • Breast cancer Maternal Aunt    • Ovarian cancer Neg Hx        Social History:    Social History     Socioeconomic History   • Marital status:    Tobacco Use   • Smoking status: Former   • Smokeless tobacco: Never   Vaping Use   • Vaping Use: Former   Substance and Sexual Activity   • Alcohol use: Yes     Comment: occasional   • Drug use: No   • Sexual activity: Defer   Works as a .      Medications:     Current Outpatient Medications:   •  Abemaciclib 150 MG tablet, Take 1 tablet by mouth 2 (Two) Times a Day., Disp: 60 tablet, Rfl: 11  •  albuterol sulfate  (90 Base) MCG/ACT inhaler, Inhale 2 puffs Every 4 (Four) Hours As Needed for Wheezing., Disp: 18 g, Rfl: 11  •  anastrozole (ARIMIDEX) 1 MG tablet, Take 1 tablet by mouth Daily., Disp: 30 tablet, Rfl: 2  •  celecoxib (CeleBREX) 200 MG capsule, Take 1 capsule by mouth Daily. (Patient taking differently: Take 200 mg by mouth As Needed.), Disp: 30 capsule, Rfl: 11  •  lidocaine-prilocaine (EMLA) 2.5-2.5 % cream, Apply 1 application topically to the appropriate area as directed As Needed (45-60 minutes prior to port access.  Cover with saran/plastic wrap.)., Disp: 30 g, Rfl: 3  •  omeprazole (priLOSEC) 20 MG capsule, Take 1 capsule by mouth Daily., Disp: 30 capsule, Rfl: 4  •  Symbicort 160-4.5 MCG/ACT inhaler, INHALE 2 PUFFS BY MOUTH TWICE DAILY, Disp: 10.2 g, Rfl: 2  •  venlafaxine XR (EFFEXOR-XR) 150 MG 24 hr capsule, Take 1 capsule by mouth Daily., Disp: 30 capsule, Rfl: 1  •  vitamin B-12 (CYANOCOBALAMIN) 1000 MCG tablet, Take 1,000 mcg by mouth Daily., Disp: , Rfl:   •  vitamin B-6 (PYRIDOXINE) 50 MG tablet, Take 50 mg by mouth Daily., Disp: , Rfl:   •  Vitamin D, Cholecalciferol, (CHOLECALCIFEROL) 10 MCG (400 UNIT) tablet, Take 400 Units by mouth Daily., Disp: , Rfl:     Current Facility-Administered Medications:   •  lidocaine (XYLOCAINE) 1 % injection 10 mL, 10 mL, Infiltration, Once, Connie Marie MD  •  lidocaine (XYLOCAINE) 1 % injection 5 mL, 5 mL, Infiltration, Once, Connie Marie MD    Allergies:  "  No Known Allergies    Objective     Vital Signs:   Vitals:    05/11/23 1058   BP: 125/83   Pulse: 77   Resp: 16   Temp: 97.1 °F (36.2 °C)   TempSrc: Temporal   SpO2: 100%   Weight: 59 kg (130 lb)   Height: 165.1 cm (65\")   PainSc: 0-No pain    Body mass index is 21.63 kg/m².   Pain Score    05/11/23 1058   PainSc: 0-No pain       ECOG Performance Status: 0    Physical Exam:  General: No acute distress. Well appearing   HEENT: Normocephalic, atraumatic. Sclera anicteric.   Neck: supple, no adenopathy.   Cardiovascular: regular rate and rhythm,. No murmurs.   Respiratory: Normal rate. Clear to auscultation bilaterally  Abdomen: Soft, nontender, non distended with normoactive bowel sounds  Lymph: no cervical, supraclavicular or axillary adenopathy  Neuro: Alert and oriented x 3. No focal deficits.   Ext: Symmetric, no swelling.   Psych: Euthymic  Breast: Incisions well-healed.  No palpable masses.  Mild radiation dermatitis left scapula.  Otherwise skin appears normal.      Laboratory/Imaging Reviewed:   Lab on 05/11/2023   Component Date Value Ref Range Status   • Glucose 05/11/2023 83  65 - 99 mg/dL Final   • BUN 05/11/2023 11  6 - 20 mg/dL Final   • Creatinine 05/11/2023 0.84  0.57 - 1.00 mg/dL Final   • Sodium 05/11/2023 138  136 - 145 mmol/L Final   • Potassium 05/11/2023 4.2  3.5 - 5.2 mmol/L Final   • Chloride 05/11/2023 102  98 - 107 mmol/L Final   • CO2 05/11/2023 29.0  22.0 - 29.0 mmol/L Final   • Calcium 05/11/2023 10.3  8.6 - 10.5 mg/dL Final   • Total Protein 05/11/2023 7.1  6.0 - 8.5 g/dL Final   • Albumin 05/11/2023 4.5  3.5 - 5.2 g/dL Final   • ALT (SGPT) 05/11/2023 15  1 - 33 U/L Final   • AST (SGOT) 05/11/2023 21  1 - 32 U/L Final   • Alkaline Phosphatase 05/11/2023 83  39 - 117 U/L Final   • Total Bilirubin 05/11/2023 0.5  0.0 - 1.2 mg/dL Final   • Globulin 05/11/2023 2.6  gm/dL Final    Calculated Result   • A/G Ratio 05/11/2023 1.7  g/dL Final   • BUN/Creatinine Ratio 05/11/2023 13.1  7.0 - 25.0 " Final   • Anion Gap 05/11/2023 7.0  5.0 - 15.0 mmol/L Final   • eGFR 05/11/2023 84.8  >60.0 mL/min/1.73 Final   • HCG, Urine QL 05/11/2023 Negative  Negative Final   • WBC 05/11/2023 2.12 (L)  3.40 - 10.80 10*3/mm3 Final   • RBC 05/11/2023 4.24  3.77 - 5.28 10*6/mm3 Final   • Hemoglobin 05/11/2023 14.1  12.0 - 15.9 g/dL Final   • Hematocrit 05/11/2023 41.5  34.0 - 46.6 % Final   • MCV 05/11/2023 97.9 (H)  79.0 - 97.0 fL Final   • MCH 05/11/2023 33.3 (H)  26.6 - 33.0 pg Final   • MCHC 05/11/2023 34.0  31.5 - 35.7 g/dL Final   • RDW 05/11/2023 13.0  12.3 - 15.4 % Final   • RDW-SD 05/11/2023 46.3  37.0 - 54.0 fl Final   • MPV 05/11/2023 8.7  6.0 - 12.0 fL Final   • Platelets 05/11/2023 176  140 - 450 10*3/mm3 Final   • Neutrophil % 05/11/2023 60.9  42.7 - 76.0 % Final   • Lymphocyte % 05/11/2023 28.3  19.6 - 45.3 % Final   • Monocyte % 05/11/2023 8.0  5.0 - 12.0 % Final   • Eosinophil % 05/11/2023 1.9  0.3 - 6.2 % Final   • Basophil % 05/11/2023 0.9  0.0 - 1.5 % Final   • Immature Grans % 05/11/2023 0.0  0.0 - 0.5 % Final   • Neutrophils, Absolute 05/11/2023 1.29 (L)  1.70 - 7.00 10*3/mm3 Final   • Lymphocytes, Absolute 05/11/2023 0.60 (L)  0.70 - 3.10 10*3/mm3 Final   • Monocytes, Absolute 05/11/2023 0.17  0.10 - 0.90 10*3/mm3 Final   • Eosinophils, Absolute 05/11/2023 0.04  0.00 - 0.40 10*3/mm3 Final   • Basophils, Absolute 05/11/2023 0.02  0.00 - 0.20 10*3/mm3 Final   • Immature Grans, Absolute 05/11/2023 0.00  0.00 - 0.05 10*3/mm3 Final     IMPRESSION:  Impression:     1. No evidence of metastatic disease within the chest, abdomen, or pelvis.  2. Patchy groundglass density in the anterior segment of the left upper lobe consistent with a nonspecific infectious/inflammatory process.  3. Incidental findings as noted above.    Impression:     1. No evidence of metastatic disease within the chest, abdomen, or pelvis.  2. Patchy groundglass density in the anterior segment of the left upper lobe consistent with a  nonspecific infectious/inflammatory process.  3. Incidental findings as noted above.  Assessment / Plan        1. Malignant neoplasm of central portion of left breast in female, estrogen receptor positive  -She has an early stage hormone sensitive breast cancer with a single positive lymph node.  She has undergone curative intent surgery.  -Historically, premenopausal lymph node positive breast cancer has been treated with adjuvant chemotherapy in addition to endocrine therapy. More recently, genomic testing has been shown to help to stratify the potential added benefit for an individual patient and identify patients who are less likely to derive significant additional benefit from chemotherapy.  For patients with N1 disease and age > 50, and low risk genomic signature with either Oncotype or MammaPrint has been shown to predict lack of benefit for adjuvant chemotherapy.  However, for patients less than 50, there is a minimum 5% absolute benefit in the lymph node positive setting from the addition of adjuvant chemotherapy even with a low risk genomic signature.  The patient is age 52, but premenopausal.  We discussed that this is a gray zone.  It is postulated that the potential benefit of adjuvant chemotherapy in premenopausal women may be at least in part explained by the effective ovarian suppression from chemotherapy.  We discussed the potential role of genomic testing to guide chemotherapy decision.  However the patient is healthy and wishes to be aggressive with her cancer treatment.  She declines genomic testing and wishes to proceed with adjuvant chemotherapy. We discussed standard treatment options of 1) dose dense Adriamycin and Cytoxan followed by weekly Taxol or 2) Taxotere and Cytoxan. We discussed differences in schedule and toxicities. We specifically discussed an increased risk for cardiotoxicity and late bone marrow disorders with the addition of adriamycin.  Given strongly ER positive disease and  N1 I would favor adjuvant TC over dose dense AC followed by weekly taxol and she completed 6 cycles  -She completed adjuvant radiation.  -FSH, LH, and estradiol consistent with postmenopausal state, will plan for initial AI with monitoring for ovarian rebound with every 8-week labs over the next 6 months.  -Ki-67 20% suggesting benefit from adjuvant Verzenio.  She initiated Verzinio 2 weeks ago. Tolerating well thus far. Labs show mild leukopenia. As ANC remains > 1 will continue with stable dose and repeat labs in 2 weeks.   -Plan adjuvant Zometa when tolerating endocrine therapy.  -ROGERIO on exam today.  -Scans from May notable for mild interstital change in radiation field. Monitor     2. Anxiety  3. Hot flashes  -Continue Effexor 150 mg daily.     4. Bone health  -Ca/D recommended  -Plan the addition of adjuvant Zometa after tolerating endocrine therapy, likely late June.    5. Cough  -Suspect related to her increased GERD symptoms.   Will schedule PPI with evening meal. If cough does not resolve or worsens, she will notify me    Follow Up:   2 weeks labs  4 weeks Jerome and wendy Cano MD  Hematology and Oncology

## 2023-05-12 ENCOUNTER — SPECIALTY PHARMACY (OUTPATIENT)
Dept: ONCOLOGY | Facility: HOSPITAL | Age: 53
End: 2023-05-12
Payer: COMMERCIAL

## 2023-05-18 RX ORDER — VENLAFAXINE HYDROCHLORIDE 150 MG/1
150 CAPSULE, EXTENDED RELEASE ORAL DAILY
Qty: 30 CAPSULE | Refills: 1 | Status: SHIPPED | OUTPATIENT
Start: 2023-05-18

## 2023-05-19 DIAGNOSIS — J45.30 MILD PERSISTENT ASTHMA WITHOUT COMPLICATION: ICD-10-CM

## 2023-05-19 RX ORDER — BUDESONIDE AND FORMOTEROL FUMARATE DIHYDRATE 160; 4.5 UG/1; UG/1
2 AEROSOL RESPIRATORY (INHALATION) 2 TIMES DAILY
Qty: 10.2 G | Refills: 2 | Status: SHIPPED | OUTPATIENT
Start: 2023-05-19

## 2023-05-19 NOTE — TELEPHONE ENCOUNTER
Caller: [id number] - [place], [place] - [address] - [phone]  - [phone] FX    Relationship: Pharmacy    Best call back number: [phone]    Requested Prescriptions:   Requested Prescriptions     Pending Prescriptions Disp Refills   • budesonide-formoterol (Symbicort) 160-4.5 MCG/ACT inhaler 10.2 g 2     Sig: Inhale 2 puffs 2 (Two) Times a Day.        Pharmacy where request should be sent: [id number] - [place], [place] - [address] - [phone]  - [phone] FX     Last office visit with prescribing clinician: 7/11/2022   Last telemedicine visit with prescribing clinician: Visit date not found   Next office visit with prescribing clinician: 7/12/2023     Additional details provided by patient:     Does the patient have less than a 3 day supply:  [] Yes  [x] No    Would you like a call back once the refill request has been completed: [] Yes [x] No    If the office needs to give you a call back, can they leave a voicemail: [] Yes [x] No    Cadance Dunaway, RegSched Rep   05/19/23 12:33 EDT

## 2023-05-25 ENCOUNTER — LAB (OUTPATIENT)
Dept: LAB | Facility: HOSPITAL | Age: 53
End: 2023-05-25
Payer: COMMERCIAL

## 2023-05-25 ENCOUNTER — TELEPHONE (OUTPATIENT)
Dept: ONCOLOGY | Facility: CLINIC | Age: 53
End: 2023-05-25
Payer: COMMERCIAL

## 2023-05-25 DIAGNOSIS — C50.112 MALIGNANT NEOPLASM OF CENTRAL PORTION OF LEFT BREAST IN FEMALE, ESTROGEN RECEPTOR POSITIVE: ICD-10-CM

## 2023-05-25 DIAGNOSIS — Z17.0 MALIGNANT NEOPLASM OF CENTRAL PORTION OF LEFT BREAST IN FEMALE, ESTROGEN RECEPTOR POSITIVE: ICD-10-CM

## 2023-05-25 LAB
ALBUMIN SERPL-MCNC: 4.4 G/DL (ref 3.5–5.2)
ALBUMIN/GLOB SERPL: 1.7 G/DL
ALP SERPL-CCNC: 90 U/L (ref 39–117)
ALT SERPL W P-5'-P-CCNC: 17 U/L (ref 1–33)
ANION GAP SERPL CALCULATED.3IONS-SCNC: 9 MMOL/L (ref 5–15)
AST SERPL-CCNC: 26 U/L (ref 1–32)
BASOPHILS # BLD AUTO: 0.04 10*3/MM3 (ref 0–0.2)
BASOPHILS NFR BLD AUTO: 1.6 % (ref 0–1.5)
BILIRUB SERPL-MCNC: 0.4 MG/DL (ref 0–1.2)
BUN SERPL-MCNC: 12 MG/DL (ref 6–20)
BUN/CREAT SERPL: 15.4 (ref 7–25)
CALCIUM SPEC-SCNC: 10 MG/DL (ref 8.6–10.5)
CHLORIDE SERPL-SCNC: 102 MMOL/L (ref 98–107)
CO2 SERPL-SCNC: 28 MMOL/L (ref 22–29)
CREAT SERPL-MCNC: 0.78 MG/DL (ref 0.57–1)
DEPRECATED RDW RBC AUTO: 49.8 FL (ref 37–54)
EGFRCR SERPLBLD CKD-EPI 2021: 92.7 ML/MIN/1.73
EOSINOPHIL # BLD AUTO: 0.06 10*3/MM3 (ref 0–0.4)
EOSINOPHIL NFR BLD AUTO: 2.3 % (ref 0.3–6.2)
ERYTHROCYTE [DISTWIDTH] IN BLOOD BY AUTOMATED COUNT: 14.1 % (ref 12.3–15.4)
GLOBULIN UR ELPH-MCNC: 2.6 GM/DL
GLUCOSE SERPL-MCNC: 91 MG/DL (ref 65–99)
HCT VFR BLD AUTO: 38.8 % (ref 34–46.6)
HGB BLD-MCNC: 13.1 G/DL (ref 12–15.9)
IMM GRANULOCYTES # BLD AUTO: 0 10*3/MM3 (ref 0–0.05)
IMM GRANULOCYTES NFR BLD AUTO: 0 % (ref 0–0.5)
LYMPHOCYTES # BLD AUTO: 0.61 10*3/MM3 (ref 0.7–3.1)
LYMPHOCYTES NFR BLD AUTO: 23.6 % (ref 19.6–45.3)
MCH RBC QN AUTO: 33.4 PG (ref 26.6–33)
MCHC RBC AUTO-ENTMCNC: 33.8 G/DL (ref 31.5–35.7)
MCV RBC AUTO: 99 FL (ref 79–97)
MONOCYTES # BLD AUTO: 0.21 10*3/MM3 (ref 0.1–0.9)
MONOCYTES NFR BLD AUTO: 8.1 % (ref 5–12)
NEUTROPHILS NFR BLD AUTO: 1.66 10*3/MM3 (ref 1.7–7)
NEUTROPHILS NFR BLD AUTO: 64.4 % (ref 42.7–76)
PLATELET # BLD AUTO: 256 10*3/MM3 (ref 140–450)
PMV BLD AUTO: 8.6 FL (ref 6–12)
POTASSIUM SERPL-SCNC: 4.5 MMOL/L (ref 3.5–5.2)
PROT SERPL-MCNC: 7 G/DL (ref 6–8.5)
RBC # BLD AUTO: 3.92 10*6/MM3 (ref 3.77–5.28)
SODIUM SERPL-SCNC: 139 MMOL/L (ref 136–145)
WBC NRBC COR # BLD: 2.58 10*3/MM3 (ref 3.4–10.8)

## 2023-05-25 PROCEDURE — 36415 COLL VENOUS BLD VENIPUNCTURE: CPT

## 2023-05-25 PROCEDURE — 85025 COMPLETE CBC W/AUTO DIFF WBC: CPT

## 2023-05-25 PROCEDURE — 80053 COMPREHEN METABOLIC PANEL: CPT

## 2023-06-08 ENCOUNTER — LAB (OUTPATIENT)
Dept: LAB | Facility: HOSPITAL | Age: 53
End: 2023-06-08
Payer: COMMERCIAL

## 2023-06-08 ENCOUNTER — TELEPHONE (OUTPATIENT)
Dept: ONCOLOGY | Facility: CLINIC | Age: 53
End: 2023-06-08
Payer: COMMERCIAL

## 2023-06-08 DIAGNOSIS — C50.112 MALIGNANT NEOPLASM OF CENTRAL PORTION OF LEFT BREAST IN FEMALE, ESTROGEN RECEPTOR POSITIVE: ICD-10-CM

## 2023-06-08 DIAGNOSIS — Z17.0 MALIGNANT NEOPLASM OF CENTRAL PORTION OF LEFT BREAST IN FEMALE, ESTROGEN RECEPTOR POSITIVE: ICD-10-CM

## 2023-06-08 LAB
ALBUMIN SERPL-MCNC: 4.2 G/DL (ref 3.5–5.2)
ALBUMIN/GLOB SERPL: 1.6 G/DL
ALP SERPL-CCNC: 86 U/L (ref 39–117)
ALT SERPL W P-5'-P-CCNC: 24 U/L (ref 1–33)
ANION GAP SERPL CALCULATED.3IONS-SCNC: 9 MMOL/L (ref 5–15)
AST SERPL-CCNC: 30 U/L (ref 1–32)
BASOPHILS # BLD AUTO: 0.03 10*3/MM3 (ref 0–0.2)
BASOPHILS NFR BLD AUTO: 1.3 % (ref 0–1.5)
BILIRUB SERPL-MCNC: 0.3 MG/DL (ref 0–1.2)
BUN SERPL-MCNC: 12 MG/DL (ref 6–20)
BUN/CREAT SERPL: 16.7 (ref 7–25)
CALCIUM SPEC-SCNC: 9.8 MG/DL (ref 8.6–10.5)
CHLORIDE SERPL-SCNC: 103 MMOL/L (ref 98–107)
CO2 SERPL-SCNC: 27 MMOL/L (ref 22–29)
CREAT SERPL-MCNC: 0.72 MG/DL (ref 0.57–1)
DEPRECATED RDW RBC AUTO: 56.5 FL (ref 37–54)
EGFRCR SERPLBLD CKD-EPI 2021: 102 ML/MIN/1.73
EOSINOPHIL # BLD AUTO: 0.03 10*3/MM3 (ref 0–0.4)
EOSINOPHIL NFR BLD AUTO: 1.3 % (ref 0.3–6.2)
ERYTHROCYTE [DISTWIDTH] IN BLOOD BY AUTOMATED COUNT: 15.3 % (ref 12.3–15.4)
ESTRADIOL SERPL HS-MCNC: <5 PG/ML
FSH SERPL-ACNC: 77 MIU/ML
GLOBULIN UR ELPH-MCNC: 2.6 GM/DL
GLUCOSE SERPL-MCNC: 79 MG/DL (ref 65–99)
HCT VFR BLD AUTO: 37.3 % (ref 34–46.6)
HGB BLD-MCNC: 12.6 G/DL (ref 12–15.9)
IMM GRANULOCYTES # BLD AUTO: 0 10*3/MM3 (ref 0–0.05)
IMM GRANULOCYTES NFR BLD AUTO: 0 % (ref 0–0.5)
LYMPHOCYTES # BLD AUTO: 0.71 10*3/MM3 (ref 0.7–3.1)
LYMPHOCYTES NFR BLD AUTO: 29.7 % (ref 19.6–45.3)
MCH RBC QN AUTO: 33.9 PG (ref 26.6–33)
MCHC RBC AUTO-ENTMCNC: 33.8 G/DL (ref 31.5–35.7)
MCV RBC AUTO: 100.3 FL (ref 79–97)
MONOCYTES # BLD AUTO: 0.18 10*3/MM3 (ref 0.1–0.9)
MONOCYTES NFR BLD AUTO: 7.5 % (ref 5–12)
NEUTROPHILS NFR BLD AUTO: 1.44 10*3/MM3 (ref 1.7–7)
NEUTROPHILS NFR BLD AUTO: 60.2 % (ref 42.7–76)
PLATELET # BLD AUTO: 233 10*3/MM3 (ref 140–450)
PMV BLD AUTO: 8.6 FL (ref 6–12)
POTASSIUM SERPL-SCNC: 4.4 MMOL/L (ref 3.5–5.2)
PROT SERPL-MCNC: 6.8 G/DL (ref 6–8.5)
RBC # BLD AUTO: 3.72 10*6/MM3 (ref 3.77–5.28)
SODIUM SERPL-SCNC: 139 MMOL/L (ref 136–145)
WBC NRBC COR # BLD: 2.39 10*3/MM3 (ref 3.4–10.8)

## 2023-06-08 PROCEDURE — 80053 COMPREHEN METABOLIC PANEL: CPT

## 2023-06-08 PROCEDURE — 36415 COLL VENOUS BLD VENIPUNCTURE: CPT

## 2023-06-08 PROCEDURE — 82670 ASSAY OF TOTAL ESTRADIOL: CPT

## 2023-06-08 PROCEDURE — 85025 COMPLETE CBC W/AUTO DIFF WBC: CPT

## 2023-06-08 PROCEDURE — 83001 ASSAY OF GONADOTROPIN (FSH): CPT

## 2023-06-08 NOTE — TELEPHONE ENCOUNTER
"Spoke with patient regarding labs due today.  Patient stated she will complete them this afternoon.  Patient stated she is feeling \"fine.\"  Patient stated she has had some increased fatigue but her children are out of school and is due to her job but overall she \"feels fine.\"  Patient notified appointment was suppose to be with MD today but is scheduled for 6/15/23.  Spoke with Dr. Stone MD on call, and per MD patient notified that she should complete labs today but keep follow up next week with Dr. Cano.  Patient verbalized understanding and stated she will complete labs today and keep next week's appointment.   "

## 2023-06-09 NOTE — TELEPHONE ENCOUNTER
Patient stated she is not having diarrhea.  Patient advised that KADE Stoner, reviewed her labs from 6/8/23 and she may continue Abemaciclib.  Patient verbalized understanding.  Patient reminded of appointment next week with Dr. Cano and she verbalized understanding.

## 2023-06-15 ENCOUNTER — LAB (OUTPATIENT)
Dept: LAB | Facility: HOSPITAL | Age: 53
End: 2023-06-15
Payer: COMMERCIAL

## 2023-06-15 DIAGNOSIS — C50.112 MALIGNANT NEOPLASM OF CENTRAL PORTION OF LEFT BREAST IN FEMALE, ESTROGEN RECEPTOR POSITIVE: ICD-10-CM

## 2023-06-15 DIAGNOSIS — Z17.0 MALIGNANT NEOPLASM OF CENTRAL PORTION OF LEFT BREAST IN FEMALE, ESTROGEN RECEPTOR POSITIVE: ICD-10-CM

## 2023-06-15 LAB
ALBUMIN SERPL-MCNC: 4.3 G/DL (ref 3.5–5.2)
ALBUMIN/GLOB SERPL: 1.8 G/DL
ALP SERPL-CCNC: 85 U/L (ref 39–117)
ALT SERPL W P-5'-P-CCNC: 19 U/L (ref 1–33)
ANION GAP SERPL CALCULATED.3IONS-SCNC: 9 MMOL/L (ref 5–15)
AST SERPL-CCNC: 22 U/L (ref 1–32)
BASOPHILS # BLD AUTO: 0.06 10*3/MM3 (ref 0–0.2)
BASOPHILS NFR BLD AUTO: 2.6 % (ref 0–1.5)
BILIRUB SERPL-MCNC: 0.4 MG/DL (ref 0–1.2)
BUN SERPL-MCNC: 11 MG/DL (ref 6–20)
BUN/CREAT SERPL: 14.9 (ref 7–25)
CALCIUM SPEC-SCNC: 10.5 MG/DL (ref 8.6–10.5)
CHLORIDE SERPL-SCNC: 103 MMOL/L (ref 98–107)
CO2 SERPL-SCNC: 27 MMOL/L (ref 22–29)
CREAT SERPL-MCNC: 0.74 MG/DL (ref 0.57–1)
DEPRECATED RDW RBC AUTO: 58.8 FL (ref 37–54)
EGFRCR SERPLBLD CKD-EPI 2021: 98.7 ML/MIN/1.73
EOSINOPHIL # BLD AUTO: 0.08 10*3/MM3 (ref 0–0.4)
EOSINOPHIL NFR BLD AUTO: 3.4 % (ref 0.3–6.2)
ERYTHROCYTE [DISTWIDTH] IN BLOOD BY AUTOMATED COUNT: 15.9 % (ref 12.3–15.4)
GLOBULIN UR ELPH-MCNC: 2.4 GM/DL
GLUCOSE SERPL-MCNC: 105 MG/DL (ref 65–99)
HCT VFR BLD AUTO: 36.7 % (ref 34–46.6)
HGB BLD-MCNC: 12.9 G/DL (ref 12–15.9)
IMM GRANULOCYTES # BLD AUTO: 0.01 10*3/MM3 (ref 0–0.05)
IMM GRANULOCYTES NFR BLD AUTO: 0.4 % (ref 0–0.5)
LYMPHOCYTES # BLD AUTO: 0.6 10*3/MM3 (ref 0.7–3.1)
LYMPHOCYTES NFR BLD AUTO: 25.9 % (ref 19.6–45.3)
MCH RBC QN AUTO: 35.4 PG (ref 26.6–33)
MCHC RBC AUTO-ENTMCNC: 35.1 G/DL (ref 31.5–35.7)
MCV RBC AUTO: 100.8 FL (ref 79–97)
MONOCYTES # BLD AUTO: 0.18 10*3/MM3 (ref 0.1–0.9)
MONOCYTES NFR BLD AUTO: 7.8 % (ref 5–12)
NEUTROPHILS NFR BLD AUTO: 1.39 10*3/MM3 (ref 1.7–7)
NEUTROPHILS NFR BLD AUTO: 59.9 % (ref 42.7–76)
PLATELET # BLD AUTO: 259 10*3/MM3 (ref 140–450)
PMV BLD AUTO: 8.4 FL (ref 6–12)
POTASSIUM SERPL-SCNC: 4.5 MMOL/L (ref 3.5–5.2)
PROT SERPL-MCNC: 6.7 G/DL (ref 6–8.5)
RBC # BLD AUTO: 3.64 10*6/MM3 (ref 3.77–5.28)
SODIUM SERPL-SCNC: 139 MMOL/L (ref 136–145)
WBC NRBC COR # BLD: 2.32 10*3/MM3 (ref 3.4–10.8)

## 2023-06-15 PROCEDURE — 36415 COLL VENOUS BLD VENIPUNCTURE: CPT

## 2023-06-15 PROCEDURE — 85025 COMPLETE CBC W/AUTO DIFF WBC: CPT

## 2023-06-15 PROCEDURE — 80053 COMPREHEN METABOLIC PANEL: CPT

## 2023-07-06 ENCOUNTER — TELEPHONE (OUTPATIENT)
Dept: ONCOLOGY | Facility: CLINIC | Age: 53
End: 2023-07-06

## 2023-07-06 RX ORDER — ANASTROZOLE 1 MG/1
1 TABLET ORAL DAILY
Qty: 30 TABLET | Refills: 2 | Status: CANCELLED | OUTPATIENT
Start: 2023-07-06

## 2023-07-27 ENCOUNTER — OFFICE VISIT (OUTPATIENT)
Dept: ONCOLOGY | Facility: CLINIC | Age: 53
End: 2023-07-27
Payer: COMMERCIAL

## 2023-07-27 VITALS
SYSTOLIC BLOOD PRESSURE: 100 MMHG | DIASTOLIC BLOOD PRESSURE: 50 MMHG | TEMPERATURE: 97.6 F | HEART RATE: 68 BPM | WEIGHT: 128 LBS | OXYGEN SATURATION: 100 % | HEIGHT: 66 IN | RESPIRATION RATE: 16 BRPM | BODY MASS INDEX: 20.57 KG/M2

## 2023-07-27 DIAGNOSIS — C50.112 MALIGNANT NEOPLASM OF CENTRAL PORTION OF LEFT BREAST IN FEMALE, ESTROGEN RECEPTOR POSITIVE: Primary | ICD-10-CM

## 2023-07-27 DIAGNOSIS — Z17.0 MALIGNANT NEOPLASM OF CENTRAL PORTION OF LEFT BREAST IN FEMALE, ESTROGEN RECEPTOR POSITIVE: Primary | ICD-10-CM

## 2023-07-27 PROCEDURE — 99214 OFFICE O/P EST MOD 30 MIN: CPT | Performed by: INTERNAL MEDICINE

## 2023-07-27 RX ORDER — IBUPROFEN 600 MG/1
600 TABLET ORAL EVERY 8 HOURS PRN
Qty: 30 TABLET | Refills: 1 | Status: SHIPPED | OUTPATIENT
Start: 2023-07-27

## 2023-07-27 RX ORDER — SODIUM CHLORIDE 9 MG/ML
250 INJECTION, SOLUTION INTRAVENOUS ONCE
OUTPATIENT
Start: 2023-07-27

## 2023-07-27 RX ORDER — VENLAFAXINE HYDROCHLORIDE 37.5 MG/1
37.5 CAPSULE, EXTENDED RELEASE ORAL DAILY
Qty: 45 CAPSULE | Refills: 3 | Status: SHIPPED | OUTPATIENT
Start: 2023-07-27

## 2023-08-01 ENCOUNTER — SPECIALTY PHARMACY (OUTPATIENT)
Dept: ONCOLOGY | Facility: HOSPITAL | Age: 53
End: 2023-08-01
Payer: COMMERCIAL

## 2023-08-08 DIAGNOSIS — M54.50 CHRONIC BILATERAL LOW BACK PAIN WITHOUT SCIATICA: ICD-10-CM

## 2023-08-08 DIAGNOSIS — G89.29 CHRONIC BILATERAL LOW BACK PAIN WITHOUT SCIATICA: ICD-10-CM

## 2023-08-08 DIAGNOSIS — M54.10 RADICULOPATHY, UNSPECIFIED SPINAL REGION: Primary | ICD-10-CM

## 2023-08-08 RX ORDER — TRAMADOL HYDROCHLORIDE 50 MG/1
50 TABLET ORAL EVERY 6 HOURS PRN
Qty: 40 TABLET | Refills: 1 | Status: SHIPPED | OUTPATIENT
Start: 2023-08-08

## 2023-08-08 RX ORDER — TIZANIDINE HYDROCHLORIDE 2 MG/1
2 CAPSULE, GELATIN COATED ORAL 3 TIMES DAILY PRN
Qty: 40 CAPSULE | Refills: 1 | Status: SHIPPED | OUTPATIENT
Start: 2023-08-08

## 2023-08-16 ENCOUNTER — OFFICE VISIT (OUTPATIENT)
Dept: NEUROSURGERY | Facility: CLINIC | Age: 53
End: 2023-08-16
Payer: COMMERCIAL

## 2023-08-16 VITALS — BODY MASS INDEX: 20.57 KG/M2 | TEMPERATURE: 97.1 F | HEIGHT: 66 IN | WEIGHT: 128 LBS

## 2023-08-16 DIAGNOSIS — M51.36 DDD (DEGENERATIVE DISC DISEASE), LUMBAR: ICD-10-CM

## 2023-08-16 DIAGNOSIS — M54.50 CHRONIC BILATERAL LOW BACK PAIN WITHOUT SCIATICA: Primary | ICD-10-CM

## 2023-08-16 DIAGNOSIS — M54.16 LUMBAR RADICULOPATHY: ICD-10-CM

## 2023-08-16 DIAGNOSIS — G89.29 CHRONIC BILATERAL LOW BACK PAIN WITHOUT SCIATICA: Primary | ICD-10-CM

## 2023-08-16 PROBLEM — M51.369 DDD (DEGENERATIVE DISC DISEASE), LUMBAR: Status: ACTIVE | Noted: 2023-08-16

## 2023-08-16 PROCEDURE — 99214 OFFICE O/P EST MOD 30 MIN: CPT | Performed by: PHYSICIAN ASSISTANT

## 2023-08-16 RX ORDER — PREGABALIN 50 MG/1
50 CAPSULE ORAL 2 TIMES DAILY
Qty: 30 CAPSULE | Refills: 0 | Status: SHIPPED | OUTPATIENT
Start: 2023-08-16

## 2023-08-16 RX ORDER — METHOCARBAMOL 500 MG/1
500 TABLET, FILM COATED ORAL 4 TIMES DAILY
Qty: 40 TABLET | Refills: 1 | Status: SHIPPED | OUTPATIENT
Start: 2023-08-16

## 2023-08-16 NOTE — PROGRESS NOTES
Debra Beckman   1970   [id number]       2023     Chief Complaint   Patient presents with    Low Back Pain & Bilateral Leg Pain        HPI   This is a 53-year-old female with a history of low back pain, I saw her originally in 2021 with back pain and sciatica.  She has undergone chemotherapy treatment for breast cancer and has returned to work as an .  She unfortunately has developed increased back pain and sciatica pain that became worse while she was on vacation and then another exacerbation recently with sitting and driving.  She was seen while out of state on vacation and underwent x-rays of the lumbar spine after she suffered a fall, these revealed significant disc space narrowing at L5-S1 and facet arthropathy, no fractures noted.  She is seen today for ongoing pain.    Chronic Illnesses:  Past Medical History:  No date: Allergic  No date: Asthma  2023: History of radiation therapy      Comment:  left breast/regional LNs  No date: Low back pain  2022: Malignant neoplasm of central portion of left breast in   female, estrogen receptor positive     Past Surgical History:   Procedure Laterality Date    BREAST BIOPSY  08/10/2022    BREAST BIOPSY Left 2022    BREAST LUMPECTOMY WITH AXILLARY NODE DISSECTION Left 2022    clean dread    BREAST LUMPECTOMY WITH SENTINEL NODE BIOPSY Left 2022     SECTION      VENOUS ACCESS DEVICE (PORT) INSERTION Right 2022        No Known Allergies       Current Outpatient Medications:     Abemaciclib 150 MG tablet, Take 1 tablet by mouth 2 (Two) Times a Day., Disp: 60 tablet, Rfl: 11    albuterol sulfate  (90 Base) MCG/ACT inhaler, Inhale 2 puffs Every 4 (Four) Hours As Needed for Wheezing., Disp: 18 g, Rfl: 11    anastrozole (ARIMIDEX) 1 MG tablet, Take 1 tablet by mouth Daily., Disp: 30 tablet, Rfl: 11    budesonide-formoterol (Symbicort) 160-4.5 MCG/ACT inhaler, Inhale 2 puffs 2 (Two) Times a Day., Disp:  10.2 g, Rfl: 2    ibuprofen (ADVIL,MOTRIN) 600 MG tablet, Take 1 tablet by mouth Every 8 (Eight) Hours As Needed for Mild Pain. Take with food., Disp: 30 tablet, Rfl: 1    lidocaine-prilocaine (EMLA) 2.5-2.5 % cream, Apply 1 application topically to the appropriate area as directed As Needed (45-60 minutes prior to port access.  Cover with saran/plastic wrap.)., Disp: 30 g, Rfl: 3    omeprazole (priLOSEC) 20 MG capsule, Take 1 capsule by mouth Daily., Disp: 30 capsule, Rfl: 4    TiZANidine (Zanaflex) 2 MG capsule, Take 1 capsule by mouth 3 (Three) Times a Day As Needed for Muscle Spasms., Disp: 40 capsule, Rfl: 1    traMADol (ULTRAM) 50 MG tablet, Take 1 tablet by mouth Every 6 (Six) Hours As Needed for Severe Pain. May take 1-2 Q6 for severe pain, Disp: 40 tablet, Rfl: 1    venlafaxine XR (EFFEXOR-XR) 37.5 MG 24 hr capsule, Take 1 capsule by mouth Daily. Take with 75 mg caps for total dose of 112.5 mg daily., Disp: 45 capsule, Rfl: 3    vitamin B-12 (CYANOCOBALAMIN) 1000 MCG tablet, Take 1 tablet by mouth Daily., Disp: , Rfl:     vitamin B-6 (PYRIDOXINE) 50 MG tablet, Take 1 tablet by mouth Daily., Disp: , Rfl:     Vitamin D, Cholecalciferol, (CHOLECALCIFEROL) 10 MCG (400 UNIT) tablet, Take 1 tablet by mouth Daily., Disp: , Rfl:     methocarbamol (ROBAXIN) 500 MG tablet, Take 1 tablet by mouth 4 (Four) Times a Day., Disp: 40 tablet, Rfl: 1    pregabalin (Lyrica) 50 MG capsule, Take 1 capsule by mouth 2 (Two) Times a Day., Disp: 30 capsule, Rfl: 0     Social History     Socioeconomic History    Marital status:    Tobacco Use    Smoking status: Former    Smokeless tobacco: Never   Vaping Use    Vaping Use: Former   Substance and Sexual Activity    Alcohol use: Yes     Comment: occasional    Drug use: No    Sexual activity: Not Currently        family history includes Alcohol abuse in her maternal aunt, maternal aunt, and maternal uncle; Anxiety disorder in her maternal aunt and maternal aunt; Breast cancer in  "her maternal aunt; Depression in her maternal aunt, maternal aunt, and maternal uncle; Drug abuse in her maternal uncle; Hypertension in her mother; No Known Problems in her father.     Social History    Tobacco Use      Smoking status: Former      Smokeless tobacco: Never       Gait & Balance Assessment :  Risk assessment for falls. Fall precautions.  universal fall precautions, such as;   Using gait aids a cane, walker at the appropriate height at all times for ambulation or if necessary a wheelchair  Removing all area rugs and coffee tables to create a safe environment at home  Ensure clean, dry floors  Wearing supportive footwear and properly fitting clothing  Ensure bed/chair is appropriate height and patient's feet can touch the floor  Using a shower transfer bench  Using walk-in shower and having shower safety bars installed  Ensure proper lighting, minimize glare  Have nightlights operational and in use  Participation in an exercise program for gait training, balance training and strength  Avoid carrying laundry up and down steps  Ensure proper compliance and organization of medications to avoid errors   Avoid use of over the counter sedatives and alcohol consumption  Ensure easy access to call bell, glasses, TV control, telephone  Ensure glasses/hearing aids are in use or close by (on top of night table)     Body mass index is 20.66 kg/mý.   BMI is within normal parameters. No other follow-up for BMI required.       Temp 97.1 øF (36.2 øC) (Infrared)   Ht 167.6 cm (66\")   Wt 58.1 kg (128 lb)   BMI 20.66 kg/mý    Physical Examination:  HEENT-wnl  Lungs-No wheezing or SOB      Neurologic Exam   Patient is alert and oriented.  Pupils are equal and reactive.  Visual fields are full.  EOMI without nystagmus.    Gait slightly antalgic favoring the right leg  Reflexes not elicited in the lower extremities.  No focal weakness in the lower extremities  SLR is slightly positive, internal/external rotation of the " hip is slightly positive.    Radiological Data Review:  For my review today is the x-ray report from 7/14/2023.    Assessment and Plan:  Diagnoses and all orders for this visit:    1. Chronic bilateral low back pain without sciatica (Primary)  -     Ambulatory Referral to Physical Therapy Evaluate and treat; Heat; Moist heat, Ultrasound; ROM, Strengthening  -     pregabalin (Lyrica) 50 MG capsule; Take 1 capsule by mouth 2 (Two) Times a Day.  Dispense: 30 capsule; Refill: 0    2. DDD (degenerative disc disease), lumbar  -     Ambulatory Referral to Physical Therapy Evaluate and treat; Heat; Moist heat, Ultrasound; ROM, Strengthening  -     pregabalin (Lyrica) 50 MG capsule; Take 1 capsule by mouth 2 (Two) Times a Day.  Dispense: 30 capsule; Refill: 0    3. Lumbar radiculopathy    Other orders  -     methocarbamol (ROBAXIN) 500 MG tablet; Take 1 tablet by mouth 4 (Four) Times a Day.  Dispense: 40 tablet; Refill: 1    We are going to continue with conservative treatment, I have made some adjustments and additions to her medication.  We are making a referral to physical therapy and she will keep me updated on her progress.  I have not ordered an MRI at this time however should her symptoms not improved with medications and therapy we will get this scheduled.    Any copied data from previous notes included in the (1) HPI, (2) PE, (3) MDM and/or assessment and plan has been reviewed and is accurate as of this date.  Lydia France, JOSE RAFAEL      PCP:  Jos Centeno MD

## 2023-09-05 ENCOUNTER — HOSPITAL ENCOUNTER (OUTPATIENT)
Dept: ONCOLOGY | Facility: HOSPITAL | Age: 53
Discharge: HOME | End: 2023-09-05
Admitting: INTERNAL MEDICINE
Payer: COMMERCIAL

## 2023-09-05 ENCOUNTER — DOCUMENTATION (OUTPATIENT)
Dept: NUTRITION | Facility: HOSPITAL | Age: 53
End: 2023-09-05
Payer: COMMERCIAL

## 2023-09-05 VITALS
BODY MASS INDEX: 20.25 KG/M2 | DIASTOLIC BLOOD PRESSURE: 93 MMHG | SYSTOLIC BLOOD PRESSURE: 120 MMHG | HEIGHT: 66 IN | HEART RATE: 99 BPM | RESPIRATION RATE: 16 BRPM | WEIGHT: 126 LBS | TEMPERATURE: 98.3 F

## 2023-09-05 DIAGNOSIS — C50.112 MALIGNANT NEOPLASM OF CENTRAL PORTION OF LEFT BREAST IN FEMALE, ESTROGEN RECEPTOR POSITIVE: Primary | ICD-10-CM

## 2023-09-05 DIAGNOSIS — Z17.0 MALIGNANT NEOPLASM OF CENTRAL PORTION OF LEFT BREAST IN FEMALE, ESTROGEN RECEPTOR POSITIVE: Primary | ICD-10-CM

## 2023-09-05 DIAGNOSIS — Z45.2 ENCOUNTER FOR CARE RELATED TO PORT-A-CATH: ICD-10-CM

## 2023-09-05 LAB
ALBUMIN SERPL-MCNC: 4.2 G/DL (ref 3.5–5.2)
ALBUMIN/GLOB SERPL: 1.7 G/DL
ALP SERPL-CCNC: 66 U/L (ref 39–117)
ALT SERPL W P-5'-P-CCNC: 18 U/L (ref 1–33)
ANION GAP SERPL CALCULATED.3IONS-SCNC: 9 MMOL/L (ref 5–15)
AST SERPL-CCNC: 22 U/L (ref 1–32)
BASOPHILS # BLD AUTO: 0.06 10*3/MM3 (ref 0–0.2)
BASOPHILS NFR BLD AUTO: 2.3 % (ref 0–1.5)
BILIRUB SERPL-MCNC: 0.4 MG/DL (ref 0–1.2)
BUN SERPL-MCNC: 9 MG/DL (ref 6–20)
BUN/CREAT SERPL: 12.2 (ref 7–25)
CALCIUM SPEC-SCNC: 9.4 MG/DL (ref 8.6–10.5)
CHLORIDE SERPL-SCNC: 104 MMOL/L (ref 98–107)
CO2 SERPL-SCNC: 27 MMOL/L (ref 22–29)
CREAT BLDA-MCNC: 0.7 MG/DL (ref 0.6–1.3)
CREAT SERPL-MCNC: 0.74 MG/DL (ref 0.57–1)
DEPRECATED RDW RBC AUTO: 51.4 FL (ref 37–54)
EGFRCR SERPLBLD CKD-EPI 2021: 98.1 ML/MIN/1.73
EOSINOPHIL # BLD AUTO: 0.05 10*3/MM3 (ref 0–0.4)
EOSINOPHIL NFR BLD AUTO: 1.9 % (ref 0.3–6.2)
ERYTHROCYTE [DISTWIDTH] IN BLOOD BY AUTOMATED COUNT: 13 % (ref 12.3–15.4)
GLOBULIN UR ELPH-MCNC: 2.5 GM/DL
GLUCOSE SERPL-MCNC: 94 MG/DL (ref 65–99)
HCT VFR BLD AUTO: 35.6 % (ref 34–46.6)
HGB BLD-MCNC: 12.3 G/DL (ref 12–15.9)
IMM GRANULOCYTES # BLD AUTO: 0 10*3/MM3 (ref 0–0.05)
IMM GRANULOCYTES NFR BLD AUTO: 0 % (ref 0–0.5)
LYMPHOCYTES # BLD AUTO: 0.68 10*3/MM3 (ref 0.7–3.1)
LYMPHOCYTES NFR BLD AUTO: 26.3 % (ref 19.6–45.3)
MAGNESIUM SERPL-MCNC: 2.1 MG/DL (ref 1.6–2.6)
MCH RBC QN AUTO: 37.4 PG (ref 26.6–33)
MCHC RBC AUTO-ENTMCNC: 34.6 G/DL (ref 31.5–35.7)
MCV RBC AUTO: 108.2 FL (ref 79–97)
MONOCYTES # BLD AUTO: 0.2 10*3/MM3 (ref 0.1–0.9)
MONOCYTES NFR BLD AUTO: 7.7 % (ref 5–12)
NEUTROPHILS NFR BLD AUTO: 1.6 10*3/MM3 (ref 1.7–7)
NEUTROPHILS NFR BLD AUTO: 61.8 % (ref 42.7–76)
PHOSPHATE SERPL-MCNC: 3.5 MG/DL (ref 2.5–4.5)
PLATELET # BLD AUTO: 194 10*3/MM3 (ref 140–450)
PMV BLD AUTO: 8.7 FL (ref 6–12)
POTASSIUM SERPL-SCNC: 3.7 MMOL/L (ref 3.5–5.2)
PROT SERPL-MCNC: 6.7 G/DL (ref 6–8.5)
RBC # BLD AUTO: 3.29 10*6/MM3 (ref 3.77–5.28)
SODIUM SERPL-SCNC: 140 MMOL/L (ref 136–145)
WBC NRBC COR # BLD: 2.59 10*3/MM3 (ref 3.4–10.8)

## 2023-09-05 PROCEDURE — 83735 ASSAY OF MAGNESIUM: CPT | Performed by: INTERNAL MEDICINE

## 2023-09-05 PROCEDURE — 82565 ASSAY OF CREATININE: CPT

## 2023-09-05 PROCEDURE — 96413 CHEMO IV INFUSION 1 HR: CPT

## 2023-09-05 PROCEDURE — 96374 THER/PROPH/DIAG INJ IV PUSH: CPT

## 2023-09-05 PROCEDURE — 85025 COMPLETE CBC W/AUTO DIFF WBC: CPT | Performed by: INTERNAL MEDICINE

## 2023-09-05 PROCEDURE — 84100 ASSAY OF PHOSPHORUS: CPT | Performed by: INTERNAL MEDICINE

## 2023-09-05 PROCEDURE — 25010000002 ZOLEDRONIC ACID PER 1 MG: Performed by: INTERNAL MEDICINE

## 2023-09-05 PROCEDURE — 80053 COMPREHEN METABOLIC PANEL: CPT | Performed by: INTERNAL MEDICINE

## 2023-09-05 RX ORDER — HEPARIN SODIUM (PORCINE) LOCK FLUSH IV SOLN 100 UNIT/ML 100 UNIT/ML
500 SOLUTION INTRAVENOUS AS NEEDED
Status: DISCONTINUED | OUTPATIENT
Start: 2023-09-05 | End: 2023-09-06 | Stop reason: HOSPADM

## 2023-09-05 RX ORDER — SODIUM CHLORIDE 9 MG/ML
250 INJECTION, SOLUTION INTRAVENOUS ONCE
Status: COMPLETED | OUTPATIENT
Start: 2023-09-05 | End: 2023-09-05

## 2023-09-05 RX ORDER — HEPARIN SODIUM (PORCINE) LOCK FLUSH IV SOLN 100 UNIT/ML 100 UNIT/ML
500 SOLUTION INTRAVENOUS AS NEEDED
Status: CANCELLED | OUTPATIENT
Start: 2023-09-05

## 2023-09-05 RX ORDER — SODIUM CHLORIDE 0.9 % (FLUSH) 0.9 %
10 SYRINGE (ML) INJECTION AS NEEDED
Status: CANCELLED | OUTPATIENT
Start: 2023-09-05

## 2023-09-05 RX ADMIN — ZOLEDRONIC ACID 4 MG: 4 INJECTION, SOLUTION, CONCENTRATE INTRAVENOUS at 15:00

## 2023-09-05 RX ADMIN — SODIUM CHLORIDE 250 ML: 9 INJECTION, SOLUTION INTRAVENOUS at 14:30

## 2023-09-05 NOTE — PROGRESS NOTES
"Onc Nutrition    Patient: Debra Beckman  YOB: 1970    Diagnosis: stage IA ER+, NE+, HER2- left breast cancer / Ki-67 20% suggesting benefit from adjuvant Verzenio      Surgery: lumpectomy and sentinel lymph node biopsy (9/16/2022) + reexcision of the lumpectomy cavity and biopsy of 1 additional axillary lymph node at the time of port placement (9/30/22)     Chemotherapy: OP BREAST TC DOCEtaxel / Cyclophosphamide - 6/6 cycles completed 2/3/23     Radiation: left breast and comprehensive regional lymphatics - 45 Gray in 25 fractions utilizing DIBH technique, completing 3/29/2023     Current Treatment: OP BREAST Abemaciclib / Letrozole + Zometa    Weight - 126# / ~20# (13.7%) weight loss x ~6 months     Follow up with patient during her initial zometa infusion appointment.  Since initial RD consult patient has completed chemo and radiation treatments and has started on Verzenio and Letrozole.  Patient reports tolerating her current treatment regimen well and denies nutritional complaints at this time.  Patient endorses weight loss and states she has made healthy diet changes of avoiding red meats, increasing plant based foods, and avoiding processed foods.  She also reports she walks her dog for ~1 hour daily.      Briefly reviewed the basics of survivorship nutrition and encouraged her to continue with her healthy diet changes.  Discussed the importance of calcium and vitamin D for bone health.  She reports taking vitamin D and is looking into calcium supplements.  Reviewed foods high in calcium to include in her diet.  Also discussed the benefits of physical activity and encouraged her to continue walking her dog and suggested she incorporate strength training as well to aid with maintaining lean muscle mass.  Advised her to maintain a healthy weight / BMI.  Provided written diet material \"Calcium\" to reinforce information discussed.  Patient reports having \"Nutritional Considerations in Breast " "Cancer\" at home, encouraged her to review the diet material as needed for survivorship nutrition information.     Answered her questions and she voiced understanding of information discussed.  Encouraged to call RD with questions.  Will follow up as indicated.     Falguni Sanders RD  09/05/23        "

## 2023-09-21 ENCOUNTER — OFFICE VISIT (OUTPATIENT)
Dept: ONCOLOGY | Facility: CLINIC | Age: 53
End: 2023-09-21
Payer: COMMERCIAL

## 2023-09-21 VITALS
WEIGHT: 123 LBS | OXYGEN SATURATION: 97 % | SYSTOLIC BLOOD PRESSURE: 135 MMHG | HEIGHT: 66 IN | HEART RATE: 81 BPM | BODY MASS INDEX: 19.77 KG/M2 | TEMPERATURE: 97.5 F | DIASTOLIC BLOOD PRESSURE: 77 MMHG | RESPIRATION RATE: 16 BRPM

## 2023-09-21 DIAGNOSIS — M54.50 CHRONIC BILATERAL LOW BACK PAIN WITHOUT SCIATICA: ICD-10-CM

## 2023-09-21 DIAGNOSIS — C50.112 MALIGNANT NEOPLASM OF CENTRAL PORTION OF LEFT BREAST IN FEMALE, ESTROGEN RECEPTOR POSITIVE: Primary | ICD-10-CM

## 2023-09-21 DIAGNOSIS — M51.36 DDD (DEGENERATIVE DISC DISEASE), LUMBAR: ICD-10-CM

## 2023-09-21 DIAGNOSIS — Z17.0 MALIGNANT NEOPLASM OF CENTRAL PORTION OF LEFT BREAST IN FEMALE, ESTROGEN RECEPTOR POSITIVE: Primary | ICD-10-CM

## 2023-09-21 DIAGNOSIS — G89.29 CHRONIC BILATERAL LOW BACK PAIN WITHOUT SCIATICA: ICD-10-CM

## 2023-09-21 RX ORDER — ANASTROZOLE 1 MG/1
1 TABLET ORAL DAILY
Qty: 90 TABLET | Refills: 1 | Status: SHIPPED | OUTPATIENT
Start: 2023-09-21

## 2023-09-21 RX ORDER — PREGABALIN 50 MG/1
50 CAPSULE ORAL 2 TIMES DAILY
Qty: 60 CAPSULE | Refills: 2 | Status: SHIPPED | OUTPATIENT
Start: 2023-09-21

## 2023-09-21 NOTE — PATIENT INSTRUCTIONS
Take prilosec and either compazine or zofran with breakfast  1) Use a daily moisturizer such as Replens or Oasis. Use a lubricant such as Astroglide with intercourse.  2) If this is ineffective, trial of Hyalogyn, purchased online: www.hyalogyn.com    HYALO GYN®   Does HYALO GYN® contain estrogen?  No, HYALO GYN® is completely estrogen free.  How often is HYALO GYN® applied?  One application every three days for a period of 30 days is recommended, unless otherwise recommended by your health care provider.

## 2023-09-21 NOTE — PROGRESS NOTES
[facility]ology and On[facility]  Office number [phone]    Fax number [phone]     Follow up     Date: 23      Patient Name: Debra Beckman  MRN: [id number]  : 1970    Referring Physician: Dr. Goran Russell    Chief Complaint: follow up breast cancer    Cancer Staging: Stage IA (cT1b, cN0, cM0, G1, ER+, WV+, HER2-)      History of Present Illness: Debra Beckman is a pleasant 53 y.o. female who presents today for evaluation of left breast cancer.  She is accompanied by her supportive father who is a retired [employer] neurosurgeon, Dr. Beckman.    Screening mammogram 2022 demonstrated an asymmetry in the anterior left medial breast.  Diagnostic mammogram on 2022 demonstrated a persistent ill-defined asymmetry with architectural distortion in the left 9:00 periareolar region.  On ultrasound, this corresponded to an 8 mm mass in the 9:00 left breast.    Left breast 9:00 biopsy on 8/10/2022 demonstrated grade 1 invasive ductal carcinoma with associated DCIS (%, WV 60%, HER2/kathleen negative, 0+).    She proceeded with bilateral breast MRI on 8/15/2022.  This demonstrated a 1.4 cm 9:00 left breast enhancing mass, with a 1.3 cm area of clumped non-mass enhancement versus postbiopsy change 1 cm anterior lateral to this.  There is additionally a dominant focus of enhancement suspicious for a satellite lesion spanning 0.4 cm.  She underwent second look ultrasound and biopsy of the second lesion which corresponded to a subcentimeteric mass on ultrasound.    Left breast 9:00 biopsy on 2022 showed grade 2 invasive ductal carcinoma (ER greater than 90%, WV greater than 80%, HER2/kathleen negative, 0+)    Left breast lumpectomy, sentinel lymph node biopsy on 2022 demonstrated grade 2 invasive ductal carcinoma spanning 1.4 cm with associated DCIS.  Margin positive.  [place] lymph node positive () with a macrometastasis spanning 4 mm with extracapsular extension. She  underwent re-excision 22 with fat necrosis and no residual invasive or in situ carcinoma. Single left axillary LN excised and benign (total LN count 1/2).    Breast cancer risk profile:  G3, P2; Age of first live birth 37  Premenopausal, LMP was 2022.  She discontinued OCPs at the time of her cancer diagnosis.  Family history of breast, ovarian, prostate or pancreatic cancer: Maternal aunt with breast cancer.  Genetics: [facility] cancer next panel was negative in .    Treatment history:  TC x 6 cycles  Adjuvant radiation completed 3/27/23    Arimidex, planning 2023  Verzinio, C1 23  Zometa, C1 23    Interval history:  Here for follow up on Verzenio and Arimidex.    She has episodeic diarrhea, large volume episode overnight ,took imodium, nothing in between, another BM on Tuesday that was diarrhea, took another imodium, none since. Associated with cramps, no persitent abdominal pain. Non bloody, waterry, this has happened twice in the last month.    She endorses continued back pain. This initially started after she fell over a doggy gate and had a negative xray. Initially robaxin and ibuprofen were helping but it persists and has not improved. Using lyrica to help her sleep.   Low appetite, mild nausea. Forgetting to eat much of they day. Takes prilosec intermittently, not using antiemetics.   Past Medical History:   Past Medical History:   Diagnosis Date    Allergic     Asthma     History of radiation therapy 2023    left breast/regional LNs    Low back pain     Malignant neoplasm of central portion of left breast in female, estrogen receptor positive 2022       Past Surgical History:   Past Surgical History:   Procedure Laterality Date    BREAST BIOPSY  08/10/2022    BREAST BIOPSY Left 2022    BREAST LUMPECTOMY WITH AXILLARY NODE DISSECTION Left 2022    clean dread    BREAST LUMPECTOMY WITH SENTINEL NODE BIOPSY Left 2022     SECTION      VENOUS  ACCESS DEVICE (PORT) INSERTION Right 09/30/2022       Family History:   Family History   Problem Relation Age of Onset    Hypertension Mother     No Known Problems Father     Breast cancer Maternal Aunt     Alcohol abuse Maternal Aunt     Anxiety disorder Maternal Aunt     Depression Maternal Aunt     Alcohol abuse Maternal Aunt     Anxiety disorder Maternal Aunt     Depression Maternal Aunt     Alcohol abuse Maternal Uncle     Depression Maternal Uncle     Drug abuse Maternal Uncle     Ovarian cancer Neg Hx        Social History:   Social History     Socioeconomic History    Marital status:    Tobacco Use    Smoking status: Former    Smokeless tobacco: Never   Vaping Use    Vaping Use: Former   Substance and Sexual Activity    Alcohol use: Yes     Comment: occasional    Drug use: No    Sexual activity: Not Currently   Works as a .      Medications:     Current Outpatient Medications:     Abemaciclib 150 MG tablet, Take 1 tablet by mouth 2 (Two) Times a Day., Disp: 60 tablet, Rfl: 11    albuterol sulfate  (90 Base) MCG/ACT inhaler, Inhale 2 puffs Every 4 (Four) Hours As Needed for Wheezing., Disp: 18 g, Rfl: 11    anastrozole (ARIMIDEX) 1 MG tablet, Take 1 tablet by mouth Daily., Disp: 30 tablet, Rfl: 11    budesonide-formoterol (Symbicort) 160-4.5 MCG/ACT inhaler, Inhale 2 puffs 2 (Two) Times a Day., Disp: 10.2 g, Rfl: 2    ibuprofen (ADVIL,MOTRIN) 600 MG tablet, Take 1 tablet by mouth Every 8 (Eight) Hours As Needed for Mild Pain. Take with food., Disp: 30 tablet, Rfl: 1    lidocaine-prilocaine (EMLA) 2.5-2.5 % cream, Apply 1 application topically to the appropriate area as directed As Needed (45-60 minutes prior to port access.  Cover with saran/plastic wrap.)., Disp: 30 g, Rfl: 3    methocarbamol (ROBAXIN) 500 MG tablet, Take 1 tablet by mouth 4 (Four) Times a Day., Disp: 40 tablet, Rfl: 1    omeprazole (priLOSEC) 20 MG capsule, Take 1 capsule by mouth Daily., Disp: 30 capsule,  "Rfl: 4    pregabalin (Lyrica) 50 MG capsule, Take 1 capsule by mouth 2 (Two) Times a Day., Disp: 30 capsule, Rfl: 0    TiZANidine (Zanaflex) 2 MG capsule, Take 1 capsule by mouth 3 (Three) Times a Day As Needed for Muscle Spasms., Disp: 40 capsule, Rfl: 1    traMADol (ULTRAM) 50 MG tablet, Take 1 tablet by mouth Every 6 (Six) Hours As Needed for Severe Pain. May take 1-2 Q6 for severe pain, Disp: 40 tablet, Rfl: 1    venlafaxine XR (EFFEXOR-XR) 37.5 MG 24 hr capsule, Take 1 capsule by mouth Daily. Take with 75 mg caps for total dose of 112.5 mg daily., Disp: 45 capsule, Rfl: 3    vitamin B-12 (CYANOCOBALAMIN) 1000 MCG tablet, Take 1 tablet by mouth Daily., Disp: , Rfl:     vitamin B-6 (PYRIDOXINE) 50 MG tablet, Take 1 tablet by mouth Daily., Disp: , Rfl:     Vitamin D, Cholecalciferol, (CHOLECALCIFEROL) 10 MCG (400 UNIT) tablet, Take 1 tablet by mouth Daily., Disp: , Rfl:     Allergies:   No Known Allergies    Objective     Vital Signs:   Vitals:    09/21/23 1342   BP: 135/77   Pulse: 81   Resp: 16   Temp: 97.5 °F (36.4 °C)   TempSrc: Infrared   SpO2: 97%   Weight: 55.8 kg (123 lb)   Height: 167.6 cm (65.98\")   PainSc: 0-No pain    Body mass index is 19.86 kg/m².   Pain Score    09/21/23 1342   PainSc: 0-No pain       ECOG Performance Status: 0    Physical Exam:  General: No acute distress. Well appearing   HEENT: Normocephalic, atraumatic. Sclera anicteric.   Neck: supple, no adenopathy.   Cardiovascular: regular rate and rhythm. No murmurs.   Respiratory: Normal rate. Clear to auscultation bilaterally  Abdomen: Soft, nontender, non distended with normoactive bowel sounds  Lymph: no cervical, supraclavicular or axillary adenopathy  Neuro: Alert and oriented x 3. No focal deficits.   Ext: Symmetric, no swelling.   Psych: Euthymic  Breast: No palpable masses in either breast or axilla.          Laboratory/Imaging Reviewed:   No visits with results within 2 Week(s) from this visit.   Latest known visit with results " is:   Hospital Outpatient Visit on 09/05/2023   Component Date Value Ref Range Status    Glucose 09/05/2023 94  65 - 99 mg/dL Final    BUN 09/05/2023 9  6 - 20 mg/dL Final    Creatinine 09/05/2023 0.74  0.57 - 1.00 mg/dL Final    Sodium 09/05/2023 140  136 - 145 mmol/L Final    Potassium 09/05/2023 3.7  3.5 - 5.2 mmol/L Final    Chloride 09/05/2023 104  98 - 107 mmol/L Final    CO2 09/05/2023 27.0  22.0 - 29.0 mmol/L Final    Calcium 09/05/2023 9.4  8.6 - 10.5 mg/dL Final    Total Protein 09/05/2023 6.7  6.0 - 8.5 g/dL Final    Albumin 09/05/2023 4.2  3.5 - 5.2 g/dL Final    ALT (SGPT) 09/05/2023 18  1 - 33 U/L Final    AST (SGOT) 09/05/2023 22  1 - 32 U/L Final    Alkaline Phosphatase 09/05/2023 66  39 - 117 U/L Final    Total Bilirubin 09/05/2023 0.4  0.0 - 1.2 mg/dL Final    Globulin 09/05/2023 2.5  gm/dL Final    Calculated Result    A/G Ratio 09/05/2023 1.7  g/dL Final    BUN/Creatinine Ratio 09/05/2023 12.2  7.0 - 25.0 Final    Anion Gap 09/05/2023 9.0  5.0 - 15.0 mmol/L Final    eGFR 09/05/2023 98.1  >60.0 mL/min/1.73 Final    Magnesium 09/05/2023 2.1  1.6 - 2.6 mg/dL Final    Phosphorus 09/05/2023 3.5  2.5 - 4.5 mg/dL Final    WBC 09/05/2023 2.59 (L)  3.40 - 10.80 10*3/mm3 Final    RBC 09/05/2023 3.29 (L)  3.77 - 5.28 10*6/mm3 Final    Hemoglobin 09/05/2023 12.3  12.0 - 15.9 g/dL Final    Hematocrit 09/05/2023 35.6  34.0 - 46.6 % Final    MCV 09/05/2023 108.2 (H)  79.0 - 97.0 fL Final    MCH 09/05/2023 37.4 (H)  26.6 - 33.0 pg Final    MCHC 09/05/2023 34.6  31.5 - 35.7 g/dL Final    RDW 09/05/2023 13.0  12.3 - 15.4 % Final    RDW-SD 09/05/2023 51.4  37.0 - 54.0 fl Final    MPV 09/05/2023 8.7  6.0 - 12.0 fL Final    Platelets 09/05/2023 194  140 - 450 10*3/mm3 Final    Neutrophil % 09/05/2023 61.8  42.7 - 76.0 % Final    Lymphocyte % 09/05/2023 26.3  19.6 - 45.3 % Final    Monocyte % 09/05/2023 7.7  5.0 - 12.0 % Final    Eosinophil % 09/05/2023 1.9  0.3 - 6.2 % Final    Basophil % 09/05/2023 2.3 (H)  0.0 -  1.5 % Final    Immature Grans % 09/05/2023 0.0  0.0 - 0.5 % Final    Neutrophils, Absolute 09/05/2023 1.60 (L)  1.70 - 7.00 10*3/mm3 Final    Lymphocytes, Absolute 09/05/2023 0.68 (L)  0.70 - 3.10 10*3/mm3 Final    Monocytes, Absolute 09/05/2023 0.20  0.10 - 0.90 10*3/mm3 Final    Eosinophils, Absolute 09/05/2023 0.05  0.00 - 0.40 10*3/mm3 Final    Basophils, Absolute 09/05/2023 0.06  0.00 - 0.20 10*3/mm3 Final    Immature Grans, Absolute 09/05/2023 0.00  0.00 - 0.05 10*3/mm3 Final    Creatinine 09/05/2023 0.70  0.60 - 1.30 mg/dL Final    Serial Number: [id number]:  [id number]     IMPRESSION:  Impression:     1. No evidence of metastatic disease within the chest, abdomen, or pelvis.  2. Patchy groundglass density in the anterior segment of the left upper lobe consistent with a nonspecific infectious/inflammatory process.  3. Incidental findings as noted above.    Impression:     1. No evidence of metastatic disease within the chest, abdomen, or pelvis.  2. Patchy groundglass density in the anterior segment of the left upper lobe consistent with a nonspecific infectious/inflammatory process.  3. Incidental findings as noted above.  Assessment / Plan        1. Malignant neoplasm of central portion of left breast in female, estrogen receptor positive  2. High risk medication use  -She has an early stage hormone sensitive breast cancer with a single positive lymph node.  She has undergone curative intent surgery.  -Given strongly ER positive disease and N1, I recommended adjuvant TC over dose dense AC followed by weekly taxol and she completed 6 cycles  -She completed adjuvant radiation. Ki67 20%.   -FSH, LH, and estradiol consistent with postmenopausal state, continue AI with monitoring for ovarian rebound.  -Labs from 9/2023 notable for adequate blood counts, normal hepatic and renal function  She continues Verzenio. Escalate antiemetic and PPI support and monitor for worsening diarrhea, discussed dose adjustment as  an option  -ROGERIO on exam today. MMG pending next months       2. Anxiety  3. Hot flashes  -Continue Effexor weaning down     4. Bone health  -Ca/D recommended  -Plan the addition of adjuvant Zometa.   -Ca/D recommended  -We discussed ASCO guidelines regarding adjuvant bisphosphonate use in early breast cancer. We reviewed the schedule and side effects of zometa including but not limited to bone pain, renal dysfunction, osteonecrosis and allergic reaction. Discussed the importance of routine dental care and need to avoid invasive dental procedures/notify dentist of medication use. Informed consent was obtained, and the patient elected to proceed pending dental evaluation.We reviewed the schedule and side effects including but not limited to bone pain, renal dysfunction, osteonecrosis and allergic reaction. Discussed the importance of routine dental care and need to avoid invasive dental procedures/notify dentist of medication use. Informed consent was obtained, and the patient elected to proceed pending dental exam.  Dental clearance form reviewed.  -Tolerated cycle 1, continue    5. GERD  -Continue PPI    6.  Low back pain  -Recommend MRI    7. Early satiety, wt loss, interstial changes on prior chest CT  Recommend restaging scans which I ordered    Follow Up:   With results    Rama Cano MD  Hematology and Oncology

## 2023-09-21 NOTE — TELEPHONE ENCOUNTER
Provider: Edilma   Caller: Corie     Surgery: -   Surgery Date: -   Last visit:  Office Visit with Lydia France PA-C (08/16/2023)   Next visit: Not scheduled  Last filled: 8/18/23       Reason for call:         Requested Prescriptions     Pending Prescriptions Disp Refills    pregabalin (Lyrica) 50 MG capsule 30 capsule 0     Sig: Take 1 capsule by mouth 2 (Two) Times a Day.    BEREKET:   Pat ID Date Filled RX # Drug Name Prescriber Name Dispenser Name Qty Days MED PMT Rpt To  1 08/18/2023 [id number] Pregabalin 50MG [facility] 15 [place]  Date Written New/Refill Dosage Form Prescriber [facility]  08/16/2023 [facility]  Pat ID Date Filled RX # Drug Name Prescriber Name Dispenser Name Qty Days MED PMT Rpt To  1 08/08/2023 [id number] Tramadol Hcl 50MG [facility] 10 20 04 [place]  Date Written New/Refill Dosage Form Prescriber [facility]  08/08/2023 [facility]  Pat ID Date Filled RX # Drug Name Prescriber Name Dispenser Name Qty Days MED PMT Rpt To  1 11/10/2022 [id number] Tramadol Hcl 50MG [facility] 5 30 04 [place]  Date Written New/Refill Dosage Form Prescriber [facility]  11/10/2022 [facility]

## 2023-09-25 ENCOUNTER — SPECIALTY PHARMACY (OUTPATIENT)
Dept: ONCOLOGY | Facility: HOSPITAL | Age: 53
End: 2023-09-25

## 2023-10-03 ENCOUNTER — HOSPITAL ENCOUNTER (OUTPATIENT)
Dept: NUCLEAR MEDICINE | Facility: HOSPITAL | Age: 53
Discharge: HOME | End: 2023-10-03
Payer: COMMERCIAL

## 2023-10-03 ENCOUNTER — HOSPITAL ENCOUNTER (OUTPATIENT)
Dept: CT IMAGING | Facility: HOSPITAL | Age: 53
Discharge: HOME | End: 2023-10-03
Admitting: INTERNAL MEDICINE
Payer: COMMERCIAL

## 2023-10-03 DIAGNOSIS — Z17.0 MALIGNANT NEOPLASM OF CENTRAL PORTION OF LEFT BREAST IN FEMALE, ESTROGEN RECEPTOR POSITIVE: ICD-10-CM

## 2023-10-03 DIAGNOSIS — C50.112 MALIGNANT NEOPLASM OF CENTRAL PORTION OF LEFT BREAST IN FEMALE, ESTROGEN RECEPTOR POSITIVE: ICD-10-CM

## 2023-10-03 DIAGNOSIS — J45.30 MILD PERSISTENT ASTHMA WITHOUT COMPLICATION: ICD-10-CM

## 2023-10-03 DIAGNOSIS — C50.112 MALIGNANT NEOPLASM OF CENTRAL PORTION OF LEFT BREAST IN FEMALE, ESTROGEN RECEPTOR POSITIVE: Primary | ICD-10-CM

## 2023-10-03 DIAGNOSIS — Z17.0 MALIGNANT NEOPLASM OF CENTRAL PORTION OF LEFT BREAST IN FEMALE, ESTROGEN RECEPTOR POSITIVE: Primary | ICD-10-CM

## 2023-10-03 PROCEDURE — 74177 CT ABD & PELVIS W/CONTRAST: CPT

## 2023-10-03 PROCEDURE — A9503 TC99M MEDRONATE: HCPCS | Performed by: INTERNAL MEDICINE

## 2023-10-03 PROCEDURE — 78306 BONE IMAGING WHOLE BODY: CPT

## 2023-10-03 PROCEDURE — 0 TECHNETIUM MEDRONATE KIT: Performed by: INTERNAL MEDICINE

## 2023-10-03 PROCEDURE — 25510000001 IOPAMIDOL 61 % SOLUTION: Performed by: INTERNAL MEDICINE

## 2023-10-03 PROCEDURE — 71260 CT THORAX DX C+: CPT

## 2023-10-03 RX ORDER — TC 99M MEDRONATE 20 MG/10ML
27.4 INJECTION, POWDER, LYOPHILIZED, FOR SOLUTION INTRAVENOUS
Status: COMPLETED | OUTPATIENT
Start: 2023-10-03 | End: 2023-10-03

## 2023-10-03 RX ADMIN — TC 99M MEDRONATE 27.4 MILLICURIE: 20 INJECTION, POWDER, LYOPHILIZED, FOR SOLUTION INTRAVENOUS at 10:45

## 2023-10-03 RX ADMIN — IOPAMIDOL 85 ML: 612 INJECTION, SOLUTION INTRAVENOUS at 12:28

## 2023-10-04 ENCOUNTER — TREATMENT (OUTPATIENT)
Dept: PHYSICAL THERAPY | Facility: CLINIC | Age: 53
End: 2023-10-04
Payer: COMMERCIAL

## 2023-10-04 ENCOUNTER — OFFICE VISIT (OUTPATIENT)
Dept: ONCOLOGY | Facility: CLINIC | Age: 53
End: 2023-10-04
Payer: COMMERCIAL

## 2023-10-04 VITALS
WEIGHT: 123 LBS | DIASTOLIC BLOOD PRESSURE: 77 MMHG | BODY MASS INDEX: 19.77 KG/M2 | OXYGEN SATURATION: 98 % | SYSTOLIC BLOOD PRESSURE: 118 MMHG | TEMPERATURE: 97.3 F | HEIGHT: 66 IN | RESPIRATION RATE: 16 BRPM | HEART RATE: 72 BPM

## 2023-10-04 DIAGNOSIS — G89.29 CHRONIC LOW BACK PAIN, UNSPECIFIED BACK PAIN LATERALITY, UNSPECIFIED WHETHER SCIATICA PRESENT: ICD-10-CM

## 2023-10-04 DIAGNOSIS — C50.112 MALIGNANT NEOPLASM OF CENTRAL PORTION OF LEFT BREAST IN FEMALE, ESTROGEN RECEPTOR POSITIVE: Primary | ICD-10-CM

## 2023-10-04 DIAGNOSIS — M53.3 SACROILIAC JOINT DYSFUNCTION: Primary | ICD-10-CM

## 2023-10-04 DIAGNOSIS — Z17.0 MALIGNANT NEOPLASM OF CENTRAL PORTION OF LEFT BREAST IN FEMALE, ESTROGEN RECEPTOR POSITIVE: Primary | ICD-10-CM

## 2023-10-04 DIAGNOSIS — M54.50 CHRONIC LOW BACK PAIN, UNSPECIFIED BACK PAIN LATERALITY, UNSPECIFIED WHETHER SCIATICA PRESENT: ICD-10-CM

## 2023-10-04 RX ORDER — IBUPROFEN 600 MG/1
600 TABLET ORAL EVERY 8 HOURS PRN
Qty: 30 TABLET | Refills: 1 | Status: SHIPPED | OUTPATIENT
Start: 2023-10-04

## 2023-10-04 RX ORDER — BUDESONIDE AND FORMOTEROL FUMARATE DIHYDRATE 160; 4.5 UG/1; UG/1
2 AEROSOL RESPIRATORY (INHALATION) 2 TIMES DAILY
Qty: 10.2 G | Refills: 2 | Status: SHIPPED | OUTPATIENT
Start: 2023-10-04
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
## 2023-10-04 NOTE — PROGRESS NOTES
Hematology and [facility]  Office number [phone]    Fax number [phone]     Follow up     Date: 10/04/23    Patient Name: Debra Beckman  MRN: [id number]  : 1970    Referring Physician: Dr. Goran Russell    Chief Complaint: follow up breast cancer    Cancer Staging: Stage IA (cT1b, cN0, cM0, G1, ER+, DC+, HER2-)    History of Present Illness: Debra Beckman is a pleasant 53 y.o. female who presents today for evaluation of left breast cancer.  She is accompanied by her supportive father who is a retired [employer] neurosurgeon, Dr. Beckman.    Screening mammogram 2022 demonstrated an asymmetry in the anterior left medial breast.  Diagnostic mammogram on 2022 demonstrated a persistent ill-defined asymmetry with architectural distortion in the left 9:00 periareolar region.  On ultrasound, this corresponded to an 8 mm mass in the 9:00 left breast.    Left breast 9:00 biopsy on 8/10/2022 demonstrated grade 1 invasive ductal carcinoma with associated DCIS (%, DC 60%, HER2/kathleen negative, 0+).    She proceeded with bilateral breast MRI on 8/15/2022.  This demonstrated a 1.4 cm 9:00 left breast enhancing mass, with a 1.3 cm area of clumped non-mass enhancement versus postbiopsy change 1 cm anterior lateral to this.  There is additionally a dominant focus of enhancement suspicious for a satellite lesion spanning 0.4 cm.  She underwent second look ultrasound and biopsy of the second lesion which corresponded to a subcentimeteric mass on ultrasound.    Left breast 9:00 biopsy on 2022 showed grade 2 invasive ductal carcinoma (ER greater than 90%, DC greater than 80%, HER2/kathleen negative, 0+)    Left breast lumpectomy, sentinel lymph node biopsy on 2022 demonstrated grade 2 invasive ductal carcinoma spanning 1.4 cm with associated DCIS.  Margin positive.  [place] lymph node positive () with a macrometastasis spanning 4 mm with extracapsular extension. She underwent  re-excision 22 with fat necrosis and no residual invasive or in situ carcinoma. Single left axillary LN excised and benign (total LN count 1/2).    Breast cancer risk profile:  G3, P2; Age of first live birth 37  Premenopausal, LMP was 2022.  She discontinued OCPs at the time of her cancer diagnosis.  Family history of breast, ovarian, prostate or pancreatic cancer: Maternal aunt with breast cancer.  Genetics: [facility] cancer next panel was negative in .    Treatment history:  TC x 6 cycles  Adjuvant radiation completed 3/27/23    Arimidex, planning 2023  Verzinio, C1 23  Zometa, C1 23    Interval history:  Here for follow up on Verzenio and Arimidex for scan results  Diarrhea and appetite have been better. She is scheduling antiemetics with each dose and PPI daily. With this regimen, side effects are more tolerable. Vaginal dryness better. Back pain stable.    Past Medical History:   Past Medical History:   Diagnosis Date    Allergic     Asthma     History of radiation therapy 2023    left breast/regional LNs    Low back pain     Malignant neoplasm of central portion of left breast in female, estrogen receptor positive 2022       Past Surgical History:   Past Surgical History:   Procedure Laterality Date    BREAST BIOPSY  08/10/2022    BREAST BIOPSY Left 2022    BREAST LUMPECTOMY WITH AXILLARY NODE DISSECTION Left 2022    clean dread    BREAST LUMPECTOMY WITH SENTINEL NODE BIOPSY Left 2022     SECTION      VENOUS ACCESS DEVICE (PORT) INSERTION Right 2022       Family History:   Family History   Problem Relation Age of Onset    Hypertension Mother     No Known Problems Father     Breast cancer Maternal Aunt     Alcohol abuse Maternal Aunt     Anxiety disorder Maternal Aunt     Depression Maternal Aunt     Alcohol abuse Maternal Aunt     Anxiety disorder Maternal Aunt     Depression Maternal Aunt     Alcohol abuse Maternal Uncle     Depression  Maternal Uncle     Drug abuse Maternal Uncle     Ovarian cancer Neg Hx        Social History:   Social History     Socioeconomic History    Marital status:    Tobacco Use    Smoking status: Former    Smokeless tobacco: Never   Vaping Use    Vaping Use: Former   Substance and Sexual Activity    Alcohol use: Yes     Comment: occasional    Drug use: No    Sexual activity: Not Currently   Works as a .      Medications:     Current Outpatient Medications:     Abemaciclib 150 MG tablet, Take 1 tablet by mouth 2 (Two) Times a Day., Disp: 60 tablet, Rfl: 11    albuterol sulfate  (90 Base) MCG/ACT inhaler, Inhale 2 puffs Every 4 (Four) Hours As Needed for Wheezing., Disp: 18 g, Rfl: 11    anastrozole (ARIMIDEX) 1 MG tablet, Take 1 tablet by mouth Daily., Disp: 90 tablet, Rfl: 1    budesonide-formoterol (Symbicort) 160-4.5 MCG/ACT inhaler, Inhale 2 puffs 2 (Two) Times a Day., Disp: 10.2 g, Rfl: 2    ibuprofen (ADVIL,MOTRIN) 600 MG tablet, Take 1 tablet by mouth Every 8 (Eight) Hours As Needed for Mild Pain. Take with food., Disp: 30 tablet, Rfl: 1    lidocaine-prilocaine (EMLA) 2.5-2.5 % cream, Apply 1 application topically to the appropriate area as directed As Needed (45-60 minutes prior to port access.  Cover with saran/plastic wrap.)., Disp: 30 g, Rfl: 3    methocarbamol (ROBAXIN) 500 MG tablet, Take 1 tablet by mouth 4 (Four) Times a Day., Disp: 40 tablet, Rfl: 1    omeprazole (priLOSEC) 20 MG capsule, Take 1 capsule by mouth Daily., Disp: 30 capsule, Rfl: 4    pregabalin (Lyrica) 50 MG capsule, Take 1 capsule by mouth 2 (Two) Times a Day., Disp: 60 capsule, Rfl: 2    TiZANidine (Zanaflex) 2 MG capsule, Take 1 capsule by mouth 3 (Three) Times a Day As Needed for Muscle Spasms., Disp: 40 capsule, Rfl: 1    traMADol (ULTRAM) 50 MG tablet, Take 1 tablet by mouth Every 6 (Six) Hours As Needed for Severe Pain. May take 1-2 Q6 for severe pain, Disp: 40 tablet, Rfl: 1    venlafaxine XR  "(EFFEXOR-XR) 37.5 MG 24 hr capsule, Take 1 capsule by mouth Daily. Take with 75 mg caps for total dose of 112.5 mg daily., Disp: 45 capsule, Rfl: 3    vitamin B-12 (CYANOCOBALAMIN) 1000 MCG tablet, Take 1 tablet by mouth Daily., Disp: , Rfl:     vitamin B-6 (PYRIDOXINE) 50 MG tablet, Take 1 tablet by mouth Daily., Disp: , Rfl:     Vitamin D, Cholecalciferol, (CHOLECALCIFEROL) 10 MCG (400 UNIT) tablet, Take 1 tablet by mouth Daily., Disp: , Rfl:   No current facility-administered medications for this visit.    Allergies:   No Known Allergies    Objective     Vital Signs:   Vitals:    10/04/23 1137   BP: 118/77   Pulse: 72   Resp: 16   Temp: 97.3 øF (36.3 øC)   TempSrc: Infrared   SpO2: 98%   Weight: 55.8 kg (123 lb)   Height: 167.6 cm (65.98\")   PainSc:   4   PainLoc: Back    Body mass index is 19.86 kg/mý.   Pain Score    10/04/23 1137   PainSc:   4   PainLoc: Back       ECOG Performance Status: 0    Physical Exam:  General: No acute distress. Well appearing   HEENT: Normocephalic, atraumatic. Sclera anicteric.   Neck: supple, no adenopathy.   Cardiovascular: regular rate and rhythm. No murmurs.   Respiratory: Normal rate. Clear to auscultation bilaterally  Abdomen: Soft, nontender, non distended with normoactive bowel sounds  Lymph: no cervical, supraclavicular or axillary adenopathy  Neuro: Alert and oriented x 3. No focal deficits.   Ext: Symmetric, no swelling.   Psych: Euthymic    Laboratory/Imaging Reviewed:   No visits with results within 2 Week(s) from this visit.   Latest known visit with results is:   Hospital Outpatient Visit on 09/05/2023   Component Date Value Ref Range Status    Glucose 09/05/2023 94  65 - 99 mg/dL Final    BUN 09/05/2023 9  6 - 20 mg/dL Final    Creatinine 09/05/2023 0.74  0.57 - 1.00 mg/dL Final    Sodium 09/05/2023 140  136 - 145 mmol/L Final    Potassium 09/05/2023 3.7  3.5 - 5.2 mmol/L Final    Chloride 09/05/2023 104  98 - 107 mmol/L Final    CO2 09/05/2023 27.0  22.0 - 29.0 " mmol/L Final    Calcium 09/05/2023 9.4  8.6 - 10.5 mg/dL Final    Total Protein 09/05/2023 6.7  6.0 - 8.5 g/dL Final    Albumin 09/05/2023 4.2  3.5 - 5.2 g/dL Final    ALT (SGPT) 09/05/2023 18  1 - 33 U/L Final    AST (SGOT) 09/05/2023 22  1 - 32 U/L Final    Alkaline Phosphatase 09/05/2023 66  39 - 117 U/L Final    Total Bilirubin 09/05/2023 0.4  0.0 - 1.2 mg/dL Final    Globulin 09/05/2023 2.5  gm/dL Final    Calculated Result    A/G Ratio 09/05/2023 1.7  g/dL Final    BUN/Creatinine Ratio 09/05/2023 12.2  7.0 - 25.0 Final    Anion Gap 09/05/2023 9.0  5.0 - 15.0 mmol/L Final    eGFR 09/05/2023 98.1  >60.0 mL/min/1.73 Final    Magnesium 09/05/2023 2.1  1.6 - 2.6 mg/dL Final    Phosphorus 09/05/2023 3.5  2.5 - 4.5 mg/dL Final    WBC 09/05/2023 2.59 (L)  3.40 - 10.80 10*3/mm3 Final    RBC 09/05/2023 3.29 (L)  3.77 - 5.28 10*6/mm3 Final    Hemoglobin 09/05/2023 12.3  12.0 - 15.9 g/dL Final    Hematocrit 09/05/2023 35.6  34.0 - 46.6 % Final    MCV 09/05/2023 108.2 (H)  79.0 - 97.0 fL Final    MCH 09/05/2023 37.4 (H)  26.6 - 33.0 pg Final    MCHC 09/05/2023 34.6  31.5 - 35.7 g/dL Final    RDW 09/05/2023 13.0  12.3 - 15.4 % Final    RDW-SD 09/05/2023 51.4  37.0 - 54.0 fl Final    MPV 09/05/2023 8.7  6.0 - 12.0 fL Final    Platelets 09/05/2023 194  140 - 450 10*3/mm3 Final    Neutrophil % 09/05/2023 61.8  42.7 - 76.0 % Final    Lymphocyte % 09/05/2023 26.3  19.6 - 45.3 % Final    Monocyte % 09/05/2023 7.7  5.0 - 12.0 % Final    Eosinophil % 09/05/2023 1.9  0.3 - 6.2 % Final    Basophil % 09/05/2023 2.3 (H)  0.0 - 1.5 % Final    Immature Grans % 09/05/2023 0.0  0.0 - 0.5 % Final    Neutrophils, Absolute 09/05/2023 1.60 (L)  1.70 - 7.00 10*3/mm3 Final    Lymphocytes, Absolute 09/05/2023 0.68 (L)  0.70 - 3.10 10*3/mm3 Final    Monocytes, Absolute 09/05/2023 0.20  0.10 - 0.90 10*3/mm3 Final    Eosinophils, Absolute 09/05/2023 0.05  0.00 - 0.40 10*3/mm3 Final    Basophils, Absolute 09/05/2023 0.06  0.00 - 0.20 10*3/mm3 Final     Immature Grans, Absolute 09/05/2023 0.00  0.00 - 0.05 10*3/mm3 Final    Creatinine 09/05/2023 0.70  0.60 - 1.30 mg/dL Final    Serial Number: [id number]:  [id number]     No visits with results within 2 Week(s) from this visit.   Latest known visit with results is:   Hospital Outpatient Visit on 09/05/2023   Component Date Value Ref Range Status    Glucose 09/05/2023 94  65 - 99 mg/dL Final    BUN 09/05/2023 9  6 - 20 mg/dL Final    Creatinine 09/05/2023 0.74  0.57 - 1.00 mg/dL Final    Sodium 09/05/2023 140  136 - 145 mmol/L Final    Potassium 09/05/2023 3.7  3.5 - 5.2 mmol/L Final    Chloride 09/05/2023 104  98 - 107 mmol/L Final    CO2 09/05/2023 27.0  22.0 - 29.0 mmol/L Final    Calcium 09/05/2023 9.4  8.6 - 10.5 mg/dL Final    Total Protein 09/05/2023 6.7  6.0 - 8.5 g/dL Final    Albumin 09/05/2023 4.2  3.5 - 5.2 g/dL Final    ALT (SGPT) 09/05/2023 18  1 - 33 U/L Final    AST (SGOT) 09/05/2023 22  1 - 32 U/L Final    Alkaline Phosphatase 09/05/2023 66  39 - 117 U/L Final    Total Bilirubin 09/05/2023 0.4  0.0 - 1.2 mg/dL Final    Globulin 09/05/2023 2.5  gm/dL Final    Calculated Result    A/G Ratio 09/05/2023 1.7  g/dL Final    BUN/Creatinine Ratio 09/05/2023 12.2  7.0 - 25.0 Final    Anion Gap 09/05/2023 9.0  5.0 - 15.0 mmol/L Final    eGFR 09/05/2023 98.1  >60.0 mL/min/1.73 Final    Magnesium 09/05/2023 2.1  1.6 - 2.6 mg/dL Final    Phosphorus 09/05/2023 3.5  2.5 - 4.5 mg/dL Final    WBC 09/05/2023 2.59 (L)  3.40 - 10.80 10*3/mm3 Final    RBC 09/05/2023 3.29 (L)  3.77 - 5.28 10*6/mm3 Final    Hemoglobin 09/05/2023 12.3  12.0 - 15.9 g/dL Final    Hematocrit 09/05/2023 35.6  34.0 - 46.6 % Final    MCV 09/05/2023 108.2 (H)  79.0 - 97.0 fL Final    MCH 09/05/2023 37.4 (H)  26.6 - 33.0 pg Final    MCHC 09/05/2023 34.6  31.5 - 35.7 g/dL Final    RDW 09/05/2023 13.0  12.3 - 15.4 % Final    RDW-SD 09/05/2023 51.4  37.0 - 54.0 fl Final    MPV 09/05/2023 8.7  6.0 - 12.0 fL Final    Platelets 09/05/2023 194  140 -  450 10*3/mm3 Final    Neutrophil % 09/05/2023 61.8  42.7 - 76.0 % Final    Lymphocyte % 09/05/2023 26.3  19.6 - 45.3 % Final    Monocyte % 09/05/2023 7.7  5.0 - 12.0 % Final    Eosinophil % 09/05/2023 1.9  0.3 - 6.2 % Final    Basophil % 09/05/2023 2.3 (H)  0.0 - 1.5 % Final    Immature Grans % 09/05/2023 0.0  0.0 - 0.5 % Final    Neutrophils, Absolute 09/05/2023 1.60 (L)  1.70 - 7.00 10*3/mm3 Final    Lymphocytes, Absolute 09/05/2023 0.68 (L)  0.70 - 3.10 10*3/mm3 Final    Monocytes, Absolute 09/05/2023 0.20  0.10 - 0.90 10*3/mm3 Final    Eosinophils, Absolute 09/05/2023 0.05  0.00 - 0.40 10*3/mm3 Final    Basophils, Absolute 09/05/2023 0.06  0.00 - 0.20 10*3/mm3 Final    Immature Grans, Absolute 09/05/2023 0.00  0.00 - 0.05 10*3/mm3 Final    Creatinine 09/05/2023 0.70  0.60 - 1.30 mg/dL Final    Serial Number: [id number]:  [id number]       MRI Lumbar Spine With & Without Contrast    Result Date: 10/5/2023  Narrative: MRI LUMBAR SPINE W WO CONTRAST Date of Exam: 10/5/2023 9:46 AM EDT Indication: breast cancer peristent low back pain.  Comparison: 3/20/2022. Technique:  Routine multiplanar/multisequence sequence images of the lumbar spine were obtained before and after the uneventful administration of 10 mL Multihance.  Findings: Minimally edematous Modic changes are noted at L4-5. T1 marrow signal is preserved, without evidence of fracture or suspicious marrow replacing lesion. There is no abnormal enhancement. Alignment is anatomic, without evidence of significant listhesis or subluxation. The conus medullaris and cauda equina nerve roots are satisfactory in appearance. The paraspinal soft tissues demonstrate no acute or suspicious findings. Multilevel spondylosis is present, with areas of involvement including L1-2, no significant spinal canal or neuroforaminal impingement. L2-3, unchanged small disc bulge and facet arthropathy with some minimal spinal canal and neuroforaminal narrowing. L3-4, small disc bulge  and bilateral facet arthropathy. There is mild spinal canal and minimal bilateral neuroforaminal narrowing present. L4-5, minimal disc bulge and facet arthropathy. The spinal canal is patent. There is minimal symmetric neuroforaminal stenosis. L5-S1, minimal disc bulge and facet arthropathy. The spinal canal is patent. There is mild bilateral neuroforaminal narrowing.     Impression: Impression: Stable mild spondylosis changes as above without evidence of associated high-grade spinal canal or neuroforaminal stenosis. There is no evidence of metastatic disease. Electronically Signed: Julio Del Angel MD  10/5/2023 10:55 AM EDT  Workstation ID: [id number]    NM Bone Scan Whole Body    Result Date: 10/4/2023  Narrative: DATE OF EXAM: 10/3/2023 10:40 AM EDT PROCEDURE: NM BONE SCAN WHOLE BODY INDICATIONS: bone pain, h/o breast cancer COMPARISON: No comparisons available. TECHNIQUE: The patient received 27.4 mCi of technetium 99m MDP intravenously and 3 hour delayed anterior and posterior whole body bone images were obtained. FINDINGS: There is roughly symmetric uptake by the major joints. There is increased uptake in the right mandible which may relate to periodontal disease. There is otherwise normal uptake of radiotracer by the bony skeleton.    Impression: No findings suspicious for metastatic disease. Increased uptake in the right mandible may relate to periodontal disease. Electronically Signed: Ana Seo MD  10/4/2023 10:27 AM EDT  Workstation ID: [id number]    CT Chest With Contrast Diagnostic    Result Date: 10/3/2023  Narrative: CT CHEST W CONTRAST DIAGNOSTIC Date of Exam: 10/3/2023 12:22 PM EDT Indication: breast cancer. Comparison: 5/1/2023. Technique: Axial CT images were obtained of the chest, abdomen and pelvis following the uneventful intravenous administration of 80 mL Isovue-300. Reconstructed coronal and sagittal images were also obtained. Automated exposure control and iterative construction methods  were used. Findings: Chest: There is no pathologic axillary adenopathy or other worrisome body wall soft tissue finding in the chest, with a small area of scarring in the left axilla appearing similar to comparison. There is no pleural or pericardial effusion. There is no distinct pathologic mediastinal or hilar lymphadenopathy. Nonaneurysmal thoracic aorta. Evaluation of the osseous structures demonstrates no evidence of acute fracture or aggressive osseous lesion. Evaluation of the lung fields demonstrates some progressive parenchymal abnormality in the left upper lobe, with both increased linear volume loss, increased somewhat patchy areas of groundglass attenuation, some architectural distortion and a somewhat more distinct area of parabronchial consolidation  seen near the left lung apex. These findings remain favored infectious/inflammatory, without distinct focal suspicious nodule otherwise identified. The right lung remains clear. Abdomen and pelvis: The body wall soft tissues demonstrate no acute or suspicious findings. The osseous structures demonstrate no evidence of acute fracture or aggressive osseous lesion. The liver, spleen, pancreas and bilateral adrenal glands demonstrate homogeneous enhancement without suspicious focal lesion. The kidneys appear normal. Unremarkable gallbladder. Moderate to large colonic stool burden is present. There is otherwise no evidence of obstruction. There is no free fluid or pneumoperitoneum. There is no worrisome retroperitoneal lymphadenopathy or definite peritoneal or mesenteric nodularity. The pelvic viscera demonstrate no acute findings.     Impression: Impression: Increasing lung parenchymal abnormality noted in the left upper lobe as above, characterized by both increased linear volume loss and somewhat patchy areas of peribronchial groundglass opacity in addition to a somewhat more confluent area of small consolidation seen near the left lung apex. Findings remain  favored infectious/inflammatory, possible atypical pneumonia or posttreatment effects/pneumonitis. Appearance would not be typical for metastatic disease. Otherwise no acute findings or evidence of metastatic disease in the chest, abdomen and pelvis. Electronically Signed: Julio Del Angel MD  10/3/2023 4:47 PM EDT  Workstation ID: [id number]    CT Abdomen Pelvis With Contrast    Result Date: 10/3/2023  Narrative: CT ABDOMEN PELVIS W CONTRAST Date of Exam: 10/3/2023 12:22 PM EDT Indication: breast cancer early satiety weight loss. Comparison: 5/1/2023. Technique: Axial CT images were obtained of the chest, abdomen and pelvis following the uneventful intravenous administration of 80 mL Isovue-300. Reconstructed coronal and sagittal images were also obtained. Automated exposure control and iterative construction methods were used. Findings: Chest: There is no pathologic axillary adenopathy or other worrisome body wall soft tissue finding in the chest, with a small area of scarring in the left axilla appearing similar to comparison. There is no pleural or pericardial effusion. There is no distinct pathologic mediastinal or hilar lymphadenopathy. Nonaneurysmal thoracic aorta. Evaluation of the osseous structures demonstrates no evidence of acute fracture or aggressive osseous lesion. Evaluation of the lung fields demonstrates some progressive parenchymal abnormality in the left upper lobe, with both increased linear volume loss, increased somewhat patchy areas of groundglass attenuation, some architectural distortion and a somewhat more distinct area of parabronchial consolidation  seen near the left lung apex. These findings remain favored infectious/inflammatory, without distinct focal suspicious nodule otherwise identified. The right lung remains clear. Abdomen and pelvis: The body wall soft tissues demonstrate no acute or suspicious findings. The osseous structures demonstrate no evidence of acute fracture or aggressive  osseous lesion. The liver, spleen, pancreas and bilateral adrenal glands demonstrate homogeneous enhancement without suspicious focal lesion. The kidneys appear normal. Unremarkable gallbladder. Moderate to large colonic stool burden is present. There is otherwise no evidence of obstruction. There is no free fluid or pneumoperitoneum. There is no worrisome retroperitoneal lymphadenopathy or definite peritoneal or mesenteric nodularity. The pelvic viscera demonstrate no acute findings.     Impression: Impression: Increasing lung parenchymal abnormality noted in the left upper lobe as above, characterized by both increased linear volume loss and somewhat patchy areas of peribronchial groundglass opacity in addition to a somewhat more confluent area of small consolidation seen near the left lung apex. Findings remain favored infectious/inflammatory, possible atypical pneumonia or posttreatment effects/pneumonitis. Appearance would not be typical for metastatic disease. Otherwise no acute findings or evidence of metastatic disease in the chest, abdomen and pelvis. Electronically Signed: Julio Del Angel MD  10/3/2023 1:09 PM EDT  Workstation ID: [id number]    IMPRESSION:  Impression:     1. No evidence of metastatic disease within the chest, abdomen, or pelvis.  2. Patchy groundglass density in the anterior segment of the left upper lobe consistent with a nonspecific infectious/inflammatory process.  3. Incidental findings as noted above.    Impression:     1. No evidence of metastatic disease within the chest, abdomen, or pelvis.  2. Patchy groundglass density in the anterior segment of the left upper lobe consistent with a nonspecific infectious/inflammatory process.  3. Incidental findings as noted above.  Assessment / Plan        1. Malignant neoplasm of central portion of left breast in female, estrogen receptor positive  2. High risk medication use  -She has an early stage hormone sensitive breast cancer with a single  positive lymph node.  She has undergone curative intent surgery.  -Given strongly ER positive disease and N1, I recommended adjuvant TC over dose dense AC followed by weekly taxol and she completed 6 cycles  -She completed adjuvant radiation. Ki67 20%.   -FSH, LH, and estradiol consistent with postmenopausal state, continue AI with monitoring for ovarian rebound.  -Labs from 9/2023 notable for adequate blood counts, normal hepatic and renal function  Since escalating antiemetic and PPI support her symptoms are better. Continue stable dose.   -Scans personally reviewed and discussed with radiation oncology and are consistent with incidental radiation pneumonitis which is asymptomatic. ROGERIO.   -Continue to monitor with serial labs next in 8 weeks     2. Anxiety  3. Hot flashes  -Continue Effexor      4. Bone health  -Ca/D recommended  -Plan the addition of adjuvant Zometa.   -Ca/D recommended  -We discussed ASCO guidelines regarding adjuvant bisphosphonate use in early breast cancer. We reviewed the schedule and side effects of zometa including but not limited to bone pain, renal dysfunction, osteonecrosis and allergic reaction. Discussed the importance of routine dental care and need to avoid invasive dental procedures/notify dentist of medication use. Informed consent was obtained, and the patient elected to proceed pending dental evaluation.We reviewed the schedule and side effects including but not limited to bone pain, renal dysfunction, osteonecrosis and allergic reaction. Discussed the importance of routine dental care and need to avoid invasive dental procedures/notify dentist of medication use. Informed consent was obtained, and the patient elected to proceed pending dental exam.  Dental clearance form reviewed.  -Tolerated cycle 1, next due 3/2024    5. GERD  -Continue PPI, better controlled    6.  Low back pain  -Follow up pending MRI    Follow Up:   8 weeks    Rama Cano MD  Hematology and Oncology

## 2023-10-05 ENCOUNTER — HOSPITAL ENCOUNTER (OUTPATIENT)
Dept: MRI IMAGING | Facility: HOSPITAL | Age: 53
Discharge: HOME | End: 2023-10-05
Admitting: INTERNAL MEDICINE
Payer: COMMERCIAL

## 2023-10-05 DIAGNOSIS — Z17.0 MALIGNANT NEOPLASM OF CENTRAL PORTION OF LEFT BREAST IN FEMALE, ESTROGEN RECEPTOR POSITIVE: ICD-10-CM

## 2023-10-05 DIAGNOSIS — C50.112 MALIGNANT NEOPLASM OF CENTRAL PORTION OF LEFT BREAST IN FEMALE, ESTROGEN RECEPTOR POSITIVE: ICD-10-CM

## 2023-10-05 PROCEDURE — 72158 MRI LUMBAR SPINE W/O & W/DYE: CPT

## 2023-10-05 PROCEDURE — 0 GADOBENATE DIMEGLUMINE 529 MG/ML SOLUTION: Performed by: INTERNAL MEDICINE

## 2023-10-05 PROCEDURE — A9577 INJ MULTIHANCE: HCPCS | Performed by: INTERNAL MEDICINE

## 2023-10-05 RX ADMIN — GADOBENATE DIMEGLUMINE 10 ML: 529 INJECTION, SOLUTION INTRAVENOUS at 10:39

## 2023-10-09 ENCOUNTER — TELEPHONE (OUTPATIENT)
Dept: ONCOLOGY | Facility: CLINIC | Age: 53
End: 2023-10-09
Payer: COMMERCIAL

## 2023-10-09 NOTE — PROGRESS NOTES
Please notify patient no cancer related findings and degenerative changes stable compared to prior exam 3/2022.

## 2023-10-09 NOTE — TELEPHONE ENCOUNTER
----- Message from Rama Cano MD sent at 10/8/2023 10:32 PM EDT -----  Please notify patient no cancer related findings and degenerative changes stable compared to prior exam 3/2022.

## 2023-10-12 ENCOUNTER — TREATMENT (OUTPATIENT)
Dept: PHYSICAL THERAPY | Facility: CLINIC | Age: 53
End: 2023-10-12

## 2023-10-12 DIAGNOSIS — M53.3 SACROILIAC JOINT DYSFUNCTION: Primary | ICD-10-CM

## 2023-10-12 DIAGNOSIS — G89.29 CHRONIC LOW BACK PAIN, UNSPECIFIED BACK PAIN LATERALITY, UNSPECIFIED WHETHER SCIATICA PRESENT: ICD-10-CM

## 2023-10-12 DIAGNOSIS — M54.50 CHRONIC LOW BACK PAIN, UNSPECIFIED BACK PAIN LATERALITY, UNSPECIFIED WHETHER SCIATICA PRESENT: ICD-10-CM

## 2023-10-12 PROCEDURE — 20561 NDL INSJ W/O NJX 3+ MUSC: CPT | Performed by: PHYSICAL THERAPIST

## 2023-10-12 NOTE — PROGRESS NOTES
Physical Therapy Daily Treatment Note    [facility]   [address] [facility] [facility] [address], [address] [place] [address]      Patient: Debra Beckman   : 1970  Referring practitioner: Lydia France PA-C  Date of Initial Visit: Type: THERAPY  Noted: 10/4/2023  Today's Date: 10/12/2023  Patient seen for 2 sessions    Certification Period 10/12/2023 thru 2024       Visit Diagnoses:    ICD-10-CM ICD-9-CM   1. Sacroiliac joint dysfunction  M53.3 724.6   2. Chronic low back pain, unspecified back pain laterality, unspecified whether sciatica present  M54.50 724.2    G89.29 338.29       Subjective     Pt states that she felt some relief after her previous visit and her low back pain was reduced. She feels some return of the pain today, but it is not as severe and seems to be a little different in nature.     Objective   See Exercise, Manual, and Modality Logs for complete treatment.       Assessment/Plan     Pt was instructed to work on her self-correction techniques between sessions to help keep her pain level down. Pt responded well to dry needling today and did not report having any excessive soreness. Will cont to progress as indicated.       Timed:         Manual Therapy:         mins  90073;     Therapeutic Exercise:         mins  78317;     Neuromuscular Mera:        mins  53035;    Therapeutic Activity:          mins  41091;     Gait Training:           mins  76910;     Ultrasound:          mins  92861;    Ionto                                   mins   22691  Self Care                            mins   59394  Canalith Repos         mins 18183  Electrical Stimulation:         mins  59346    Un-Timed:  Electrical Stimulation:         mins  92654 ( );  Dry Needling     30     mins self-pay  Traction          mins 07341      Timed Treatment:      mins   Total Treatment:     30   mins    Robinson Cardona, PT, DPT, OCS, Cert. DN  [place] License: [id number]

## 2023-10-12 NOTE — PROGRESS NOTES
Patient returned call and would like to proceed with genetic counseling and testing. All questions were answered and she will call with any new concerns.    Doxepin Counseling:  Patient advised that the medication is sedating and not to drive a car after taking this medication. Patient informed of potential adverse effects including but not limited to dry mouth, urinary retention, and blurry vision.  The patient verbalized understanding of the proper use and possible adverse effects of doxepin.  All of the patient's questions and concerns were addressed.

## 2023-10-16 ENCOUNTER — SPECIALTY PHARMACY (OUTPATIENT)
Dept: ONCOLOGY | Facility: HOSPITAL | Age: 53
End: 2023-10-16
Payer: COMMERCIAL

## 2023-11-08 ENCOUNTER — TREATMENT (OUTPATIENT)
Dept: PHYSICAL THERAPY | Facility: CLINIC | Age: 53
End: 2023-11-08
Payer: COMMERCIAL

## 2023-11-08 DIAGNOSIS — M53.3 SACROILIAC JOINT DYSFUNCTION: Primary | ICD-10-CM

## 2023-11-08 DIAGNOSIS — M54.50 CHRONIC LOW BACK PAIN, UNSPECIFIED BACK PAIN LATERALITY, UNSPECIFIED WHETHER SCIATICA PRESENT: ICD-10-CM

## 2023-11-08 DIAGNOSIS — G89.29 CHRONIC LOW BACK PAIN, UNSPECIFIED BACK PAIN LATERALITY, UNSPECIFIED WHETHER SCIATICA PRESENT: ICD-10-CM

## 2023-11-08 NOTE — PROGRESS NOTES
Physical Therapy Daily Treatment Note    [facility]   [address] [facility], [address] [place] [address]      Patient: Debra Beckman   : 1970  Referring practitioner: Lydia France PA-C  Date of Initial Visit: Type: THERAPY  Noted: 10/4/2023  Today's Date: 2023  Patient seen for 3 sessions    Certification Period 2023 thru 2024       Visit Diagnoses:    ICD-10-CM ICD-9-CM   1. Sacroiliac joint dysfunction  M53.3 724.6   2. Chronic low back pain, unspecified back pain laterality, unspecified whether sciatica present  M54.50 724.2    G89.29 338.29       Subjective     Pt states that she is feeling good and she was encouraged by her ability to travel back and forth to [place] without having a significant increase in pain. She feels relief of symptoms with dry needling and gets consistent decrease in intensity of pain with SI self correction techniques.     Objective   See Exercise, Manual, and Modality Logs for complete treatment.       Assessment/Plan     Pt had a mild-moderate vagal response and felt light headed immediately after needling treatment today. She was able to rest with her LE's elevated and felt that she has returned back to normal before leaving the clinic. Will decrease the number of needles used at the next visit to try to improve her response.       Timed:         Manual Therapy:         mins  05788;     Therapeutic Exercise:         mins  72768;     Neuromuscular Mera:        mins  68958;    Therapeutic Activity:          mins  86042;     Gait Training:           mins  52739;     Ultrasound:          mins  62980;    Ionto                                   mins   05548  Self Care                            mins   33288  Canalith Repos         mins 98501  Electrical Stimulation:         mins  45396    Un-Timed:  Electrical Stimulation:         mins  32023 ( );  Dry Needling     30     mins self-pay  Traction          mins 17612      Timed Treatment:   0    mins   Total Treatment:     30   mins    Robinson Cardona, PT, DPT, OCS, Cert. DN  [place] License: [id number]

## 2023-11-27 RX ORDER — SODIUM PICOSULFATE, MAGNESIUM OXIDE, AND ANHYDROUS CITRIC ACID 10; 3.5; 12 MG/160ML; G/160ML; G/160ML
350 LIQUID ORAL TAKE AS DIRECTED
Qty: 350 ML | Refills: 0 | Status: SHIPPED | OUTPATIENT
Start: 2023-11-27

## 2023-12-01 ENCOUNTER — OUTSIDE FACILITY SERVICE (OUTPATIENT)
Dept: GASTROENTEROLOGY | Facility: CLINIC | Age: 53
End: 2023-12-01
Payer: COMMERCIAL

## 2023-12-01 PROCEDURE — 45378 DIAGNOSTIC COLONOSCOPY: CPT | Performed by: INTERNAL MEDICINE

## 2023-12-05 ENCOUNTER — LAB (OUTPATIENT)
Dept: LAB | Facility: HOSPITAL | Age: 53
End: 2023-12-05
Payer: COMMERCIAL

## 2023-12-05 ENCOUNTER — OFFICE VISIT (OUTPATIENT)
Dept: ONCOLOGY | Facility: CLINIC | Age: 53
End: 2023-12-05
Payer: COMMERCIAL

## 2023-12-05 VITALS
BODY MASS INDEX: 19.77 KG/M2 | TEMPERATURE: 97.3 F | HEART RATE: 57 BPM | OXYGEN SATURATION: 98 % | SYSTOLIC BLOOD PRESSURE: 113 MMHG | DIASTOLIC BLOOD PRESSURE: 83 MMHG | RESPIRATION RATE: 16 BRPM | WEIGHT: 123 LBS | HEIGHT: 66 IN

## 2023-12-05 DIAGNOSIS — Z17.0 MALIGNANT NEOPLASM OF CENTRAL PORTION OF LEFT BREAST IN FEMALE, ESTROGEN RECEPTOR POSITIVE: Primary | ICD-10-CM

## 2023-12-05 DIAGNOSIS — C50.112 MALIGNANT NEOPLASM OF CENTRAL PORTION OF LEFT BREAST IN FEMALE, ESTROGEN RECEPTOR POSITIVE: ICD-10-CM

## 2023-12-05 DIAGNOSIS — C50.112 MALIGNANT NEOPLASM OF CENTRAL PORTION OF LEFT BREAST IN FEMALE, ESTROGEN RECEPTOR POSITIVE: Primary | ICD-10-CM

## 2023-12-05 DIAGNOSIS — Z17.0 MALIGNANT NEOPLASM OF CENTRAL PORTION OF LEFT BREAST IN FEMALE, ESTROGEN RECEPTOR POSITIVE: ICD-10-CM

## 2023-12-05 LAB
ALBUMIN SERPL-MCNC: 4.3 G/DL (ref 3.5–5.2)
ALBUMIN/GLOB SERPL: 1.4 G/DL
ALP SERPL-CCNC: 62 U/L (ref 39–117)
ALT SERPL W P-5'-P-CCNC: 17 U/L (ref 1–33)
ANION GAP SERPL CALCULATED.3IONS-SCNC: 9 MMOL/L (ref 5–15)
AST SERPL-CCNC: 21 U/L (ref 1–32)
BASOPHILS # BLD AUTO: 0.06 10*3/MM3 (ref 0–0.2)
BASOPHILS NFR BLD AUTO: 2 % (ref 0–1.5)
BILIRUB SERPL-MCNC: 0.4 MG/DL (ref 0–1.2)
BUN SERPL-MCNC: 11 MG/DL (ref 6–20)
BUN/CREAT SERPL: 15.1 (ref 7–25)
CALCIUM SPEC-SCNC: 9.3 MG/DL (ref 8.6–10.5)
CHLORIDE SERPL-SCNC: 103 MMOL/L (ref 98–107)
CO2 SERPL-SCNC: 27 MMOL/L (ref 22–29)
CREAT SERPL-MCNC: 0.73 MG/DL (ref 0.57–1)
DEPRECATED RDW RBC AUTO: 49.8 FL (ref 37–54)
EGFRCR SERPLBLD CKD-EPI 2021: 99.7 ML/MIN/1.73
EOSINOPHIL # BLD AUTO: 0.03 10*3/MM3 (ref 0–0.4)
EOSINOPHIL NFR BLD AUTO: 1 % (ref 0.3–6.2)
ERYTHROCYTE [DISTWIDTH] IN BLOOD BY AUTOMATED COUNT: 12.2 % (ref 12.3–15.4)
ESTRADIOL SERPL HS-MCNC: <5 PG/ML
FSH SERPL-ACNC: 83.1 MIU/ML
GLOBULIN UR ELPH-MCNC: 3 GM/DL
GLUCOSE SERPL-MCNC: 74 MG/DL (ref 65–99)
HCT VFR BLD AUTO: 39.5 % (ref 34–46.6)
HGB BLD-MCNC: 13.4 G/DL (ref 12–15.9)
IMM GRANULOCYTES # BLD AUTO: 0 10*3/MM3 (ref 0–0.05)
IMM GRANULOCYTES NFR BLD AUTO: 0 % (ref 0–0.5)
LYMPHOCYTES # BLD AUTO: 0.94 10*3/MM3 (ref 0.7–3.1)
LYMPHOCYTES NFR BLD AUTO: 31.1 % (ref 19.6–45.3)
MCH RBC QN AUTO: 37.2 PG (ref 26.6–33)
MCHC RBC AUTO-ENTMCNC: 33.9 G/DL (ref 31.5–35.7)
MCV RBC AUTO: 109.7 FL (ref 79–97)
MONOCYTES # BLD AUTO: 0.22 10*3/MM3 (ref 0.1–0.9)
MONOCYTES NFR BLD AUTO: 7.3 % (ref 5–12)
NEUTROPHILS NFR BLD AUTO: 1.77 10*3/MM3 (ref 1.7–7)
NEUTROPHILS NFR BLD AUTO: 58.6 % (ref 42.7–76)
PLATELET # BLD AUTO: 215 10*3/MM3 (ref 140–450)
PMV BLD AUTO: 8.7 FL (ref 6–12)
POTASSIUM SERPL-SCNC: 4 MMOL/L (ref 3.5–5.2)
PROT SERPL-MCNC: 7.3 G/DL (ref 6–8.5)
RBC # BLD AUTO: 3.6 10*6/MM3 (ref 3.77–5.28)
SODIUM SERPL-SCNC: 139 MMOL/L (ref 136–145)
TSH SERPL DL<=0.05 MIU/L-ACNC: 1.21 UIU/ML (ref 0.27–4.2)
WBC NRBC COR # BLD AUTO: 3.02 10*3/MM3 (ref 3.4–10.8)

## 2023-12-05 PROCEDURE — 36415 COLL VENOUS BLD VENIPUNCTURE: CPT

## 2023-12-05 PROCEDURE — 83001 ASSAY OF GONADOTROPIN (FSH): CPT

## 2023-12-05 PROCEDURE — 80050 GENERAL HEALTH PANEL: CPT

## 2023-12-05 PROCEDURE — 82670 ASSAY OF TOTAL ESTRADIOL: CPT

## 2023-12-05 RX ORDER — PROCHLORPERAZINE MALEATE 10 MG
10 TABLET ORAL EVERY 6 HOURS PRN
COMMUNITY
End: 2023-12-05 | Stop reason: SDUPTHER

## 2023-12-05 RX ORDER — PROCHLORPERAZINE MALEATE 5 MG/1
5 TABLET ORAL EVERY 8 HOURS PRN
Qty: 90 TABLET | Refills: 2 | Status: SHIPPED | OUTPATIENT
Start: 2023-12-05

## 2023-12-05 NOTE — PROGRESS NOTES
[facility]ematology and Oncology [place]  Office number [phone]    Fax number [phone]     Follow up     Date: 23    Patient Name: Debra Beckman  MRN: [id number]  : 1970    Referring Physician: Dr. Goran Russell    Chief Complaint: follow up breast cancer    Cancer Staging: Stage IA (cT1b, cN0, cM0, G1, ER+, MO+, HER2-)    History of Present Illness: Debra Beckman is a pleasant 53 y.o. female who presents today for evaluation of left breast cancer.  She is accompanied by her supportive father who is a retired [employer] neurosurgeon, Dr. Beckman.    Screening mammogram 2022 demonstrated an asymmetry in the anterior left medial breast.  Diagnostic mammogram on 2022 demonstrated a persistent ill-defined asymmetry with architectural distortion in the left 9:00 periareolar region.  On ultrasound, this corresponded to an 8 mm mass in the 9:00 left breast.    Left breast 9:00 biopsy on 8/10/2022 demonstrated grade 1 invasive ductal carcinoma with associated DCIS (%, MO 60%, HER2/kathleen negative, 0+).    She proceeded with bilateral breast MRI on 8/15/2022.  This demonstrated a 1.4 cm 9:00 left breast enhancing mass, with a 1.3 cm area of clumped non-mass enhancement versus postbiopsy change 1 cm anterior lateral to this.  There is additionally a dominant focus of enhancement suspicious for a satellite lesion spanning 0.4 cm.  She underwent second look ultrasound and biopsy of the second lesion which corresponded to a subcentimeteric mass on ultrasound.    Left breast 9:00 biopsy on 2022 showed grade 2 invasive ductal carcinoma (ER greater than 90%, MO greater than 80%, HER2/kathleen negative, 0+)    Left breast lumpectomy, sentinel lymph node biopsy on 2022 demonstrated grade 2 invasive ductal carcinoma spanning 1.4 cm with associated DCIS.  Margin positive.  [place] lymph node positive () with a macrometastasis spanning 4 mm with extracapsular extension. She underwent  re-excision 9/30/22 with fat necrosis and no residual invasive or in situ carcinoma. Single left axillary LN excised and benign (total LN count 1/2).    Breast cancer risk profile:  G3, P2; Age of first live birth 37  Premenopausal, LMP was August 2022.  She discontinued OCPs at the time of her cancer diagnosis.  Family history of breast, ovarian, prostate or pancreatic cancer: Maternal aunt with breast cancer.  Genetics: [facility] cancer next panel was negative in 2022.    Treatment history:  TC x 6 cycles  Adjuvant radiation completed 3/27/23    Arimidex, planning 4/12/2023  Verzinio, C1 4/27/23  Zometa, C1 9/5/23    Interval history:  Here for follow up on Verzenio and Arimidex. She is accompanied by her supportive father.   Hot flashes manageable. Mood is good. Has questions about venlafaxine taper when she is ready to trial stopping. Taking 37.5 mg daily.  LBP is improving with dry needling. Takes lyrica at night. Takes robaxin  1-2 x per day when dry needling wears off. This helps a lot  Cough productive of white phlegm, not resolving, followed a viral infection. Taking claritin and mucinex. This started shortly after her last scan.  Dyspepsia and appetite have been better. She is scheduling antiemetics with each dose and PPI an average of once per week. With this regimen, side effects are more tolerable.   No diarrhea.  Missed her mammogram appt and has not been able to get rescheduled.    Past Medical History:   Past Medical History:   Diagnosis Date    Allergic     Asthma     History of radiation therapy 03/27/2023    left breast/regional LNs    Low back pain     Malignant neoplasm of central portion of left breast in female, estrogen receptor positive 09/21/2022       Past Surgical History:   Past Surgical History:   Procedure Laterality Date    BREAST BIOPSY  08/10/2022    BREAST BIOPSY Left 08/26/2022    BREAST LUMPECTOMY WITH AXILLARY NODE DISSECTION Left 09/30/2022    clean dread    BREAST LUMPECTOMY WITH  SENTINEL NODE BIOPSY Left 2022     SECTION      VENOUS ACCESS DEVICE (PORT) INSERTION Right 2022       Family History:   Family History   Problem Relation Age of Onset    Hypertension Mother     No Known Problems Father     Breast cancer Maternal Aunt     Alcohol abuse Maternal Aunt     Anxiety disorder Maternal Aunt     Depression Maternal Aunt     Alcohol abuse Maternal Aunt     Anxiety disorder Maternal Aunt     Depression Maternal Aunt     Alcohol abuse Maternal Uncle     Depression Maternal Uncle     Drug abuse Maternal Uncle     Ovarian cancer Neg Hx        Social History:   Social History     Socioeconomic History    Marital status:    Tobacco Use    Smoking status: Former    Smokeless tobacco: Never   Vaping Use    Vaping Use: Former   Substance and Sexual Activity    Alcohol use: Yes     Comment: occasional    Drug use: No    Sexual activity: Not Currently   Works as a .      Medications:     Current Outpatient Medications:     prochlorperazine (COMPAZINE) 5 MG tablet, Take 1 tablet by mouth Every 8 (Eight) Hours As Needed for Nausea or Vomiting., Disp: 90 tablet, Rfl: 2    Abemaciclib 150 MG tablet, Take 1 tablet by mouth 2 (Two) Times a Day., Disp: 60 tablet, Rfl: 11    albuterol sulfate  (90 Base) MCG/ACT inhaler, Inhale 2 puffs Every 4 (Four) Hours As Needed for Wheezing., Disp: 18 g, Rfl: 11    anastrozole (ARIMIDEX) 1 MG tablet, Take 1 tablet by mouth Daily., Disp: 90 tablet, Rfl: 1    budesonide-formoterol (Symbicort) 160-4.5 MCG/ACT inhaler, Inhale 2 puffs 2 (Two) Times a Day., Disp: 10.2 g, Rfl: 2    ibuprofen (ADVIL,MOTRIN) 600 MG tablet, Take 1 tablet by mouth Every 8 (Eight) Hours As Needed for Mild Pain. Take with food., Disp: 30 tablet, Rfl: 1    methocarbamol (ROBAXIN) 500 MG tablet, Take 1 tablet by mouth 4 (Four) Times a Day., Disp: 40 tablet, Rfl: 1    omeprazole (priLOSEC) 20 MG capsule, Take 1 capsule by mouth Daily., Disp: 30 capsule,  "Rfl: 4    pregabalin (Lyrica) 50 MG capsule, Take 1 capsule by mouth 2 (Two) Times a Day., Disp: 60 capsule, Rfl: 2    TiZANidine (Zanaflex) 2 MG capsule, Take 1 capsule by mouth 3 (Three) Times a Day As Needed for Muscle Spasms., Disp: 40 capsule, Rfl: 1    traMADol (ULTRAM) 50 MG tablet, Take 1 tablet by mouth Every 6 (Six) Hours As Needed for Severe Pain. May take 1-2 Q6 for severe pain, Disp: 40 tablet, Rfl: 1    venlafaxine XR (EFFEXOR-XR) 37.5 MG 24 hr capsule, Take 1 capsule by mouth Daily. Take with 75 mg caps for total dose of 112.5 mg daily., Disp: 45 capsule, Rfl: 3    vitamin B-12 (CYANOCOBALAMIN) 1000 MCG tablet, Take 1 tablet by mouth Daily., Disp: , Rfl:     vitamin B-6 (PYRIDOXINE) 50 MG tablet, Take 1 tablet by mouth Daily., Disp: , Rfl:     Vitamin D, Cholecalciferol, (CHOLECALCIFEROL) 10 MCG (400 UNIT) tablet, Take 1 tablet by mouth Daily., Disp: , Rfl:     Allergies:   No Known Allergies    Objective     Vital Signs:   Vitals:    12/05/23 1309   BP: 113/83   Pulse: 57   Resp: 16   Temp: 97.3 °F (36.3 °C)   TempSrc: Infrared   SpO2: 98%   Weight: 55.8 kg (123 lb)   Height: 167.6 cm (65.98\")   PainSc: 0-No pain    Body mass index is 19.86 kg/m².   Pain Score    12/05/23 1309   PainSc: 0-No pain       ECOG Performance Status: 0    Physical Exam:  General: No acute distress. Well appearing   HEENT: Normocephalic, atraumatic. Sclera anicteric.   Neck: supple, no adenopathy.   Cardiovascular: regular rate and rhythm. No murmurs.   Respiratory: Normal rate. Clear to auscultation bilaterally  Abdomen: Soft, nontender, non distended with normoactive bowel sounds  Lymph: no cervical, supraclavicular or axillary adenopathy  Neuro: Alert and oriented x 3. No focal deficits.   Ext: Symmetric, no swelling.   Psych: Euthymic    Laboratory/Imaging Reviewed:   Lab on 12/05/2023   Component Date Value Ref Range Status    WBC 12/05/2023 3.02 (L)  3.40 - 10.80 10*3/mm3 Final    RBC 12/05/2023 3.60 (L)  3.77 - 5.28 " 10*6/mm3 Final    Hemoglobin 12/05/2023 13.4  12.0 - 15.9 g/dL Final    Hematocrit 12/05/2023 39.5  34.0 - 46.6 % Final    MCV 12/05/2023 109.7 (H)  79.0 - 97.0 fL Final    MCH 12/05/2023 37.2 (H)  26.6 - 33.0 pg Final    MCHC 12/05/2023 33.9  31.5 - 35.7 g/dL Final    RDW 12/05/2023 12.2 (L)  12.3 - 15.4 % Final    RDW-SD 12/05/2023 49.8  37.0 - 54.0 fl Final    MPV 12/05/2023 8.7  6.0 - 12.0 fL Final    Platelets 12/05/2023 215  140 - 450 10*3/mm3 Final    Neutrophil % 12/05/2023 58.6  42.7 - 76.0 % Final    Lymphocyte % 12/05/2023 31.1  19.6 - 45.3 % Final    Monocyte % 12/05/2023 7.3  5.0 - 12.0 % Final    Eosinophil % 12/05/2023 1.0  0.3 - 6.2 % Final    Basophil % 12/05/2023 2.0 (H)  0.0 - 1.5 % Final    Immature Grans % 12/05/2023 0.0  0.0 - 0.5 % Final    Neutrophils, Absolute 12/05/2023 1.77  1.70 - 7.00 10*3/mm3 Final    Lymphocytes, Absolute 12/05/2023 0.94  0.70 - 3.10 10*3/mm3 Final    Monocytes, Absolute 12/05/2023 0.22  0.10 - 0.90 10*3/mm3 Final    Eosinophils, Absolute 12/05/2023 0.03  0.00 - 0.40 10*3/mm3 Final    Basophils, Absolute 12/05/2023 0.06  0.00 - 0.20 10*3/mm3 Final    Immature Grans, Absolute 12/05/2023 0.00  0.00 - 0.05 10*3/mm3 Final     Lab on 12/05/2023   Component Date Value Ref Range Status    Glucose 12/05/2023 74  65 - 99 mg/dL Final    BUN 12/05/2023 11  6 - 20 mg/dL Final    Creatinine 12/05/2023 0.73  0.57 - 1.00 mg/dL Final    Sodium 12/05/2023 139  136 - 145 mmol/L Final    Potassium 12/05/2023 4.0  3.5 - 5.2 mmol/L Final    Chloride 12/05/2023 103  98 - 107 mmol/L Final    CO2 12/05/2023 27.0  22.0 - 29.0 mmol/L Final    Calcium 12/05/2023 9.3  8.6 - 10.5 mg/dL Final    Total Protein 12/05/2023 7.3  6.0 - 8.5 g/dL Final    Albumin 12/05/2023 4.3  3.5 - 5.2 g/dL Final    ALT (SGPT) 12/05/2023 17  1 - 33 U/L Final    AST (SGOT) 12/05/2023 21  1 - 32 U/L Final    Alkaline Phosphatase 12/05/2023 62  39 - 117 U/L Final    Total Bilirubin 12/05/2023 0.4  0.0 - 1.2 mg/dL Final     Globulin 12/05/2023 3.0  gm/dL Final    Calculated Result    A/G Ratio 12/05/2023 1.4  g/dL Final    BUN/Creatinine Ratio 12/05/2023 15.1  7.0 - 25.0 Final    Anion Gap 12/05/2023 9.0  5.0 - 15.0 mmol/L Final    eGFR 12/05/2023 99.7  >60.0 mL/min/1.73 Final    FSH 12/05/2023 83.10  mIU/mL Final    Estradiol 12/05/2023 <5.0  pg/mL Final    WBC 12/05/2023 3.02 (L)  3.40 - 10.80 10*3/mm3 Final    RBC 12/05/2023 3.60 (L)  3.77 - 5.28 10*6/mm3 Final    Hemoglobin 12/05/2023 13.4  12.0 - 15.9 g/dL Final    Hematocrit 12/05/2023 39.5  34.0 - 46.6 % Final    MCV 12/05/2023 109.7 (H)  79.0 - 97.0 fL Final    MCH 12/05/2023 37.2 (H)  26.6 - 33.0 pg Final    MCHC 12/05/2023 33.9  31.5 - 35.7 g/dL Final    RDW 12/05/2023 12.2 (L)  12.3 - 15.4 % Final    RDW-SD 12/05/2023 49.8  37.0 - 54.0 fl Final    MPV 12/05/2023 8.7  6.0 - 12.0 fL Final    Platelets 12/05/2023 215  140 - 450 10*3/mm3 Final    Neutrophil % 12/05/2023 58.6  42.7 - 76.0 % Final    Lymphocyte % 12/05/2023 31.1  19.6 - 45.3 % Final    Monocyte % 12/05/2023 7.3  5.0 - 12.0 % Final    Eosinophil % 12/05/2023 1.0  0.3 - 6.2 % Final    Basophil % 12/05/2023 2.0 (H)  0.0 - 1.5 % Final    Immature Grans % 12/05/2023 0.0  0.0 - 0.5 % Final    Neutrophils, Absolute 12/05/2023 1.77  1.70 - 7.00 10*3/mm3 Final    Lymphocytes, Absolute 12/05/2023 0.94  0.70 - 3.10 10*3/mm3 Final    Monocytes, Absolute 12/05/2023 0.22  0.10 - 0.90 10*3/mm3 Final    Eosinophils, Absolute 12/05/2023 0.03  0.00 - 0.40 10*3/mm3 Final    Basophils, Absolute 12/05/2023 0.06  0.00 - 0.20 10*3/mm3 Final    Immature Grans, Absolute 12/05/2023 0.00  0.00 - 0.05 10*3/mm3 Final    TSH 12/05/2023 1.210  0.270 - 4.200 uIU/mL Final     No results found.     Assessment / Plan        1. Malignant neoplasm of central portion of left breast in female, estrogen receptor positive  2. High risk medication use  -She has an early stage hormone sensitive breast cancer with a single positive lymph node.  She has  undergone curative intent surgery.  -Given strongly ER positive disease and N1, I recommended adjuvant TC over dose dense AC followed by weekly taxol and she completed 6 cycles  -She completed adjuvant radiation. Ki67 20%.   -FSH, LH, and estradiol consistent with postmenopausal state, continue AI with monitoring for ovarian rebound.  -Labs from 12/2023 notable for adequate blood counts, normal hepatic and renal function, suppressed estradiol  Since escalating antiemetic and PPI support her symptoms are better. Continue stable dose.   -Follow up in 8 weeks with repeat labs.   -Message sent to mammography and surgeon office to schedule     2. Anxiety  3. Hot flashes  -Continue Effexor      4. Bone health  -Ca/D recommended  -We discussed ASCO guidelines regarding adjuvant bisphosphonate use in early breast cancer. We reviewed the schedule and side effects of zometa including but not limited to bone pain, renal dysfunction, osteonecrosis and allergic reaction. Discussed the importance of routine dental care and need to avoid invasive dental procedures/notify dentist of medication use. Informed consent was obtained, and the patient elected to proceed pending dental evaluation.We reviewed the schedule and side effects including but not limited to bone pain, renal dysfunction, osteonecrosis and allergic reaction. Discussed the importance of routine dental care and need to avoid invasive dental procedures/notify dentist of medication use. Informed consent was obtained, and the patient elected to proceed.   -Tolerated cycle 1, next due 3/2024    5. GERD  -Continue PPI, now using PRN    6.  Low back pain  -MRI reassuring    7. Cough  -Following viral infection  Patient Instructions   Call if you cough does not resolve in 4 weeks and will repeat imaging     Follow Up:   2 mo    Rama Cano MD  Hematology and Oncology

## 2023-12-11 ENCOUNTER — TELEPHONE (OUTPATIENT)
Dept: ONCOLOGY | Facility: CLINIC | Age: 53
End: 2023-12-11
Payer: COMMERCIAL

## 2023-12-11 ENCOUNTER — SPECIALTY PHARMACY (OUTPATIENT)
Dept: ONCOLOGY | Facility: HOSPITAL | Age: 53
End: 2023-12-11
Payer: COMMERCIAL

## 2023-12-11 ENCOUNTER — TRANSCRIBE ORDERS (OUTPATIENT)
Dept: ADMINISTRATIVE | Facility: HOSPITAL | Age: 53
End: 2023-12-11
Payer: COMMERCIAL

## 2023-12-11 DIAGNOSIS — R92.8 ABNORMAL MAMMOGRAM: Primary | ICD-10-CM

## 2023-12-12 ENCOUNTER — TREATMENT (OUTPATIENT)
Dept: PHYSICAL THERAPY | Facility: CLINIC | Age: 53
End: 2023-12-12
Payer: COMMERCIAL

## 2023-12-12 DIAGNOSIS — M54.50 CHRONIC LOW BACK PAIN, UNSPECIFIED BACK PAIN LATERALITY, UNSPECIFIED WHETHER SCIATICA PRESENT: Primary | ICD-10-CM

## 2023-12-12 DIAGNOSIS — G89.29 CHRONIC LOW BACK PAIN, UNSPECIFIED BACK PAIN LATERALITY, UNSPECIFIED WHETHER SCIATICA PRESENT: Primary | ICD-10-CM

## 2023-12-12 DIAGNOSIS — M53.3 SACROILIAC JOINT DYSFUNCTION: ICD-10-CM

## 2023-12-17 NOTE — PROGRESS NOTES
Physical Therapy Daily Treatment Note    [facility]   [address], [address] [place] [address]      Patient: Debra Beckman   : 1970  Referring practitioner: Lydia France PA-C  Date of Initial Visit: Type: THERAPY  Noted: 10/4/2023  Today's Date: 2023  Patient seen for 4 sessions    Certification Period 2023 thru 3/15/2024       Visit Diagnoses:    ICD-10-CM ICD-9-CM   1. Chronic low back pain, unspecified back pain laterality, unspecified whether sciatica present  M54.50 724.2    G89.29 338.29   2. Sacroiliac joint dysfunction  M53.3 724.6       Subjective     Patient states she is still doing very well and she continues to have a significant amount of relief with the dry needling.  She has had some return of symptoms although this has been less severe and mostly only exacerbated after long periods of sitting.    Objective   See Exercise, Manual, and Modality Logs for complete treatment.       Assessment/Plan     Patient is progressing very well and she demonstrates a significant improvement in her overall ability to perform functional activities without exacerbation of symptoms in the low back.  Patient responds very well to dry needling and she tolerates treatments without any adverse reaction.  Will continue to progress as indicated.      Timed:         Manual Therapy:         mins  55825;     Therapeutic Exercise:         mins  97937;     Neuromuscular Mera:        mins  76117;    Therapeutic Activity:          mins  03166;     Gait Training:           mins  31342;     Ultrasound:          mins  64443;    Ionto                                   mins   91433  Self Care                            mins   46398  Canalith Repos         mins 60541  Electrical Stimulation:         mins  22960    Un-Timed:  Electrical Stimulation:         mins  71571 ( );  Dry Needling     32     mins self-pay  Traction          mins 48220      Timed Treatment:      mins   Total  Treatment:     32   mins    Robinson Cardona, PT, DPT, OCS, Cert. DN  [place] License: [id number]

## 2023-12-19 ENCOUNTER — HOSPITAL ENCOUNTER (OUTPATIENT)
Dept: MAMMOGRAPHY | Facility: HOSPITAL | Age: 53
Discharge: HOME | End: 2023-12-19
Admitting: SURGERY
Payer: COMMERCIAL

## 2023-12-19 DIAGNOSIS — R92.8 ABNORMAL MAMMOGRAM: ICD-10-CM

## 2023-12-19 PROCEDURE — 77066 DX MAMMO INCL CAD BI: CPT

## 2023-12-19 PROCEDURE — G0279 TOMOSYNTHESIS, MAMMO: HCPCS

## 2024-01-07 ENCOUNTER — DOCUMENTATION (OUTPATIENT)
Dept: RADIATION ONCOLOGY | Facility: HOSPITAL | Age: 54
End: 2024-01-07
Payer: COMMERCIAL

## 2024-01-30 ENCOUNTER — OFFICE VISIT (OUTPATIENT)
Dept: ONCOLOGY | Facility: CLINIC | Age: 54
End: 2024-01-30
Payer: COMMERCIAL

## 2024-01-30 ENCOUNTER — LAB (OUTPATIENT)
Dept: LAB | Facility: HOSPITAL | Age: 54
End: 2024-01-30
Payer: COMMERCIAL

## 2024-01-30 VITALS
TEMPERATURE: 97 F | HEIGHT: 66 IN | SYSTOLIC BLOOD PRESSURE: 122 MMHG | HEART RATE: 64 BPM | DIASTOLIC BLOOD PRESSURE: 66 MMHG | BODY MASS INDEX: 20.57 KG/M2 | OXYGEN SATURATION: 100 % | WEIGHT: 128 LBS

## 2024-01-30 DIAGNOSIS — C50.112 MALIGNANT NEOPLASM OF CENTRAL PORTION OF LEFT BREAST IN FEMALE, ESTROGEN RECEPTOR POSITIVE: ICD-10-CM

## 2024-01-30 DIAGNOSIS — Z17.0 MALIGNANT NEOPLASM OF CENTRAL PORTION OF LEFT BREAST IN FEMALE, ESTROGEN RECEPTOR POSITIVE: Primary | ICD-10-CM

## 2024-01-30 DIAGNOSIS — C50.112 MALIGNANT NEOPLASM OF CENTRAL PORTION OF LEFT BREAST IN FEMALE, ESTROGEN RECEPTOR POSITIVE: Primary | ICD-10-CM

## 2024-01-30 DIAGNOSIS — Z17.0 MALIGNANT NEOPLASM OF CENTRAL PORTION OF LEFT BREAST IN FEMALE, ESTROGEN RECEPTOR POSITIVE: ICD-10-CM

## 2024-01-30 LAB
ALBUMIN SERPL-MCNC: 4.3 G/DL (ref 3.5–5.2)
ALBUMIN/GLOB SERPL: 1.8 G/DL
ALP SERPL-CCNC: 55 U/L (ref 39–117)
ALT SERPL W P-5'-P-CCNC: 14 U/L (ref 1–33)
ANION GAP SERPL CALCULATED.3IONS-SCNC: 7 MMOL/L (ref 5–15)
AST SERPL-CCNC: 19 U/L (ref 1–32)
BASOPHILS # BLD AUTO: 0.05 10*3/MM3 (ref 0–0.2)
BASOPHILS NFR BLD AUTO: 1.9 % (ref 0–1.5)
BILIRUB SERPL-MCNC: 0.2 MG/DL (ref 0–1.2)
BUN SERPL-MCNC: 13 MG/DL (ref 6–20)
BUN/CREAT SERPL: 20 (ref 7–25)
CALCIUM SPEC-SCNC: 9.2 MG/DL (ref 8.6–10.5)
CHLORIDE SERPL-SCNC: 104 MMOL/L (ref 98–107)
CHOLEST SERPL-MCNC: 218 MG/DL (ref 0–200)
CO2 SERPL-SCNC: 28 MMOL/L (ref 22–29)
CREAT SERPL-MCNC: 0.65 MG/DL (ref 0.57–1)
DEPRECATED RDW RBC AUTO: 49.5 FL (ref 37–54)
EGFRCR SERPLBLD CKD-EPI 2021: 106.7 ML/MIN/1.73
EOSINOPHIL # BLD AUTO: 0.05 10*3/MM3 (ref 0–0.4)
EOSINOPHIL NFR BLD AUTO: 1.9 % (ref 0.3–6.2)
ERYTHROCYTE [DISTWIDTH] IN BLOOD BY AUTOMATED COUNT: 12.4 % (ref 12.3–15.4)
FOLATE SERPL-MCNC: 13.4 NG/ML (ref 4.78–24.2)
GLOBULIN UR ELPH-MCNC: 2.4 GM/DL
GLUCOSE SERPL-MCNC: 90 MG/DL (ref 65–99)
HCT VFR BLD AUTO: 37.1 % (ref 34–46.6)
HDLC SERPL-MCNC: 115 MG/DL (ref 40–60)
HGB BLD-MCNC: 12.7 G/DL (ref 12–15.9)
IMM GRANULOCYTES # BLD AUTO: 0 10*3/MM3 (ref 0–0.05)
IMM GRANULOCYTES NFR BLD AUTO: 0 % (ref 0–0.5)
LDLC SERPL CALC-MCNC: 89 MG/DL (ref 0–100)
LDLC/HDLC SERPL: 0.75 {RATIO}
LYMPHOCYTES # BLD AUTO: 0.93 10*3/MM3 (ref 0.7–3.1)
LYMPHOCYTES NFR BLD AUTO: 34.6 % (ref 19.6–45.3)
MCH RBC QN AUTO: 36.4 PG (ref 26.6–33)
MCHC RBC AUTO-ENTMCNC: 34.2 G/DL (ref 31.5–35.7)
MCV RBC AUTO: 106.3 FL (ref 79–97)
MONOCYTES # BLD AUTO: 0.31 10*3/MM3 (ref 0.1–0.9)
MONOCYTES NFR BLD AUTO: 11.5 % (ref 5–12)
NEUTROPHILS NFR BLD AUTO: 1.35 10*3/MM3 (ref 1.7–7)
NEUTROPHILS NFR BLD AUTO: 50.1 % (ref 42.7–76)
PLATELET # BLD AUTO: 174 10*3/MM3 (ref 140–450)
PMV BLD AUTO: 8.6 FL (ref 6–12)
POTASSIUM SERPL-SCNC: 3.8 MMOL/L (ref 3.5–5.2)
PROT SERPL-MCNC: 6.7 G/DL (ref 6–8.5)
RBC # BLD AUTO: 3.49 10*6/MM3 (ref 3.77–5.28)
SODIUM SERPL-SCNC: 139 MMOL/L (ref 136–145)
TRIGL SERPL-MCNC: 83 MG/DL (ref 0–150)
VIT B12 BLD-MCNC: 517 PG/ML (ref 211–946)
VLDLC SERPL-MCNC: 14 MG/DL (ref 5–40)
WBC NRBC COR # BLD AUTO: 2.69 10*3/MM3 (ref 3.4–10.8)

## 2024-01-30 PROCEDURE — 82746 ASSAY OF FOLIC ACID SERUM: CPT

## 2024-01-30 PROCEDURE — 80053 COMPREHEN METABOLIC PANEL: CPT

## 2024-01-30 PROCEDURE — 85025 COMPLETE CBC W/AUTO DIFF WBC: CPT

## 2024-01-30 PROCEDURE — 83001 ASSAY OF GONADOTROPIN (FSH): CPT

## 2024-01-30 PROCEDURE — 80061 LIPID PANEL: CPT

## 2024-01-30 PROCEDURE — 82607 VITAMIN B-12: CPT

## 2024-01-30 PROCEDURE — 99214 OFFICE O/P EST MOD 30 MIN: CPT | Performed by: INTERNAL MEDICINE

## 2024-01-30 PROCEDURE — 36415 COLL VENOUS BLD VENIPUNCTURE: CPT

## 2024-01-30 PROCEDURE — 82670 ASSAY OF TOTAL ESTRADIOL: CPT

## 2024-01-30 RX ORDER — VENLAFAXINE HYDROCHLORIDE 37.5 MG/1
37.5 CAPSULE, EXTENDED RELEASE ORAL DAILY
Qty: 90 CAPSULE | Refills: 1 | Status: SHIPPED | OUTPATIENT
Start: 2024-01-30

## 2024-01-30 NOTE — PROGRESS NOTES
Hematology and Oncology [facility]  Office number [phone]    Fax number [phone]     Follow up     Date: 24    Patient Name: Debra Beckman  MRN: [id number]  : 1970    Referring Physician: Dr. Goran Russell    Chief Complaint: follow up breast cancer    Cancer Staging: Stage IA (cT1b, cN0, cM0, G1, ER+, SD+, HER2-)    History of Present Illness: Debra Beckman is a pleasant 53 y.o. female who presents today for evaluation of left breast cancer.  She is accompanied by her supportive father who is a retired [employer] neurosurgeon, Dr. Beckman.    Screening mammogram 2022 demonstrated an asymmetry in the anterior left medial breast.  Diagnostic mammogram on 2022 demonstrated a persistent ill-defined asymmetry with architectural distortion in the left 9:00 periareolar region.  On ultrasound, this corresponded to an 8 mm mass in the 9:00 left breast.    Left breast 9:00 biopsy on 8/10/2022 demonstrated grade 1 invasive ductal carcinoma with associated DCIS (%, SD 60%, HER2/kathleen negative, 0+).    She proceeded with bilateral breast MRI on 8/15/2022.  This demonstrated a 1.4 cm 9:00 left breast enhancing mass, with a 1.3 cm area of clumped non-mass enhancement versus postbiopsy change 1 cm anterior lateral to this.  There is additionally a dominant focus of enhancement suspicious for a satellite lesion spanning 0.4 cm.  She underwent second look ultrasound and biopsy of the second lesion which corresponded to a subcentimeteric mass on ultrasound.    Left breast 9:00 biopsy on 2022 showed grade 2 invasive ductal carcinoma (ER greater than 90%, SD greater than 80%, HER2/kathleen negative, 0+)    Left breast lumpectomy, sentinel lymph node biopsy on 2022 demonstrated grade 2 invasive ductal carcinoma spanning 1.4 cm with associated DCIS.  Margin positive.  [place] lymph node positive () with a macrometastasis spanning 4 mm with extracapsular extension. She underwent  re-excision 22 with fat necrosis and no residual invasive or in situ carcinoma. Single left axillary LN excised and benign (total LN count 1/2).    Breast cancer risk profile:  G3, P2; Age of first live birth 37  Premenopausal, LMP was 2022.  She discontinued OCPs at the time of her cancer diagnosis.  Family history of breast, ovarian, prostate or pancreatic cancer: Maternal aunt with breast cancer.  Genetics: [facility] cancer next panel was negative in .    Treatment history:  TC x 6 cycles  Adjuvant radiation completed 3/27/23    Arimidex, planning 2023  Verzinio, C1 23  Zometa, C1 23    Interval history:  Here for follow up on Verzenio and Arimidex.   She is doing well and not needing antiemetics or antidiarrheals as frequently.  GERD remains controlled off PPI.  She attempted to wean off Effexor, but is planning to continue 37.5 mg daily.  Hot flashes and mood are well-controlled on this dose.  Her cough has fully resolved.  She continues with low back pain which is stable/failing to resolve.  She has had workup in the past including bone scan and MRI and found to have DJD.  She is currently utilizing dry needling which is helping as well as physical therapy.  She does not want to consider further consultations or steroid injections.    Past Medical History:   Past Medical History:   Diagnosis Date    Allergic     Asthma     History of radiation therapy 2023    left breast/regional LNs    Low back pain     Malignant neoplasm of central portion of left breast in female, estrogen receptor positive 2022    Radiculopathy 2022       Past Surgical History:   Past Surgical History:   Procedure Laterality Date    BREAST BIOPSY  08/10/2022    BREAST BIOPSY Left 2022    BREAST LUMPECTOMY      BREAST LUMPECTOMY WITH AXILLARY NODE DISSECTION Left 2022    clean dread    BREAST LUMPECTOMY WITH SENTINEL NODE BIOPSY Left 2022     SECTION      VENOUS ACCESS  DEVICE (PORT) INSERTION Right 09/30/2022       Family History:   Family History   Problem Relation Age of Onset    Hypertension Mother     No Known Problems Father     Breast cancer Maternal Aunt     Alcohol abuse Maternal Aunt     Anxiety disorder Maternal Aunt     Depression Maternal Aunt     Alcohol abuse Maternal Aunt     Anxiety disorder Maternal Aunt     Depression Maternal Aunt     Alcohol abuse Maternal Uncle     Depression Maternal Uncle     Drug abuse Maternal Uncle     Ovarian cancer Neg Hx        Social History:   Social History     Socioeconomic History    Marital status:    Tobacco Use    Smoking status: Former    Smokeless tobacco: Never   Vaping Use    Vaping Use: Former   Substance and Sexual Activity    Alcohol use: Yes     Comment: occasional    Drug use: No    Sexual activity: Not Currently   Works as a .      Medications:     Current Outpatient Medications:     Abemaciclib 150 MG tablet, Take 1 tablet by mouth 2 (Two) Times a Day., Disp: 60 tablet, Rfl: 11    albuterol sulfate  (90 Base) MCG/ACT inhaler, Inhale 2 puffs Every 4 (Four) Hours As Needed for Wheezing., Disp: 18 g, Rfl: 11    anastrozole (ARIMIDEX) 1 MG tablet, Take 1 tablet by mouth Daily., Disp: 90 tablet, Rfl: 1    budesonide-formoterol (Symbicort) 160-4.5 MCG/ACT inhaler, Inhale 2 puffs 2 (Two) Times a Day., Disp: 10.2 g, Rfl: 2    ibuprofen (ADVIL,MOTRIN) 600 MG tablet, Take 1 tablet by mouth Every 8 (Eight) Hours As Needed for Mild Pain. Take with food., Disp: 30 tablet, Rfl: 1    methocarbamol (ROBAXIN) 500 MG tablet, Take 1 tablet by mouth 4 (Four) Times a Day., Disp: 40 tablet, Rfl: 1    omeprazole (priLOSEC) 20 MG capsule, Take 1 capsule by mouth Daily., Disp: 30 capsule, Rfl: 4    pregabalin (Lyrica) 50 MG capsule, Take 1 capsule by mouth 2 (Two) Times a Day., Disp: 60 capsule, Rfl: 2    prochlorperazine (COMPAZINE) 5 MG tablet, Take 1 tablet by mouth Every 8 (Eight) Hours As Needed for  "Nausea or Vomiting., Disp: 90 tablet, Rfl: 2    TiZANidine (Zanaflex) 2 MG capsule, Take 1 capsule by mouth 3 (Three) Times a Day As Needed for Muscle Spasms., Disp: 40 capsule, Rfl: 1    traMADol (ULTRAM) 50 MG tablet, Take 1 tablet by mouth Every 6 (Six) Hours As Needed for Severe Pain. May take 1-2 Q6 for severe pain, Disp: 40 tablet, Rfl: 1    venlafaxine XR (EFFEXOR-XR) 37.5 MG 24 hr capsule, Take 1 capsule by mouth Daily. Take with 75 mg caps for total dose of 112.5 mg daily., Disp: 45 capsule, Rfl: 3    vitamin B-12 (CYANOCOBALAMIN) 1000 MCG tablet, Take 1 tablet by mouth Daily., Disp: , Rfl:     vitamin B-6 (PYRIDOXINE) 50 MG tablet, Take 1 tablet by mouth Daily., Disp: , Rfl:     Vitamin D, Cholecalciferol, (CHOLECALCIFEROL) 10 MCG (400 UNIT) tablet, Take 1 tablet by mouth Daily., Disp: , Rfl:     Allergies:   No Known Allergies    Objective     Vital Signs:   Vitals:    01/30/24 1518   BP: 122/66   Pulse: 64   Temp: 97 °F (36.1 °C)   TempSrc: Temporal   SpO2: 100%   Weight: 58.1 kg (128 lb)   Height: 167.6 cm (65.98\")   PainSc: 0-No pain    Body mass index is 20.67 kg/m².   Pain Score    01/30/24 1518   PainSc: 0-No pain       ECOG Performance Status: 0    Physical Exam:  General: No acute distress. Well appearing   HEENT: Normocephalic, atraumatic. Sclera anicteric.   Neck: supple, no adenopathy.   Cardiovascular: regular rate and rhythm. No murmurs.   Respiratory: Normal rate. Clear to auscultation bilaterally  Abdomen: Soft, nontender, non distended with normoactive bowel sounds  Lymph: no cervical, supraclavicular or axillary adenopathy  Neuro: Alert and oriented x 3. No focal deficits.   Ext: Symmetric, no swelling.   Breast: No palpable masses bilaterally    Laboratory/Imaging Reviewed:   No visits with results within 2 Week(s) from this visit.   Latest known visit with results is:   Lab on 12/05/2023   Component Date Value Ref Range Status    Glucose 12/05/2023 74  65 - 99 mg/dL Final    BUN " 12/05/2023 11  6 - 20 mg/dL Final    Creatinine 12/05/2023 0.73  0.57 - 1.00 mg/dL Final    Sodium 12/05/2023 139  136 - 145 mmol/L Final    Potassium 12/05/2023 4.0  3.5 - 5.2 mmol/L Final    Chloride 12/05/2023 103  98 - 107 mmol/L Final    CO2 12/05/2023 27.0  22.0 - 29.0 mmol/L Final    Calcium 12/05/2023 9.3  8.6 - 10.5 mg/dL Final    Total Protein 12/05/2023 7.3  6.0 - 8.5 g/dL Final    Albumin 12/05/2023 4.3  3.5 - 5.2 g/dL Final    ALT (SGPT) 12/05/2023 17  1 - 33 U/L Final    AST (SGOT) 12/05/2023 21  1 - 32 U/L Final    Alkaline Phosphatase 12/05/2023 62  39 - 117 U/L Final    Total Bilirubin 12/05/2023 0.4  0.0 - 1.2 mg/dL Final    Globulin 12/05/2023 3.0  gm/dL Final    Calculated Result    A/G Ratio 12/05/2023 1.4  g/dL Final    BUN/Creatinine Ratio 12/05/2023 15.1  7.0 - 25.0 Final    Anion Gap 12/05/2023 9.0  5.0 - 15.0 mmol/L Final    eGFR 12/05/2023 99.7  >60.0 mL/min/1.73 Final    FSH 12/05/2023 83.10  mIU/mL Final    Estradiol 12/05/2023 <5.0  pg/mL Final    WBC 12/05/2023 3.02 (L)  3.40 - 10.80 10*3/mm3 Final    RBC 12/05/2023 3.60 (L)  3.77 - 5.28 10*6/mm3 Final    Hemoglobin 12/05/2023 13.4  12.0 - 15.9 g/dL Final    Hematocrit 12/05/2023 39.5  34.0 - 46.6 % Final    MCV 12/05/2023 109.7 (H)  79.0 - 97.0 fL Final    MCH 12/05/2023 37.2 (H)  26.6 - 33.0 pg Final    MCHC 12/05/2023 33.9  31.5 - 35.7 g/dL Final    RDW 12/05/2023 12.2 (L)  12.3 - 15.4 % Final    RDW-SD 12/05/2023 49.8  37.0 - 54.0 fl Final    MPV 12/05/2023 8.7  6.0 - 12.0 fL Final    Platelets 12/05/2023 215  140 - 450 10*3/mm3 Final    Neutrophil % 12/05/2023 58.6  42.7 - 76.0 % Final    Lymphocyte % 12/05/2023 31.1  19.6 - 45.3 % Final    Monocyte % 12/05/2023 7.3  5.0 - 12.0 % Final    Eosinophil % 12/05/2023 1.0  0.3 - 6.2 % Final    Basophil % 12/05/2023 2.0 (H)  0.0 - 1.5 % Final    Immature Grans % 12/05/2023 0.0  0.0 - 0.5 % Final    Neutrophils, Absolute 12/05/2023 1.77  1.70 - 7.00 10*3/mm3 Final    Lymphocytes, Absolute  12/05/2023 0.94  0.70 - 3.10 10*3/mm3 Final    Monocytes, Absolute 12/05/2023 0.22  0.10 - 0.90 10*3/mm3 Final    Eosinophils, Absolute 12/05/2023 0.03  0.00 - 0.40 10*3/mm3 Final    Basophils, Absolute 12/05/2023 0.06  0.00 - 0.20 10*3/mm3 Final    Immature Grans, Absolute 12/05/2023 0.00  0.00 - 0.05 10*3/mm3 Final    TSH 12/05/2023 1.210  0.270 - 4.200 uIU/mL Final     No visits with results within 2 Week(s) from this visit.   Latest known visit with results is:   Lab on 12/05/2023   Component Date Value Ref Range Status    Glucose 12/05/2023 74  65 - 99 mg/dL Final    BUN 12/05/2023 11  6 - 20 mg/dL Final    Creatinine 12/05/2023 0.73  0.57 - 1.00 mg/dL Final    Sodium 12/05/2023 139  136 - 145 mmol/L Final    Potassium 12/05/2023 4.0  3.5 - 5.2 mmol/L Final    Chloride 12/05/2023 103  98 - 107 mmol/L Final    CO2 12/05/2023 27.0  22.0 - 29.0 mmol/L Final    Calcium 12/05/2023 9.3  8.6 - 10.5 mg/dL Final    Total Protein 12/05/2023 7.3  6.0 - 8.5 g/dL Final    Albumin 12/05/2023 4.3  3.5 - 5.2 g/dL Final    ALT (SGPT) 12/05/2023 17  1 - 33 U/L Final    AST (SGOT) 12/05/2023 21  1 - 32 U/L Final    Alkaline Phosphatase 12/05/2023 62  39 - 117 U/L Final    Total Bilirubin 12/05/2023 0.4  0.0 - 1.2 mg/dL Final    Globulin 12/05/2023 3.0  gm/dL Final    Calculated Result    A/G Ratio 12/05/2023 1.4  g/dL Final    BUN/Creatinine Ratio 12/05/2023 15.1  7.0 - 25.0 Final    Anion Gap 12/05/2023 9.0  5.0 - 15.0 mmol/L Final    eGFR 12/05/2023 99.7  >60.0 mL/min/1.73 Final    FSH 12/05/2023 83.10  mIU/mL Final    Estradiol 12/05/2023 <5.0  pg/mL Final    WBC 12/05/2023 3.02 (L)  3.40 - 10.80 10*3/mm3 Final    RBC 12/05/2023 3.60 (L)  3.77 - 5.28 10*6/mm3 Final    Hemoglobin 12/05/2023 13.4  12.0 - 15.9 g/dL Final    Hematocrit 12/05/2023 39.5  34.0 - 46.6 % Final    MCV 12/05/2023 109.7 (H)  79.0 - 97.0 fL Final    MCH 12/05/2023 37.2 (H)  26.6 - 33.0 pg Final    MCHC 12/05/2023 33.9  31.5 - 35.7 g/dL Final    RDW  12/05/2023 12.2 (L)  12.3 - 15.4 % Final    RDW-SD 12/05/2023 49.8  37.0 - 54.0 fl Final    MPV 12/05/2023 8.7  6.0 - 12.0 fL Final    Platelets 12/05/2023 215  140 - 450 10*3/mm3 Final    Neutrophil % 12/05/2023 58.6  42.7 - 76.0 % Final    Lymphocyte % 12/05/2023 31.1  19.6 - 45.3 % Final    Monocyte % 12/05/2023 7.3  5.0 - 12.0 % Final    Eosinophil % 12/05/2023 1.0  0.3 - 6.2 % Final    Basophil % 12/05/2023 2.0 (H)  0.0 - 1.5 % Final    Immature Grans % 12/05/2023 0.0  0.0 - 0.5 % Final    Neutrophils, Absolute 12/05/2023 1.77  1.70 - 7.00 10*3/mm3 Final    Lymphocytes, Absolute 12/05/2023 0.94  0.70 - 3.10 10*3/mm3 Final    Monocytes, Absolute 12/05/2023 0.22  0.10 - 0.90 10*3/mm3 Final    Eosinophils, Absolute 12/05/2023 0.03  0.00 - 0.40 10*3/mm3 Final    Basophils, Absolute 12/05/2023 0.06  0.00 - 0.20 10*3/mm3 Final    Immature Grans, Absolute 12/05/2023 0.00  0.00 - 0.05 10*3/mm3 Final    TSH 12/05/2023 1.210  0.270 - 4.200 uIU/mL Final     No results found.     Assessment / Plan        1. Malignant neoplasm of central portion of left breast in female, estrogen receptor positive  2. High risk medication use  -She has an early stage hormone sensitive breast cancer with a single positive lymph node.  She has undergone curative intent surgery.  -Given strongly ER positive disease and N1, I recommended adjuvant TC over dose dense AC followed by weekly taxol and she completed 6 cycles  -She completed adjuvant radiation.   -Ki67 20%, initiated adjuvant Verzenio.  Labs are in an acceptable range to continue Verzenio including ANC 1.3 with otherwise normal blood counts; CMP normal; FSH and estradiol remaining in a postmenopausal range.  -Follow-up in 8 weeks with next Zometa and will repeat CBC and CMP at that time.     2. Anxiety  3. Hot flashes  -Continue Effexor.  Refills provided.     4. Bone health  -Ca/D recommended  -We discussed ASCO guidelines regarding adjuvant bisphosphonate use in early breast cancer.  We reviewed the schedule and side effects of zometa including but not limited to bone pain, renal dysfunction, osteonecrosis and allergic reaction. Discussed the importance of routine dental care and need to avoid invasive dental procedures/notify dentist of medication use. Informed consent was obtained, and the patient elected to proceed pending dental evaluation.We reviewed the schedule and side effects including but not limited to bone pain, renal dysfunction, osteonecrosis and allergic reaction. Discussed the importance of routine dental care and need to avoid invasive dental procedures/notify dentist of medication use. Informed consent was obtained, and the patient elected to proceed.   -Tolerated cycle 1, next due 3/2024    5. GERD  -Continue PPI, now using PRN    6.  Low back pain  -MRI reassuring.  We discussed repeat imaging if she has any worsening symptoms.  She continues with dry needling and physical therapy for now.    7.  Post radiation pneumonitis  -Her cough is now resolved.  -CT from October showed radiation pneumonitis  -Consider repeat CT of the chest in 6 months (April 2024)    Follow Up:   2 mo    Rama Cano MD  Hematology and Oncology

## 2024-01-31 ENCOUNTER — SPECIALTY PHARMACY (OUTPATIENT)
Dept: ONCOLOGY | Facility: HOSPITAL | Age: 54
End: 2024-01-31
Payer: COMMERCIAL

## 2024-01-31 ENCOUNTER — TELEPHONE (OUTPATIENT)
Dept: ONCOLOGY | Facility: CLINIC | Age: 54
End: 2024-01-31
Payer: COMMERCIAL

## 2024-01-31 LAB
ESTRADIOL SERPL HS-MCNC: <5 PG/ML
FSH SERPL-ACNC: 80.5 MIU/ML

## 2024-01-31 NOTE — PROGRESS NOTES
Your labs are all normal.     I ordered a CT of the chest to follow up on the radiation pneumonitis and can be done any time in April (6 mo from last CT).     Please call the office with any questions.     Best,     Dr. Cano

## 2024-01-31 NOTE — TELEPHONE ENCOUNTER
----- Message from Rama Cano MD sent at 1/31/2024  7:57 AM EST -----  Your labs are all normal.     I ordered a CT of the chest to follow up on the radiation pneumonitis and can be done any time in April (6 mo from last CT).     Please call the office with any questions.     Best,     Dr. Cano

## 2024-02-02 ENCOUNTER — TREATMENT (OUTPATIENT)
Dept: PHYSICAL THERAPY | Facility: CLINIC | Age: 54
End: 2024-02-02
Payer: COMMERCIAL

## 2024-02-02 DIAGNOSIS — M54.50 CHRONIC LOW BACK PAIN, UNSPECIFIED BACK PAIN LATERALITY, UNSPECIFIED WHETHER SCIATICA PRESENT: Primary | ICD-10-CM

## 2024-02-02 DIAGNOSIS — G89.29 CHRONIC LOW BACK PAIN, UNSPECIFIED BACK PAIN LATERALITY, UNSPECIFIED WHETHER SCIATICA PRESENT: Primary | ICD-10-CM

## 2024-02-04 DIAGNOSIS — M51.36 DDD (DEGENERATIVE DISC DISEASE), LUMBAR: ICD-10-CM

## 2024-02-04 DIAGNOSIS — G89.29 CHRONIC BILATERAL LOW BACK PAIN WITHOUT SCIATICA: ICD-10-CM

## 2024-02-04 DIAGNOSIS — M54.50 CHRONIC BILATERAL LOW BACK PAIN WITHOUT SCIATICA: ICD-10-CM

## 2024-02-05 RX ORDER — PREGABALIN 50 MG/1
50 CAPSULE ORAL 2 TIMES DAILY
Qty: 60 CAPSULE | Refills: 2 | Status: SHIPPED | OUTPATIENT
Start: 2024-02-05

## 2024-02-05 RX ORDER — METHOCARBAMOL 500 MG/1
500 TABLET, FILM COATED ORAL 4 TIMES DAILY
Qty: 40 TABLET | Refills: 1 | Status: SHIPPED | OUTPATIENT
Start: 2024-02-05

## 2024-02-05 NOTE — TELEPHONE ENCOUNTER
Provider:  Edilma  Surgery/Procedure:  LUAN  Surgery/Procedure Date:    Last visit:   8/16/23  Next visit: NA     Reason for call:  Refills for Lyrica and Robaxin pending.     Charles:    Pat ID Date Filled RX # Drug Name Prescriber Name Dispenser Name Qty Days MED PMT Rpt To  1 02/01/2024 [id number] Pregabalin 50MG Croucher, Lydia [facility] [address] 5 04 [place]  Date Written New/Refill Dosage Form Prescriber [facility]  09/21/2023 Refill CAPS [facility]  Pat ID Date Filled RX # Drug Name Prescriber Name Dispenser Name Qty Days MED PMT Rpt To  1 02/01/2024 [id number] Tramadol Hydrochloride 50MG Croucher, Lydia [facility] [address] 10 04 [place]  Date Written New/Refill Dosage Form Prescriber [facility]  [facility]  Pat ID Date Filled RX # Drug Name Prescriber Name Dispenser Name Qty Days MED PMT Rpt To  1 11/29/2023 [id number] Pregabalin 50MG Croucher, Lydia [facility] [address] 30 04 [place]  Date Written New/Refill Dosage Form Prescriber [facility]  09/21/2023 [facility]  Pat ID Date Filled RX # Drug Name Prescriber Name Dispenser Name Qty Days MED PMT Rpt To  1 09/25/2023 [id number] Pregabalin 50MG Croucher, Lydia [facility] [address] 30 04 [place]  Date Written New/Refill Dosage Form Prescriber [facility]  09/21/2023 New CAPS [facility]  Pat ID Date Filled RX # Drug Name Prescriber Name Dispenser Name Qty Days MED PMT Rpt To  1 08/18/2023 [id number] Pregabalin 50MG Croucher, Lydia [facility] [address] 15 04 [place]

## 2024-02-07 NOTE — PROGRESS NOTES
Physical Therapy Daily Treatment Note    [facility]   [address], [address] [place] [address]      Patient: Debra Beckman   : 1970  Referring practitioner: Lydia Farnce PA-C  Date of Initial Visit: Type: THERAPY  Noted: 10/4/2023  Today's Date: 2024  Patient seen for 5 sessions    Certification Period 2024 thru 2024       Visit Diagnoses:    ICD-10-CM ICD-9-CM   1. Chronic low back pain, unspecified back pain laterality, unspecified whether sciatica present  M54.50 724.2    G89.29 338.29       Subjective     Pt states that she is still significantly improved since beginning treatment, but she has had some recent exacerbation of symptoms in the low back. She reports feeling tightness around andrea SI joints and muscle tension in the post hip musculature. Pt does not think she is getting as much relief from the SI belt and thinks this may be due to a loss of elasticity over time.     Objective   See Exercise, Manual, and Modality Logs for complete treatment.       Assessment/Plan     Pt had slightly more discomfort with dry needling today likely due to increased hypertonicity in the piriformis and glut med muscles. Pt tolerated the treatment well overall though and she was able to complete the treatment. Will assess her response at the next visit and proceed as indicated.       Timed:         Manual Therapy:         mins  47613;     Therapeutic Exercise:         mins  93589;     Neuromuscular Mera:        mins  15813;    Therapeutic Activity:          mins  63110;     Gait Training:           mins  11179;     Ultrasound:          mins  17285;    Ionto                                   mins   59848  Self Care                            mins   52916  Canalith Repos         mins 11372  Electrical Stimulation:         mins  81082    Un-Timed:  Electrical Stimulation:         mins  44352 ( );  Dry Needling     30     mins self-pay  Traction          mins 93815      Timed  Treatment:   0   mins   Total Treatment:     30   mins    Robinson Cardona PT, RYLAN, OCS, Cert. DN  [place] License: [id number]

## 2024-02-28 NOTE — LETTER
August 30, 2023     Jos Centeno MD  [address]  [facility] [address]    Patient: Debra Beckman   YOB: 1970   Date of Visit: 8/16/2023     Dear Jos Centeno MD:       Thank you for referring Debra Beckman to me for evaluation. Below are the relevant portions of my assessment and plan of care.    If you have questions, please do not hesitate to call me. I look forward to following Debra along with you.         Sincerely,        Lydia France PA-C        CC: No Recipients    Lydia France PA-C  08/24/23 0913  Signed  Debra Beckman   1970   [id number]       08/16/2023     Chief Complaint   Patient presents with    Low Back Pain & Bilateral Leg Pain        HPI   This is a 53-year-old female with a history of low back pain, I saw her originally in July 2021 with back pain and sciatica.  She has undergone chemotherapy treatment for breast cancer and has returned to work as an .  She unfortunately has developed increased back pain and sciatica pain that became worse while she was on vacation and then another exacerbation recently with sitting and driving.  She was seen while out of state on vacation and underwent x-rays of the lumbar spine after she suffered a fall, these revealed significant disc space narrowing at L5-S1 and facet arthropathy, no fractures noted.  She is seen today for ongoing pain.    Chronic Illnesses:  Past Medical History:  No date: Allergic  No date: Asthma  03/27/2023: History of radiation therapy      Comment:  left breast/regional LNs  No date: Low back pain  09/21/2022: Malignant neoplasm of central portion of left breast in   female, estrogen receptor positive     Past Surgical History:   Procedure Laterality Date    BREAST BIOPSY  08/10/2022    BREAST BIOPSY Left 08/26/2022    BREAST LUMPECTOMY WITH AXILLARY NODE DISSECTION Left 09/30/2022    clean dread    BREAST LUMPECTOMY WITH SENTINEL  NODE BIOPSY Left 2022     SECTION      VENOUS ACCESS DEVICE (PORT) INSERTION Right 2022        No Known Allergies       Current Outpatient Medications:     Abemaciclib 150 MG tablet, Take 1 tablet by mouth 2 (Two) Times a Day., Disp: 60 tablet, Rfl: 11    albuterol sulfate  (90 Base) MCG/ACT inhaler, Inhale 2 puffs Every 4 (Four) Hours As Needed for Wheezing., Disp: 18 g, Rfl: 11    anastrozole (ARIMIDEX) 1 MG tablet, Take 1 tablet by mouth Daily., Disp: 30 tablet, Rfl: 11    budesonide-formoterol (Symbicort) 160-4.5 MCG/ACT inhaler, Inhale 2 puffs 2 (Two) Times a Day., Disp: 10.2 g, Rfl: 2    ibuprofen (ADVIL,MOTRIN) 600 MG tablet, Take 1 tablet by mouth Every 8 (Eight) Hours As Needed for Mild Pain. Take with food., Disp: 30 tablet, Rfl: 1    lidocaine-prilocaine (EMLA) 2.5-2.5 % cream, Apply 1 application topically to the appropriate area as directed As Needed (45-60 minutes prior to port access.  Cover with saran/plastic wrap.)., Disp: 30 g, Rfl: 3    omeprazole (priLOSEC) 20 MG capsule, Take 1 capsule by mouth Daily., Disp: 30 capsule, Rfl: 4    TiZANidine (Zanaflex) 2 MG capsule, Take 1 capsule by mouth 3 (Three) Times a Day As Needed for Muscle Spasms., Disp: 40 capsule, Rfl: 1    traMADol (ULTRAM) 50 MG tablet, Take 1 tablet by mouth Every 6 (Six) Hours As Needed for Severe Pain. May take 1-2 Q6 for severe pain, Disp: 40 tablet, Rfl: 1    venlafaxine XR (EFFEXOR-XR) 37.5 MG 24 hr capsule, Take 1 capsule by mouth Daily. Take with 75 mg caps for total dose of 112.5 mg daily., Disp: 45 capsule, Rfl: 3    vitamin B-12 (CYANOCOBALAMIN) 1000 MCG tablet, Take 1 tablet by mouth Daily., Disp: , Rfl:     vitamin B-6 (PYRIDOXINE) 50 MG tablet, Take 1 tablet by mouth Daily., Disp: , Rfl:     Vitamin D, Cholecalciferol, (CHOLECALCIFEROL) 10 MCG (400 UNIT) tablet, Take 1 tablet by mouth Daily., Disp: , Rfl:     methocarbamol (ROBAXIN) 500 MG tablet, Take 1 tablet by mouth 4  (Four) Times a Day., Disp: 40 tablet, Rfl: 1    pregabalin (Lyrica) 50 MG capsule, Take 1 capsule by mouth 2 (Two) Times a Day., Disp: 30 capsule, Rfl: 0     Social History     Socioeconomic History    Marital status:    Tobacco Use    Smoking status: Former    Smokeless tobacco: Never   Vaping Use    Vaping Use: Former   Substance and Sexual Activity    Alcohol use: Yes     Comment: occasional    Drug use: No    Sexual activity: Not Currently        family history includes Alcohol abuse in her maternal aunt, maternal aunt, and maternal uncle; Anxiety disorder in her maternal aunt and maternal aunt; Breast cancer in her maternal aunt; Depression in her maternal aunt, maternal aunt, and maternal uncle; Drug abuse in her maternal uncle; Hypertension in her mother; No Known Problems in her father.     Social History    Tobacco Use      Smoking status: Former      Smokeless tobacco: Never       Gait & Balance Assessment :  Risk assessment for falls. Fall precautions.  universal fall precautions, such as;   Using gait aids a cane, walker at the appropriate height at all times for ambulation or if necessary a wheelchair  Removing all area rugs and coffee tables to create a safe environment at home  Ensure clean, dry floors  Wearing supportive footwear and properly fitting clothing  Ensure bed/chair is appropriate height and patient's feet can touch the floor  Using a shower transfer bench  Using walk-in shower and having shower safety bars installed  Ensure proper lighting, minimize glare  Have nightlights operational and in use  Participation in an exercise program for gait training, balance training and strength  Avoid carrying laundry up and down steps  Ensure proper compliance and organization of medications to avoid errors   Avoid use of over the counter sedatives and alcohol consumption  Ensure easy access to call bell, glasses, TV control, telephone  Ensure glasses/hearing aids are in use or close  "by (on top of night table)     Body mass index is 20.66 kg/mý.   BMI is within normal parameters. No other follow-up for BMI required.       Temp 97.1 øF (36.2 øC) (Infrared)   Ht 167.6 cm (66\")   Wt 58.1 kg (128 lb)   BMI 20.66 kg/mý    Physical Examination:  HEENT-wnl  Lungs-No wheezing or SOB      Neurologic Exam   Patient is alert and oriented.  Pupils are equal and reactive.  Visual fields are full.  EOMI without nystagmus.    Gait slightly antalgic favoring the right leg  Reflexes not elicited in the lower extremities.  No focal weakness in the lower extremities  SLR is slightly positive, internal/external rotation of the hip is slightly positive.    Radiological Data Review:  For my review today is the x-ray report from 7/14/2023.    Assessment and Plan:  Diagnoses and all orders for this visit:    1. Chronic bilateral low back pain without sciatica (Primary)  -     Ambulatory Referral to Physical Therapy Evaluate and treat; Heat; Moist heat, Ultrasound; ROM, Strengthening  -     pregabalin (Lyrica) 50 MG capsule; Take 1 capsule by mouth 2 (Two) Times a Day.  Dispense: 30 capsule; Refill: 0    2. DDD (degenerative disc disease), lumbar  -     Ambulatory Referral to Physical Therapy Evaluate and treat; Heat; Moist heat, Ultrasound; ROM, Strengthening  -     pregabalin (Lyrica) 50 MG capsule; Take 1 capsule by mouth 2 (Two) Times a Day.  Dispense: 30 capsule; Refill: 0    3. Lumbar radiculopathy    Other orders  -     methocarbamol (ROBAXIN) 500 MG tablet; Take 1 tablet by mouth 4 (Four) Times a Day.  Dispense: 40 tablet; Refill: 1    We are going to continue with conservative treatment, I have made some adjustments and additions to her medication.  We are making a referral to physical therapy and she will keep me updated on her progress.  I have not ordered an MRI at this time however should her symptoms not improved with medications and therapy we will get this scheduled.    Any copied data from previous " notes included in the (1) HPI, (2) PE, (3) MDM and/or assessment and plan has been reviewed and is accurate as of this date.  Lydia France, PAC      PCP:  Jos Centeno MD      Class II - visualization of the soft palate, fauces, and uvula

## 2024-03-01 ENCOUNTER — TREATMENT (OUTPATIENT)
Dept: PHYSICAL THERAPY | Facility: CLINIC | Age: 54
End: 2024-03-01
Payer: COMMERCIAL

## 2024-03-01 DIAGNOSIS — G89.29 CHRONIC LOW BACK PAIN, UNSPECIFIED BACK PAIN LATERALITY, UNSPECIFIED WHETHER SCIATICA PRESENT: Primary | ICD-10-CM

## 2024-03-01 DIAGNOSIS — M54.50 CHRONIC LOW BACK PAIN, UNSPECIFIED BACK PAIN LATERALITY, UNSPECIFIED WHETHER SCIATICA PRESENT: Primary | ICD-10-CM

## 2024-03-01 NOTE — PROGRESS NOTES
Physical Therapy Daily Treatment Note    [facility]   [address], [address] [place] [address]      Patient: Debra Beckman   : 1970  Referring practitioner: Lydia France PA-C  Date of Initial Visit: Type: THERAPY  Noted: 10/4/2023  Today's Date: 3/1/2024  Patient seen for 6 sessions    Certification Period 3/1/2024 thru 2024       Visit Diagnoses:    ICD-10-CM ICD-9-CM   1. Chronic low back pain, unspecified back pain laterality, unspecified whether sciatica present  M54.50 724.2    G89.29 338.29       Subjective     Patient states feels like she doing much better today than she was at her previous visit and the dry needling seem to help with her exacerbation of symptoms prior to her most recent visit.  Patient experienced some tenderness at the lower lumbar spinal segment and she has worked on some self adjustments at home which have seemed to decrease the overall intensity of her symptoms.    Objective   See Exercise, Manual, and Modality Logs for complete treatment.     Mild to moderate tenderness to palpation noted in the lower lumbar spinal segment specifically in the area of L3.  Patient responded well to demonstration of self correction techniques.    Assessment/Plan     Patient tolerated needling well today and she had much less tenderness and discomfort with dry needling treatment today as compared to her previous visit.  Will assess her overall response at next visit and progress as indicated.      Timed:         Manual Therapy:         mins  53336;     Therapeutic Exercise:         mins  64480;     Neuromuscular Mera:        mins  50737;    Therapeutic Activity:          mins  09348;     Gait Training:           mins  77882;     Ultrasound:          mins  32854;    Ionto                                   mins   73252  Self Care                            mins   64251  Canalith Repos         mins 41933  Electrical Stimulation:         mins   70298    Un-Timed:  Electrical Stimulation:         mins  24349 ( );  Dry Needling     30     mins self-pay  Traction          mins 65657      Timed Treatment:   0   mins   Total Treatment:     30   mins    Robinson Cardona PT, DPT, OCS, Cert. DN  [place] License: [id number]

## 2024-03-04 DIAGNOSIS — Z17.0 MALIGNANT NEOPLASM OF CENTRAL PORTION OF LEFT BREAST IN FEMALE, ESTROGEN RECEPTOR POSITIVE: ICD-10-CM

## 2024-03-04 DIAGNOSIS — Z17.0 MALIGNANT NEOPLASM OF CENTRAL PORTION OF LEFT BREAST IN FEMALE, ESTROGEN RECEPTOR POSITIVE: Primary | ICD-10-CM

## 2024-03-04 DIAGNOSIS — C50.112 MALIGNANT NEOPLASM OF CENTRAL PORTION OF LEFT BREAST IN FEMALE, ESTROGEN RECEPTOR POSITIVE: Primary | ICD-10-CM

## 2024-03-04 DIAGNOSIS — C50.112 MALIGNANT NEOPLASM OF CENTRAL PORTION OF LEFT BREAST IN FEMALE, ESTROGEN RECEPTOR POSITIVE: ICD-10-CM

## 2024-03-04 RX ORDER — SODIUM CHLORIDE 9 MG/ML
250 INJECTION, SOLUTION INTRAVENOUS ONCE
Status: CANCELLED | OUTPATIENT
Start: 2024-03-05

## 2024-03-05 ENCOUNTER — OFFICE VISIT (OUTPATIENT)
Dept: ONCOLOGY | Facility: CLINIC | Age: 54
End: 2024-03-05
Payer: COMMERCIAL

## 2024-03-05 ENCOUNTER — SPECIALTY PHARMACY (OUTPATIENT)
Dept: ONCOLOGY | Facility: HOSPITAL | Age: 54
End: 2024-03-05
Payer: COMMERCIAL

## 2024-03-05 ENCOUNTER — HOSPITAL ENCOUNTER (OUTPATIENT)
Dept: ONCOLOGY | Facility: HOSPITAL | Age: 54
Discharge: HOME | End: 2024-03-05
Admitting: INTERNAL MEDICINE
Payer: COMMERCIAL

## 2024-03-05 VITALS
HEART RATE: 60 BPM | TEMPERATURE: 97.1 F | OXYGEN SATURATION: 98 % | DIASTOLIC BLOOD PRESSURE: 76 MMHG | SYSTOLIC BLOOD PRESSURE: 119 MMHG | WEIGHT: 125 LBS | BODY MASS INDEX: 20.09 KG/M2 | RESPIRATION RATE: 16 BRPM | HEIGHT: 66 IN

## 2024-03-05 DIAGNOSIS — C50.112 MALIGNANT NEOPLASM OF CENTRAL PORTION OF LEFT BREAST IN FEMALE, ESTROGEN RECEPTOR POSITIVE: ICD-10-CM

## 2024-03-05 DIAGNOSIS — Z17.0 MALIGNANT NEOPLASM OF CENTRAL PORTION OF LEFT BREAST IN FEMALE, ESTROGEN RECEPTOR POSITIVE: Primary | ICD-10-CM

## 2024-03-05 DIAGNOSIS — C50.112 MALIGNANT NEOPLASM OF CENTRAL PORTION OF LEFT BREAST IN FEMALE, ESTROGEN RECEPTOR POSITIVE: Primary | ICD-10-CM

## 2024-03-05 DIAGNOSIS — Z17.0 MALIGNANT NEOPLASM OF CENTRAL PORTION OF LEFT BREAST IN FEMALE, ESTROGEN RECEPTOR POSITIVE: ICD-10-CM

## 2024-03-05 LAB
ALBUMIN SERPL-MCNC: 4 G/DL (ref 3.5–5.2)
ALBUMIN/GLOB SERPL: 1.7 G/DL
ALP SERPL-CCNC: 53 U/L (ref 39–117)
ALT SERPL W P-5'-P-CCNC: 16 U/L (ref 1–33)
ANION GAP SERPL CALCULATED.3IONS-SCNC: 8 MMOL/L (ref 5–15)
AST SERPL-CCNC: 20 U/L (ref 1–32)
BASOPHILS # BLD AUTO: 0.05 10*3/MM3 (ref 0–0.2)
BASOPHILS NFR BLD AUTO: 2.1 % (ref 0–1.5)
BILIRUB SERPL-MCNC: 0.3 MG/DL (ref 0–1.2)
BUN SERPL-MCNC: 13 MG/DL (ref 6–20)
BUN/CREAT SERPL: 18.8 (ref 7–25)
CALCIUM SPEC-SCNC: 8.9 MG/DL (ref 8.6–10.5)
CHLORIDE SERPL-SCNC: 104 MMOL/L (ref 98–107)
CO2 SERPL-SCNC: 25 MMOL/L (ref 22–29)
CREAT BLDA-MCNC: 0.7 MG/DL (ref 0.6–1.3)
CREAT SERPL-MCNC: 0.69 MG/DL (ref 0.57–1)
DEPRECATED RDW RBC AUTO: 52.9 FL (ref 37–54)
EGFRCR SERPLBLD CKD-EPI 2021: 105.2 ML/MIN/1.73
EOSINOPHIL # BLD AUTO: 0.02 10*3/MM3 (ref 0–0.4)
EOSINOPHIL NFR BLD AUTO: 0.8 % (ref 0.3–6.2)
ERYTHROCYTE [DISTWIDTH] IN BLOOD BY AUTOMATED COUNT: 12.9 % (ref 12.3–15.4)
GLOBULIN UR ELPH-MCNC: 2.4 GM/DL
GLUCOSE SERPL-MCNC: 92 MG/DL (ref 65–99)
HCT VFR BLD AUTO: 36.6 % (ref 34–46.6)
HGB BLD-MCNC: 12.5 G/DL (ref 12–15.9)
IMM GRANULOCYTES # BLD AUTO: 0 10*3/MM3 (ref 0–0.05)
IMM GRANULOCYTES NFR BLD AUTO: 0 % (ref 0–0.5)
LYMPHOCYTES # BLD AUTO: 0.87 10*3/MM3 (ref 0.7–3.1)
LYMPHOCYTES NFR BLD AUTO: 36.4 % (ref 19.6–45.3)
MAGNESIUM SERPL-MCNC: 2.4 MG/DL (ref 1.6–2.6)
MCH RBC QN AUTO: 37.4 PG (ref 26.6–33)
MCHC RBC AUTO-ENTMCNC: 34.2 G/DL (ref 31.5–35.7)
MCV RBC AUTO: 109.6 FL (ref 79–97)
MONOCYTES # BLD AUTO: 0.2 10*3/MM3 (ref 0.1–0.9)
MONOCYTES NFR BLD AUTO: 8.4 % (ref 5–12)
NEUTROPHILS NFR BLD AUTO: 1.25 10*3/MM3 (ref 1.7–7)
NEUTROPHILS NFR BLD AUTO: 52.3 % (ref 42.7–76)
PHOSPHATE SERPL-MCNC: 3.6 MG/DL (ref 2.5–4.5)
PLATELET # BLD AUTO: 194 10*3/MM3 (ref 140–450)
PMV BLD AUTO: 8.4 FL (ref 6–12)
POTASSIUM SERPL-SCNC: 3.8 MMOL/L (ref 3.5–5.2)
PROT SERPL-MCNC: 6.4 G/DL (ref 6–8.5)
RBC # BLD AUTO: 3.34 10*6/MM3 (ref 3.77–5.28)
SODIUM SERPL-SCNC: 137 MMOL/L (ref 136–145)
WBC NRBC COR # BLD AUTO: 2.39 10*3/MM3 (ref 3.4–10.8)

## 2024-03-05 PROCEDURE — 96374 THER/PROPH/DIAG INJ IV PUSH: CPT

## 2024-03-05 PROCEDURE — 83735 ASSAY OF MAGNESIUM: CPT | Performed by: INTERNAL MEDICINE

## 2024-03-05 PROCEDURE — 25810000003 SODIUM CHLORIDE 0.9 % SOLUTION: Performed by: INTERNAL MEDICINE

## 2024-03-05 PROCEDURE — 80053 COMPREHEN METABOLIC PANEL: CPT | Performed by: INTERNAL MEDICINE

## 2024-03-05 PROCEDURE — 99214 OFFICE O/P EST MOD 30 MIN: CPT | Performed by: NURSE PRACTITIONER

## 2024-03-05 PROCEDURE — 85025 COMPLETE CBC W/AUTO DIFF WBC: CPT | Performed by: INTERNAL MEDICINE

## 2024-03-05 PROCEDURE — 25010000002 ZOLEDRONIC ACID 4 MG/100ML SOLUTION: Performed by: INTERNAL MEDICINE

## 2024-03-05 PROCEDURE — 84100 ASSAY OF PHOSPHORUS: CPT | Performed by: INTERNAL MEDICINE

## 2024-03-05 PROCEDURE — 82565 ASSAY OF CREATININE: CPT

## 2024-03-05 RX ORDER — SODIUM CHLORIDE 9 MG/ML
250 INJECTION, SOLUTION INTRAVENOUS ONCE
Status: COMPLETED | OUTPATIENT
Start: 2024-03-05 | End: 2024-03-05

## 2024-03-05 RX ORDER — ZOLEDRONIC ACID 0.04 MG/ML
4 INJECTION, SOLUTION INTRAVENOUS ONCE
Qty: 100 ML | Refills: 0 | Status: COMPLETED | OUTPATIENT
Start: 2024-03-05 | End: 2024-03-05

## 2024-03-05 RX ORDER — ABEMACICLIB 150 MG/1
1 TABLET ORAL 2 TIMES DAILY
Qty: 56 TABLET | Refills: 11 | OUTPATIENT
Start: 2024-03-05

## 2024-03-05 RX ADMIN — SODIUM CHLORIDE 250 ML: 9 INJECTION, SOLUTION INTRAVENOUS at 14:33

## 2024-03-05 RX ADMIN — ZOLEDRONIC ACID 4 MG: 0.04 INJECTION, SOLUTION INTRAVENOUS at 14:33

## 2024-03-05 NOTE — ADDENDUM NOTE
Addended by: JAYME ELKINS on: 3/5/2024 12:05 PM     Modules accepted: Orders    
Addended by: JAYME ELKINS on: 3/5/2024 12:20 PM     Modules accepted: Orders    
2- weeks

## 2024-03-05 NOTE — PROGRESS NOTES
MEDICAL ONCOLOGY CANCER SURVIVORSHIP VISIT    Debra Beckman  [id number]  1970    Chief Complaint: Follow up breast cancer management and survivorship visit      History of present illness:  Debra Beckman is a 53 y.o. year old female who is here today for the Cancer Survivorship visit, see oncology history below. She continues on anastrozole and Verzenio and tolerating them well.  She has occasional nausea and diarrhea that is manageable.  She received her second dose of Zometa today.  She continues on Effexor 37.5 mg daily which controls her mood and hot flashes.  She continues dry needling for low back pain which helps along with physical therapy.  Denies cough, fever, or chills.        Cancer History:   Oncology/Hematology History   Malignant neoplasm of central portion of left breast in female, estrogen receptor positive   8/10/2022 Cancer Staged    Staging form: Breast, AJCC 8th Edition  - Clinical stage from 8/10/2022: Stage IA (cT1b, cN0, cM0, G1, ER+, MS+, HER2-) - Signed by Raman Dunn MD on 9/21/2022 9/9/2022 Genetic Testing    Genetic testing negative for germline mutations in BRCA1/2 and 34 additional genes evaluated on multigene panel.     9/16/2022 Cancer Staged    Staging form: Breast, AJCC 8th Edition  - Pathologic stage from 9/16/2022: Stage IA (pT1c, pN1a, cM0, G2, ER+, MS+, HER2-) - Signed by Raman Dunn MD on 9/21/2022 9/21/2022 Initial Diagnosis    Malignant neoplasm of central portion of left breast in female, estrogen receptor positive (HCC)     10/20/2022 - 2/3/2023 Chemotherapy    OP BREAST TC DOCEtaxel / Cyclophosphamide     12/6/2022 - 2/9/2023 Chemotherapy    OP SUPPORTIVE HYDRATION + ANTIEMETICS     2/21/2023 - 3/29/2023 Radiation    Radiation OncologyTreatment Course:  Debra Beckman received 4500 cGy in 25 fractions to left breast and left supraclavicular nodes via External Beam Radiation - EBRT.     5/11/2023 -  Chemotherapy    OP BREAST  Abemaciclib / Arimidex     2023 -  Chemotherapy    OP SUPPORTIVE Zoledronic Acid Q6M         Past Medical History:   Diagnosis Date    Allergic     Asthma     History of radiation therapy 2023    left breast/regional LNs    Low back pain     Malignant neoplasm of central portion of left breast in female, estrogen receptor positive 2022    Radiculopathy 2022       Past Surgical History:   Procedure Laterality Date    BREAST BIOPSY  08/10/2022    BREAST BIOPSY Left 2022    BREAST LUMPECTOMY      BREAST LUMPECTOMY WITH AXILLARY NODE DISSECTION Left 2022    clean dread    BREAST LUMPECTOMY WITH SENTINEL NODE BIOPSY Left 2022     SECTION      VENOUS ACCESS DEVICE (PORT) INSERTION Right 2022       MEDICATIONS: The current medication list was reviewed and reconciled.     Allergies:  has No Known Allergies.    Family History   Problem Relation Age of Onset    Hypertension Mother     No Known Problems Father     Breast cancer Maternal Aunt     Alcohol abuse Maternal Aunt     Anxiety disorder Maternal Aunt     Depression Maternal Aunt     Alcohol abuse Maternal Aunt     Anxiety disorder Maternal Aunt     Depression Maternal Aunt     Alcohol abuse Maternal Uncle     Depression Maternal Uncle     Drug abuse Maternal Uncle     Ovarian cancer Neg Hx          Review of Systems   Constitutional:  Negative for appetite change, fatigue, fever and unexpected weight change.   HENT:  Negative for mouth sores, sore throat and trouble swallowing.    Respiratory:  Negative for cough, shortness of breath and wheezing.    Cardiovascular:  Negative for chest pain, palpitations and leg swelling.   Gastrointestinal:  Positive for diarrhea and nausea. Negative for abdominal distention, abdominal pain, constipation and vomiting.   Genitourinary:  Negative for difficulty urinating, dysuria and frequency.   Musculoskeletal:  Negative for arthralgias.   Skin:  Negative for pallor, rash and wound.  "  Neurological:  Negative for dizziness and weakness.   Hematological:  Does not bruise/bleed easily.   Psychiatric/Behavioral:  Negative for confusion and sleep disturbance. The patient is not nervous/anxious.        Physical Exam  Vital Signs: /76 Comment: LUE  Pulse 60   Temp 97.1 °F (36.2 °C) (Infrared)   Resp 16   Ht 167.6 cm (66\")   Wt 56.7 kg (125 lb)   SpO2 98% Comment: RA  BMI 20.18 kg/m²    General Appearance:  alert, cooperative, no apparent distress and appears stated age   Neurologic/Psychiatri: A&O x 3, gait steady, appropriate affect   HEENT:  Normocephalic, without obvious abnormality, mucous membranes moist   Lungs:   Clear to auscultation bilaterally; respirations regular, even, and unlabored bilaterally   Heart:  Regular rate and rhythm, no murmurs appreciated   Abdomen:   Soft, non-tender, non-distended, and no organomegaly   Extremities: Normal, atraumatic; no clubbing, cyanosis, or edema      ECOG Performance Status: (0) Fully Active - Able to Carry On All Pre-disease Performance Without Restriction        Assessment and Plan:  1. Malignant neoplasm of central portion of left breast in female, estrogen receptor positive  2. High risk medication use  -She has an early stage hormone sensitive breast cancer with a single positive lymph node.  She has undergone curative intent surgery.  -Given strongly ER positive disease and N1, I recommended adjuvant TC over dose dense AC followed by weekly taxol and she completed 6 cycles  -She completed adjuvant radiation.   -Ki67 20%, initiated adjuvant Verzenio.  Labs are in an acceptable range to continue Verzenio including ANC 1.25 with otherwise normal blood counts; CMP normal.     2. Anxiety  3. Hot flashes  -Continue Effexor.       4. Bone health  -Ca/D recommended  -Received cycle #2 today, will repeat in 6 months.       5. GERD  -Continue PPI, now using PRN     6.  Low back pain  -MRI reassuring.  We discussed repeat imaging if she has any " worsening symptoms.  She continues with dry needling and physical therapy for now.     7.  Post radiation pneumonitis  -Her cough is now resolved.  -CT from October showed radiation pneumonitis  -Repeat CT chest on 4/30/2024    The patient and I have reviewed [facility] personal Survivorship Care Plan in detail. We discussed diagnosis, pathology, histology, all treatments, and ongoing surveillance recommendations. All questions were answered to her satisfaction. The patient is in agreement with our plan for ongoing surveillance as outlined in the plan. A copy of this document was provided at the completion of our visit.  A copy has also been sent to the patient's primary care provider.    Patient is scheduled for follow up with Dr. Cano on 5/28/2024.  She will notify us in the interim with any issues or concerns.      Total time of patient care on day of service including time prior to, face to face with patient, and following visit spent in reviewing records, lab results, imaging studies, discussion with patient, review survivorship care plan and documentation/charting was > 35 minutes.     Unique Ellison, APRN  03/05/2024

## 2024-03-05 NOTE — PROGRESS NOTES
Re: Refills of Oral Specialty Medication - Verzenio (abemaciclib)    Drug-Drug Interactions: The current medication list was reviewed and there are no relevant drug-drug interactions with the specialty medication.  Medication Allergies: The patient has NKDA.  Review of Labs/Dose Adjustments: NO DOSE CHANGE - I reviewed the most recent note and labs and the patient will continue without any dose changes.  I sent refills as described below.    Drug: Verzenio (abemaciclib)  Strength: 150 mg  Directions: Take 1 tablet by mouth twice a day  Quantity: 56  Refills: 11  Pharmacy prescription sent to: [facility]    Ann Cowden Mayer, PharmD, BCPS  Oncology Clinical Pharmacist  Phone [phone]    3/5/2024  09:29 EST

## 2024-04-12 ENCOUNTER — TREATMENT (OUTPATIENT)
Dept: PHYSICAL THERAPY | Facility: CLINIC | Age: 54
End: 2024-04-12
Payer: COMMERCIAL

## 2024-04-12 DIAGNOSIS — M54.50 CHRONIC LOW BACK PAIN, UNSPECIFIED BACK PAIN LATERALITY, UNSPECIFIED WHETHER SCIATICA PRESENT: Primary | ICD-10-CM

## 2024-04-12 DIAGNOSIS — G89.29 CHRONIC LOW BACK PAIN, UNSPECIFIED BACK PAIN LATERALITY, UNSPECIFIED WHETHER SCIATICA PRESENT: Primary | ICD-10-CM

## 2024-04-18 NOTE — PROGRESS NOTES
Physical Therapy Daily Treatment Note    [facility]   [address], [address] [place] [address]      Patient: Debra Beckman   : 1970  Referring practitioner: Lydia France PA-C  Date of Initial Visit: Type: THERAPY  Noted: 10/4/2023  Today's Date: 2024  Patient seen for 7 sessions    Certification Period 2024 thru 2024       Visit Diagnoses:    ICD-10-CM ICD-9-CM   1. Chronic low back pain, unspecified back pain laterality, unspecified whether sciatica present  M54.50 724.2    G89.29 338.29       Subjective     Pt states that she is feeling good overall and she feels that she has prolonged relief after dry needling sessions. She has had some mild-moderate return of symptoms, but she feels good about her ability to manage mild exacerbations.     Objective   See Exercise, Manual, and Modality Logs for complete treatment.       Assessment/Plan     Pt is progressing well and she continues to have a very positive response to dry needling treatments. Decreased hypertonicity noted in the lower lumbar paraspinals and in the right glut med and piriformis today. Will cont as indicated.       Timed:         Manual Therapy:         mins  11533;     Therapeutic Exercise:         mins  84730;     Neuromuscular Mera:        mins  12889;    Therapeutic Activity:          mins  41131;     Gait Training:           mins  14572;     Ultrasound:          mins  10326;    Ionto                                   mins   08476  Self Care                            mins   82305  Canalith Repos         mins 60488  Electrical Stimulation:         mins  83125    Un-Timed:  Electrical Stimulation:         mins  18997 ( );  Dry Needling     30     mins self-pay  Traction          mins 53787      Timed Treatment:      mins   Total Treatment:     30   mins    Robinson Cardona, PT, DPT, OCS, Cert. DN  [place] License: [id number]

## 2024-04-22 ENCOUNTER — TELEPHONE (OUTPATIENT)
Dept: ONCOLOGY | Facility: CLINIC | Age: 54
End: 2024-04-22

## 2024-04-22 NOTE — TELEPHONE ENCOUNTER
Caller: [facility] - [place], [place] - [address] - [phone]  - [phone]    Relationship: Pharmacy    Best call back number: [phone]      What was the call regarding: PHARMACY CALLED TO ADVISE THAT PATIENTS ABEMACICLIB MEDICATION NEEDS AN AUTH FROM INSURANCE

## 2024-04-26 ENCOUNTER — HOSPITAL ENCOUNTER (OUTPATIENT)
Dept: RADIATION ONCOLOGY | Facility: HOSPITAL | Age: 54
Setting detail: RADIATION/ONCOLOGY SERIES
Discharge: HOME | End: 2024-04-26
Payer: COMMERCIAL

## 2024-04-26 ENCOUNTER — OFFICE VISIT (OUTPATIENT)
Dept: RADIATION ONCOLOGY | Facility: HOSPITAL | Age: 54
End: 2024-04-26
Payer: COMMERCIAL

## 2024-04-26 VITALS
SYSTOLIC BLOOD PRESSURE: 125 MMHG | HEART RATE: 72 BPM | BODY MASS INDEX: 20.29 KG/M2 | WEIGHT: 125.7 LBS | DIASTOLIC BLOOD PRESSURE: 74 MMHG | OXYGEN SATURATION: 94 % | RESPIRATION RATE: 20 BRPM | TEMPERATURE: 97.2 F

## 2024-04-26 DIAGNOSIS — C50.112 MALIGNANT NEOPLASM OF CENTRAL PORTION OF LEFT BREAST IN FEMALE, ESTROGEN RECEPTOR POSITIVE: Primary | ICD-10-CM

## 2024-04-26 DIAGNOSIS — Z17.0 MALIGNANT NEOPLASM OF CENTRAL PORTION OF LEFT BREAST IN FEMALE, ESTROGEN RECEPTOR POSITIVE: Primary | ICD-10-CM

## 2024-04-26 PROCEDURE — G0463 HOSPITAL OUTPT CLINIC VISIT: HCPCS

## 2024-04-26 NOTE — PROGRESS NOTES
FOLLOW UP NOTE    PATIENT:                                                      Debra Beckman  MEDICAL RECORD #:                        [id number]  :                                                          1970  COMPLETION DATE:   3/29/2023  DIAGNOSIS:     Malignant neoplasm of central portion of left breast in female, estrogen receptor positive  Clinical- Stage IA (cT1b, cN0, cM0, G1, ER+, TX+, HER2-)  Pathologic- Stage IA (pT1c, pN1a, cM0, G2, ER+, TX+, HER2-)      BRIEF HISTORY:    She has a diagnosis of left central breast cancer, grade 2 infiltrating ductal, hormone receptor positive, HER2 negative with associated intermediate grade DCIS.    She is status post conservation lumpectomy.  She had a positive anterior margin involved by DCIS.  She had a single sentinel lymph node macrometastasis with a small 4 mm focus but with extracapsular karena extension.    She underwent reexcision of the lumpectomy cavity and biopsy of 1 additional axillary lymph node.  Final surgical margins were negative with no residual neoplasm identified.  A single left axillary lymph node was also negative for metastasis (total of 1/2 involved lymph nodes).  PET/CT showed low-level axillary and subpectoral lymph node activity most consistent with reactive change rather than other lymphatic metastasis.  Genetics testing was negative.  She underwent adjuvant chemotherapy of TC x6 cycles with Dr. Cano.  She then underwent adjuvant radiotherapy to the left breast and comprehensive regional lymphatics to a dose of 45 Gray in 25 fractions utilizing DIBH technique, completing 3/29/2023.  She tolerated treatment well.  She continues adjuvant anastrozole and does note some associated hot flashes.  She started adjuvant Verzenio on 2023 and reports some associated nausea but has adjusted well to her normal lifestyle and routine.  Overall, she feels well and denies additional acute concerns today.  She has chronic low back  pain.  MRI and bone scan show some degenerative changes and spondylolisthesis but no evidence of metastatic disease.  Radiographic imaging of the chest as shown stable left apical lung fibrotic process which is very likely postradiation change.  This is not symptomatic.  She has no associated dyspnea, cough, chest pain, pleurisy or sputum production.    MEDICATIONS: Medication reconciliation for the patient was reviewed and confirmed in the electronic medical record.    Review of Systems   Endocrine: Positive for hot flashes.   Musculoskeletal:  Positive for back pain.       Karnofsky score: 90     Physical Exam  Vitals and nursing note reviewed.   Constitutional:       Appearance: She is well-developed.   HENT:      Head: Normocephalic and atraumatic.   Cardiovascular:      Rate and Rhythm: Normal rate and regular rhythm.      Heart sounds: Normal heart sounds. No murmur heard.  Pulmonary:      Effort: Pulmonary effort is normal.      Breath sounds: Normal breath sounds. No wheezing or rales.   Chest:   Breasts:     Right: Normal.      Left: No swelling, mass or tenderness.   Abdominal:      General: Bowel sounds are normal. There is no distension.      Palpations: Abdomen is soft.      Tenderness: There is no abdominal tenderness.   Musculoskeletal:         General: No tenderness. Normal range of motion.      Cervical back: Normal range of motion and neck supple.   Lymphadenopathy:      Cervical: No cervical adenopathy.      Upper Body:      Right upper body: No supraclavicular adenopathy.      Left upper body: No supraclavicular adenopathy.   Skin:     General: Skin is warm and dry.      Comments: Well-healed skin over the left breast and axilla.   Neurological:      Mental Status: She is alert and oriented to person, place, and time.      Sensory: No sensory deficit.   Psychiatric:         Behavior: Behavior normal.         Thought Content: Thought content normal.         Judgment: Judgment normal.         VITAL  SIGNS:   Vitals:    04/26/24 0841   BP: 125/74   Pulse: 72   Resp: 20   Temp: 97.2 °F (36.2 °C)   TempSrc: Skin   SpO2: 94%   Weight: 57 kg (125 lb 11.2 oz)   PainSc:   2   PainLoc: Back             Karnofsky score: 90         The following portions of the patient's history were reviewed and updated as appropriate: allergies, current medications, past family history, past medical history, past social history, past surgical history and problem list.         Diagnoses and all orders for this visit:    1. 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  She continues maintenance hormonal therapy/Verzenio.  She has no evidence of disease recurrence or metastasis.  She is clinically doing extremely well.  She has stable postradiation left apical lung fibrotic change which is asymptomatic.    RECOMMENDATIONS: She plans to continue general oncology follow-up, laboratory surveillance and imaging surveillance under the care of Dr. Cano.  I will be happy to be available, if needed.    I spent a total of 15 minutes on todays visit, with more than 10 minutes in direct face to face communication, and the remainder of the time spent in reviewing the relevant history, records, available imaging, and for documentation.    Return if symptoms worsen or fail to improve.    Raman Dunn MD

## 2024-04-30 ENCOUNTER — HOSPITAL ENCOUNTER (OUTPATIENT)
Dept: CT IMAGING | Facility: HOSPITAL | Age: 54
Discharge: HOME | End: 2024-04-30
Admitting: INTERNAL MEDICINE
Payer: COMMERCIAL

## 2024-04-30 DIAGNOSIS — Z17.0 MALIGNANT NEOPLASM OF CENTRAL PORTION OF LEFT BREAST IN FEMALE, ESTROGEN RECEPTOR POSITIVE: ICD-10-CM

## 2024-04-30 DIAGNOSIS — C50.112 MALIGNANT NEOPLASM OF CENTRAL PORTION OF LEFT BREAST IN FEMALE, ESTROGEN RECEPTOR POSITIVE: ICD-10-CM

## 2024-04-30 PROCEDURE — 71260 CT THORAX DX C+: CPT

## 2024-04-30 PROCEDURE — 25510000001 IOPAMIDOL 61 % SOLUTION: Performed by: INTERNAL MEDICINE

## 2024-04-30 RX ADMIN — IOPAMIDOL 85 ML: 612 INJECTION, SOLUTION INTRAVENOUS at 14:05

## 2024-05-01 NOTE — PROGRESS NOTES
Please notify patient of stable results CT/no concerning findings. Will discuss further at her follow up visit

## 2024-05-02 ENCOUNTER — TELEPHONE (OUTPATIENT)
Dept: ONCOLOGY | Facility: CLINIC | Age: 54
End: 2024-05-02
Payer: COMMERCIAL

## 2024-05-02 NOTE — TELEPHONE ENCOUNTER
Notified patient per Dr. Cano regarding recent CT Chest: Please notify patient of stable results CT/no concerning findings. Will discuss further at her follow up visit. Patient verbalized understanding and was reminded of next follow up date and time.

## 2024-05-17 ENCOUNTER — TREATMENT (OUTPATIENT)
Dept: PHYSICAL THERAPY | Facility: CLINIC | Age: 54
End: 2024-05-17
Payer: COMMERCIAL

## 2024-05-17 DIAGNOSIS — M54.50 CHRONIC LOW BACK PAIN, UNSPECIFIED BACK PAIN LATERALITY, UNSPECIFIED WHETHER SCIATICA PRESENT: Primary | ICD-10-CM

## 2024-05-17 DIAGNOSIS — G89.29 CHRONIC LOW BACK PAIN, UNSPECIFIED BACK PAIN LATERALITY, UNSPECIFIED WHETHER SCIATICA PRESENT: Primary | ICD-10-CM

## 2024-05-17 PROCEDURE — 20561 NDL INSJ W/O NJX 3+ MUSC: CPT | Performed by: PHYSICAL THERAPIST

## 2024-05-23 NOTE — PROGRESS NOTES
Physical Therapy Daily Treatment Note    [facility]   [address] [facility], [address] [place] [address]      Patient: Debra Beckman   : 1970  Referring practitioner: Lydia France PA-C  Date of Initial Visit: Type: THERAPY  Noted: 10/4/2023  Today's Date: 2024  Patient seen for 8 sessions    Certification Period 2024 thru 2024       Visit Diagnoses:    ICD-10-CM ICD-9-CM   1. Chronic low back pain, unspecified back pain laterality, unspecified whether sciatica present  M54.50 724.2    G89.29 338.29       Subjective     Patient states that she feels like the needling has continued to be very helpful and controlling the symptom intensity in her low back.  She has had some exacerbation of symptoms recently but this is not as severe and patient has been able to decrease the intensity of her symptoms slightly with stretching and with self-correction techniques.    Objective   See Exercise, Manual, and Modality Logs for complete treatment.       Assessment/Plan     Patient responded well to dry needling treatment in the clinic today and she demonstrated improvement and overall mobility and a decrease and symptom intensity posttreatment.  Will continue to progress as indicated after assessment at the next visit. Debra Beckman will continue to benefit from skilled physical therapy services to address deficits and continue to work towards reaching functional goals.           Timed:         Manual Therapy:         mins  39787;     Therapeutic Exercise:         mins  92383;     Neuromuscular Mera:        mins  00309;    Therapeutic Activity:          mins  59654;     Gait Training:           mins  32552;     Ultrasound:          mins  60208;    Ionto                                   mins   92187  Self Care                            mins   27149  Canalith Repos         mins 87566  Electrical Stimulation:         mins  33953    Un-Timed:  Electrical Stimulation:         mins  75840  ( );  Dry Needling     30     mins self-pay  Traction          mins 34448      Timed Treatment:   0   mins   Total Treatment:     30   mins    Robinson Cardona PT, DPT, Cert. DN  [place] License: [id number]

## 2024-05-28 ENCOUNTER — LAB (OUTPATIENT)
Dept: LAB | Facility: HOSPITAL | Age: 54
End: 2024-05-28
Payer: COMMERCIAL

## 2024-05-28 ENCOUNTER — OFFICE VISIT (OUTPATIENT)
Dept: ONCOLOGY | Facility: CLINIC | Age: 54
End: 2024-05-28
Payer: COMMERCIAL

## 2024-05-28 VITALS
TEMPERATURE: 97.1 F | OXYGEN SATURATION: 96 % | HEART RATE: 60 BPM | WEIGHT: 127 LBS | DIASTOLIC BLOOD PRESSURE: 68 MMHG | SYSTOLIC BLOOD PRESSURE: 114 MMHG | BODY MASS INDEX: 20.41 KG/M2 | HEIGHT: 66 IN

## 2024-05-28 DIAGNOSIS — C50.112 MALIGNANT NEOPLASM OF CENTRAL PORTION OF LEFT BREAST IN FEMALE, ESTROGEN RECEPTOR POSITIVE: Primary | ICD-10-CM

## 2024-05-28 DIAGNOSIS — C50.112 MALIGNANT NEOPLASM OF CENTRAL PORTION OF LEFT BREAST IN FEMALE, ESTROGEN RECEPTOR POSITIVE: ICD-10-CM

## 2024-05-28 DIAGNOSIS — Z17.0 MALIGNANT NEOPLASM OF CENTRAL PORTION OF LEFT BREAST IN FEMALE, ESTROGEN RECEPTOR POSITIVE: Primary | ICD-10-CM

## 2024-05-28 DIAGNOSIS — Z17.0 MALIGNANT NEOPLASM OF CENTRAL PORTION OF LEFT BREAST IN FEMALE, ESTROGEN RECEPTOR POSITIVE: ICD-10-CM

## 2024-05-28 LAB
ALBUMIN SERPL-MCNC: 4.4 G/DL (ref 3.5–5.2)
ALBUMIN/GLOB SERPL: 1.8 G/DL
ALP SERPL-CCNC: 51 U/L (ref 39–117)
ALT SERPL W P-5'-P-CCNC: 20 U/L (ref 1–33)
ANION GAP SERPL CALCULATED.3IONS-SCNC: 7 MMOL/L (ref 5–15)
AST SERPL-CCNC: 25 U/L (ref 1–32)
BASOPHILS # BLD AUTO: 0.05 10*3/MM3 (ref 0–0.2)
BASOPHILS NFR BLD AUTO: 1.9 % (ref 0–1.5)
BILIRUB SERPL-MCNC: 0.4 MG/DL (ref 0–1.2)
BUN SERPL-MCNC: 13 MG/DL (ref 6–20)
BUN/CREAT SERPL: 18.6 (ref 7–25)
CALCIUM SPEC-SCNC: 9.4 MG/DL (ref 8.6–10.5)
CHLORIDE SERPL-SCNC: 106 MMOL/L (ref 98–107)
CO2 SERPL-SCNC: 30 MMOL/L (ref 22–29)
CREAT SERPL-MCNC: 0.7 MG/DL (ref 0.57–1)
DEPRECATED RDW RBC AUTO: 50.3 FL (ref 37–54)
EGFRCR SERPLBLD CKD-EPI 2021: 104.9 ML/MIN/1.73
EOSINOPHIL # BLD AUTO: 0.04 10*3/MM3 (ref 0–0.4)
EOSINOPHIL NFR BLD AUTO: 1.5 % (ref 0.3–6.2)
ERYTHROCYTE [DISTWIDTH] IN BLOOD BY AUTOMATED COUNT: 12.7 % (ref 12.3–15.4)
GLOBULIN UR ELPH-MCNC: 2.4 GM/DL
GLUCOSE SERPL-MCNC: 72 MG/DL (ref 65–99)
HCT VFR BLD AUTO: 37.3 % (ref 34–46.6)
HGB BLD-MCNC: 13 G/DL (ref 12–15.9)
IMM GRANULOCYTES # BLD AUTO: 0 10*3/MM3 (ref 0–0.05)
IMM GRANULOCYTES NFR BLD AUTO: 0 % (ref 0–0.5)
LYMPHOCYTES # BLD AUTO: 0.94 10*3/MM3 (ref 0.7–3.1)
LYMPHOCYTES NFR BLD AUTO: 35.3 % (ref 19.6–45.3)
MCH RBC QN AUTO: 36.8 PG (ref 26.6–33)
MCHC RBC AUTO-ENTMCNC: 34.9 G/DL (ref 31.5–35.7)
MCV RBC AUTO: 105.7 FL (ref 79–97)
MONOCYTES # BLD AUTO: 0.27 10*3/MM3 (ref 0.1–0.9)
MONOCYTES NFR BLD AUTO: 10.2 % (ref 5–12)
NEUTROPHILS NFR BLD AUTO: 1.36 10*3/MM3 (ref 1.7–7)
NEUTROPHILS NFR BLD AUTO: 51.1 % (ref 42.7–76)
PLATELET # BLD AUTO: 186 10*3/MM3 (ref 140–450)
PMV BLD AUTO: 8.6 FL (ref 6–12)
POTASSIUM SERPL-SCNC: 3.9 MMOL/L (ref 3.5–5.2)
PROT SERPL-MCNC: 6.8 G/DL (ref 6–8.5)
RBC # BLD AUTO: 3.53 10*6/MM3 (ref 3.77–5.28)
SODIUM SERPL-SCNC: 143 MMOL/L (ref 136–145)
WBC NRBC COR # BLD AUTO: 2.66 10*3/MM3 (ref 3.4–10.8)

## 2024-05-28 PROCEDURE — 36415 COLL VENOUS BLD VENIPUNCTURE: CPT

## 2024-05-28 PROCEDURE — 85025 COMPLETE CBC W/AUTO DIFF WBC: CPT

## 2024-05-28 PROCEDURE — 80053 COMPREHEN METABOLIC PANEL: CPT

## 2024-05-28 PROCEDURE — 82670 ASSAY OF TOTAL ESTRADIOL: CPT

## 2024-05-28 RX ORDER — ANASTROZOLE 1 MG/1
1 TABLET ORAL DAILY
Qty: 90 TABLET | Refills: 1 | Status: SHIPPED | OUTPATIENT
Start: 2024-05-28

## 2024-05-28 NOTE — PROGRESS NOTES
Hematology and Oncology [place]  Office number [phone]    Fax number [phone]     Follow up     Date: 24    Patient Name: Debra Beckman  MRN: [id number]  : 1970    Referring Physician: Dr. Goran Russell    Chief Complaint: follow up breast cancer    Cancer Staging: Stage IA (cT1b, cN0, cM0, G1, ER+, VA+, HER2-)    History of Present Illness: Debra Beckman is a pleasant 53 y.o. female who presents today for evaluation of left breast cancer.  She is accompanied by her supportive father who is a retired [employer] neurosurgeon, Dr. Beckman.    Screening mammogram 2022 demonstrated an asymmetry in the anterior left medial breast.  Diagnostic mammogram on 2022 demonstrated a persistent ill-defined asymmetry with architectural distortion in the left 9:00 periareolar region.  On ultrasound, this corresponded to an 8 mm mass in the 9:00 left breast.    Left breast 9:00 biopsy on 8/10/2022 demonstrated grade 1 invasive ductal carcinoma with associated DCIS (%, VA 60%, HER2/kathleen negative, 0+).    She proceeded with bilateral breast MRI on 8/15/2022.  This demonstrated a 1.4 cm 9:00 left breast enhancing mass, with a 1.3 cm area of clumped non-mass enhancement versus postbiopsy change 1 cm anterior lateral to this.  There is additionally a dominant focus of enhancement suspicious for a satellite lesion spanning 0.4 cm.  She underwent second look ultrasound and biopsy of the second lesion which corresponded to a subcentimeteric mass on ultrasound.    Left breast 9:00 biopsy on 2022 showed grade 2 invasive ductal carcinoma (ER greater than 90%, VA greater than 80%, HER2/kathleen negative, 0+)    Left breast lumpectomy, sentinel lymph node biopsy on 2022 demonstrated grade 2 invasive ductal carcinoma spanning 1.4 cm with associated DCIS.  Margin positive.  [place] lymph node positive () with a macrometastasis spanning 4 mm with extracapsular extension. She underwent  re-excision 9/30/22 with fat necrosis and no residual invasive or in situ carcinoma. Single left axillary LN excised and benign (total LN count 1/2).    Breast cancer risk profile:  G3, P2; Age of first live birth 37  Premenopausal, LMP was August 2022.  She discontinued OCPs at the time of her cancer diagnosis.  Family history of breast, ovarian, prostate or pancreatic cancer: Maternal aunt with breast cancer.  Genetics: [facility] cancer next panel was negative in 2022.    Treatment history:  TC x 6 cycles  Adjuvant radiation completed 3/27/23    Arimidex, planning 4/12/2023  Verzinio, C1 4/27/23  Zometa, C1 9/5/23    Interval history:  Here for follow up on Verzenio and Arimidex accompanied by her supportive dad.  RLS wakes her from sleep. She continues to have low back pain that has escalated now radiating to lateral thigh and with numbness in her toes on the right. Fell walking the dogs but this was due to tripping and has not noted any weakness. She has been driving more for work so she and her dad think the increased pain/radiation is secondary to exacerbation of her underlying DJD.She does think this is in the same location as at the time of her bone scan CT and MRI and does not note new areas of pain.   She is doing well and not needing antiemetics or antidiarrheals.  GERD remains controlled off PPI.    Hot flashes and mood are well-controlled on Effexor. Vaginal dryness and pain with intercourse despite [place], utilizing 2 x per week but sometimes less.     Past Medical History:   Past Medical History:   Diagnosis Date    Allergic     Asthma     History of radiation therapy 03/27/2023    left breast/regional LNs    Low back pain     Malignant neoplasm of central portion of left breast in female, estrogen receptor positive 09/21/2022    Radiculopathy 02/23/2022       Past Surgical History:   Past Surgical History:   Procedure Laterality Date    BREAST BIOPSY  08/10/2022    BREAST BIOPSY Left 08/26/2022    BREAST  LUMPECTOMY      BREAST LUMPECTOMY WITH AXILLARY NODE DISSECTION Left 2022    clean dread    BREAST LUMPECTOMY WITH SENTINEL NODE BIOPSY Left 2022     SECTION      VENOUS ACCESS DEVICE (PORT) INSERTION Right 2022       Family History:   Family History   Problem Relation Age of Onset    Hypertension Mother     No Known Problems Father     Breast cancer Maternal Aunt     Alcohol abuse Maternal Aunt     Anxiety disorder Maternal Aunt     Depression Maternal Aunt     Alcohol abuse Maternal Aunt     Anxiety disorder Maternal Aunt     Depression Maternal Aunt     Alcohol abuse Maternal Uncle     Depression Maternal Uncle     Drug abuse Maternal Uncle     Ovarian cancer Neg Hx        Social History:   Social History     Socioeconomic History    Marital status:    Tobacco Use    Smoking status: Former    Smokeless tobacco: Never   Vaping Use    Vaping status: Former   Substance and Sexual Activity    Alcohol use: Yes     Comment: occasional    Drug use: No    Sexual activity: Not Currently   Works as a .      Medications:     Current Outpatient Medications:     Abemaciclib 150 MG tablet, Take 1 tablet by mouth 2 (Two) Times a Day., Disp: 56 tablet, Rfl: 11    albuterol sulfate  (90 Base) MCG/ACT inhaler, Inhale 2 puffs Every 4 (Four) Hours As Needed for Wheezing., Disp: 18 g, Rfl: 11    anastrozole (ARIMIDEX) 1 MG tablet, Take 1 tablet by mouth Daily., Disp: 90 tablet, Rfl: 1    budesonide-formoterol (Symbicort) 160-4.5 MCG/ACT inhaler, Inhale 2 puffs 2 (Two) Times a Day., Disp: 10.2 g, Rfl: 2    ibuprofen (ADVIL,MOTRIN) 600 MG tablet, Take 1 tablet by mouth Every 8 (Eight) Hours As Needed for Mild Pain. Take with food., Disp: 30 tablet, Rfl: 1    methocarbamol (ROBAXIN) 500 MG tablet, Take 1 tablet by mouth 4 (Four) Times a Day., Disp: 40 tablet, Rfl: 1    pregabalin (Lyrica) 50 MG capsule, Take 1 capsule by mouth 2 (Two) Times a Day., Disp: 60 capsule, Rfl: 2     "prochlorperazine (COMPAZINE) 5 MG tablet, Take 1 tablet by mouth Every 8 (Eight) Hours As Needed for Nausea or Vomiting., Disp: 90 tablet, Rfl: 2    TiZANidine (Zanaflex) 2 MG capsule, Take 1 capsule by mouth 3 (Three) Times a Day As Needed for Muscle Spasms., Disp: 40 capsule, Rfl: 1    traMADol (ULTRAM) 50 MG tablet, Take 1 tablet by mouth Every 6 (Six) Hours As Needed for Severe Pain. May take 1-2 Q6 for severe pain, Disp: 40 tablet, Rfl: 1    venlafaxine XR (EFFEXOR-XR) 37.5 MG 24 hr capsule, Take 1 capsule by mouth Daily., Disp: 90 capsule, Rfl: 1    vitamin B-12 (CYANOCOBALAMIN) 1000 MCG tablet, Take 1 tablet by mouth Daily., Disp: , Rfl:     vitamin B-6 (PYRIDOXINE) 50 MG tablet, Take 1 tablet by mouth Daily., Disp: , Rfl:     Vitamin D, Cholecalciferol, (CHOLECALCIFEROL) 10 MCG (400 UNIT) tablet, Take 1 tablet by mouth Daily., Disp: , Rfl:     Allergies:   No Known Allergies    Objective     Vital Signs:   Vitals:    05/28/24 1432   BP: 114/68   Pulse: 60   Temp: 97.1 °F (36.2 °C)   TempSrc: Infrared   SpO2: 96%   Weight: 57.6 kg (127 lb)   Height: 167.6 cm (65.98\")   PainSc: 0-No pain    Body mass index is 20.51 kg/m².   Pain Score    05/28/24 1432   PainSc: 0-No pain       ECOG Performance Status: 0    Physical Exam:  General: No acute distress. Well appearing   HEENT: Normocephalic, atraumatic. Sclera anicteric.   Neck: supple, no adenopathy.   Cardiovascular: regular rate and rhythm. No murmurs.   Respiratory: Normal rate. Clear to auscultation bilaterally  Abdomen: Soft, nontender, non distended with normoactive bowel sounds  Lymph: no cervical, supraclavicular or axillary adenopathy  Neuro: Alert and oriented x 3. No focal deficits.   Ext: Symmetric, no swelling.   Breast: No palpable masses bilaterally    Laboratory/Imaging Reviewed:   No visits with results within 2 Week(s) from this visit.   Latest known visit with results is:   Hospital Outpatient Visit on 03/05/2024   Component Date Value Ref " Range Status    Glucose 03/05/2024 92  65 - 99 mg/dL Final    BUN 03/05/2024 13  6 - 20 mg/dL Final    Creatinine 03/05/2024 0.69  0.57 - 1.00 mg/dL Final    Sodium 03/05/2024 137  136 - 145 mmol/L Final    Potassium 03/05/2024 3.8  3.5 - 5.2 mmol/L Final    Slight hemolysis detected by analyzer. Result may be falsely elevated.    Chloride 03/05/2024 104  98 - 107 mmol/L Final    CO2 03/05/2024 25.0  22.0 - 29.0 mmol/L Final    Calcium 03/05/2024 8.9  8.6 - 10.5 mg/dL Final    Total Protein 03/05/2024 6.4  6.0 - 8.5 g/dL Final    Albumin 03/05/2024 4.0  3.5 - 5.2 g/dL Final    ALT (SGPT) 03/05/2024 16  1 - 33 U/L Final    AST (SGOT) 03/05/2024 20  1 - 32 U/L Final    Alkaline Phosphatase 03/05/2024 53  39 - 117 U/L Final    Total Bilirubin 03/05/2024 0.3  0.0 - 1.2 mg/dL Final    Globulin 03/05/2024 2.4  gm/dL Final    Calculated Result    A/G Ratio 03/05/2024 1.7  g/dL Final    BUN/Creatinine Ratio 03/05/2024 18.8  7.0 - 25.0 Final    Anion Gap 03/05/2024 8.0  5.0 - 15.0 mmol/L Final    eGFR 03/05/2024 105.2  >60.0 mL/min/1.73 Final    Magnesium 03/05/2024 2.4  1.6 - 2.6 mg/dL Final    Phosphorus 03/05/2024 3.6  2.5 - 4.5 mg/dL Final    WBC 03/05/2024 2.39 (L)  3.40 - 10.80 10*3/mm3 Final    RBC 03/05/2024 3.34 (L)  3.77 - 5.28 10*6/mm3 Final    Hemoglobin 03/05/2024 12.5  12.0 - 15.9 g/dL Final    Hematocrit 03/05/2024 36.6  34.0 - 46.6 % Final    MCV 03/05/2024 109.6 (H)  79.0 - 97.0 fL Final    MCH 03/05/2024 37.4 (H)  26.6 - 33.0 pg Final    MCHC 03/05/2024 34.2  31.5 - 35.7 g/dL Final    RDW 03/05/2024 12.9  12.3 - 15.4 % Final    RDW-SD 03/05/2024 52.9  37.0 - 54.0 fl Final    MPV 03/05/2024 8.4  6.0 - 12.0 fL Final    Platelets 03/05/2024 194  140 - 450 10*3/mm3 Final    Neutrophil % 03/05/2024 52.3  42.7 - 76.0 % Final    Lymphocyte % 03/05/2024 36.4  19.6 - 45.3 % Final    Monocyte % 03/05/2024 8.4  5.0 - 12.0 % Final    Eosinophil % 03/05/2024 0.8  0.3 - 6.2 % Final    Basophil % 03/05/2024 2.1 (H)  0.0 -  1.5 % Final    Immature Grans % 03/05/2024 0.0  0.0 - 0.5 % Final    Neutrophils, Absolute 03/05/2024 1.25 (L)  1.70 - 7.00 10*3/mm3 Final    Lymphocytes, Absolute 03/05/2024 0.87  0.70 - 3.10 10*3/mm3 Final    Monocytes, Absolute 03/05/2024 0.20  0.10 - 0.90 10*3/mm3 Final    Eosinophils, Absolute 03/05/2024 0.02  0.00 - 0.40 10*3/mm3 Final    Basophils, Absolute 03/05/2024 0.05  0.00 - 0.20 10*3/mm3 Final    Immature Grans, Absolute 03/05/2024 0.00  0.00 - 0.05 10*3/mm3 Final    Creatinine 03/05/2024 0.70  0.60 - 1.30 mg/dL Final    Serial Number: [id number]:  [id number]     No visits with results within 2 Week(s) from this visit.   Latest known visit with results is:   Hospital Outpatient Visit on 03/05/2024   Component Date Value Ref Range Status    Glucose 03/05/2024 92  65 - 99 mg/dL Final    BUN 03/05/2024 13  6 - 20 mg/dL Final    Creatinine 03/05/2024 0.69  0.57 - 1.00 mg/dL Final    Sodium 03/05/2024 137  136 - 145 mmol/L Final    Potassium 03/05/2024 3.8  3.5 - 5.2 mmol/L Final    Slight hemolysis detected by analyzer. Result may be falsely elevated.    Chloride 03/05/2024 104  98 - 107 mmol/L Final    CO2 03/05/2024 25.0  22.0 - 29.0 mmol/L Final    Calcium 03/05/2024 8.9  8.6 - 10.5 mg/dL Final    Total Protein 03/05/2024 6.4  6.0 - 8.5 g/dL Final    Albumin 03/05/2024 4.0  3.5 - 5.2 g/dL Final    ALT (SGPT) 03/05/2024 16  1 - 33 U/L Final    AST (SGOT) 03/05/2024 20  1 - 32 U/L Final    Alkaline Phosphatase 03/05/2024 53  39 - 117 U/L Final    Total Bilirubin 03/05/2024 0.3  0.0 - 1.2 mg/dL Final    Globulin 03/05/2024 2.4  gm/dL Final    Calculated Result    A/G Ratio 03/05/2024 1.7  g/dL Final    BUN/Creatinine Ratio 03/05/2024 18.8  7.0 - 25.0 Final    Anion Gap 03/05/2024 8.0  5.0 - 15.0 mmol/L Final    eGFR 03/05/2024 105.2  >60.0 mL/min/1.73 Final    Magnesium 03/05/2024 2.4  1.6 - 2.6 mg/dL Final    Phosphorus 03/05/2024 3.6  2.5 - 4.5 mg/dL Final    WBC 03/05/2024 2.39 (L)  3.40 - 10.80  10*3/mm3 Final    RBC 03/05/2024 3.34 (L)  3.77 - 5.28 10*6/mm3 Final    Hemoglobin 03/05/2024 12.5  12.0 - 15.9 g/dL Final    Hematocrit 03/05/2024 36.6  34.0 - 46.6 % Final    MCV 03/05/2024 109.6 (H)  79.0 - 97.0 fL Final    MCH 03/05/2024 37.4 (H)  26.6 - 33.0 pg Final    MCHC 03/05/2024 34.2  31.5 - 35.7 g/dL Final    RDW 03/05/2024 12.9  12.3 - 15.4 % Final    RDW-SD 03/05/2024 52.9  37.0 - 54.0 fl Final    MPV 03/05/2024 8.4  6.0 - 12.0 fL Final    Platelets 03/05/2024 194  140 - 450 10*3/mm3 Final    Neutrophil % 03/05/2024 52.3  42.7 - 76.0 % Final    Lymphocyte % 03/05/2024 36.4  19.6 - 45.3 % Final    Monocyte % 03/05/2024 8.4  5.0 - 12.0 % Final    Eosinophil % 03/05/2024 0.8  0.3 - 6.2 % Final    Basophil % 03/05/2024 2.1 (H)  0.0 - 1.5 % Final    Immature Grans % 03/05/2024 0.0  0.0 - 0.5 % Final    Neutrophils, Absolute 03/05/2024 1.25 (L)  1.70 - 7.00 10*3/mm3 Final    Lymphocytes, Absolute 03/05/2024 0.87  0.70 - 3.10 10*3/mm3 Final    Monocytes, Absolute 03/05/2024 0.20  0.10 - 0.90 10*3/mm3 Final    Eosinophils, Absolute 03/05/2024 0.02  0.00 - 0.40 10*3/mm3 Final    Basophils, Absolute 03/05/2024 0.05  0.00 - 0.20 10*3/mm3 Final    Immature Grans, Absolute 03/05/2024 0.00  0.00 - 0.05 10*3/mm3 Final    Creatinine 03/05/2024 0.70  0.60 - 1.30 mg/dL Final    Serial Number: [id number]:  [id number]     CT Chest With Contrast Diagnostic    Result Date: 5/1/2024  Narrative: CT CHEST W CONTRAST DIAGNOSTIC Date of Exam: 4/30/2024 1:55 PM EDT Indication: pneumonitis. Comparison: CT chest 10/3/2020 Technique: Axial CT images were obtained of the chest after the uneventful intravenous administration of 85 mL Isovue 300.  Reconstructed coronal and sagittal images were also obtained. Automated exposure control and iterative construction methods were used. Findings: MEDIASTINUM: Unremarkable. Aortic and heart size are normal. No mass nor pericardial effusion. CORONARY ARTERIES: No significant calcified  atherosclerotic disease. LUNGS: There is a similar appearance of changes in the left upper lobe with some architectural distortion and volume loss along with peripheral consolidation near the left apex. Additionally, more linear or bandlike left upper lobe opacity extending to the area of consolidation is unchanged. However, the previously seen groundglass opacities have improved. Punctate calcified nodule in the left lower lobe. No new suspicious nodules. PLEURAL SPACE: No effusion, mass, nor pneumothorax. LYMPH NODES: There are no pathologically enlarged lymph nodes. Small calcified left hilar lymph nodes. UPPER ABDOMEN: Small benign-appearing cyst in the upper pole of the right kidney. OSSEOUS STRUCTURES: Appropriate for age with no acute process identified.     Impression: Impression: 1. Changes in the left upper lobe including architectural distortion, volume loss and peripheral consolidation along with more linear or bandlike opacity are stable and are suggestive of treatment related change, though continued follow-up is recommended. The previously seen left upper lobe groundglass opacities have improved. 2. No new suspicious nodules are seen. Electronically Signed: Francisca Griffith MD  5/1/2024 11:20 AM EDT  Workstation ID: [id number]      Assessment / Plan        1. Malignant neoplasm of central portion of left breast in female, estrogen receptor positive  2. High risk medication use  -She has an early stage hormone sensitive breast cancer with a single positive lymph node.  She has undergone curative intent surgery.  -Given strongly ER positive disease and N1, I recommended adjuvant TC over dose dense AC followed by weekly taxol and she completed 6 cycles  -She completed adjuvant radiation.   -Ki67 20%, initiated adjuvant Verzenio.  Labs are in an acceptable range to continue Verzenio including Cbc/CMP/estradiol  -Follow up in 3 mo with repeat labs unless worsening interval symptoms      2. Anxiety  3. Hot  flashes  -Continue Effexor.       4. Bone health  -Ca/D recommended  -We discussed ASCO guidelines regarding adjuvant bisphosphonate use in early breast cancer. We reviewed the schedule and side effects of zometa including but not limited to bone pain, renal dysfunction, osteonecrosis and allergic reaction. Discussed the importance of routine dental care and need to avoid invasive dental procedures/notify dentist of medication use. Informed consent was obtained, and the patient elected to proceed pending dental evaluation.We reviewed the schedule and side effects including but not limited to bone pain, renal dysfunction, osteonecrosis and allergic reaction. Discussed the importance of routine dental care and need to avoid invasive dental procedures/notify dentist of medication use. Informed consent was obtained, and the patient elected to proceed.   -Tolerated cycle 1, next due 3/2024    5. Vaginal dryness  -Recommend consistent [place] use and use replens in between  -Consider pelvic floor PT  -She will follow up with GYN this month    6.  Low back pain  -MRI reassuring.  I would consider repeat imaging if worsening. She thinks current exacerbation of chronic DJD/radicular symptoms are due to riding in the car longer hours. Low threshold to re-image if further progression or new areas of pain    7.  Post radiation pneumonitis  -Her cough is now resolved.  -CT from October showed radiation pneumonitis  -Repeat chest CT stable.    Follow Up:   3 mo    Rama Cano MD  Hematology and Oncology

## 2024-05-29 LAB — ESTRADIOL SERPL HS-MCNC: <5 PG/ML

## 2024-06-03 ENCOUNTER — SPECIALTY PHARMACY (OUTPATIENT)
Facility: HOSPITAL | Age: 54
End: 2024-06-03
Payer: COMMERCIAL

## 2024-06-14 ENCOUNTER — TREATMENT (OUTPATIENT)
Dept: PHYSICAL THERAPY | Facility: CLINIC | Age: 54
End: 2024-06-14
Payer: COMMERCIAL

## 2024-06-14 DIAGNOSIS — M54.50 CHRONIC LOW BACK PAIN, UNSPECIFIED BACK PAIN LATERALITY, UNSPECIFIED WHETHER SCIATICA PRESENT: Primary | ICD-10-CM

## 2024-06-14 DIAGNOSIS — G89.29 CHRONIC LOW BACK PAIN, UNSPECIFIED BACK PAIN LATERALITY, UNSPECIFIED WHETHER SCIATICA PRESENT: Primary | ICD-10-CM

## 2024-06-19 NOTE — PROGRESS NOTES
Physical Therapy Daily Treatment Note    [facility]   [address], [address] [place] [address]      Patient: Debra Beckman   : 1970  Referring practitioner: Lydia France PA-C  Date of Initial Visit: Type: THERAPY  Noted: 10/4/2023  Today's Date: 2024  Patient seen for 9 sessions    Certification Period 2024 thru 9/15/2024       Visit Diagnoses:    ICD-10-CM ICD-9-CM   1. Chronic low back pain, unspecified back pain laterality, unspecified whether sciatica present  M54.50 724.2    G89.29 338.29       Subjective     Patient states that her low back pain is continue to be well managed with the dry needling and with performance of exercises and stretching at home.  She has noticed some onset of numbness and tingling into the feet recently with more intense symptoms into the right foot although it is bilateral most the time.  She is unsure if this is related to her low back/SI joint issues    Objective   See Exercise, Manual, and Modality Logs for complete treatment.       Assessment/Plan     Assessed change in symptoms into the lower extremities with stretching of the posterior hip musculature and with extension biased positioning.  No change was noted in the patient's lower extremity symptoms, possibly indicating a nondisc/soft tissue related pathology.  Patient responded well to dry needling treatment today and we will assess at the next visit and progress as indicated. Debra Beckman will continue to benefit from skilled physical therapy services to address deficits and continue to work towards reaching functional goals.           Timed:         Manual Therapy:         mins  32106;     Therapeutic Exercise:         mins  91677;     Neuromuscular Mera:        mins  87661;    Therapeutic Activity:          mins  39559;     Gait Training:           mins  37243;     Ultrasound:          mins  26984;    [place]onto                                   mins   53774  Self Care                             mins   24983  [facility]         mins 31216  Electrical Stimulation:         mins  73941    Un-Timed:  Electrical Stimulation:         mins  38692 ( );  Dry Needling     30     mins self-pay  Traction          mins [id number]      Timed Treatment:   0   mins   Total Treatment:     30   mins    Robinson Cardona PT, DPT, Cert. DN  [place] License: [id number]

## 2024-07-08 ENCOUNTER — HOSPITAL ENCOUNTER (OUTPATIENT)
Dept: MAMMOGRAPHY | Facility: HOSPITAL | Age: 54
Discharge: HOME | End: 2024-07-08
Admitting: RADIOLOGY
Payer: COMMERCIAL

## 2024-07-08 DIAGNOSIS — R92.8 ABNORMAL MAMMOGRAM: ICD-10-CM

## 2024-07-08 PROCEDURE — 77061 BREAST TOMOSYNTHESIS UNI: CPT | Performed by: RADIOLOGY

## 2024-07-08 PROCEDURE — 77065 DX MAMMO INCL CAD UNI: CPT

## 2024-07-08 PROCEDURE — 77065 DX MAMMO INCL CAD UNI: CPT | Performed by: RADIOLOGY

## 2024-07-19 ENCOUNTER — TREATMENT (OUTPATIENT)
Dept: PHYSICAL THERAPY | Facility: CLINIC | Age: 54
End: 2024-07-19
Payer: COMMERCIAL

## 2024-07-19 DIAGNOSIS — M54.50 CHRONIC LOW BACK PAIN, UNSPECIFIED BACK PAIN LATERALITY, UNSPECIFIED WHETHER SCIATICA PRESENT: Primary | ICD-10-CM

## 2024-07-19 DIAGNOSIS — G89.29 CHRONIC LOW BACK PAIN, UNSPECIFIED BACK PAIN LATERALITY, UNSPECIFIED WHETHER SCIATICA PRESENT: Primary | ICD-10-CM

## 2024-07-25 NOTE — PROGRESS NOTES
Physical Therapy Daily Treatment Note    [facility]   [address], [address] [place] [address]      Patient: Debra Beckman   : 1970  Referring practitioner: No ref. provider found  Date of Initial Visit: Type: THERAPY  Noted: 10/4/2023  Today's Date: 2024  Patient seen for 10 sessions    Certification Period 2024 thru 10/22/2024       Visit Diagnoses:    ICD-10-CM ICD-9-CM   1. Chronic low back pain, unspecified back pain laterality, unspecified whether sciatica present  M54.50 724.2    G89.29 338.29       Subjective     Patient states that she continues to feel improvement after dry needling sessions and feels that the needling helps to maintain her symptoms for about 3 to 4 weeks of time.  She continues to have issues with some tingling in the toes, and this has been unchanged with performance of extension-based exercises and has not been improved with dry needling.    Objective   See Exercise, Manual, and Modality Logs for complete treatment.       Assessment/Plan     Patient tolerates treatment well and she did not have any excessive discomfort with performance of dry needling in the clinic today.  Will continue to proceed as indicated and will assess her overall response at the next visit.    Debra Beckman will continue to benefit from skilled physical therapy services to address deficits and continue to work towards reaching functional goals.           Timed:         Manual Therapy:         mins  52866;     Therapeutic Exercise:         mins  48145;     Neuromuscular Mera:        mins  62538;    Therapeutic Activity:          mins  64854;     Gait Training:           mins  08536;     Ultrasound:         mins  41687;    Ionto                                   mins   32005  Self Care                            mins   63778  [place]         mins 38150  Electrical Stimulation:         mins  82836    Un-Timed:  Electrical Stimulation:         mins  45058 (  );  Dry Needling     30     mins self-pay  Traction          mins 41706      Timed Treatment:   0   mins   Total Treatment:     30   mins    Robinson Cardona PT, DPT, Cert. DN  [place] License: [id number]

## 2024-07-31 RX ORDER — VENLAFAXINE HYDROCHLORIDE 37.5 MG/1
37.5 CAPSULE, EXTENDED RELEASE ORAL DAILY
Qty: 90 CAPSULE | Refills: 1 | Status: SHIPPED | OUTPATIENT
Start: 2024-07-31

## 2024-07-31 NOTE — TELEPHONE ENCOUNTER
UPCOMING APPTS  With Oncology (Rama Cano MD)  08/28/2024 at 1:15 PM  LAST OFFICE VISIT - THIS DEPT  5/28/2024 Rama Cano MD  Last refill 1/30/24 90 tablets 1 refill

## 2024-08-05 DIAGNOSIS — M54.50 CHRONIC BILATERAL LOW BACK PAIN WITHOUT SCIATICA: ICD-10-CM

## 2024-08-05 DIAGNOSIS — G89.29 CHRONIC BILATERAL LOW BACK PAIN WITHOUT SCIATICA: ICD-10-CM

## 2024-08-05 DIAGNOSIS — M51.36 DDD (DEGENERATIVE DISC DISEASE), LUMBAR: ICD-10-CM

## 2024-08-06 RX ORDER — PREGABALIN 50 MG/1
50 CAPSULE ORAL 2 TIMES DAILY
Qty: 60 CAPSULE | Refills: 3 | Status: SHIPPED | OUTPATIENT
Start: 2024-08-06

## 2024-08-06 NOTE — TELEPHONE ENCOUNTER
Provider:  lucina Thomas  Surgery/Procedure:    Surgery/Procedure Date:    Last visit:   Office Visit with Lydia France PA-C (08/16/2023)   Next visit: No future appts    Chronic low back pain without sciatica, DDD       Reason for call:   Requested Prescriptions     Pending Prescriptions Disp Refills    pregabalin (LYRICA) 50 MG capsule [Pharmacy Med Name: PREGABALIN 50MG CAPSULES] 60 capsule      Sig: TAKE 1 CAPSULE BY MOUTH TWICE DAILY     06/12/2024 [id number] Pregabalin 50MG [place], Lydia [employer] 50.00 30 04 [place]  Refill 02/05/2024 [facility] [place]  1 06/10/2024 [id number] Pregabalin 50MG Croucher, Lydia [employer] 10.00 5 04 [place]  Refill 02/05/2024 [facility] [place]  1 04/03/2024 [id number] Pregabalin 50MG Croucher, Lydia [employer] 60.00 30 04 [place]  New 02/05/2024 [facility] [place]  1 02/05/2024 [id number] Pregabalin 50MG Croucher, Lydia [employer] 50.00 25 04 [place]  New 09/21/2023 [facility] [place]  1 02/01/2024 [id number] Pregabalin 50MG Cr[place]cher, Lydia [employer] 10.00 5

## 2024-08-15 ENCOUNTER — TREATMENT (OUTPATIENT)
Dept: PHYSICAL THERAPY | Facility: CLINIC | Age: 54
End: 2024-08-15
Payer: COMMERCIAL

## 2024-08-15 ENCOUNTER — TELEPHONE (OUTPATIENT)
Dept: PHYSICAL THERAPY | Facility: CLINIC | Age: 54
End: 2024-08-15

## 2024-08-15 DIAGNOSIS — G89.29 CHRONIC LOW BACK PAIN, UNSPECIFIED BACK PAIN LATERALITY, UNSPECIFIED WHETHER SCIATICA PRESENT: Primary | ICD-10-CM

## 2024-08-15 DIAGNOSIS — M54.50 CHRONIC LOW BACK PAIN, UNSPECIFIED BACK PAIN LATERALITY, UNSPECIFIED WHETHER SCIATICA PRESENT: Primary | ICD-10-CM

## 2024-08-16 ENCOUNTER — TRANSCRIBE ORDERS (OUTPATIENT)
Dept: ADMINISTRATIVE | Facility: HOSPITAL | Age: 54
End: 2024-08-16
Payer: COMMERCIAL

## 2024-08-16 DIAGNOSIS — R92.8 ABNORMAL MAMMOGRAM: Primary | ICD-10-CM

## 2024-08-23 NOTE — PROGRESS NOTES
Physical Therapy Daily Treatment Note    [facility]   [address], [address] [place] [address]      Patient: Debra Beckman   : 1970  Referring practitioner: No ref. provider found  Date of Initial Visit: Type: THERAPY  Noted: 10/4/2023  Today's Date: 2024  Patient seen for 11 sessions    Certification Period 2024 thru 2024       Visit Diagnoses:    ICD-10-CM ICD-9-CM   1. Chronic low back pain, unspecified back pain laterality, unspecified whether sciatica present  M54.50 724.2    G89.29 338.29       Subjective     Patient states that she has been having less pain recently and she feels that the dry needling is still very beneficial.  She continues to have numbness and tingling into the left lower extremity and is planning on consulting with her oncologist about this, to see if it is related to previous chemotherapy treatments.    Objective   See Exercise, Manual, and Modality Logs for complete treatment.       Assessment/Plan     Patient continues respond very well to dry needling treatments and she did not have any adverse reaction to the treatment in the clinic today.  Will continue to progress as indicated.    Debra Beckman will continue to benefit from skilled physical therapy services to address deficits and continue to work towards reaching functional goals.           Timed:         Manual Therapy:         mins  24930;     Therapeutic Exercise:         mins  05687;     Neuromuscular Mera:        mins  45970;    Therapeutic Activity:          mins  44062;     Gait Training:           mins  73866;     Ultrasound:          mins  55063;    Ionto                                   mins   23777  Self Care                            mins   34846  Canalith Repos         mins 58984  Electrical Stimulation:         mins  68152    Un-Timed:  Electrical Stimulation:         mins  45932 ( );  Dry Needling     30     mins self-pay  Traction          mins  36232      Timed Treatment:   0   mins   Total Treatment:     30   mins    Robinson Cardona PT, DPT, Cert. DN  [place] License: [id number]

## 2024-08-28 ENCOUNTER — OFFICE VISIT (OUTPATIENT)
Dept: ONCOLOGY | Facility: CLINIC | Age: 54
End: 2024-08-28
Payer: COMMERCIAL

## 2024-08-28 ENCOUNTER — LAB (OUTPATIENT)
Dept: LAB | Facility: HOSPITAL | Age: 54
End: 2024-08-28
Payer: COMMERCIAL

## 2024-08-28 VITALS
BODY MASS INDEX: 19.93 KG/M2 | HEIGHT: 66 IN | DIASTOLIC BLOOD PRESSURE: 74 MMHG | HEART RATE: 64 BPM | SYSTOLIC BLOOD PRESSURE: 117 MMHG | WEIGHT: 124 LBS | OXYGEN SATURATION: 96 % | TEMPERATURE: 97.3 F

## 2024-08-28 DIAGNOSIS — C50.112 MALIGNANT NEOPLASM OF CENTRAL PORTION OF LEFT BREAST IN FEMALE, ESTROGEN RECEPTOR POSITIVE: Primary | ICD-10-CM

## 2024-08-28 DIAGNOSIS — G89.29 CHRONIC LOW BACK PAIN, UNSPECIFIED BACK PAIN LATERALITY, UNSPECIFIED WHETHER SCIATICA PRESENT: ICD-10-CM

## 2024-08-28 DIAGNOSIS — Z17.0 MALIGNANT NEOPLASM OF CENTRAL PORTION OF LEFT BREAST IN FEMALE, ESTROGEN RECEPTOR POSITIVE: Primary | ICD-10-CM

## 2024-08-28 DIAGNOSIS — M54.50 CHRONIC LOW BACK PAIN, UNSPECIFIED BACK PAIN LATERALITY, UNSPECIFIED WHETHER SCIATICA PRESENT: ICD-10-CM

## 2024-08-28 DIAGNOSIS — Z17.0 MALIGNANT NEOPLASM OF CENTRAL PORTION OF LEFT BREAST IN FEMALE, ESTROGEN RECEPTOR POSITIVE: ICD-10-CM

## 2024-08-28 DIAGNOSIS — R20.0 NUMBNESS OF RIGHT FOOT: ICD-10-CM

## 2024-08-28 DIAGNOSIS — C50.112 MALIGNANT NEOPLASM OF CENTRAL PORTION OF LEFT BREAST IN FEMALE, ESTROGEN RECEPTOR POSITIVE: ICD-10-CM

## 2024-08-28 LAB
ALBUMIN SERPL-MCNC: 4.7 G/DL (ref 3.5–5.2)
ALBUMIN/GLOB SERPL: 2 G/DL
ALP SERPL-CCNC: 60 U/L (ref 39–117)
ALT SERPL W P-5'-P-CCNC: 33 U/L (ref 1–33)
ANION GAP SERPL CALCULATED.3IONS-SCNC: 9 MMOL/L (ref 5–15)
AST SERPL-CCNC: 34 U/L (ref 1–32)
BASOPHILS # BLD AUTO: 0.06 10*3/MM3 (ref 0–0.2)
BASOPHILS NFR BLD AUTO: 2.2 % (ref 0–1.5)
BILIRUB SERPL-MCNC: 0.5 MG/DL (ref 0–1.2)
BUN SERPL-MCNC: 13 MG/DL (ref 6–20)
BUN/CREAT SERPL: 16.5 (ref 7–25)
CALCIUM SPEC-SCNC: 9.7 MG/DL (ref 8.6–10.5)
CHLORIDE SERPL-SCNC: 101 MMOL/L (ref 98–107)
CO2 SERPL-SCNC: 29 MMOL/L (ref 22–29)
CREAT SERPL-MCNC: 0.79 MG/DL (ref 0.57–1)
DEPRECATED RDW RBC AUTO: 50.3 FL (ref 37–54)
EGFRCR SERPLBLD CKD-EPI 2021: 90.1 ML/MIN/1.73
EOSINOPHIL # BLD AUTO: 0.02 10*3/MM3 (ref 0–0.4)
EOSINOPHIL NFR BLD AUTO: 0.7 % (ref 0.3–6.2)
ERYTHROCYTE [DISTWIDTH] IN BLOOD BY AUTOMATED COUNT: 12.8 % (ref 12.3–15.4)
GLOBULIN UR ELPH-MCNC: 2.3 GM/DL
GLUCOSE SERPL-MCNC: 84 MG/DL (ref 65–99)
HCT VFR BLD AUTO: 38.3 % (ref 34–46.6)
HGB BLD-MCNC: 13.5 G/DL (ref 12–15.9)
IMM GRANULOCYTES # BLD AUTO: 0.01 10*3/MM3 (ref 0–0.05)
IMM GRANULOCYTES NFR BLD AUTO: 0.4 % (ref 0–0.5)
LYMPHOCYTES # BLD AUTO: 0.99 10*3/MM3 (ref 0.7–3.1)
LYMPHOCYTES NFR BLD AUTO: 36.1 % (ref 19.6–45.3)
MCH RBC QN AUTO: 37.6 PG (ref 26.6–33)
MCHC RBC AUTO-ENTMCNC: 35.2 G/DL (ref 31.5–35.7)
MCV RBC AUTO: 106.7 FL (ref 79–97)
MONOCYTES # BLD AUTO: 0.25 10*3/MM3 (ref 0.1–0.9)
MONOCYTES NFR BLD AUTO: 9.1 % (ref 5–12)
NEUTROPHILS NFR BLD AUTO: 1.41 10*3/MM3 (ref 1.7–7)
NEUTROPHILS NFR BLD AUTO: 51.5 % (ref 42.7–76)
PLATELET # BLD AUTO: 178 10*3/MM3 (ref 140–450)
PMV BLD AUTO: 8.7 FL (ref 6–12)
POTASSIUM SERPL-SCNC: 3.8 MMOL/L (ref 3.5–5.2)
PROT SERPL-MCNC: 7 G/DL (ref 6–8.5)
RBC # BLD AUTO: 3.59 10*6/MM3 (ref 3.77–5.28)
SODIUM SERPL-SCNC: 139 MMOL/L (ref 136–145)
WBC NRBC COR # BLD AUTO: 2.74 10*3/MM3 (ref 3.4–10.8)

## 2024-08-28 PROCEDURE — 85025 COMPLETE CBC W/AUTO DIFF WBC: CPT

## 2024-08-28 PROCEDURE — 80053 COMPREHEN METABOLIC PANEL: CPT

## 2024-08-28 PROCEDURE — 36415 COLL VENOUS BLD VENIPUNCTURE: CPT

## 2024-08-28 PROCEDURE — 99214 OFFICE O/P EST MOD 30 MIN: CPT | Performed by: INTERNAL MEDICINE

## 2024-08-28 RX ORDER — VENLAFAXINE HYDROCHLORIDE 75 MG/1
75 CAPSULE, EXTENDED RELEASE ORAL DAILY
Qty: 90 CAPSULE | Refills: 1 | Status: SHIPPED | OUTPATIENT
Start: 2024-08-28

## 2024-08-28 NOTE — PROGRESS NOTES
[facility] [place]  Office number [phone]    Fax number [phone]     Follow up     Date: 24    Patient Name: Debra Beckman  MRN: [id number]  : 1970    Referring Physician: Dr. Goran Russell    Chief Complaint: follow up breast cancer    Cancer Staging: Stage IA (cT1b, cN0, cM0, G1, ER+, OH+, HER2-)    History of Present Illness: Debra Beckman is a pleasant 54 y.o. female who presents today for evaluation of left breast cancer.  She is accompanied by her supportive father who is a retired [employer] neurosurgeon, Dr. Beckman.    Screening mammogram 2022 demonstrated an asymmetry in the anterior left medial breast.  Diagnostic mammogram on 2022 demonstrated a persistent ill-defined asymmetry with architectural distortion in the left 9:00 periareolar region.  On ultrasound, this corresponded to an 8 mm mass in the 9:00 left breast.    Left breast 9:00 biopsy on 8/10/2022 demonstrated grade 1 invasive ductal carcinoma with associated DCIS (%, OH 60%, HER2/kathleen negative, 0+).    She proceeded with bilateral breast MRI on 8/15/2022.  This demonstrated a 1.4 cm 9:00 left breast enhancing mass, with a 1.3 cm area of clumped non-mass enhancement versus postbiopsy change 1 cm anterior lateral to this.  There is additionally a dominant focus of enhancement suspicious for a satellite lesion spanning 0.4 cm.  She underwent second look ultrasound and biopsy of the second lesion which corresponded to a subcentimeteric mass on ultrasound.    Left breast 9:00 biopsy on 2022 showed grade 2 invasive ductal carcinoma (ER greater than 90%, OH greater than 80%, HER2/kathleen negative, 0+)    Left breast lumpectomy, sentinel lymph node biopsy on 2022 demonstrated grade 2 invasive ductal carcinoma spanning 1.4 cm with associated DCIS.  Margin positive.  [place] lymph node positive () with a macrometastasis spanning 4 mm with extracapsular extension. She underwent  re-excision 22 with fat necrosis and no residual invasive or in situ carcinoma. Single left axillary LN excised and benign (total LN count 1/2).    Breast cancer risk profile:  G3, P2; Age of first live birth 37  Premenopausal, LMP was 2022.  She discontinued OCPs at the time of her cancer diagnosis.  Family history of breast, ovarian, prostate or pancreatic cancer: Maternal aunt with breast cancer.  Genetics: [facility] cancer next panel was negative in .    Treatment history:  TC x 6 cycles  Adjuvant radiation completed 3/27/23    Arimidex, planning 2023  Verzinio, C1 23  Zometa, C1 23    Interval history:  Here for follow up on Verzenio and Arimidex accompanied by her supportive dad.  Increased Effexor to 2 tabs, fatigue, hot flashes better, low mood/overwhelm persist  Right foot pain numbness, started in calf, extends to toes, greadulaly worse. Has been doing PT.  RLS wakes her from sleep. She has longstanding DJD of low back   She is doing well and not needing antiemetics or antidiarrheals.  GERD remains controlled off PPI.    Hot flashes and mood are well-controlled on Effexor. Vaginal dryness and pain with intercourse despite [place], wishes to pursue pelvic floor PT    Past Medical History:   Past Medical History:   Diagnosis Date    Allergic     Asthma     Drug therapy     History of radiation therapy 2023    left breast/regional LNs    Hx of radiation therapy     Low back pain     Malignant neoplasm of central portion of left breast in female, estrogen receptor positive 2022    Radiculopathy 2022       Past Surgical History:   Past Surgical History:   Procedure Laterality Date    BREAST BIOPSY  08/10/2022    BREAST BIOPSY Left 2022    BREAST LUMPECTOMY      BREAST LUMPECTOMY WITH AXILLARY NODE DISSECTION Left 2022    clean dread    BREAST LUMPECTOMY WITH SENTINEL NODE BIOPSY Left 2022     SECTION      VENOUS ACCESS DEVICE (PORT) INSERTION  Right 09/30/2022       Family History:   Family History   Problem Relation Age of Onset    Hypertension Mother     No Known Problems Father     Breast cancer Maternal Aunt     Alcohol abuse Maternal Aunt     Anxiety disorder Maternal Aunt     Depression Maternal Aunt     Alcohol abuse Maternal Aunt     Anxiety disorder Maternal Aunt     Depression Maternal Aunt     Alcohol abuse Maternal Uncle     Depression Maternal Uncle     Drug abuse Maternal Uncle     Ovarian cancer Neg Hx        Social History:   Social History     Socioeconomic History    Marital status:    Tobacco Use    Smoking status: Former    Smokeless tobacco: Never   Vaping Use    Vaping status: Former   Substance and Sexual Activity    Alcohol use: Yes     Comment: occasional    Drug use: No    Sexual activity: Not Currently   Works as a .      Medications:     Current Outpatient Medications:     Abemaciclib 150 MG tablet, Take 1 tablet by mouth 2 (Two) Times a Day., Disp: 56 tablet, Rfl: 11    albuterol sulfate  (90 Base) MCG/ACT inhaler, Inhale 2 puffs Every 4 (Four) Hours As Needed for Wheezing., Disp: 18 g, Rfl: 11    anastrozole (ARIMIDEX) 1 MG tablet, Take 1 tablet by mouth Daily., Disp: 90 tablet, Rfl: 1    budesonide-formoterol (Symbicort) 160-4.5 MCG/ACT inhaler, Inhale 2 puffs 2 (Two) Times a Day., Disp: 10.2 g, Rfl: 2    ibuprofen (ADVIL,MOTRIN) 600 MG tablet, Take 1 tablet by mouth Every 8 (Eight) Hours As Needed for Mild Pain. Take with food., Disp: 30 tablet, Rfl: 1    methocarbamol (ROBAXIN) 500 MG tablet, Take 1 tablet by mouth 4 (Four) Times a Day., Disp: 40 tablet, Rfl: 1    pregabalin (LYRICA) 50 MG capsule, TAKE 1 CAPSULE BY MOUTH TWICE DAILY, Disp: 60 capsule, Rfl: 3    prochlorperazine (COMPAZINE) 5 MG tablet, Take 1 tablet by mouth Every 8 (Eight) Hours As Needed for Nausea or Vomiting., Disp: 90 tablet, Rfl: 2    TiZANidine (Zanaflex) 2 MG capsule, Take 1 capsule by mouth 3 (Three) Times a Day As  "Needed for Muscle Spasms., Disp: 40 capsule, Rfl: 1    traMADol (ULTRAM) 50 MG tablet, Take 1 tablet by mouth Every 6 (Six) Hours As Needed for Severe Pain. May take 1-2 Q6 for severe pain, Disp: 40 tablet, Rfl: 1    venlafaxine XR (EFFEXOR-XR) 37.5 MG 24 hr capsule, Take 1 capsule by mouth Daily., Disp: 90 capsule, Rfl: 1    vitamin B-12 (CYANOCOBALAMIN) 1000 MCG tablet, Take 1 tablet by mouth Daily., Disp: , Rfl:     vitamin B-6 (PYRIDOXINE) 50 MG tablet, Take 1 tablet by mouth Daily., Disp: , Rfl:     Vitamin D, Cholecalciferol, (CHOLECALCIFEROL) 10 MCG (400 UNIT) tablet, Take 1 tablet by mouth Daily., Disp: , Rfl:     Allergies:   No Known Allergies    Objective     Vital Signs:   Vitals:    08/28/24 1335   BP: 117/74   Pulse: 64   Temp: 97.3 °F (36.3 °C)   TempSrc: Infrared   SpO2: 96%   Weight: 56.2 kg (124 lb)   Height: 167.6 cm (65.98\")   PainSc: 0-No pain    Body mass index is 20.02 kg/m².   Pain Score    08/28/24 1335   PainSc: 0-No pain       ECOG Performance Status: 0    Physical Exam:  General: No acute distress. Well appearing   HEENT: Normocephalic, atraumatic. Sclera anicteric.   Neck: supple, no adenopathy.   Cardiovascular: regular rate and rhythm. No murmurs.   Respiratory: Normal rate. Clear to auscultation bilaterally  Abdomen: Soft, nontender, non distended with normoactive bowel sounds  Lymph: no cervical, supraclavicular or axillary adenopathy  Neuro: Alert and oriented x 3. No focal deficits.   Ext: Symmetric, no swelling.   Breast: No palpable masses bilaterally    Laboratory/Imaging Reviewed:   Lab on 08/28/2024   Component Date Value Ref Range Status    WBC 08/28/2024 2.74 (L)  3.40 - 10.80 10*3/mm3 Final    RBC 08/28/2024 3.59 (L)  3.77 - 5.28 10*6/mm3 Final    Hemoglobin 08/28/2024 13.5  12.0 - 15.9 g/dL Final    Hematocrit 08/28/2024 38.3  34.0 - 46.6 % Final    MCV 08/28/2024 106.7 (H)  79.0 - 97.0 fL Final    MCH 08/28/2024 37.6 (H)  26.6 - 33.0 pg Final    MCHC 08/28/2024 35.2  31.5 " - 35.7 g/dL Final    RDW 08/28/2024 12.8  12.3 - 15.4 % Final    RDW-SD 08/28/2024 50.3  37.0 - 54.0 fl Final    MPV 08/28/2024 8.7  6.0 - 12.0 fL Final    Platelets 08/28/2024 178  140 - 450 10*3/mm3 Final    Neutrophil % 08/28/2024 51.5  42.7 - 76.0 % Final    Lymphocyte % 08/28/2024 36.1  19.6 - 45.3 % Final    Monocyte % 08/28/2024 9.1  5.0 - 12.0 % Final    Eosinophil % 08/28/2024 0.7  0.3 - 6.2 % Final    Basophil % 08/28/2024 2.2 (H)  0.0 - 1.5 % Final    Immature Grans % 08/28/2024 0.4  0.0 - 0.5 % Final    Neutrophils, Absolute 08/28/2024 1.41 (L)  1.70 - 7.00 10*3/mm3 Final    Lymphocytes, Absolute 08/28/2024 0.99  0.70 - 3.10 10*3/mm3 Final    Monocytes, Absolute 08/28/2024 0.25  0.10 - 0.90 10*3/mm3 Final    Eosinophils, Absolute 08/28/2024 0.02  0.00 - 0.40 10*3/mm3 Final    Basophils, Absolute 08/28/2024 0.06  0.00 - 0.20 10*3/mm3 Final    Immature Grans, Absolute 08/28/2024 0.01  0.00 - 0.05 10*3/mm3 Final     Lab on 08/28/2024   Component Date Value Ref Range Status    WBC 08/28/2024 2.74 (L)  3.40 - 10.80 10*3/mm3 Final    RBC 08/28/2024 3.59 (L)  3.77 - 5.28 10*6/mm3 Final    Hemoglobin 08/28/2024 13.5  12.0 - 15.9 g/dL Final    Hematocrit 08/28/2024 38.3  34.0 - 46.6 % Final    MCV 08/28/2024 106.7 (H)  79.0 - 97.0 fL Final    MCH 08/28/2024 37.6 (H)  26.6 - 33.0 pg Final    MCHC 08/28/2024 35.2  31.5 - 35.7 g/dL Final    RDW 08/28/2024 12.8  12.3 - 15.4 % Final    RDW-SD 08/28/2024 50.3  37.0 - 54.0 fl Final    MPV 08/28/2024 8.7  6.0 - 12.0 fL Final    Platelets 08/28/2024 178  140 - 450 10*3/mm3 Final    Neutrophil % 08/28/2024 51.5  42.7 - 76.0 % Final    Lymphocyte % 08/28/2024 36.1  19.6 - 45.3 % Final    Monocyte % 08/28/2024 9.1  5.0 - 12.0 % Final    Eosinophil % 08/28/2024 0.7  0.3 - 6.2 % Final    Basophil % 08/28/2024 2.2 (H)  0.0 - 1.5 % Final    Immature Grans % 08/28/2024 0.4  0.0 - 0.5 % Final    Neutrophils, Absolute 08/28/2024 1.41 (L)  1.70 - 7.00 10*3/mm3 Final    Lymphocytes,  Absolute 08/28/2024 0.99  0.70 - 3.10 10*3/mm3 Final    Monocytes, Absolute 08/28/2024 0.25  0.10 - 0.90 10*3/mm3 Final    Eosinophils, Absolute 08/28/2024 0.02  0.00 - 0.40 10*3/mm3 Final    Basophils, Absolute 08/28/2024 0.06  0.00 - 0.20 10*3/mm3 Final    Immature Grans, Absolute 08/28/2024 0.01  0.00 - 0.05 10*3/mm3 Final     No results found.     Assessment / Plan        1. Malignant neoplasm of central portion of left breast in female, estrogen receptor positive  2. High risk medication use  -She has an early stage hormone sensitive breast cancer with a single positive lymph node.  She has undergone curative intent surgery.  -Given strongly ER positive disease and N1, I recommended adjuvant TC over dose dense AC followed by weekly taxol and she completed 6 cycles  -She completed adjuvant radiation.   -Ki67 20%, initiated adjuvant Verzenio.  Labs are in an acceptable range to continue Verzenio including Cbc/CMP/estradiol  -Follow up in 3 mo with repeat labs unless worsening interval symptoms      2. Anxiety  3. Hot flashes  -Continue Effexor.       4. Bone health  -Ca/D recommended  -We discussed ASCO guidelines regarding adjuvant bisphosphonate use in early breast cancer. We reviewed the schedule and side effects of zometa including but not limited to bone pain, renal dysfunction, osteonecrosis and allergic reaction. Discussed the importance of routine dental care and need to avoid invasive dental procedures/notify dentist of medication use. Informed consent was obtained, and the patient elected to proceed pending dental evaluation.We reviewed the schedule and side effects including but not limited to bone pain, renal dysfunction, osteonecrosis and allergic reaction. Discussed the importance of routine dental care and need to avoid invasive dental procedures/notify dentist of medication use. Informed consent was obtained, and the patient elected to proceed.   -Next cycle due 9/2024, DEXA after dose #4    5.  Vaginal dryness  -Recommend consistent [place] use and use replens in between  -Consider pelvic floor PT, referral placed    6.  Low back pain  7. New RLE numbness  MRI l spine and brain, continues PT    7.  Post radiation pneumonitis  -Her cough is now resolved.  -CT from October showed radiation pneumonitis  -Repeat chest CT stable Spring 2024    Follow Up:   3 mo  Orders Placed This Encounter   Procedures    MRI Brain With & Without Contrast    MRI Lumbar Spine With & Without Contrast    Ambulatory Referral to Physical Therapy for Evaluation & Treatment        Rama Cano MD  Hematology and Oncology

## 2024-08-30 ENCOUNTER — TELEPHONE (OUTPATIENT)
Dept: ONCOLOGY | Facility: CLINIC | Age: 54
End: 2024-08-30
Payer: COMMERCIAL

## 2024-08-30 NOTE — TELEPHONE ENCOUNTER
Caller: Debra Beckman    Relationship: Self    Best call back number: [phone]     What is the best time to reach you: ASAP    Who are you requesting to speak with (clinical staff, provider,  specific staff member): CLINICAL    What was the call regarding: PATIENT THOUGHT SHE HAD ZOMETA INFUSION SCHEDULED FOR 9/3 BUT NOTHING SCHEDULED.  PLEASE CALL PT TO ADVISE WHEN SHE IS SUPPOSED TO HAVE HER NEXT ONE.

## 2024-08-30 NOTE — TELEPHONE ENCOUNTER
Advised pt RN needs to clarify next dose with MD as she has not requested it to be scheduled.  Patient stated she is not available on 9/3/24.  Advised her RN will contact her back on 9/3/24 to advise per MD.  Pt verbalized understanding.

## 2024-09-03 ENCOUNTER — SPECIALTY PHARMACY (OUTPATIENT)
Dept: ONCOLOGY | Facility: HOSPITAL | Age: 54
End: 2024-09-03
Payer: COMMERCIAL

## 2024-09-06 ENCOUNTER — TELEPHONE (OUTPATIENT)
Dept: ONCOLOGY | Facility: CLINIC | Age: 54
End: 2024-09-06
Payer: COMMERCIAL

## 2024-09-06 DIAGNOSIS — C50.112 MALIGNANT NEOPLASM OF CENTRAL PORTION OF LEFT BREAST IN FEMALE, ESTROGEN RECEPTOR POSITIVE: Primary | ICD-10-CM

## 2024-09-06 DIAGNOSIS — R20.0 NUMBNESS OF RIGHT FOOT: ICD-10-CM

## 2024-09-06 DIAGNOSIS — G89.29 CHRONIC LOW BACK PAIN, UNSPECIFIED BACK PAIN LATERALITY, UNSPECIFIED WHETHER SCIATICA PRESENT: ICD-10-CM

## 2024-09-06 DIAGNOSIS — Z17.0 MALIGNANT NEOPLASM OF CENTRAL PORTION OF LEFT BREAST IN FEMALE, ESTROGEN RECEPTOR POSITIVE: Primary | ICD-10-CM

## 2024-09-06 DIAGNOSIS — M54.50 CHRONIC LOW BACK PAIN, UNSPECIFIED BACK PAIN LATERALITY, UNSPECIFIED WHETHER SCIATICA PRESENT: ICD-10-CM

## 2024-09-06 NOTE — TELEPHONE ENCOUNTER
Called patient.  No answer received. Left VM stating referral sent to [facility] and that if she doesn't hear from them soon, to contact this office.  Asked her to call with any questions. Phone number provided.  Per verbal release, okay to leave detailed VM.

## 2024-09-06 NOTE — TELEPHONE ENCOUNTER
[facility]  Referral faxed to the [facility] in [place] as that is the only location that does pelvic floor.  Staff stated someone will contact office if they cannot see patient.  Successful fax receipt received back.

## 2024-09-06 NOTE — TELEPHONE ENCOUNTER
Caller: Debra Beckman    Relationship: Self    Best call back number:   Telephone Information:   Mobile [phone]     What is the best time to reach you: ASAP    Who are you requesting to speak with (clinical staff, provider,  specific staff member): CLINICAL     What was the call regarding: PATIENT NEEDS A INTERNAL NEW REFERRAL TO BE SENT TO PHYSICAL THERAPY WITH THE DIAGNOSIS OF IT BEING FOR PELVIC FLOOR.      CALL HER TO LET HER KNOW YOU PUT IT IN SO SHE CAN SCHEDULE

## 2024-09-09 DIAGNOSIS — Z17.0 MALIGNANT NEOPLASM OF CENTRAL PORTION OF LEFT BREAST IN FEMALE, ESTROGEN RECEPTOR POSITIVE: Primary | ICD-10-CM

## 2024-09-09 DIAGNOSIS — C50.112 MALIGNANT NEOPLASM OF CENTRAL PORTION OF LEFT BREAST IN FEMALE, ESTROGEN RECEPTOR POSITIVE: Primary | ICD-10-CM

## 2024-09-09 RX ORDER — SODIUM CHLORIDE 9 MG/ML
250 INJECTION, SOLUTION INTRAVENOUS ONCE
Status: CANCELLED | OUTPATIENT
Start: 2024-09-09

## 2024-09-10 ENCOUNTER — HOSPITAL ENCOUNTER (OUTPATIENT)
Dept: ONCOLOGY | Facility: HOSPITAL | Age: 54
Discharge: HOME | End: 2024-09-10
Admitting: INTERNAL MEDICINE
Payer: COMMERCIAL

## 2024-09-10 VITALS
TEMPERATURE: 98.5 F | HEART RATE: 78 BPM | HEIGHT: 65 IN | DIASTOLIC BLOOD PRESSURE: 61 MMHG | SYSTOLIC BLOOD PRESSURE: 143 MMHG | WEIGHT: 126 LBS | RESPIRATION RATE: 18 BRPM | BODY MASS INDEX: 20.99 KG/M2

## 2024-09-10 DIAGNOSIS — Z17.0 MALIGNANT NEOPLASM OF CENTRAL PORTION OF LEFT BREAST IN FEMALE, ESTROGEN RECEPTOR POSITIVE: Primary | ICD-10-CM

## 2024-09-10 DIAGNOSIS — C50.112 MALIGNANT NEOPLASM OF CENTRAL PORTION OF LEFT BREAST IN FEMALE, ESTROGEN RECEPTOR POSITIVE: Primary | ICD-10-CM

## 2024-09-10 LAB
ALBUMIN SERPL-MCNC: 4.1 G/DL (ref 3.5–5.2)
ALBUMIN/GLOB SERPL: 1.9 G/DL
ALP SERPL-CCNC: 58 U/L (ref 39–117)
ALT SERPL W P-5'-P-CCNC: 25 U/L (ref 1–33)
ANION GAP SERPL CALCULATED.3IONS-SCNC: 8 MMOL/L (ref 5–15)
AST SERPL-CCNC: 23 U/L (ref 1–32)
BASOPHILS # BLD AUTO: 0.02 10*3/MM3 (ref 0–0.2)
BASOPHILS NFR BLD AUTO: 0.8 % (ref 0–1.5)
BILIRUB SERPL-MCNC: 0.3 MG/DL (ref 0–1.2)
BUN SERPL-MCNC: 10 MG/DL (ref 6–20)
BUN/CREAT SERPL: 13.5 (ref 7–25)
CALCIUM SPEC-SCNC: 9 MG/DL (ref 8.6–10.5)
CHLORIDE SERPL-SCNC: 106 MMOL/L (ref 98–107)
CO2 SERPL-SCNC: 25 MMOL/L (ref 22–29)
CREAT BLDA-MCNC: 0.7 MG/DL (ref 0.6–1.3)
CREAT SERPL-MCNC: 0.74 MG/DL (ref 0.57–1)
DEPRECATED RDW RBC AUTO: 50.9 FL (ref 37–54)
EGFRCR SERPLBLD CKD-EPI 2021: 97.5 ML/MIN/1.73
EOSINOPHIL # BLD AUTO: 0.03 10*3/MM3 (ref 0–0.4)
EOSINOPHIL NFR BLD AUTO: 1.3 % (ref 0.3–6.2)
ERYTHROCYTE [DISTWIDTH] IN BLOOD BY AUTOMATED COUNT: 12.8 % (ref 12.3–15.4)
GLOBULIN UR ELPH-MCNC: 2.2 GM/DL
GLUCOSE SERPL-MCNC: 101 MG/DL (ref 65–99)
HCT VFR BLD AUTO: 35.5 % (ref 34–46.6)
HGB BLD-MCNC: 12.4 G/DL (ref 12–15.9)
IMM GRANULOCYTES # BLD AUTO: 0 10*3/MM3 (ref 0–0.05)
IMM GRANULOCYTES NFR BLD AUTO: 0 % (ref 0–0.5)
LYMPHOCYTES # BLD AUTO: 0.94 10*3/MM3 (ref 0.7–3.1)
LYMPHOCYTES NFR BLD AUTO: 39.7 % (ref 19.6–45.3)
MAGNESIUM SERPL-MCNC: 2 MG/DL (ref 1.6–2.6)
MCH RBC QN AUTO: 37.9 PG (ref 26.6–33)
MCHC RBC AUTO-ENTMCNC: 34.9 G/DL (ref 31.5–35.7)
MCV RBC AUTO: 108.6 FL (ref 79–97)
MONOCYTES # BLD AUTO: 0.16 10*3/MM3 (ref 0.1–0.9)
MONOCYTES NFR BLD AUTO: 6.8 % (ref 5–12)
NEUTROPHILS NFR BLD AUTO: 1.22 10*3/MM3 (ref 1.7–7)
NEUTROPHILS NFR BLD AUTO: 51.4 % (ref 42.7–76)
PHOSPHATE SERPL-MCNC: 2.9 MG/DL (ref 2.5–4.5)
PLATELET # BLD AUTO: 161 10*3/MM3 (ref 140–450)
PMV BLD AUTO: 8.7 FL (ref 6–12)
POTASSIUM SERPL-SCNC: 3.9 MMOL/L (ref 3.5–5.2)
PROT SERPL-MCNC: 6.3 G/DL (ref 6–8.5)
RBC # BLD AUTO: 3.27 10*6/MM3 (ref 3.77–5.28)
SODIUM SERPL-SCNC: 139 MMOL/L (ref 136–145)
WBC NRBC COR # BLD AUTO: 2.37 10*3/MM3 (ref 3.4–10.8)

## 2024-09-10 PROCEDURE — 84100 ASSAY OF PHOSPHORUS: CPT | Performed by: INTERNAL MEDICINE

## 2024-09-10 PROCEDURE — 85025 COMPLETE CBC W/AUTO DIFF WBC: CPT | Performed by: INTERNAL MEDICINE

## 2024-09-10 PROCEDURE — 82565 ASSAY OF CREATININE: CPT

## 2024-09-10 PROCEDURE — 25810000003 SODIUM CHLORIDE 0.9 % SOLUTION: Performed by: INTERNAL MEDICINE

## 2024-09-10 PROCEDURE — 25010000002 ZOLEDRONIC ACID 4 MG/100ML SOLUTION: Performed by: INTERNAL MEDICINE

## 2024-09-10 PROCEDURE — 83735 ASSAY OF MAGNESIUM: CPT | Performed by: INTERNAL MEDICINE

## 2024-09-10 PROCEDURE — 96374 THER/PROPH/DIAG INJ IV PUSH: CPT

## 2024-09-10 PROCEDURE — 80053 COMPREHEN METABOLIC PANEL: CPT | Performed by: INTERNAL MEDICINE

## 2024-09-10 PROCEDURE — 96365 THER/PROPH/DIAG IV INF INIT: CPT

## 2024-09-10 RX ORDER — SODIUM CHLORIDE 9 MG/ML
250 INJECTION, SOLUTION INTRAVENOUS ONCE
Status: COMPLETED | OUTPATIENT
Start: 2024-09-10 | End: 2024-09-10

## 2024-09-10 RX ORDER — ZOLEDRONIC ACID 0.04 MG/ML
4 INJECTION, SOLUTION INTRAVENOUS ONCE
Status: COMPLETED | OUTPATIENT
Start: 2024-09-10 | End: 2024-09-10

## 2024-09-10 RX ADMIN — SODIUM CHLORIDE 250 ML: 9 INJECTION, SOLUTION INTRAVENOUS at 14:40

## 2024-09-10 RX ADMIN — ZOLEDRONIC ACID 4 MG: 0.04 INJECTION, SOLUTION INTRAVENOUS at 14:42

## 2024-09-16 ENCOUNTER — OFFICE VISIT (OUTPATIENT)
Dept: FAMILY MEDICINE CLINIC | Facility: CLINIC | Age: 54
End: 2024-09-16
Payer: COMMERCIAL

## 2024-09-16 VITALS
SYSTOLIC BLOOD PRESSURE: 116 MMHG | WEIGHT: 126.6 LBS | RESPIRATION RATE: 16 BRPM | BODY MASS INDEX: 21.09 KG/M2 | HEART RATE: 87 BPM | HEIGHT: 65 IN | DIASTOLIC BLOOD PRESSURE: 68 MMHG | TEMPERATURE: 96.8 F | OXYGEN SATURATION: 99 %

## 2024-09-16 DIAGNOSIS — D49.2 NEOPLASM OF SKIN OF EAR: ICD-10-CM

## 2024-09-16 DIAGNOSIS — J45.30 MILD PERSISTENT ASTHMA WITHOUT COMPLICATION: Primary | ICD-10-CM

## 2024-09-16 PROCEDURE — 99203 OFFICE O/P NEW LOW 30 MIN: CPT | Performed by: FAMILY MEDICINE

## 2024-09-16 RX ORDER — ALBUTEROL SULFATE 90 UG/1
2 AEROSOL, METERED RESPIRATORY (INHALATION) EVERY 4 HOURS PRN
Qty: 18 G | Refills: 11 | Status: SHIPPED | OUTPATIENT
Start: 2024-09-16

## 2024-09-16 RX ORDER — BUDESONIDE AND FORMOTEROL FUMARATE DIHYDRATE 160; 4.5 UG/1; UG/1
2 AEROSOL RESPIRATORY (INHALATION)
Qty: 6 G | Refills: 5 | Status: SHIPPED | OUTPATIENT
Start: 2024-09-16

## 2024-09-16 RX ORDER — BUDESONIDE AND FORMOTEROL FUMARATE DIHYDRATE 160; 4.5 UG/1; UG/1
2 AEROSOL RESPIRATORY (INHALATION)
COMMUNITY
End: 2024-09-16 | Stop reason: SDUPTHER

## 2024-09-26 ENCOUNTER — TREATMENT (OUTPATIENT)
Dept: PHYSICAL THERAPY | Facility: CLINIC | Age: 54
End: 2024-09-26
Payer: COMMERCIAL

## 2024-09-26 DIAGNOSIS — G89.29 CHRONIC LOW BACK PAIN, UNSPECIFIED BACK PAIN LATERALITY, UNSPECIFIED WHETHER SCIATICA PRESENT: Primary | ICD-10-CM

## 2024-09-26 DIAGNOSIS — M54.50 CHRONIC LOW BACK PAIN, UNSPECIFIED BACK PAIN LATERALITY, UNSPECIFIED WHETHER SCIATICA PRESENT: Primary | ICD-10-CM

## 2024-10-07 ENCOUNTER — TELEPHONE (OUTPATIENT)
Dept: FAMILY MEDICINE CLINIC | Facility: CLINIC | Age: 54
End: 2024-10-07
Payer: COMMERCIAL

## 2024-10-07 NOTE — TELEPHONE ENCOUNTER
Caller: Debra Beckman    Relationship: Self    Best call back number: [phone]     What is the medical concern/diagnosis: SPOT ON EAR    What specialty or service is being requested: DERMATOLOGY        Any additional details: PATIENT SPOKE WITH THE DERMATOLOGIST OFFICE PCP REFERRED BUT THEY CAN NOT GET HER IN TIL DECEMBER. SHE IS WANTING TO KNOW IF SHE CAN GET IN SOONER WITH SOMEONE ELSE AND ALSO THEY ASKED IF SHE HAD THE BIOPSY YET SO IF SHE CAN GET THE BIOPSY DONE ANYWHERE SOONER. CALL PATIENT

## 2024-11-07 ENCOUNTER — TREATMENT (OUTPATIENT)
Dept: PHYSICAL THERAPY | Facility: CLINIC | Age: 54
End: 2024-11-07
Payer: COMMERCIAL

## 2024-11-07 DIAGNOSIS — G89.29 CHRONIC LOW BACK PAIN, UNSPECIFIED BACK PAIN LATERALITY, UNSPECIFIED WHETHER SCIATICA PRESENT: Primary | ICD-10-CM

## 2024-11-07 DIAGNOSIS — M54.50 CHRONIC LOW BACK PAIN, UNSPECIFIED BACK PAIN LATERALITY, UNSPECIFIED WHETHER SCIATICA PRESENT: Primary | ICD-10-CM

## 2024-11-12 ENCOUNTER — HOSPITAL ENCOUNTER (OUTPATIENT)
Dept: MRI IMAGING | Facility: HOSPITAL | Age: 54
Discharge: HOME | End: 2024-11-12
Payer: COMMERCIAL

## 2024-11-12 DIAGNOSIS — C50.112 MALIGNANT NEOPLASM OF CENTRAL PORTION OF LEFT BREAST IN FEMALE, ESTROGEN RECEPTOR POSITIVE: ICD-10-CM

## 2024-11-12 DIAGNOSIS — M54.50 CHRONIC LOW BACK PAIN, UNSPECIFIED BACK PAIN LATERALITY, UNSPECIFIED WHETHER SCIATICA PRESENT: ICD-10-CM

## 2024-11-12 DIAGNOSIS — Z17.0 MALIGNANT NEOPLASM OF CENTRAL PORTION OF LEFT BREAST IN FEMALE, ESTROGEN RECEPTOR POSITIVE: ICD-10-CM

## 2024-11-12 DIAGNOSIS — G89.29 CHRONIC LOW BACK PAIN, UNSPECIFIED BACK PAIN LATERALITY, UNSPECIFIED WHETHER SCIATICA PRESENT: ICD-10-CM

## 2024-11-12 DIAGNOSIS — R20.0 NUMBNESS OF RIGHT FOOT: ICD-10-CM

## 2024-11-12 PROCEDURE — A9577 INJ MULTIHANCE: HCPCS | Performed by: INTERNAL MEDICINE

## 2024-11-12 PROCEDURE — 72158 MRI LUMBAR SPINE W/O & W/DYE: CPT

## 2024-11-12 PROCEDURE — 70553 MRI BRAIN STEM W/O & W/DYE: CPT

## 2024-11-12 PROCEDURE — 25510000002 GADOBENATE DIMEGLUMINE 529 MG/ML SOLUTION: Performed by: INTERNAL MEDICINE

## 2024-11-12 RX ADMIN — GADOBENATE DIMEGLUMINE 10 ML: 529 INJECTION, SOLUTION INTRAVENOUS at 15:00

## 2024-11-14 NOTE — PROGRESS NOTES
Physical Therapy Daily Treatment Note    [facility]   [address], [address] [place] [address]      Patient: Debra Beckman   : 1970  Referring practitioner: No ref. provider found  Date of Initial Visit: Type: THERAPY  Noted: 10/4/2023  Today's Date: 2024  Patient seen for 13 sessions    Certification Period 2024 thru 2/10/2025       Visit Diagnoses:    ICD-10-CM ICD-9-CM   1. Chronic low back pain, unspecified back pain laterality, unspecified whether sciatica present  M54.50 724.2    G89.29 338.29       Subjective     Patient states that she feels like she has been doing well and she continues to notice improvement in her low back pain after needling treatments.  She does however continue to have some intermittent discomfort in the low back and tightness in the posterior hips.    Objective   See Exercise, Manual, and Modality Logs for complete treatment.       Assessment/Plan     Responded well to treatment today and did not have any adverse reaction to the needling treatment.  Will continue to progress treatment as indicated.    Debra Beckman will continue to benefit from skilled physical therapy services to address deficits and continue to work towards reaching functional goals.           Timed:         Manual Therapy:         mins  84947;     Therapeutic Exercise:         mins  06442;     Neuromuscular Mera:        mins  98191;    Therapeutic Activity:          mins  90184;     Gait Training:           mins  84028;     Ultrasound:          mins  91301;    Ionto                                   mins   60183  Self Care                            mins   35432  Canalith Repos         mins 33166  Electrical Stimulation:         mins  61851    Un-Timed:  Electrical Stimulation:         mins  79941 ( );  Dry Needling     30     mins self-pay  Traction          mins 27166      Timed Treatment:   0   mins   Total Treatment:     30   mins    Robinson Cardona, PT, DPT,  Cert. DN  [place] License: [id number]

## 2024-11-15 ENCOUNTER — TELEPHONE (OUTPATIENT)
Dept: ONCOLOGY | Facility: CLINIC | Age: 54
End: 2024-11-15
Payer: COMMERCIAL

## 2024-11-15 NOTE — TELEPHONE ENCOUNTER
Called to notify patient per Dr. Cano of recent MRI Brain: Normal.  No answer received.  Left VM requesting return call.

## 2024-11-19 ENCOUNTER — LAB (OUTPATIENT)
Dept: LAB | Facility: HOSPITAL | Age: 54
End: 2024-11-19
Payer: COMMERCIAL

## 2024-11-19 ENCOUNTER — OFFICE VISIT (OUTPATIENT)
Dept: ONCOLOGY | Facility: CLINIC | Age: 54
End: 2024-11-19
Payer: COMMERCIAL

## 2024-11-19 VITALS
RESPIRATION RATE: 16 BRPM | HEART RATE: 66 BPM | DIASTOLIC BLOOD PRESSURE: 75 MMHG | WEIGHT: 124 LBS | OXYGEN SATURATION: 99 % | SYSTOLIC BLOOD PRESSURE: 123 MMHG | HEIGHT: 65 IN | BODY MASS INDEX: 20.66 KG/M2

## 2024-11-19 DIAGNOSIS — C50.112 MALIGNANT NEOPLASM OF CENTRAL PORTION OF LEFT BREAST IN FEMALE, ESTROGEN RECEPTOR POSITIVE: ICD-10-CM

## 2024-11-19 DIAGNOSIS — C50.112 MALIGNANT NEOPLASM OF CENTRAL PORTION OF LEFT BREAST IN FEMALE, ESTROGEN RECEPTOR POSITIVE: Primary | ICD-10-CM

## 2024-11-19 DIAGNOSIS — Z17.0 MALIGNANT NEOPLASM OF CENTRAL PORTION OF LEFT BREAST IN FEMALE, ESTROGEN RECEPTOR POSITIVE: ICD-10-CM

## 2024-11-19 DIAGNOSIS — Z79.899 HIGH RISK MEDICATION USE: ICD-10-CM

## 2024-11-19 DIAGNOSIS — Z17.0 MALIGNANT NEOPLASM OF CENTRAL PORTION OF LEFT BREAST IN FEMALE, ESTROGEN RECEPTOR POSITIVE: Primary | ICD-10-CM

## 2024-11-19 LAB
ALBUMIN SERPL-MCNC: 4.5 G/DL (ref 3.5–5.2)
ALBUMIN/GLOB SERPL: 1.7 G/DL
ALP SERPL-CCNC: 58 U/L (ref 39–117)
ALT SERPL W P-5'-P-CCNC: 23 U/L (ref 1–33)
ANION GAP SERPL CALCULATED.3IONS-SCNC: 8 MMOL/L (ref 5–15)
AST SERPL-CCNC: 31 U/L (ref 1–32)
BASOPHILS # BLD AUTO: 0.05 10*3/MM3 (ref 0–0.2)
BASOPHILS NFR BLD AUTO: 1.4 % (ref 0–1.5)
BILIRUB SERPL-MCNC: 0.3 MG/DL (ref 0–1.2)
BUN SERPL-MCNC: 12 MG/DL (ref 6–20)
BUN/CREAT SERPL: 15.8 (ref 7–25)
CALCIUM SPEC-SCNC: 9.3 MG/DL (ref 8.6–10.5)
CHLORIDE SERPL-SCNC: 103 MMOL/L (ref 98–107)
CO2 SERPL-SCNC: 28 MMOL/L (ref 22–29)
CREAT SERPL-MCNC: 0.76 MG/DL (ref 0.57–1)
DEPRECATED RDW RBC AUTO: 49.9 FL (ref 37–54)
EGFRCR SERPLBLD CKD-EPI 2021: 94.4 ML/MIN/1.73
EOSINOPHIL # BLD AUTO: 0.02 10*3/MM3 (ref 0–0.4)
EOSINOPHIL NFR BLD AUTO: 0.6 % (ref 0.3–6.2)
ERYTHROCYTE [DISTWIDTH] IN BLOOD BY AUTOMATED COUNT: 12.6 % (ref 12.3–15.4)
GLOBULIN UR ELPH-MCNC: 2.6 GM/DL
GLUCOSE SERPL-MCNC: 88 MG/DL (ref 65–99)
HCT VFR BLD AUTO: 38.2 % (ref 34–46.6)
HGB BLD-MCNC: 13.4 G/DL (ref 12–15.9)
IMM GRANULOCYTES # BLD AUTO: 0 10*3/MM3 (ref 0–0.05)
IMM GRANULOCYTES NFR BLD AUTO: 0 % (ref 0–0.5)
LYMPHOCYTES # BLD AUTO: 1.12 10*3/MM3 (ref 0.7–3.1)
LYMPHOCYTES NFR BLD AUTO: 32.1 % (ref 19.6–45.3)
MCH RBC QN AUTO: 37.6 PG (ref 26.6–33)
MCHC RBC AUTO-ENTMCNC: 35.1 G/DL (ref 31.5–35.7)
MCV RBC AUTO: 107.3 FL (ref 79–97)
MONOCYTES # BLD AUTO: 0.29 10*3/MM3 (ref 0.1–0.9)
MONOCYTES NFR BLD AUTO: 8.3 % (ref 5–12)
NEUTROPHILS NFR BLD AUTO: 2.01 10*3/MM3 (ref 1.7–7)
NEUTROPHILS NFR BLD AUTO: 57.6 % (ref 42.7–76)
PLATELET # BLD AUTO: 169 10*3/MM3 (ref 140–450)
PMV BLD AUTO: 8.6 FL (ref 6–12)
POTASSIUM SERPL-SCNC: 3.9 MMOL/L (ref 3.5–5.2)
PROT SERPL-MCNC: 7.1 G/DL (ref 6–8.5)
RBC # BLD AUTO: 3.56 10*6/MM3 (ref 3.77–5.28)
SODIUM SERPL-SCNC: 139 MMOL/L (ref 136–145)
WBC NRBC COR # BLD AUTO: 3.49 10*3/MM3 (ref 3.4–10.8)

## 2024-11-19 PROCEDURE — 99214 OFFICE O/P EST MOD 30 MIN: CPT | Performed by: INTERNAL MEDICINE

## 2024-11-19 PROCEDURE — 36415 COLL VENOUS BLD VENIPUNCTURE: CPT

## 2024-11-19 PROCEDURE — 80053 COMPREHEN METABOLIC PANEL: CPT

## 2024-11-19 PROCEDURE — 85025 COMPLETE CBC W/AUTO DIFF WBC: CPT

## 2024-11-19 NOTE — PROGRESS NOTES
Hematology and Oncology [place]  Office number [phone]    Fax number [phone]     Follow up     Date: 24    Patient Name: Debra Beckman  MRN: [id number]  : 1970    Referring Physician: Dr. Goran Russell    Chief Complaint: follow up breast cancer    Cancer Staging: Stage IA (cT1b, cN0, cM0, G1, ER+, UT+, HER2-)    History of Present Illness: Debra Beckman is a pleasant 54 y.o. female who presents today for evaluation of left breast cancer.  She is accompanied by her supportive father who is a retired [employer] neurosurgeon, Dr. Beckman.    Screening mammogram 2022 demonstrated an asymmetry in the anterior left medial breast.  Diagnostic mammogram on 2022 demonstrated a persistent ill-defined asymmetry with architectural distortion in the left 9:00 periareolar region.  On ultrasound, this corresponded to an 8 mm mass in the 9:00 left breast.    Left breast 9:00 biopsy on 8/10/2022 demonstrated grade 1 invasive ductal carcinoma with associated DCIS (%, UT 60%, HER2/kathleen negative, 0+).    She proceeded with bilateral breast MRI on 8/15/2022.  This demonstrated a 1.4 cm 9:00 left breast enhancing mass, with a 1.3 cm area of clumped non-mass enhancement versus postbiopsy change 1 cm anterior lateral to this.  There is additionally a dominant focus of enhancement suspicious for a satellite lesion spanning 0.4 cm.  She underwent second look ultrasound and biopsy of the second lesion which corresponded to a subcentimeteric mass on ultrasound.    Left breast 9:00 biopsy on 2022 showed grade 2 invasive ductal carcinoma (ER greater than 90%, UT greater than 80%, HER2/kathleen negative, 0+)    Left breast lumpectomy, sentinel lymph node biopsy on 2022 demonstrated grade 2 invasive ductal carcinoma spanning 1.4 cm with associated DCIS.  Margin positive.  [place] lymph node positive () with a macrometastasis spanning 4 mm with extracapsular extension. She underwent  re-excision 22 with fat necrosis and no residual invasive or in situ carcinoma. Single left axillary LN excised and benign (total LN count 1/2).    Breast cancer risk profile:  G3, P2; Age of first live birth 37  Premenopausal, LMP was 2022.  She discontinued OCPs at the time of her cancer diagnosis.  Family history of breast, ovarian, prostate or pancreatic cancer: Maternal aunt with breast cancer.  Genetics: [facility] cancer next panel was negative in .    Treatment history:  TC x 6 cycles  Adjuvant radiation completed 3/27/23    Arimidex, planning 2023  Verzinio, C1 23  Zometa, C1 23    Interval history:  Here for follow up on Verzenio and Arimidex accompanied by her supportive dad. Also underwent MRI of brain/lumbar. Completing PT twice weekly which is helping her back/lower extremity symptoms. Also pelvic floor PT.   Mood and hot flashes well controlled  Lyrica is helping her pain  No other new symptoms.    Past Medical History:   Past Medical History:   Diagnosis Date    Allergic     Asthma     Depression     Drug therapy     History of radiation therapy 2023    left breast/regional LNs    Hx of radiation therapy     Low back pain     Malignant neoplasm of central portion of left breast in female, estrogen receptor positive 2022    Radiculopathy 2022       Past Surgical History:   Past Surgical History:   Procedure Laterality Date    BREAST BIOPSY  08/10/2022    BREAST BIOPSY Left 2022    BREAST LUMPECTOMY      BREAST LUMPECTOMY WITH AXILLARY NODE DISSECTION Left 2022    clean dread    BREAST LUMPECTOMY WITH SENTINEL NODE BIOPSY Left 2022     SECTION      VENOUS ACCESS DEVICE (PORT) INSERTION Right 2022       Family History:   Family History   Problem Relation Age of Onset    Hypertension Mother     No Known Problems Father     Breast cancer Maternal Aunt     Alcohol abuse Maternal Aunt     Anxiety disorder Maternal Aunt     Depression  Maternal Aunt     Alcohol abuse Maternal Aunt             Anxiety disorder Maternal Aunt     Depression Maternal Aunt     Alcohol abuse Maternal Uncle     Depression Maternal Uncle     Drug abuse Maternal Uncle     Ovarian cancer Neg Hx        Social History:   Social History     Socioeconomic History    Marital status:    Tobacco Use    Smoking status: Former     Passive exposure: Past    Smokeless tobacco: Never   Vaping Use    Vaping status: Former   Substance and Sexual Activity    Alcohol use: Yes     Comment: occasional    Drug use: No    Sexual activity: Not Currently   Works as a .      Medications:     Current Outpatient Medications:     Abemaciclib 150 MG tablet, Take 1 tablet by mouth 2 (Two) Times a Day., Disp: 56 tablet, Rfl: 11    albuterol sulfate  (90 Base) MCG/ACT inhaler, Inhale 2 puffs Every 4 (Four) Hours As Needed for Wheezing., Disp: 18 g, Rfl: 11    anastrozole (ARIMIDEX) 1 MG tablet, Take 1 tablet by mouth Daily., Disp: 90 tablet, Rfl: 1    budesonide-formoterol (SYMBICORT) 160-4.5 MCG/ACT inhaler, Inhale 2 puffs 2 (Two) Times a Day., Disp: 6 g, Rfl: 5    methocarbamol (ROBAXIN) 500 MG tablet, Take 1 tablet by mouth 4 (Four) Times a Day., Disp: 40 tablet, Rfl: 1    pregabalin (LYRICA) 50 MG capsule, TAKE 1 CAPSULE BY MOUTH TWICE DAILY, Disp: 60 capsule, Rfl: 3    prochlorperazine (COMPAZINE) 5 MG tablet, Take 1 tablet by mouth Every 8 (Eight) Hours As Needed for Nausea or Vomiting., Disp: 90 tablet, Rfl: 2    venlafaxine XR (EFFEXOR-XR) 75 MG 24 hr capsule, Take 1 capsule by mouth Daily., Disp: 90 capsule, Rfl: 1    vitamin B-12 (CYANOCOBALAMIN) 1000 MCG tablet, Take 1 tablet by mouth Daily., Disp: , Rfl:     vitamin B-6 (PYRIDOXINE) 50 MG tablet, Take 1 tablet by mouth Daily., Disp: , Rfl:     Vitamin D, Cholecalciferol, (CHOLECALCIFEROL) 10 MCG (400 UNIT) tablet, Take 1 tablet by mouth Daily., Disp: , Rfl:     Allergies:   No Known  "Allergies    Objective     Vital Signs:   Vitals:    11/19/24 1308   BP: 123/75   Pulse: 66   Resp: 16   SpO2: 99%   Weight: 56.2 kg (124 lb)   Height: 165.1 cm (65\")   PainSc: 0-No pain    Body mass index is 20.63 kg/m².   Pain Score    11/19/24 1308   PainSc: 0-No pain       ECOG Performance Status: 0    Physical Exam:  General: No acute distress. Well appearing   HEENT: Normocephalic, atraumatic. Sclera anicteric.   Neck: supple, no adenopathy.   Cardiovascular: regular rate and rhythm. No murmurs.   Respiratory: Normal rate. Clear to auscultation bilaterally  Abdomen: Soft, nontender, non distended with normoactive bowel sounds  Lymph: no cervical, supraclavicular or axillary adenopathy  Neuro: Alert and oriented x 3. No focal deficits.   Ext: Symmetric, no swelling.   Breast: No palpable masses bilaterally  Accurate 11/25/24    Laboratory/Imaging Reviewed:   Lab on 11/19/2024   Component Date Value Ref Range Status    WBC 11/19/2024 3.49  3.40 - 10.80 10*3/mm3 Final    RBC 11/19/2024 3.56 (L)  3.77 - 5.28 10*6/mm3 Final    Hemoglobin 11/19/2024 13.4  12.0 - 15.9 g/dL Final    Hematocrit 11/19/2024 38.2  34.0 - 46.6 % Final    MCV 11/19/2024 107.3 (H)  79.0 - 97.0 fL Final    MCH 11/19/2024 37.6 (H)  26.6 - 33.0 pg Final    MCHC 11/19/2024 35.1  31.5 - 35.7 g/dL Final    RDW 11/19/2024 12.6  12.3 - 15.4 % Final    RDW-SD 11/19/2024 49.9  37.0 - 54.0 fl Final    MPV 11/19/2024 8.6  6.0 - 12.0 fL Final    Platelets 11/19/2024 169  140 - 450 10*3/mm3 Final    Neutrophil % 11/19/2024 57.6  42.7 - 76.0 % Final    Lymphocyte % 11/19/2024 32.1  19.6 - 45.3 % Final    Monocyte % 11/19/2024 8.3  5.0 - 12.0 % Final    Eosinophil % 11/19/2024 0.6  0.3 - 6.2 % Final    Basophil % 11/19/2024 1.4  0.0 - 1.5 % Final    Immature Grans % 11/19/2024 0.0  0.0 - 0.5 % Final    Neutrophils, Absolute 11/19/2024 2.01  1.70 - 7.00 10*3/mm3 Final    Lymphocytes, Absolute 11/19/2024 1.12  0.70 - 3.10 10*3/mm3 Final    Monocytes, Absolute " 11/19/2024 0.29  0.10 - 0.90 10*3/mm3 Final    Eosinophils, Absolute 11/19/2024 0.02  0.00 - 0.40 10*3/mm3 Final    Basophils, Absolute 11/19/2024 0.05  0.00 - 0.20 10*3/mm3 Final    Immature Grans, Absolute 11/19/2024 0.00  0.00 - 0.05 10*3/mm3 Final     Lab on 11/19/2024   Component Date Value Ref Range Status    WBC 11/19/2024 3.49  3.40 - 10.80 10*3/mm3 Final    RBC 11/19/2024 3.56 (L)  3.77 - 5.28 10*6/mm3 Final    Hemoglobin 11/19/2024 13.4  12.0 - 15.9 g/dL Final    Hematocrit 11/19/2024 38.2  34.0 - 46.6 % Final    MCV 11/19/2024 107.3 (H)  79.0 - 97.0 fL Final    MCH 11/19/2024 37.6 (H)  26.6 - 33.0 pg Final    MCHC 11/19/2024 35.1  31.5 - 35.7 g/dL Final    RDW 11/19/2024 12.6  12.3 - 15.4 % Final    RDW-SD 11/19/2024 49.9  37.0 - 54.0 fl Final    MPV 11/19/2024 8.6  6.0 - 12.0 fL Final    Platelets 11/19/2024 169  140 - 450 10*3/mm3 Final    Neutrophil % 11/19/2024 57.6  42.7 - 76.0 % Final    Lymphocyte % 11/19/2024 32.1  19.6 - 45.3 % Final    Monocyte % 11/19/2024 8.3  5.0 - 12.0 % Final    Eosinophil % 11/19/2024 0.6  0.3 - 6.2 % Final    Basophil % 11/19/2024 1.4  0.0 - 1.5 % Final    Immature Grans % 11/19/2024 0.0  0.0 - 0.5 % Final    Neutrophils, Absolute 11/19/2024 2.01  1.70 - 7.00 10*3/mm3 Final    Lymphocytes, Absolute 11/19/2024 1.12  0.70 - 3.10 10*3/mm3 Final    Monocytes, Absolute 11/19/2024 0.29  0.10 - 0.90 10*3/mm3 Final    Eosinophils, Absolute 11/19/2024 0.02  0.00 - 0.40 10*3/mm3 Final    Basophils, Absolute 11/19/2024 0.05  0.00 - 0.20 10*3/mm3 Final    Immature Grans, Absolute 11/19/2024 0.00  0.00 - 0.05 10*3/mm3 Final     MRI Lumbar Spine With & Without Contrast    Result Date: 11/14/2024  Narrative: MRI LUMBAR SPINE W WO CONTRAST Date of Exam: 11/12/2024 1:34 PM EST Indication: right foot numbness h/o breast cancer.  Comparison: 10/5/2023. Technique:  Routine multiplanar/multisequence sequence images of the lumbar spine were obtained before and after the uneventful  administration of 10 mL Multihance.  Findings: Redemonstrated minimally edematous Modic endplate changes at L4-5. There is no evidence of fracture or suspicious marrow replacing lesion. Alignment remains anatomic without listhesis or subluxation. There is no abnormal enhancement. The conus medullaris  and cauda equina nerve roots are satisfactory in appearance. The paraspinal soft tissues demonstrate no acute or suspicious findings. Spondylosis changes appear stable and relatively mild without evidence of associated significant spinal canal or neuroforaminal impingement     Impression: Impression: Stable contrast-enhanced MRI of the lumbar spine demonstrating no evidence of progressive spondylosis or findings to suggest metastatic involvement. Electronically Signed: Julio Del Angel MD  11/14/2024 11:29 AM EST  Workstation ID: [id number]    MRI Brain With & Without Contrast    Result Date: 11/13/2024  Narrative: MRI BRAIN W WO CONTRAST Date of Exam: 11/12/2024 1:35 PM EST Indication: right foot numbness.  Comparison: None available. Technique:  Routine multiplanar/multisequence sequence images of the brain were obtained before and after the uneventful administration of 18 mL Multihance. Findings: No acute infarct is present on diffusion weighted sequences. Midline structures appear normal and the craniocervical junction is satisfactory. Gray-white differentiation is maintained. There is no evidence of intracranial hemorrhage, mass or mass effect.  There is no abnormal brain parenchymal enhancement. The orbits are normal. The paranasal sinuses appear clear.     Impression: Impression: No evidence of intracranial metastatic disease. Essentially normal contrast-enhanced MRI of the brain. Electronically Signed: Julio Del Angel MD  11/13/2024 11:08 AM EST  Workstation ID: [id number]      Assessment / Plan        1. Malignant neoplasm of central portion of left breast in female, estrogen receptor positive  2. High risk  medication use  -She has an early stage hormone sensitive breast cancer with a single positive lymph node.  She has undergone curative intent surgery.  -Given strongly ER positive disease and N1, I recommended adjuvant TC over dose dense AC followed by weekly taxol and she completed 6 cycles  -She completed adjuvant radiation.   -Ki67 20%, initiated adjuvant Verzenio.  Labs are in an acceptable range to continue Verzenio including Cbc/CMP/  -Follow up in 3 mo with repeat labs     3. Anxiety  4. Hot flashes  -Continue Effexor.  Well controlled.      5. Bone health  -Ca/D recommended  -We discussed ASCO guidelines regarding adjuvant bisphosphonate use in early breast cancer. We reviewed the schedule and side effects of zometa including but not limited to bone pain, renal dysfunction, osteonecrosis and allergic reaction. Discussed the importance of routine dental care and need to avoid invasive dental procedures/notify dentist of medication use. Informed consent was obtained, and the patient elected to proceed pending dental evaluation.We reviewed the schedule and side effects including but not limited to bone pain, renal dysfunction, osteonecrosis and allergic reaction. Discussed the importance of routine dental care and need to avoid invasive dental procedures/notify dentist of medication use. Informed consent was obtained, and the patient elected to proceed.   -Next cycle due 3/2025, DEXA after dose #4    6. Vaginal dryness  -Recommend consistent [place] use and use replens in between  -pelvic floor PT    7.  Low back pain  8. New RLE numbness  MRI L spine and brain reviewed.     9.  Post radiation pneumonitis  -Her cough is now resolved.  -CT from October showed radiation pneumonitis  -Repeat chest CT stable Spring 2024, repeat Spring 2025    Follow Up:   3 mo  Orders Placed This Encounter   Procedures    CT Chest With Contrast    Comprehensive Metabolic Panel    Vitamin B12    Folate    TSH    CBC & Differential         Rama Cano MD  Hematology and Oncology

## 2024-11-25 ENCOUNTER — SPECIALTY PHARMACY (OUTPATIENT)
Dept: ONCOLOGY | Facility: HOSPITAL | Age: 54
End: 2024-11-25
Payer: COMMERCIAL

## 2024-11-25 RX ORDER — TIZANIDINE HYDROCHLORIDE 2 MG/1
2 CAPSULE, GELATIN COATED ORAL 3 TIMES DAILY PRN
Qty: 40 CAPSULE | Refills: 1 | OUTPATIENT
Start: 2024-11-25

## 2024-11-30 DIAGNOSIS — R20.0 NUMBNESS OF RIGHT FOOT: ICD-10-CM

## 2024-11-30 DIAGNOSIS — G89.29 CHRONIC LOW BACK PAIN, UNSPECIFIED BACK PAIN LATERALITY, UNSPECIFIED WHETHER SCIATICA PRESENT: ICD-10-CM

## 2024-11-30 DIAGNOSIS — M54.50 CHRONIC LOW BACK PAIN, UNSPECIFIED BACK PAIN LATERALITY, UNSPECIFIED WHETHER SCIATICA PRESENT: ICD-10-CM

## 2024-12-02 RX ORDER — VENLAFAXINE HYDROCHLORIDE 75 MG/1
75 CAPSULE, EXTENDED RELEASE ORAL DAILY
Qty: 90 CAPSULE | Refills: 1 | Status: SHIPPED | OUTPATIENT
Start: 2024-12-02

## 2024-12-19 ENCOUNTER — TREATMENT (OUTPATIENT)
Dept: PHYSICAL THERAPY | Facility: CLINIC | Age: 54
End: 2024-12-19
Payer: COMMERCIAL

## 2024-12-19 DIAGNOSIS — G89.29 CHRONIC LOW BACK PAIN, UNSPECIFIED BACK PAIN LATERALITY, UNSPECIFIED WHETHER SCIATICA PRESENT: Primary | ICD-10-CM

## 2024-12-19 DIAGNOSIS — M54.50 CHRONIC LOW BACK PAIN, UNSPECIFIED BACK PAIN LATERALITY, UNSPECIFIED WHETHER SCIATICA PRESENT: Primary | ICD-10-CM

## 2024-12-23 NOTE — PROGRESS NOTES
Physical Therapy Daily Treatment Note    [facility]   [address], [address] [place] [address]      Patient: Debra Beckman   : 1970  Referring practitioner: No ref. provider found  Date of Initial Visit: Type: THERAPY  Noted: 10/4/2023  Today's Date: 2024  Patient seen for 14 sessions    Certification Period 2024 thru 3/22/2025       Visit Diagnoses:    ICD-10-CM ICD-9-CM   1. Chronic low back pain, unspecified back pain laterality, unspecified whether sciatica present  M54.50 724.2    G89.29 338.29       Subjective     Pt states that she is feeling some increase in pain in the low back and SI joints. She recently went on a trip and had a fall while she was travelling. She fell on the right side and she feels like this caused an increase in pain and possibly a shift in her pelvis.     Objective   See Exercise, Manual, and Modality Logs for complete treatment.       Assessment/Plan     Patient responded well to dry needling clinic today and she reported decrease in her overall symptom intensity.  Will continue to progress activity as indicated.    Debra Beckman will continue to benefit from skilled physical therapy services to address deficits and continue to work towards reaching functional goals.           Timed:         Manual Therapy:         mins  23069;     Therapeutic Exercise:         mins  73602;     Neuromuscular Mera:        mins  75221;    Therapeutic Activity:          mins  10948;     Gait Training:           mins  32565;     Ultrasound:          mins  68074;    Ionto                                   mins   29072  Self Care                            mins   51649  Canalith Repos         mins 54923  Electrical Stimulation:         mins  88244    Un-Timed:  Electrical Stimulation:         mins  28687 ( );  Dry Needling     30     mins self-pay  Traction          mins 97521      Timed Treatment:   0   mins   Total Treatment:     30   mins    Robinson  Dulce PT, DPT, Cert. DN  [place] License: [id number]                   Diabetes mellitus    High cholesterol    HTN (hypertension)

## 2025-01-02 ENCOUNTER — DOCUMENTATION (OUTPATIENT)
Dept: GENETICS | Facility: HOSPITAL | Age: 55
End: 2025-01-02
Payer: COMMERCIAL

## 2025-01-02 LAB — NCCN CRITERIA FLAG: ABNORMAL

## 2025-01-02 NOTE — PROGRESS NOTES
This patient recently completed the CARE risk assessment for a mammogram appointment. Based on the patient's responses, NCCN criteria for genetic testing was met.     Navigator follow-up:   The patient had [facility] w/ [facility] genetic testing in 2022 which was negative.  Additional testing is not indicated at this time.

## 2025-01-02 NOTE — PROGRESS NOTES
Meets NCCN Criteria for Genetic Testing  Declines genetic testing? Y   Reason for decline? Previous Testing   If Reason for Decline is Previous Testing    [facility] CARE     Non-CARE Y   Non-CARE Confirmation    Navigator Confirmed? Y   Patient Reported?     Elevated Tyrer-Cuzick Score ? Or Equal to 20%    Declines referral?     Reason for decline?

## 2025-01-06 RX ORDER — ANASTROZOLE 1 MG/1
1 TABLET ORAL DAILY
Qty: 90 TABLET | Refills: 1 | Status: SHIPPED | OUTPATIENT
Start: 2025-01-06

## 2025-01-09 ENCOUNTER — HOSPITAL ENCOUNTER (OUTPATIENT)
Dept: MAMMOGRAPHY | Facility: HOSPITAL | Age: 55
Discharge: HOME | End: 2025-01-09
Admitting: SURGERY
Payer: COMMERCIAL

## 2025-01-09 DIAGNOSIS — R92.8 ABNORMAL MAMMOGRAM: ICD-10-CM

## 2025-01-09 PROCEDURE — G0279 TOMOSYNTHESIS, MAMMO: HCPCS

## 2025-01-09 PROCEDURE — 77066 DX MAMMO INCL CAD BI: CPT

## 2025-01-28 ENCOUNTER — HOSPITAL ENCOUNTER (OUTPATIENT)
Facility: HOSPITAL | Age: 55
Discharge: HOME | End: 2025-01-28
Admitting: OBSTETRICS & GYNECOLOGY
Payer: COMMERCIAL

## 2025-01-28 DIAGNOSIS — Z85.3 PERSONAL HISTORY OF MALIGNANT NEOPLASM OF BREAST: ICD-10-CM

## 2025-01-28 PROCEDURE — C8908 MRI W/O FOL W/CONT, BREAST,: HCPCS

## 2025-01-28 PROCEDURE — C8937 CAD BREAST MRI: HCPCS

## 2025-01-28 PROCEDURE — 25510000002 GADOBENATE DIMEGLUMINE 529 MG/ML SOLUTION: Performed by: OBSTETRICS & GYNECOLOGY

## 2025-01-28 PROCEDURE — A9577 INJ MULTIHANCE: HCPCS | Performed by: OBSTETRICS & GYNECOLOGY

## 2025-01-28 RX ADMIN — GADOBENATE DIMEGLUMINE 11 ML: 529 INJECTION, SOLUTION INTRAVENOUS at 12:48

## 2025-02-05 ENCOUNTER — TREATMENT (OUTPATIENT)
Dept: PHYSICAL THERAPY | Facility: CLINIC | Age: 55
End: 2025-02-05
Payer: COMMERCIAL

## 2025-02-05 DIAGNOSIS — M54.50 CHRONIC LOW BACK PAIN, UNSPECIFIED BACK PAIN LATERALITY, UNSPECIFIED WHETHER SCIATICA PRESENT: Primary | ICD-10-CM

## 2025-02-05 DIAGNOSIS — G89.29 CHRONIC LOW BACK PAIN, UNSPECIFIED BACK PAIN LATERALITY, UNSPECIFIED WHETHER SCIATICA PRESENT: Primary | ICD-10-CM

## 2025-02-05 PROCEDURE — 20561 NDL INSJ W/O NJX 3+ MUSC: CPT | Performed by: PHYSICAL THERAPIST

## 2025-02-06 DIAGNOSIS — M54.50 CHRONIC BILATERAL LOW BACK PAIN WITHOUT SCIATICA: ICD-10-CM

## 2025-02-06 DIAGNOSIS — M51.369 DDD (DEGENERATIVE DISC DISEASE), LUMBAR: ICD-10-CM

## 2025-02-06 DIAGNOSIS — G89.29 CHRONIC BILATERAL LOW BACK PAIN WITHOUT SCIATICA: ICD-10-CM

## 2025-02-07 DIAGNOSIS — Z17.0 MALIGNANT NEOPLASM OF CENTRAL PORTION OF LEFT BREAST IN FEMALE, ESTROGEN RECEPTOR POSITIVE: ICD-10-CM

## 2025-02-07 DIAGNOSIS — C50.112 MALIGNANT NEOPLASM OF CENTRAL PORTION OF LEFT BREAST IN FEMALE, ESTROGEN RECEPTOR POSITIVE: ICD-10-CM

## 2025-02-07 RX ORDER — PREGABALIN 50 MG/1
50 CAPSULE ORAL 2 TIMES DAILY
Qty: 60 CAPSULE | Refills: 2 | Status: SHIPPED | OUTPATIENT
Start: 2025-02-07

## 2025-02-10 ENCOUNTER — SPECIALTY PHARMACY (OUTPATIENT)
Dept: ONCOLOGY | Facility: HOSPITAL | Age: 55
End: 2025-02-10
Payer: COMMERCIAL

## 2025-02-10 DIAGNOSIS — Z17.0 MALIGNANT NEOPLASM OF CENTRAL PORTION OF LEFT BREAST IN FEMALE, ESTROGEN RECEPTOR POSITIVE: ICD-10-CM

## 2025-02-10 DIAGNOSIS — C50.112 MALIGNANT NEOPLASM OF CENTRAL PORTION OF LEFT BREAST IN FEMALE, ESTROGEN RECEPTOR POSITIVE: ICD-10-CM

## 2025-02-10 RX ORDER — ABEMACICLIB 150 MG/1
1 TABLET ORAL 2 TIMES DAILY
Qty: 56 TABLET | Refills: 11 | OUTPATIENT
Start: 2025-02-10

## 2025-02-10 NOTE — TELEPHONE ENCOUNTER
I sent a new prescription for Verzenio to the pharmacy today on 2/10/25.    Kristyn Ohara, PharmD

## 2025-02-10 NOTE — PROGRESS NOTES
Re: Refills of Oral Specialty Medication - Verzenio (abemaciclib)    Drug-Drug Interactions: The current medication list was reviewed and there are no relevant drug-drug interactions with the specialty medication.  Medication Allergies: The patient has NKDA  Review of Labs/Dose Adjustments: NO DOSE CHANGE - I reviewed the most recent note and labs and the patient will continue without any dose changes.  I sent refills as described below.    The patient started Verzenio in April 2023. The patient will complete 2 years of treatment with Verzenio in April 2025, so I sent the prescription below with 2 refills.    Drug: Verzenio (abemaciclib)  Strength: 150 mg  Directions: Take 1 tablet by mouth twice a day  Quantity: 56 tablets  Refills: 2  Pharmacy prescription sent to: [facility]    Kristyn Ohara, PharmD  [facility]inic On[facility] Pharmacy Specialist  [phone]    2/10/2025  12:32 EST

## 2025-02-11 NOTE — PROGRESS NOTES
Physical Therapy Daily Treatment Note    [facility]   [address], [address] [place] [address]      Patient: Debra Beckman   : 1970  Referring practitioner: No ref. provider found  Date of Initial Visit: Type: THERAPY  Noted: 10/4/2023  Today's Date: 2025  Patient seen for 15 sessions    Certification Period 2025 thru 2025       Visit Diagnoses:    ICD-10-CM ICD-9-CM   1. Chronic low back pain, unspecified back pain laterality, unspecified whether sciatica present  M54.50 724.2    G89.29 338.29       Subjective     Patient states that she feels like her low back pain has been well-managed over the past several months.  She has been attending for additional therapy sessions added on the location and also feels like the dry needling is helpful and decreasing tension in the low back and the gluteals.    Objective   See Exercise, Manual, and Modality Logs for complete treatment.       Assessment/Plan     Patient tolerated treatment well today and did not experience any exacerbation of symptoms.  Decreased tone noted at the conclusion of treatment.  Will continue as indicated.    Debra Beckman will continue to benefit from skilled physical therapy services to address deficits and continue to work towards reaching functional goals.           Timed:         Manual Therapy:         mins  84785;     Therapeutic Exercise:         mins  67597;     Neuromuscular Mera:        mins  12568;    Therapeutic Activity:          mins  74497;     Gait Training:           mins  96924;     Ultrasound:         mins  98157;    Ionto                                   mins   69008  Self Care                            mins   36889  Canalith Repos         mins 07262  Electrical Stimulation:         mins  89802    Un-Timed:  Electrical Stimulation:         mins  15689 ( );  Dry Needling     30     mins self-pay  Traction          mins 77867      Timed Treatment:   0   mins   Total  Treatment:     30   mins    Robinson Cardona PT, DPT, Cert. DN  [place] License: [id number]

## 2025-02-12 ENCOUNTER — HOSPITAL ENCOUNTER (OUTPATIENT)
Dept: CT IMAGING | Facility: HOSPITAL | Age: 55
Discharge: HOME | End: 2025-02-12
Admitting: INTERNAL MEDICINE
Payer: COMMERCIAL

## 2025-02-12 DIAGNOSIS — Z79.899 HIGH RISK MEDICATION USE: ICD-10-CM

## 2025-02-12 DIAGNOSIS — C50.112 MALIGNANT NEOPLASM OF CENTRAL PORTION OF LEFT BREAST IN FEMALE, ESTROGEN RECEPTOR POSITIVE: ICD-10-CM

## 2025-02-12 DIAGNOSIS — Z17.0 MALIGNANT NEOPLASM OF CENTRAL PORTION OF LEFT BREAST IN FEMALE, ESTROGEN RECEPTOR POSITIVE: ICD-10-CM

## 2025-02-12 PROCEDURE — 25510000001 IOPAMIDOL 61 % SOLUTION: Performed by: INTERNAL MEDICINE

## 2025-02-12 PROCEDURE — 71260 CT THORAX DX C+: CPT

## 2025-02-12 RX ORDER — IOPAMIDOL 612 MG/ML
100 INJECTION, SOLUTION INTRAVASCULAR
Status: COMPLETED | OUTPATIENT
Start: 2025-02-12 | End: 2025-02-12

## 2025-02-12 RX ADMIN — IOPAMIDOL 85 ML: 612 INJECTION, SOLUTION INTRAVENOUS at 10:01

## 2025-02-19 ENCOUNTER — LAB (OUTPATIENT)
Dept: LAB | Facility: HOSPITAL | Age: 55
End: 2025-02-19
Payer: COMMERCIAL

## 2025-02-19 ENCOUNTER — SPECIALTY PHARMACY (OUTPATIENT)
Dept: ONCOLOGY | Facility: HOSPITAL | Age: 55
End: 2025-02-19
Payer: COMMERCIAL

## 2025-02-19 ENCOUNTER — OFFICE VISIT (OUTPATIENT)
Dept: ONCOLOGY | Facility: CLINIC | Age: 55
End: 2025-02-19
Payer: COMMERCIAL

## 2025-02-19 VITALS
HEART RATE: 68 BPM | BODY MASS INDEX: 21.16 KG/M2 | SYSTOLIC BLOOD PRESSURE: 113 MMHG | HEIGHT: 65 IN | OXYGEN SATURATION: 97 % | TEMPERATURE: 97.2 F | WEIGHT: 127 LBS | DIASTOLIC BLOOD PRESSURE: 77 MMHG

## 2025-02-19 DIAGNOSIS — Z79.899 HIGH RISK MEDICATION USE: ICD-10-CM

## 2025-02-19 DIAGNOSIS — C50.112 MALIGNANT NEOPLASM OF CENTRAL PORTION OF LEFT BREAST IN FEMALE, ESTROGEN RECEPTOR POSITIVE: Primary | ICD-10-CM

## 2025-02-19 DIAGNOSIS — C50.112 MALIGNANT NEOPLASM OF CENTRAL PORTION OF LEFT BREAST IN FEMALE, ESTROGEN RECEPTOR POSITIVE: ICD-10-CM

## 2025-02-19 DIAGNOSIS — Z17.0 MALIGNANT NEOPLASM OF CENTRAL PORTION OF LEFT BREAST IN FEMALE, ESTROGEN RECEPTOR POSITIVE: ICD-10-CM

## 2025-02-19 DIAGNOSIS — Z17.0 MALIGNANT NEOPLASM OF CENTRAL PORTION OF LEFT BREAST IN FEMALE, ESTROGEN RECEPTOR POSITIVE: Primary | ICD-10-CM

## 2025-02-19 LAB
ALBUMIN SERPL-MCNC: 4.4 G/DL (ref 3.5–5.2)
ALBUMIN/GLOB SERPL: 1.9 G/DL
ALP SERPL-CCNC: 62 U/L (ref 39–117)
ALT SERPL W P-5'-P-CCNC: 22 U/L (ref 1–33)
ANION GAP SERPL CALCULATED.3IONS-SCNC: 8 MMOL/L (ref 5–15)
AST SERPL-CCNC: 27 U/L (ref 1–32)
BASOPHILS # BLD AUTO: 0.05 10*3/MM3 (ref 0–0.2)
BASOPHILS NFR BLD AUTO: 1.7 % (ref 0–1.5)
BILIRUB SERPL-MCNC: 0.4 MG/DL (ref 0–1.2)
BUN SERPL-MCNC: 13 MG/DL (ref 6–20)
BUN/CREAT SERPL: 17.6 (ref 7–25)
CALCIUM SPEC-SCNC: 9.6 MG/DL (ref 8.6–10.5)
CHLORIDE SERPL-SCNC: 102 MMOL/L (ref 98–107)
CO2 SERPL-SCNC: 30 MMOL/L (ref 22–29)
CREAT SERPL-MCNC: 0.74 MG/DL (ref 0.57–1)
DEPRECATED RDW RBC AUTO: 50.6 FL (ref 37–54)
EGFRCR SERPLBLD CKD-EPI 2021: 97.5 ML/MIN/1.73
EOSINOPHIL # BLD AUTO: 0.03 10*3/MM3 (ref 0–0.4)
EOSINOPHIL NFR BLD AUTO: 1 % (ref 0.3–6.2)
ERYTHROCYTE [DISTWIDTH] IN BLOOD BY AUTOMATED COUNT: 12.8 % (ref 12.3–15.4)
FOLATE SERPL-MCNC: 16.8 NG/ML (ref 4.78–24.2)
GLOBULIN UR ELPH-MCNC: 2.3 GM/DL
GLUCOSE SERPL-MCNC: 79 MG/DL (ref 65–99)
HCT VFR BLD AUTO: 39.1 % (ref 34–46.6)
HGB BLD-MCNC: 13.5 G/DL (ref 12–15.9)
IMM GRANULOCYTES # BLD AUTO: 0.01 10*3/MM3 (ref 0–0.05)
IMM GRANULOCYTES NFR BLD AUTO: 0.3 % (ref 0–0.5)
LYMPHOCYTES # BLD AUTO: 1.04 10*3/MM3 (ref 0.7–3.1)
LYMPHOCYTES NFR BLD AUTO: 36 % (ref 19.6–45.3)
MCH RBC QN AUTO: 36.7 PG (ref 26.6–33)
MCHC RBC AUTO-ENTMCNC: 34.5 G/DL (ref 31.5–35.7)
MCV RBC AUTO: 106.3 FL (ref 79–97)
MONOCYTES # BLD AUTO: 0.24 10*3/MM3 (ref 0.1–0.9)
MONOCYTES NFR BLD AUTO: 8.3 % (ref 5–12)
NEUTROPHILS NFR BLD AUTO: 1.52 10*3/MM3 (ref 1.7–7)
NEUTROPHILS NFR BLD AUTO: 52.7 % (ref 42.7–76)
PLATELET # BLD AUTO: 181 10*3/MM3 (ref 140–450)
PMV BLD AUTO: 8.3 FL (ref 6–12)
POTASSIUM SERPL-SCNC: 4.2 MMOL/L (ref 3.5–5.2)
PROT SERPL-MCNC: 6.7 G/DL (ref 6–8.5)
RBC # BLD AUTO: 3.68 10*6/MM3 (ref 3.77–5.28)
SODIUM SERPL-SCNC: 140 MMOL/L (ref 136–145)
TSH SERPL DL<=0.05 MIU/L-ACNC: 1.88 UIU/ML (ref 0.27–4.2)
VIT B12 BLD-MCNC: 375 PG/ML (ref 211–946)
WBC NRBC COR # BLD AUTO: 2.89 10*3/MM3 (ref 3.4–10.8)

## 2025-02-19 PROCEDURE — 82746 ASSAY OF FOLIC ACID SERUM: CPT

## 2025-02-19 PROCEDURE — 85060 BLOOD SMEAR INTERPRETATION: CPT

## 2025-02-19 PROCEDURE — 99214 OFFICE O/P EST MOD 30 MIN: CPT | Performed by: INTERNAL MEDICINE

## 2025-02-19 PROCEDURE — 36415 COLL VENOUS BLD VENIPUNCTURE: CPT

## 2025-02-19 PROCEDURE — 82607 VITAMIN B-12: CPT

## 2025-02-19 PROCEDURE — 80050 GENERAL HEALTH PANEL: CPT

## 2025-02-19 RX ORDER — SODIUM CHLORIDE 9 MG/ML
250 INJECTION, SOLUTION INTRAVENOUS ONCE
OUTPATIENT
Start: 2025-03-04

## 2025-02-19 NOTE — PROGRESS NOTES
Hematology and Oncology [place]  Office number [phone]    Fax number [phone]     Follow up     Date: 25    Patient Name: Debra Beckman  MRN: [id number]  : 1970    Referring Physician: Dr. Goran Russell    Chief Complaint: follow up breast cancer    Cancer Staging: Stage IA (cT1b, cN0, cM0, G1, ER+, TN+, HER2-)    History of Present Illness: Debra Beckman is a pleasant 54 y.o. female who presents today for evaluation of left breast cancer.  She is accompanied by her supportive father who is a retired [employer] neurosurgeon, Dr. Beckman.    Screening mammogram 2022 demonstrated an asymmetry in the anterior left medial breast.  Diagnostic mammogram on 2022 demonstrated a persistent ill-defined asymmetry with architectural distortion in the left 9:00 periareolar region.  On ultrasound, this corresponded to an 8 mm mass in the 9:00 left breast.    Left breast 9:00 biopsy on 8/10/2022 demonstrated grade 1 invasive ductal carcinoma with associated DCIS (%, TN 60%, HER2/kathleen negative, 0+).    She proceeded with bilateral breast MRI on 8/15/2022.  This demonstrated a 1.4 cm 9:00 left breast enhancing mass, with a 1.3 cm area of clumped non-mass enhancement versus postbiopsy change 1 cm anterior lateral to this.  There is additionally a dominant focus of enhancement suspicious for a satellite lesion spanning 0.4 cm.  She underwent second look ultrasound and biopsy of the second lesion which corresponded to a subcentimeteric mass on ultrasound.    Left breast 9:00 biopsy on 2022 showed grade 2 invasive ductal carcinoma (ER greater than 90%, TN greater than 80%, HER2/kathleen negative, 0+)    Left breast lumpectomy, sentinel lymph node biopsy on 2022 demonstrated grade 2 invasive ductal carcinoma spanning 1.4 cm with associated DCIS.  Margin positive.  [place] lymph node positive () with a macrometastasis spanning 4 mm with extracapsular extension. She underwent  re-excision 22 with fat necrosis and no residual invasive or in situ carcinoma. Single left axillary LN excised and benign (total LN count 1/2).    Breast cancer risk profile:  G3, P2; Age of first live birth 37  Premenopausal, LMP was 2022.  She discontinued OCPs at the time of her cancer diagnosis.  Family history of breast, ovarian, prostate or pancreatic cancer: Maternal aunt with breast cancer.  Genetics: [facility] cancer next panel was negative in .    Treatment history:  TC x 6 cycles  Adjuvant radiation completed 3/27/23    Arimidex, planning 2023  Verzinio, C1   Zometa, C1 23    Interval history:  Here for follow up on Verzenio and Arimidex accompanied by her supportive dad.  She is feeling well.  She has some mild intermittent nausea.  Diet trigger diarrhea.  All of this is mild.  Pelvic floor physical therapy has helped her vaginal symptoms.  She is currently in twice a week physical therapy for her low back pain and hip pain and this is also stable.  She denies any new issues.  Mood and hot flashes are well-controlled.     She has questions regarding duration of adjuvant Verzenio.      Past Medical History:   Past Medical History:   Diagnosis Date    Allergic     Asthma     Depression     Drug therapy     History of radiation therapy 2023    left breast/regional LNs    Hx of radiation therapy     Low back pain     Malignant neoplasm of central portion of left breast in female, estrogen receptor positive 2022    Radiculopathy 2022       Past Surgical History:   Past Surgical History:   Procedure Laterality Date    BREAST BIOPSY  08/10/2022    BREAST BIOPSY Left 2022    BREAST LUMPECTOMY      BREAST LUMPECTOMY WITH AXILLARY NODE DISSECTION Left 2022    clean dread    BREAST LUMPECTOMY WITH SENTINEL NODE BIOPSY Left 2022     SECTION      VENOUS ACCESS DEVICE (PORT) INSERTION Right 2022       Family History:   Family History    Problem Relation Age of Onset    Hypertension Mother     No Known Problems Father     Breast cancer Maternal Aunt     Alcohol abuse Maternal Aunt     Anxiety disorder Maternal Aunt     Depression Maternal Aunt     Alcohol abuse Maternal Aunt             Anxiety disorder Maternal Aunt     Depression Maternal Aunt     Alcohol abuse Maternal Uncle     Depression Maternal Uncle     Drug abuse Maternal Uncle     Ovarian cancer Neg Hx        Social History:   Social History     Socioeconomic History    Marital status:    Tobacco Use    Smoking status: Former     Passive exposure: Past    Smokeless tobacco: Never   Vaping Use    Vaping status: Former   Substance and Sexual Activity    Alcohol use: Yes     Comment: occasional    Drug use: No    Sexual activity: Not Currently   Works as a .      Medications:     Current Outpatient Medications:     Abemaciclib 150 MG tablet, Take 1 tablet by mouth 2 (Two) Times a Day., Disp: 56 tablet, Rfl: 2    albuterol sulfate  (90 Base) MCG/ACT inhaler, Inhale 2 puffs Every 4 (Four) Hours As Needed for Wheezing., Disp: 18 g, Rfl: 11    anastrozole (ARIMIDEX) 1 MG tablet, TAKE 1 TABLET BY MOUTH DAILY, Disp: 90 tablet, Rfl: 1    budesonide-formoterol (SYMBICORT) 160-4.5 MCG/ACT inhaler, Inhale 2 puffs 2 (Two) Times a Day., Disp: 6 g, Rfl: 5    methocarbamol (ROBAXIN) 500 MG tablet, Take 1 tablet by mouth 4 (Four) Times a Day., Disp: 40 tablet, Rfl: 1    pregabalin (LYRICA) 50 MG capsule, TAKE 1 CAPSULE BY MOUTH TWICE DAILY, Disp: 60 capsule, Rfl: 2    prochlorperazine (COMPAZINE) 5 MG tablet, Take 1 tablet by mouth Every 8 (Eight) Hours As Needed for Nausea or Vomiting., Disp: 90 tablet, Rfl: 2    venlafaxine XR (EFFEXOR-XR) 75 MG 24 hr capsule, TAKE 1 CAPSULE BY MOUTH DAILY, Disp: 90 capsule, Rfl: 1    vitamin B-12 (CYANOCOBALAMIN) 1000 MCG tablet, Take 1 tablet by mouth Daily., Disp: , Rfl:     vitamin B-6 (PYRIDOXINE) 50 MG tablet, Take 1 tablet  "by mouth Daily., Disp: , Rfl:     Vitamin D, Cholecalciferol, (CHOLECALCIFEROL) 10 MCG (400 UNIT) tablet, Take 1 tablet by mouth Daily., Disp: , Rfl:     Allergies:   No Known Allergies    Objective     Vital Signs:   Vitals:    02/19/25 1017   BP: 113/77   Pulse: 68   Temp: 97.2 °F (36.2 °C)   TempSrc: Infrared   SpO2: 97%   Weight: 57.6 kg (127 lb)   Height: 165.1 cm (65\")   PainSc: 0-No pain    Body mass index is 21.13 kg/m².   Pain Score    02/19/25 1017   PainSc: 0-No pain       ECOG Performance Status: 0    Physical Exam:  General: No acute distress. Well appearing   HEENT: Normocephalic, atraumatic. Sclera anicteric.   Neck: supple, no adenopathy.   Cardiovascular: regular rate and rhythm. No murmurs.   Respiratory: Normal rate. Clear to auscultation bilaterally  Abdomen: Soft, nontender, non distended with normoactive bowel sounds  Lymph: no cervical, supraclavicular or axillary adenopathy  Neuro: Alert and oriented x 3. No focal deficits.   Ext: Symmetric, no swelling.   Breast: No palpable masses bilaterally  Accurate 2/19/25    Laboratory/Imaging Reviewed:   No visits with results within 2 Week(s) from this visit.   Latest known visit with results is:   Orders Only on 01/02/2025   Component Date Value Ref Range Status    [facility] 01/02/2025 NCCN met (A)   Final    High Risk Cancer Risk Assessment     No visits with results within 2 Week(s) from this visit.   Latest known visit with results is:   Orders Only on 01/02/2025   Component Date Value Ref Range Status    [facility] 01/02/2025 NCCN met (A)   Final    High Risk Cancer Risk Assessment     CT Chest With Contrast Diagnostic    Result Date: 2/16/2025  Narrative: CT CHEST W CONTRAST DIAGNOSTIC Date of Exam: 2/12/2025 9:48 AM EST Indication: lung nodule h/o breast cancer. Comparison: 4/30/2024 Technique: Axial CT images were obtained of the chest after the uneventful intravenous administration of contrast .  Reconstructed coronal and sagittal images were also " obtained. Automated exposure control and iterative construction methods were used. Findings: The central airways are patent. There is linear areas of scarring, peripheral reticulation and mild bronchiectasis, most consistent with post treatment changes. No significant nodularity is identified. No suspicious groundglass opacities or nodules. There are calcified pulmonary nodules consistent with prior granulomatous infection. Thyroid is normal. No axillary or mediastinal adenopathy is identified. The left vertebral artery originates directly from the aorta. The thyroid is normal esophagus is normal. Pulmonary artery and aorta are are normal in caliber. Limited evaluation of the upper abdomen is unremarkable. No aggressive appearing lytic or sclerotic bone lesions are identified.     Impression: Impression: 1.No evidence of metastatic disease within the chest. 2.Post treatment changes within the lungs. Electronically Signed: Ken Moore MD  2/16/2025 2:01 PM EST  Workstation ID: [id number]    MRI Breast Bilateral Screening With & Without Contrast    Result Date: 1/31/2025  Narrative: BILATERAL BREAST MRI WITH CONTRAST  CLINICAL HISTORY: 54-year-old patient presents for intermediate risk screening breast MRI. The patient is status post previous left breast lumpectomy on 9/16/2022. The patient is postmenopausal. The patient is on anastrozole. The patient has had an interval 16 pound weight loss.  TECHNIQUE:  Pre-contrast spin-echo T1 weighted and T2 sequences were obtained in the axial plane.  Routine dynamic images were performed following the administration of 11.0 ml of Multihance contrast. Maximum intensity projection images were created. No contrast complications occurred.  Delayed high resolution post contrast T1 weighted sagittal images were also obtained.   A CAD system ([email or web address]) was utilized for data analysis. The patient was imaged with her arms down by her side.  COMPARISON: Comparison is made to a  preoperative breast MRI done on 8/18/2022. Comparison is made to the patient's most recent diagnostic mammograms done on 1/9/2025 and 7/8/2024.  FINDINGS: Contrast is identified within the heart and great vessels. There is minimal background contrast enhancement.  The breasts are overall both smaller when compared to the prior exams consistent with the patient's interval weight loss. There are new postlumpectomy changes involving the LEFT anterior medial and central breast. There is also new post radiation changes involving the left breast including skin and trabecular thickening and edema. No concerning mass or non-mass enhancement is seen involving either breast. No abnormal contrast enhancement is seen in association with the left lumpectomy bed.  There is no evidence of axillary adenopathy. There is no evidence of internal mammary adenopathy.      Impression: Benign intermediate risk screening breast MRI.  BI-RADS CATEGORY: 2, BENIGN  The patient is a candidate to continue with annual intermediate risk screening breast MRI.   This report was finalized on 1/31/2025 12:46 PM by Dr. Brook Lincoln MD.        Assessment / Plan        1. Malignant neoplasm of central portion of left breast in female, estrogen receptor positive  2. High risk medication use  -She has an early stage hormone sensitive breast cancer with a single positive lymph node.  She has undergone curative intent surgery.  -Given strongly ER positive disease and N1, I recommended adjuvant TC over dose dense AC followed by weekly taxol and she completed 6 cycles  -She completed adjuvant radiation.   -Ki67 20%, initiated adjuvant Verzenio. check labs today including the following:  Orders Placed This Encounter   Procedures    DEXA Bone Density Axial    Comprehensive Metabolic Panel    TSH    Folate    Vitamin B12    Comprehensive metabolic panel    Magnesium    Phosphorus    CBC & Differential    CBC and Differential       She will  complete 2 years of  adjuvant Verzenio in early May and discontinue at that time.  -Follow up in 3 mo with repeat labs     3. Anxiety  4. Hot flashes  -Continue Effexor.  Well controlled.      5. Bone health  -Ca/D recommended  -We discussed ASCO guidelines regarding adjuvant bisphosphonate use in early breast cancer. We reviewed the schedule and side effects of zometa including but not limited to bone pain, renal dysfunction, osteonecrosis and allergic reaction. Discussed the importance of routine dental care and need to avoid invasive dental procedures/notify dentist of medication use. Informed consent was obtained, and the patient elected to proceed pending dental evaluation.We reviewed the schedule and side effects including but not limited to bone pain, renal dysfunction, osteonecrosis and allergic reaction. Discussed the importance of routine dental care and need to avoid invasive dental procedures/notify dentist of medication use. Informed consent was obtained, and the patient elected to proceed.   -Next cycle due 3/2025, signed.  DEXA after dose #4.  Ordered today.    6. Vaginal dryness  -Recommend consistent [place] use and use replens in between  -Well controlled    7.  Low back pain  8. New RLE numbness  MRI L spine and brain reviewed.   -PT repeat chest CT stable.  Will transition to clinical follow-up only.    9.  Post radiation pneumonitis  -    Follow Up:   4 mo      Rama Cano MD  Hematology and Oncology

## 2025-02-20 LAB
CYTOLOGIST CVX/VAG CYTO: NORMAL
PATH INTERP BLD-IMP: NORMAL

## 2025-03-04 ENCOUNTER — HOSPITAL ENCOUNTER (OUTPATIENT)
Dept: ONCOLOGY | Facility: HOSPITAL | Age: 55
Discharge: HOME | End: 2025-03-04
Admitting: INTERNAL MEDICINE
Payer: COMMERCIAL

## 2025-03-04 VITALS
BODY MASS INDEX: 20.83 KG/M2 | WEIGHT: 125 LBS | SYSTOLIC BLOOD PRESSURE: 105 MMHG | DIASTOLIC BLOOD PRESSURE: 60 MMHG | RESPIRATION RATE: 16 BRPM | HEART RATE: 88 BPM | TEMPERATURE: 97.6 F | HEIGHT: 65 IN

## 2025-03-04 DIAGNOSIS — Z17.0 MALIGNANT NEOPLASM OF CENTRAL PORTION OF LEFT BREAST IN FEMALE, ESTROGEN RECEPTOR POSITIVE: Primary | ICD-10-CM

## 2025-03-04 DIAGNOSIS — C50.112 MALIGNANT NEOPLASM OF CENTRAL PORTION OF LEFT BREAST IN FEMALE, ESTROGEN RECEPTOR POSITIVE: Primary | ICD-10-CM

## 2025-03-04 LAB
ALBUMIN SERPL-MCNC: 4.2 G/DL (ref 3.5–5.2)
ALBUMIN/GLOB SERPL: 1.8 G/DL
ALP SERPL-CCNC: 69 U/L (ref 39–117)
ALT SERPL W P-5'-P-CCNC: 19 U/L (ref 1–33)
ANION GAP SERPL CALCULATED.3IONS-SCNC: 10 MMOL/L (ref 5–15)
AST SERPL-CCNC: 29 U/L (ref 1–32)
BASOPHILS # BLD AUTO: 0.01 10*3/MM3 (ref 0–0.2)
BASOPHILS NFR BLD AUTO: 0.5 % (ref 0–1.5)
BILIRUB SERPL-MCNC: 0.3 MG/DL (ref 0–1.2)
BUN SERPL-MCNC: 14 MG/DL (ref 6–20)
BUN/CREAT SERPL: 20 (ref 7–25)
CALCIUM SPEC-SCNC: 9 MG/DL (ref 8.6–10.5)
CHLORIDE SERPL-SCNC: 104 MMOL/L (ref 98–107)
CO2 SERPL-SCNC: 25 MMOL/L (ref 22–29)
CREAT BLDA-MCNC: 0.8 MG/DL (ref 0.6–1.3)
CREAT SERPL-MCNC: 0.7 MG/DL (ref 0.57–1)
DEPRECATED RDW RBC AUTO: 50.2 FL (ref 37–54)
EGFRCR SERPLBLD CKD-EPI 2021: 104.2 ML/MIN/1.73
EOSINOPHIL # BLD AUTO: 0.01 10*3/MM3 (ref 0–0.4)
EOSINOPHIL NFR BLD AUTO: 0.5 % (ref 0.3–6.2)
ERYTHROCYTE [DISTWIDTH] IN BLOOD BY AUTOMATED COUNT: 12.7 % (ref 12.3–15.4)
GLOBULIN UR ELPH-MCNC: 2.4 GM/DL
GLUCOSE SERPL-MCNC: 61 MG/DL (ref 65–99)
HCT VFR BLD AUTO: 37.6 % (ref 34–46.6)
HGB BLD-MCNC: 13.2 G/DL (ref 12–15.9)
IMM GRANULOCYTES # BLD AUTO: 0 10*3/MM3 (ref 0–0.05)
IMM GRANULOCYTES NFR BLD AUTO: 0 % (ref 0–0.5)
LYMPHOCYTES # BLD AUTO: 1.05 10*3/MM3 (ref 0.7–3.1)
LYMPHOCYTES NFR BLD AUTO: 48.8 % (ref 19.6–45.3)
MAGNESIUM SERPL-MCNC: 2.2 MG/DL (ref 1.6–2.6)
MCH RBC QN AUTO: 37.1 PG (ref 26.6–33)
MCHC RBC AUTO-ENTMCNC: 35.1 G/DL (ref 31.5–35.7)
MCV RBC AUTO: 105.6 FL (ref 79–97)
MONOCYTES # BLD AUTO: 0.19 10*3/MM3 (ref 0.1–0.9)
MONOCYTES NFR BLD AUTO: 8.8 % (ref 5–12)
NEUTROPHILS NFR BLD AUTO: 0.89 10*3/MM3 (ref 1.7–7)
NEUTROPHILS NFR BLD AUTO: 41.4 % (ref 42.7–76)
PHOSPHATE SERPL-MCNC: 3 MG/DL (ref 2.5–4.5)
PLATELET # BLD AUTO: 145 10*3/MM3 (ref 140–450)
PMV BLD AUTO: 8.7 FL (ref 6–12)
POTASSIUM SERPL-SCNC: 4 MMOL/L (ref 3.5–5.2)
PROT SERPL-MCNC: 6.6 G/DL (ref 6–8.5)
RBC # BLD AUTO: 3.56 10*6/MM3 (ref 3.77–5.28)
SODIUM SERPL-SCNC: 139 MMOL/L (ref 136–145)
WBC NRBC COR # BLD AUTO: 2.15 10*3/MM3 (ref 3.4–10.8)

## 2025-03-04 PROCEDURE — 25010000002 ZOLEDRONIC ACID 4 MG/100ML SOLUTION: Performed by: INTERNAL MEDICINE

## 2025-03-04 PROCEDURE — 96374 THER/PROPH/DIAG INJ IV PUSH: CPT

## 2025-03-04 PROCEDURE — 83735 ASSAY OF MAGNESIUM: CPT | Performed by: INTERNAL MEDICINE

## 2025-03-04 PROCEDURE — 85025 COMPLETE CBC W/AUTO DIFF WBC: CPT | Performed by: INTERNAL MEDICINE

## 2025-03-04 PROCEDURE — 82565 ASSAY OF CREATININE: CPT

## 2025-03-04 PROCEDURE — 84100 ASSAY OF PHOSPHORUS: CPT | Performed by: INTERNAL MEDICINE

## 2025-03-04 PROCEDURE — 80053 COMPREHEN METABOLIC PANEL: CPT | Performed by: INTERNAL MEDICINE

## 2025-03-04 RX ORDER — SODIUM CHLORIDE 9 MG/ML
250 INJECTION, SOLUTION INTRAVENOUS ONCE
Status: DISCONTINUED | OUTPATIENT
Start: 2025-03-04 | End: 2025-03-05 | Stop reason: HOSPADM

## 2025-03-04 RX ORDER — ZOLEDRONIC ACID 0.04 MG/ML
4 INJECTION, SOLUTION INTRAVENOUS ONCE
Status: COMPLETED | OUTPATIENT
Start: 2025-03-04 | End: 2025-03-04

## 2025-03-04 RX ADMIN — ZOLEDRONIC ACID 4 MG: 0.04 INJECTION, SOLUTION INTRAVENOUS at 14:32

## 2025-03-05 ENCOUNTER — TELEPHONE (OUTPATIENT)
Dept: ONCOLOGY | Facility: CLINIC | Age: 55
End: 2025-03-05
Payer: COMMERCIAL

## 2025-03-05 ENCOUNTER — PATIENT MESSAGE (OUTPATIENT)
Dept: ONCOLOGY | Facility: CLINIC | Age: 55
End: 2025-03-05
Payer: COMMERCIAL

## 2025-03-05 DIAGNOSIS — Z17.0 MALIGNANT NEOPLASM OF CENTRAL PORTION OF LEFT BREAST IN FEMALE, ESTROGEN RECEPTOR POSITIVE: Primary | ICD-10-CM

## 2025-03-05 DIAGNOSIS — C50.112 MALIGNANT NEOPLASM OF CENTRAL PORTION OF LEFT BREAST IN FEMALE, ESTROGEN RECEPTOR POSITIVE: Primary | ICD-10-CM

## 2025-03-05 NOTE — TELEPHONE ENCOUNTER
----- Message from Rama Cano sent at 3/4/2025  4:51 PM EST -----  Hold verzinio and have labs repeated in 1-2 weeks. Call for results prior to resuming

## 2025-03-05 NOTE — TELEPHONE ENCOUNTER
Called patient to advise her per Dr. Cano and to advise that follow up on 5-21-25 will be rescheduled for after her Dexa scan on 6-11-25.  No answer received.  Left  requesting return call as soon as possible.

## 2025-03-07 NOTE — TELEPHONE ENCOUNTER
"Late entry:  Spoke with patient at 15:41 on 3-6-25.  She stated she had a problem with her tooth and took a \"round of penicillin.\"  She stated she began antibiotics on 2-20-25 and finished 2-24-25.  She stated she has a follow up with her dentist on 3-10-25.  She stated that she is using chlorhexidine mouthwash.  She stated that there was a \"piece of bone that came out of my gum.\"  She stated she has a non-healed hole in her gums.  She stated they asked her if she is on mediation for osteoporosis and she told them she has taken zometa  and she stated they \"made note of it.\"  Patient stated she will hold Verzenio and complete repeat labs. Dr. Cano notified.      Called patient back this date to advise per MD that she needs to be seen by Dr. Cano in 1-2 weeks and that she can complete labs at that time but to continue to hold Verzenio until seen and to ask her to update this office after her dentist appointment on 3-10-25.  No answer received.  Left  with details of MD recommendations and stated will send my chart message as well.    "

## 2025-03-07 NOTE — TELEPHONE ENCOUNTER
Called patient at phone number listed in chart.  Advised patient per Dr. Cano:  that Dr. Cano would like to see her in 1-2 weeks instead. Dr. Cano stated patient can complete labs at that time but she should continue to hold Verzenio until she is seen by Dr. Cano and advised to restart. Also, advised patient that MD also would like for her to contact this office with an update after she is seen by her dentist on Monday, 3-10-25. Patient advised of new follow up scheduled.  Patient verbalized understanding.

## 2025-03-18 ENCOUNTER — OFFICE VISIT (OUTPATIENT)
Dept: ONCOLOGY | Facility: CLINIC | Age: 55
End: 2025-03-18
Payer: COMMERCIAL

## 2025-03-18 ENCOUNTER — LAB (OUTPATIENT)
Dept: LAB | Facility: HOSPITAL | Age: 55
End: 2025-03-18
Payer: COMMERCIAL

## 2025-03-18 VITALS
OXYGEN SATURATION: 98 % | RESPIRATION RATE: 16 BRPM | HEIGHT: 65 IN | DIASTOLIC BLOOD PRESSURE: 73 MMHG | SYSTOLIC BLOOD PRESSURE: 134 MMHG | BODY MASS INDEX: 21.66 KG/M2 | TEMPERATURE: 97.5 F | HEART RATE: 66 BPM | WEIGHT: 130 LBS

## 2025-03-18 DIAGNOSIS — C50.112 MALIGNANT NEOPLASM OF CENTRAL PORTION OF LEFT BREAST IN FEMALE, ESTROGEN RECEPTOR POSITIVE: ICD-10-CM

## 2025-03-18 DIAGNOSIS — C50.112 MALIGNANT NEOPLASM OF CENTRAL PORTION OF LEFT BREAST IN FEMALE, ESTROGEN RECEPTOR POSITIVE: Primary | ICD-10-CM

## 2025-03-18 DIAGNOSIS — Z17.0 MALIGNANT NEOPLASM OF CENTRAL PORTION OF LEFT BREAST IN FEMALE, ESTROGEN RECEPTOR POSITIVE: Primary | ICD-10-CM

## 2025-03-18 DIAGNOSIS — Z17.0 MALIGNANT NEOPLASM OF CENTRAL PORTION OF LEFT BREAST IN FEMALE, ESTROGEN RECEPTOR POSITIVE: ICD-10-CM

## 2025-03-18 LAB
ALBUMIN SERPL-MCNC: 4.5 G/DL (ref 3.5–5.2)
ALBUMIN/GLOB SERPL: 1.9 G/DL
ALP SERPL-CCNC: 76 U/L (ref 39–117)
ALT SERPL W P-5'-P-CCNC: 34 U/L (ref 1–33)
ANION GAP SERPL CALCULATED.3IONS-SCNC: 11 MMOL/L (ref 5–15)
AST SERPL-CCNC: 34 U/L (ref 1–32)
BASOPHILS # BLD AUTO: 0.05 10*3/MM3 (ref 0–0.2)
BASOPHILS NFR BLD AUTO: 1.4 % (ref 0–1.5)
BILIRUB SERPL-MCNC: 0.4 MG/DL (ref 0–1.2)
BUN SERPL-MCNC: 13 MG/DL (ref 6–20)
BUN/CREAT SERPL: 22 (ref 7–25)
CALCIUM SPEC-SCNC: 9.8 MG/DL (ref 8.6–10.5)
CHLORIDE SERPL-SCNC: 105 MMOL/L (ref 98–107)
CO2 SERPL-SCNC: 27 MMOL/L (ref 22–29)
CREAT SERPL-MCNC: 0.59 MG/DL (ref 0.57–1)
DEPRECATED RDW RBC AUTO: 49.6 FL (ref 37–54)
EGFRCR SERPLBLD CKD-EPI 2021: 108.6 ML/MIN/1.73
EOSINOPHIL # BLD AUTO: 0.02 10*3/MM3 (ref 0–0.4)
EOSINOPHIL NFR BLD AUTO: 0.6 % (ref 0.3–6.2)
ERYTHROCYTE [DISTWIDTH] IN BLOOD BY AUTOMATED COUNT: 12.6 % (ref 12.3–15.4)
FOLATE SERPL-MCNC: 11.9 NG/ML (ref 4.78–24.2)
GLOBULIN UR ELPH-MCNC: 2.4 GM/DL
GLUCOSE SERPL-MCNC: 77 MG/DL (ref 65–99)
HCT VFR BLD AUTO: 39.3 % (ref 34–46.6)
HGB BLD-MCNC: 13.5 G/DL (ref 12–15.9)
IMM GRANULOCYTES # BLD AUTO: 0 10*3/MM3 (ref 0–0.05)
IMM GRANULOCYTES NFR BLD AUTO: 0 % (ref 0–0.5)
LYMPHOCYTES # BLD AUTO: 1.06 10*3/MM3 (ref 0.7–3.1)
LYMPHOCYTES NFR BLD AUTO: 30.3 % (ref 19.6–45.3)
MCH RBC QN AUTO: 36.2 PG (ref 26.6–33)
MCHC RBC AUTO-ENTMCNC: 34.4 G/DL (ref 31.5–35.7)
MCV RBC AUTO: 105.4 FL (ref 79–97)
MONOCYTES # BLD AUTO: 0.36 10*3/MM3 (ref 0.1–0.9)
MONOCYTES NFR BLD AUTO: 10.3 % (ref 5–12)
NEUTROPHILS NFR BLD AUTO: 2.01 10*3/MM3 (ref 1.7–7)
NEUTROPHILS NFR BLD AUTO: 57.4 % (ref 42.7–76)
PLATELET # BLD AUTO: 215 10*3/MM3 (ref 140–450)
PMV BLD AUTO: 8.6 FL (ref 6–12)
POTASSIUM SERPL-SCNC: 4.3 MMOL/L (ref 3.5–5.2)
PROT SERPL-MCNC: 6.9 G/DL (ref 6–8.5)
RBC # BLD AUTO: 3.73 10*6/MM3 (ref 3.77–5.28)
SODIUM SERPL-SCNC: 143 MMOL/L (ref 136–145)
TSH SERPL DL<=0.05 MIU/L-ACNC: 1.35 UIU/ML (ref 0.27–4.2)
VIT B12 BLD-MCNC: 370 PG/ML (ref 211–946)
WBC NRBC COR # BLD AUTO: 3.5 10*3/MM3 (ref 3.4–10.8)

## 2025-03-18 PROCEDURE — 82746 ASSAY OF FOLIC ACID SERUM: CPT

## 2025-03-18 PROCEDURE — 82607 VITAMIN B-12: CPT

## 2025-03-18 PROCEDURE — 99214 OFFICE O/P EST MOD 30 MIN: CPT | Performed by: INTERNAL MEDICINE

## 2025-03-18 PROCEDURE — 80050 GENERAL HEALTH PANEL: CPT

## 2025-03-18 PROCEDURE — 36415 COLL VENOUS BLD VENIPUNCTURE: CPT

## 2025-03-18 RX ORDER — CHLORHEXIDINE GLUCONATE ORAL RINSE 1.2 MG/ML
SOLUTION DENTAL
COMMUNITY
Start: 2025-03-10

## 2025-03-18 NOTE — PROGRESS NOTES
[facility]ology [facility] [place]  Office number [phone]    Fax number [phone]     Follow up     Date: 25    Patient Name: Debra Beckman  MRN: [id number]  : 1970    Referring Physician: Dr. Goran Russell    Chief Complaint: follow up breast cancer    Cancer Staging: Stage IA (cT1b, cN0, cM0, G1, ER+, MD+, HER2-)    History of Present Illness: Debra Beckman is a pleasant 54 y.o. female who presents today for evaluation of left breast cancer.  She is accompanied by her supportive father who is a retired [employer] neurosurgeon, Dr. Beckman.    Screening mammogram 2022 demonstrated an asymmetry in the anterior left medial breast.  Diagnostic mammogram on 2022 demonstrated a persistent ill-defined asymmetry with architectural distortion in the left 9:00 periareolar region.  On ultrasound, this corresponded to an 8 mm mass in the 9:00 left breast.    Left breast 9:00 biopsy on 8/10/2022 demonstrated grade 1 invasive ductal carcinoma with associated DCIS (%, MD 60%, HER2/kathleen negative, 0+).    She proceeded with bilateral breast MRI on 8/15/2022.  This demonstrated a 1.4 cm 9:00 left breast enhancing mass, with a 1.3 cm area of clumped non-mass enhancement versus postbiopsy change 1 cm anterior lateral to this.  There is additionally a dominant focus of enhancement suspicious for a satellite lesion spanning 0.4 cm.  She underwent second look ultrasound and biopsy of the second lesion which corresponded to a subcentimeteric mass on ultrasound.    Left breast 9:00 biopsy on 2022 showed grade 2 invasive ductal carcinoma (ER greater than 90%, MD greater than 80%, HER2/kathleen negative, 0+)    Left breast lumpectomy, sentinel lymph node biopsy on 2022 demonstrated grade 2 invasive ductal carcinoma spanning 1.4 cm with associated DCIS.  Margin positive.  [place] lymph node positive () with a macrometastasis spanning 4 mm with extracapsular extension. She underwent  re-excision 9/30/22 with fat necrosis and no residual invasive or in situ carcinoma. Single left axillary LN excised and benign (total LN count 1/2).    Breast cancer risk profile:  G3, P2; Age of first live birth 37  Premenopausal, LMP was August 2022.  She discontinued OCPs at the time of her cancer diagnosis.  Family history of breast, ovarian, prostate or pancreatic cancer: Maternal aunt with breast cancer.  Genetics: [facility] next panel was negative in 2022.    Treatment history:  TC x 6 cycles  Adjuvant radiation completed 3/27/23    Arimidex, planning 4/12/2023  Verzinio, C1 4/27/23-3/2025  Zometa, C1 9/5/23, received cycle 4/4 3/2025    Interval history:  She burned her mouth eating something hot and after that had an ulceration and fragments of a tooth which broke off.  She was evaluated by her dentist.  She had an x-ray.  She still has an area of ulceration with exposure but tooth beneath.  She has been told that the tooth will follow-up over time, but extraction was not pursued because of recent bisphosphonate.  She completed a course of antibiotics.  She had an x-ray, and there was no clinical concern for osteonecrosis per her report.  She reports her case has been discussed with oral surgery but she is currently scheduled for follow-up with her dentist.  She did stop Verzenio at onset of this symptom and has remained off.  Overall she feels much better with improvement in her GI side effects since discontinuing.  As we are within a month of completion of her 2 years of adjuvant Verzenio, she wants to consider treatment complete.  She has been struggling with her mood more.  Effexor had previously controlled her symptoms, but she feels both emotionally blunted as well as still having a lot of negative self talk.  She would like to see cancer psychiatry to discuss additional therapeutic options.    Past Medical History:   Past Medical History:   Diagnosis Date    Allergic     Asthma     Depression      Drug therapy     History of radiation therapy 2023    left breast/regional LNs    Hx of radiation therapy     Low back pain     Malignant neoplasm of central portion of left breast in female, estrogen receptor positive 2022    Radiculopathy 2022       Past Surgical History:   Past Surgical History:   Procedure Laterality Date    BREAST BIOPSY  08/10/2022    BREAST BIOPSY Left 2022    BREAST LUMPECTOMY      BREAST LUMPECTOMY WITH AXILLARY NODE DISSECTION Left 2022    clean dread    BREAST LUMPECTOMY WITH SENTINEL NODE BIOPSY Left 2022     SECTION      VENOUS ACCESS DEVICE (PORT) INSERTION Right 2022       Family History:   Family History   Problem Relation Age of Onset    Hypertension Mother     No Known Problems Father     Breast cancer Maternal Aunt     Alcohol abuse Maternal Aunt     Anxiety disorder Maternal Aunt     Depression Maternal Aunt     Alcohol abuse Maternal Aunt             Anxiety disorder Maternal Aunt     Depression Maternal Aunt     Alcohol abuse Maternal Uncle     Depression Maternal Uncle     Drug abuse Maternal Uncle     Ovarian cancer Neg Hx        Social History:   Social History     Socioeconomic History    Marital status:    Tobacco Use    Smoking status: Former     Passive exposure: Past    Smokeless tobacco: Never   Vaping Use    Vaping status: Former   Substance and Sexual Activity    Alcohol use: Yes     Comment: occasional    Drug use: No    Sexual activity: Not Currently   Works as a .      Medications:     Current Outpatient Medications:     Abemaciclib 150 MG tablet, Take 1 tablet by mouth 2 (Two) Times a Day., Disp: 56 tablet, Rfl: 2    albuterol sulfate  (90 Base) MCG/ACT inhaler, Inhale 2 puffs Every 4 (Four) Hours As Needed for Wheezing., Disp: 18 g, Rfl: 11    anastrozole (ARIMIDEX) 1 MG tablet, TAKE 1 TABLET BY MOUTH DAILY, Disp: 90 tablet, Rfl: 1    budesonide-formoterol (SYMBICORT)  "160-4.5 MCG/ACT inhaler, Inhale 2 puffs 2 (Two) Times a Day., Disp: 6 g, Rfl: 5    chlorhexidine (PERIDEX) 0.12 % solution, RINSE MOUTH WITH 1 MILLILITER AS DIRECTED TWICE DAILY THEN SPIT, Disp: , Rfl:     methocarbamol (ROBAXIN) 500 MG tablet, Take 1 tablet by mouth 4 (Four) Times a Day., Disp: 40 tablet, Rfl: 1    pregabalin (LYRICA) 50 MG capsule, TAKE 1 CAPSULE BY MOUTH TWICE DAILY, Disp: 60 capsule, Rfl: 2    prochlorperazine (COMPAZINE) 5 MG tablet, Take 1 tablet by mouth Every 8 (Eight) Hours As Needed for Nausea or Vomiting., Disp: 90 tablet, Rfl: 2    venlafaxine XR (EFFEXOR-XR) 75 MG 24 hr capsule, TAKE 1 CAPSULE BY MOUTH DAILY, Disp: 90 capsule, Rfl: 1    vitamin B-12 (CYANOCOBALAMIN) 1000 MCG tablet, Take 1 tablet by mouth Daily., Disp: , Rfl:     vitamin B-6 (PYRIDOXINE) 50 MG tablet, Take 1 tablet by mouth Daily., Disp: , Rfl:     Vitamin D, Cholecalciferol, (CHOLECALCIFEROL) 10 MCG (400 UNIT) tablet, Take 1 tablet by mouth Daily., Disp: , Rfl:     Allergies:   No Known Allergies    Objective     Vital Signs:   Vitals:    03/18/25 1558   BP: 134/73  Comment: RUE   Pulse: 66   Resp: 16   Temp: 97.5 °F (36.4 °C)   TempSrc: Temporal   SpO2: 98%  Comment: RA   Weight: 59 kg (130 lb)   Height: 165.1 cm (65\")   PainSc: 0-No pain    Body mass index is 21.63 kg/m².   Pain Score    03/18/25 1558   PainSc: 0-No pain       ECOG Performance Status: 0    Physical Exam:  Constitutional:  Well developed, Well nourished, No acute distress.    HENT:  Normocephalic, Atraumatic.  Eyes: Conjunctivae normal.  Sclerae anicteric  Musculoskeletal: No peripheral edema.  Skin:  Warm, Dry, No erythema, No rash.     Neuro: A and O x 3     Laboratory/Imaging Reviewed:   Lab on 03/18/2025   Component Date Value Ref Range Status    Glucose 03/18/2025 77  65 - 99 mg/dL Final    BUN 03/18/2025 13  6 - 20 mg/dL Final    Creatinine 03/18/2025 0.59  0.57 - 1.00 mg/dL Final    Sodium 03/18/2025 143  136 - 145 mmol/L Final    Potassium " 03/18/2025 4.3  3.5 - 5.2 mmol/L Final    Chloride 03/18/2025 105  98 - 107 mmol/L Final    CO2 03/18/2025 27.0  22.0 - 29.0 mmol/L Final    Calcium 03/18/2025 9.8  8.6 - 10.5 mg/dL Final    Total Protein 03/18/2025 6.9  6.0 - 8.5 g/dL Final    Albumin 03/18/2025 4.5  3.5 - 5.2 g/dL Final    ALT (SGPT) 03/18/2025 34 (H)  1 - 33 U/L Final    AST (SGOT) 03/18/2025 34 (H)  1 - 32 U/L Final    Alkaline Phosphatase 03/18/2025 76  39 - 117 U/L Final    Total Bilirubin 03/18/2025 0.4  0.0 - 1.2 mg/dL Final    Globulin 03/18/2025 2.4  gm/dL Final    Calculated Result    A/G Ratio 03/18/2025 1.9  g/dL Final    BUN/Creatinine Ratio 03/18/2025 22.0  7.0 - 25.0 Final    Anion Gap 03/18/2025 11.0  5.0 - 15.0 mmol/L Final    eGFR 03/18/2025 108.6  >60.0 mL/min/1.73 Final    TSH 03/18/2025 1.350  0.270 - 4.200 uIU/mL Final    Folate 03/18/2025 11.90  4.78 - 24.20 ng/mL Final    Vitamin B-12 03/18/2025 370  211 - 946 pg/mL Final    WBC 03/18/2025 3.50  3.40 - 10.80 10*3/mm3 Final    RBC 03/18/2025 3.73 (L)  3.77 - 5.28 10*6/mm3 Final    Hemoglobin 03/18/2025 13.5  12.0 - 15.9 g/dL Final    Hematocrit 03/18/2025 39.3  34.0 - 46.6 % Final    MCV 03/18/2025 105.4 (H)  79.0 - 97.0 fL Final    MCH 03/18/2025 36.2 (H)  26.6 - 33.0 pg Final    MCHC 03/18/2025 34.4  31.5 - 35.7 g/dL Final    RDW 03/18/2025 12.6  12.3 - 15.4 % Final    RDW-SD 03/18/2025 49.6  37.0 - 54.0 fl Final    MPV 03/18/2025 8.6  6.0 - 12.0 fL Final    Platelets 03/18/2025 215  140 - 450 10*3/mm3 Final    Neutrophil % 03/18/2025 57.4  42.7 - 76.0 % Final    Lymphocyte % 03/18/2025 30.3  19.6 - 45.3 % Final    Monocyte % 03/18/2025 10.3  5.0 - 12.0 % Final    Eosinophil % 03/18/2025 0.6  0.3 - 6.2 % Final    Basophil % 03/18/2025 1.4  0.0 - 1.5 % Final    Immature Grans % 03/18/2025 0.0  0.0 - 0.5 % Final    Neutrophils, Absolute 03/18/2025 2.01  1.70 - 7.00 10*3/mm3 Final    Lymphocytes, Absolute 03/18/2025 1.06  0.70 - 3.10 10*3/mm3 Final    Monocytes, Absolute  03/18/2025 0.36  0.10 - 0.90 10*3/mm3 Final    Eosinophils, Absolute 03/18/2025 0.02  0.00 - 0.40 10*3/mm3 Final    Basophils, Absolute 03/18/2025 0.05  0.00 - 0.20 10*3/mm3 Final    Immature Grans, Absolute 03/18/2025 0.00  0.00 - 0.05 10*3/mm3 Final     Lab on 03/18/2025   Component Date Value Ref Range Status    Glucose 03/18/2025 77  65 - 99 mg/dL Final    BUN 03/18/2025 13  6 - 20 mg/dL Final    Creatinine 03/18/2025 0.59  0.57 - 1.00 mg/dL Final    Sodium 03/18/2025 143  136 - 145 mmol/L Final    Potassium 03/18/2025 4.3  3.5 - 5.2 mmol/L Final    Chloride 03/18/2025 105  98 - 107 mmol/L Final    CO2 03/18/2025 27.0  22.0 - 29.0 mmol/L Final    Calcium 03/18/2025 9.8  8.6 - 10.5 mg/dL Final    Total Protein 03/18/2025 6.9  6.0 - 8.5 g/dL Final    Albumin 03/18/2025 4.5  3.5 - 5.2 g/dL Final    ALT (SGPT) 03/18/2025 34 (H)  1 - 33 U/L Final    AST (SGOT) 03/18/2025 34 (H)  1 - 32 U/L Final    Alkaline Phosphatase 03/18/2025 76  39 - 117 U/L Final    Total Bilirubin 03/18/2025 0.4  0.0 - 1.2 mg/dL Final    Globulin 03/18/2025 2.4  gm/dL Final    Calculated Result    A/G Ratio 03/18/2025 1.9  g/dL Final    BUN/Creatinine Ratio 03/18/2025 22.0  7.0 - 25.0 Final    Anion Gap 03/18/2025 11.0  5.0 - 15.0 mmol/L Final    eGFR 03/18/2025 108.6  >60.0 mL/min/1.73 Final    TSH 03/18/2025 1.350  0.270 - 4.200 uIU/mL Final    Folate 03/18/2025 11.90  4.78 - 24.20 ng/mL Final    Vitamin B-12 03/18/2025 370  211 - 946 pg/mL Final    WBC 03/18/2025 3.50  3.40 - 10.80 10*3/mm3 Final    RBC 03/18/2025 3.73 (L)  3.77 - 5.28 10*6/mm3 Final    Hemoglobin 03/18/2025 13.5  12.0 - 15.9 g/dL Final    Hematocrit 03/18/2025 39.3  34.0 - 46.6 % Final    MCV 03/18/2025 105.4 (H)  79.0 - 97.0 fL Final    MCH 03/18/2025 36.2 (H)  26.6 - 33.0 pg Final    MCHC 03/18/2025 34.4  31.5 - 35.7 g/dL Final    RDW 03/18/2025 12.6  12.3 - 15.4 % Final    RDW-SD 03/18/2025 49.6  37.0 - 54.0 fl Final    MPV 03/18/2025 8.6  6.0 - 12.0 fL Final     Platelets 03/18/2025 215  140 - 450 10*3/mm3 Final    Neutrophil % 03/18/2025 57.4  42.7 - 76.0 % Final    Lymphocyte % 03/18/2025 30.3  19.6 - 45.3 % Final    Monocyte % 03/18/2025 10.3  5.0 - 12.0 % Final    Eosinophil % 03/18/2025 0.6  0.3 - 6.2 % Final    Basophil % 03/18/2025 1.4  0.0 - 1.5 % Final    Immature Grans % 03/18/2025 0.0  0.0 - 0.5 % Final    Neutrophils, Absolute 03/18/2025 2.01  1.70 - 7.00 10*3/mm3 Final    Lymphocytes, Absolute 03/18/2025 1.06  0.70 - 3.10 10*3/mm3 Final    Monocytes, Absolute 03/18/2025 0.36  0.10 - 0.90 10*3/mm3 Final    Eosinophils, Absolute 03/18/2025 0.02  0.00 - 0.40 10*3/mm3 Final    Basophils, Absolute 03/18/2025 0.05  0.00 - 0.20 10*3/mm3 Final    Immature Grans, Absolute 03/18/2025 0.00  0.00 - 0.05 10*3/mm3 Final     No results found.     Assessment / Plan        1. Malignant neoplasm of central portion of left breast in female, estrogen receptor positive  2. High risk medication use  -She has an early stage hormone sensitive breast cancer with a single positive lymph node.  She has undergone curative intent surgery.  -Given strongly ER positive disease and N1, I recommended adjuvant TC over dose dense AC followed by weekly taxol and she completed 6 cycles  -She completed adjuvant radiation.   -Ki67 20%, initiated adjuvant Verzenio.  She has completed nearly 2 years of Verzenio and it is now on hold for  -An oral ulcer as well as dental issue.  She would like to consider therapy complete, which is very reasonable as we are just 1 month shy of her 2-year treatment.  She will continue with adjuvant AI.  Follow-up in 2 months.  Reviewed anticipated course of side effect resolution.       3. Anxiety  4. Hot flashes  -Continue Effexor.  Well controlled.      5. Bone health  -Ca/D recommended  -We discussed ASCO guidelines regarding adjuvant bisphosphonate use in early breast cancer. We reviewed the schedule and side effects of zometa including but not limited to bone pain,  renal dysfunction, osteonecrosis and allergic reaction. Discussed the importance of routine dental care and need to avoid invasive dental procedures/notify dentist of medication use. Informed consent was obtained, and the patient elected to proceed pending dental evaluation.We reviewed the schedule and side effects including but not limited to bone pain, renal dysfunction, osteonecrosis and allergic reaction. Discussed the importance of routine dental care and need to avoid invasive dental procedures/notify dentist of medication use. Informed consent was obtained, and the patient elected to proceed.   -She completed 4 cycles of Zometa, last in early March.  She is now having a dental issue.  She is a repeat DEXA pending this fall and I anticipate we will hold further Zometa.  Discussed that she needs to remind her dentist and oral surgeon about recent Zometa administration if any interventions recommended for her current dental issue.    6.  Anxiety/depression   -referral back to cancer psychiatry for additional treatment recommendations      Follow Up:   3 mo        Rama Cano MD  Hematology and Oncology

## 2025-03-21 ENCOUNTER — TREATMENT (OUTPATIENT)
Dept: PHYSICAL THERAPY | Facility: CLINIC | Age: 55
End: 2025-03-21
Payer: COMMERCIAL

## 2025-03-21 DIAGNOSIS — G89.29 CHRONIC LOW BACK PAIN, UNSPECIFIED BACK PAIN LATERALITY, UNSPECIFIED WHETHER SCIATICA PRESENT: Primary | ICD-10-CM

## 2025-03-21 DIAGNOSIS — M54.50 CHRONIC LOW BACK PAIN, UNSPECIFIED BACK PAIN LATERALITY, UNSPECIFIED WHETHER SCIATICA PRESENT: Primary | ICD-10-CM

## 2025-03-26 NOTE — PROGRESS NOTES
Physical Therapy Daily Treatment Note    [facility]   [address], [address] [place] [address]      Patient: Debra Beckman   : 1970  Referring practitioner: No ref. provider found  Date of Initial Visit: Type: THERAPY  Noted: 10/4/2023  Today's Date: 3/21/2025  Patient seen for 16 sessions    Certification Period 3/21/2025 thru 2025       Visit Diagnoses:    ICD-10-CM ICD-9-CM   1. Chronic low back pain, unspecified back pain laterality, unspecified whether sciatica present  M54.50 724.2    G89.29 338.29       Subjective     Pt states that she is feeling improvement in her symptoms overall and she has a decrease in symptom intensity with dry needling treatments. She is having some mild soreness/stiffness in the left piriformis today, as well as in the right.     Objective   See Exercise, Manual, and Modality Logs for complete treatment.       Assessment/Plan     Pt tolerated treatment well and did not report having any adverse reaction or excessive soreness. Decreased hypertonicity noted in andrea post hip musculature after treatment. Will cont to progress as indicated.      Debra Beckman will continue to benefit from skilled physical therapy services to address deficits and continue to work towards reaching functional goals.           Timed:         Manual Therapy:        mins  27801;     Therapeutic Exercise:         mins  39708;     Neuromuscular Mera:        mins  46029;    Therapeutic Activity:          mins  54814;     Gait Training:           mins  54792;     Ultrasound:          mins  51852;    Ionto                                   mins   18250  Self Care                            mins   24562  Canalith Repos         mins 17504  Electrical Stimulation:         mins  67993    Un-Timed:  Electrical Stimulation:         mins  21302 ( );  Dry Needling     30     mins self-pay  Traction          mins 15255      Timed Treatment:   0   mins   Total Treatment:     30    mins    Robinson Cardona, PT, DPT, Cert. DN  [place] License: [id number]

## 2025-04-22 ENCOUNTER — SPECIALTY PHARMACY (OUTPATIENT)
Dept: ONCOLOGY | Facility: HOSPITAL | Age: 55
End: 2025-04-22
Payer: COMMERCIAL

## 2025-04-28 ENCOUNTER — OFFICE VISIT (OUTPATIENT)
Dept: PSYCHIATRY | Facility: CLINIC | Age: 55
End: 2025-04-28
Payer: COMMERCIAL

## 2025-04-28 DIAGNOSIS — F33.1 MODERATE EPISODE OF RECURRENT MAJOR DEPRESSIVE DISORDER: Primary | ICD-10-CM

## 2025-04-28 PROCEDURE — 99214 OFFICE O/P EST MOD 30 MIN: CPT | Performed by: NURSE PRACTITIONER

## 2025-04-28 RX ORDER — LEVOCETIRIZINE DIHYDROCHLORIDE 5 MG/1
5 TABLET, FILM COATED ORAL EVERY EVENING
COMMUNITY

## 2025-04-28 NOTE — PROGRESS NOTES
Subjective   Debra Beckman is a ,  works full time,  54 y.o. female who is here today for medication management follow up. In person face to face. 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   Arimidex, planning 4/12/2023 current  Verzinio, C1 4/27/23-3/2025  Zometa, C1 9/5/23, received cycle 4/4 3/2025     TIME IN:0905  TIME OUT: 0940    I spent 35 minutes in patient care: reviewing records prior to the visit, assessing the patient, entering orders and documentation.    Chief Complaint: decreased mood, stress    History of Present Illness Patient reports venlafaxine XR 75 mg has worked for her mood initially from 8/2024 however over past several months she has felt more blunting of emotions and the negative self talk is back. She is unable to cry for example and doesn't feel kristine like she use to. Has decreased motivation and interest. Denies hopelessness. Denies SI/HI or AVH. Denies PTSD, OCD or aimee. Working on self care with yoga and PT for back. Sees LCSW/  Deborah Rogersfredaalejandra monthly.  Sleeps well when she takes pregabalin. Has life stressors with being a mother, full time , health challenges.  Has good support from family. Her daughters are doing in general well, 11 yo and 15 yo. Her ex- are co parenting well and are amicable.  Works as   for death row cases. Has four cases instead of 75 she had as  .   In past she was on escitalopram and initially mood improved but then negative thoughts returned and felt very tired on it she states.   (Scales based on 0 - 10 with 10 being the worst)       The following portions of the patient's history were reviewed and updated as appropriate: allergies, current medications, past family history, past medical history, past social history, past surgical history, and problem list.    Review of Systems   A 14 point review of systems was performed and is negative except as noted above.    Objective   Physical Exam  There were no vitals taken for this visit.    No Known Allergies    Current Medications:   Current Outpatient Medications   Medication Sig Dispense Refill    albuterol sulfate  (90 Base) MCG/ACT inhaler Inhale 2 puffs Every 4 (Four) Hours As Needed for Wheezing. 18 g 11    anastrozole (ARIMIDEX) 1 MG tablet TAKE 1 TABLET BY MOUTH DAILY 90 tablet 1    budesonide-formoterol (SYMBICORT) 160-4.5 MCG/ACT inhaler Inhale 2 puffs 2 (Two) Times a Day. 6 g 5    chlorhexidine (PERIDEX) 0.12 % solution RINSE MOUTH WITH 1 MILLILITER AS DIRECTED TWICE DAILY THEN SPIT      FLUoxetine (PROzac) 20 MG capsule Take 1 capsule by mouth Daily. 30 capsule 1    methocarbamol (ROBAXIN) 500 MG tablet Take 1 tablet by mouth 4 (Four) Times a Day. 40 tablet 1    pregabalin (LYRICA) 50 MG capsule TAKE 1 CAPSULE BY MOUTH TWICE DAILY 60 capsule 2    prochlorperazine (COMPAZINE) 5 MG tablet Take 1 tablet by mouth Every 8 (Eight) Hours As Needed for Nausea or Vomiting. 90 tablet 2    vitamin B-12 (CYANOCOBALAMIN) 1000 MCG tablet Take 1 tablet by mouth Daily.      vitamin B-6 (PYRIDOXINE) 50 MG tablet Take 1 tablet by mouth Daily.      Vitamin D, Cholecalciferol, (CHOLECALCIFEROL) 10 MCG (400 UNIT) tablet Take 1 tablet by mouth Daily.       No current facility-administered medications for this visit.       Lab Results:  Lab on 03/18/2025   Component Date Value Ref Range Status    Glucose 03/18/2025 77  65 - 99 mg/dL Final    BUN 03/18/2025 13  6 - 20 mg/dL Final    Creatinine 03/18/2025 0.59  0.57 - 1.00 mg/dL Final    Sodium 03/18/2025 143  136 - 145 mmol/L Final    Potassium 03/18/2025 4.3  3.5 - 5.2 mmol/L Final    Chloride 03/18/2025 105  98 - 107 mmol/L Final    CO2 03/18/2025 27.0  22.0 - 29.0 mmol/L Final    Calcium 03/18/2025 9.8  8.6 - 10.5 mg/dL Final    Total Protein 03/18/2025 6.9  6.0 - 8.5 g/dL Final    Albumin  03/18/2025 4.5  3.5 - 5.2 g/dL Final    ALT (SGPT) 03/18/2025 34 (H)  1 - 33 U/L Final    AST (SGOT) 03/18/2025 34 (H)  1 - 32 U/L Final    Alkaline Phosphatase 03/18/2025 76  39 - 117 U/L Final    Total Bilirubin 03/18/2025 0.4  0.0 - 1.2 mg/dL Final    Globulin 03/18/2025 2.4  gm/dL Final    Calculated Result    A/G Ratio 03/18/2025 1.9  g/dL Final    BUN/Creatinine Ratio 03/18/2025 22.0  7.0 - 25.0 Final    Anion Gap 03/18/2025 11.0  5.0 - 15.0 mmol/L Final    eGFR 03/18/2025 108.6  >60.0 mL/min/1.73 Final    TSH 03/18/2025 1.350  0.270 - 4.200 uIU/mL Final    Folate 03/18/2025 11.90  4.78 - 24.20 ng/mL Final    Vitamin B-12 03/18/2025 370  211 - 946 pg/mL Final    WBC 03/18/2025 3.50  3.40 - 10.80 10*3/mm3 Final    RBC 03/18/2025 3.73 (L)  3.77 - 5.28 10*6/mm3 Final    Hemoglobin 03/18/2025 13.5  12.0 - 15.9 g/dL Final    Hematocrit 03/18/2025 39.3  34.0 - 46.6 % Final    MCV 03/18/2025 105.4 (H)  79.0 - 97.0 fL Final    MCH 03/18/2025 36.2 (H)  26.6 - 33.0 pg Final    MCHC 03/18/2025 34.4  31.5 - 35.7 g/dL Final    RDW 03/18/2025 12.6  12.3 - 15.4 % Final    RDW-SD 03/18/2025 49.6  37.0 - 54.0 fl Final    MPV 03/18/2025 8.6  6.0 - 12.0 fL Final    Platelets 03/18/2025 215  140 - 450 10*3/mm3 Final    Neutrophil % 03/18/2025 57.4  42.7 - 76.0 % Final    Lymphocyte % 03/18/2025 30.3  19.6 - 45.3 % Final    Monocyte % 03/18/2025 10.3  5.0 - 12.0 % Final    Eosinophil % 03/18/2025 0.6  0.3 - 6.2 % Final    Basophil % 03/18/2025 1.4  0.0 - 1.5 % Final    Immature Grans % 03/18/2025 0.0  0.0 - 0.5 % Final    Neutrophils, Absolute 03/18/2025 2.01  1.70 - 7.00 10*3/mm3 Final    Lymphocytes, Absolute 03/18/2025 1.06  0.70 - 3.10 10*3/mm3 Final    Monocytes, Absolute 03/18/2025 0.36  0.10 - 0.90 10*3/mm3 Final    Eosinophils, Absolute 03/18/2025 0.02  0.00 - 0.40 10*3/mm3 Final    Basophils, Absolute 03/18/2025 0.05  0.00 - 0.20 10*3/mm3 Final    Immature Grans, Absolute 03/18/2025 0.00  0.00 - 0.05 10*3/mm3 Final    Hospital Outpatient Visit on 03/04/2025   Component Date Value Ref Range Status    Glucose 03/04/2025 61 (L)  65 - 99 mg/dL Final    BUN 03/04/2025 14  6 - 20 mg/dL Final    Creatinine 03/04/2025 0.70  0.57 - 1.00 mg/dL Final    Sodium 03/04/2025 139  136 - 145 mmol/L Final    Potassium 03/04/2025 4.0  3.5 - 5.2 mmol/L Final    Slight hemolysis detected by analyzer. Result may be falsely elevated.    Chloride 03/04/2025 104  98 - 107 mmol/L Final    CO2 03/04/2025 25.0  22.0 - 29.0 mmol/L Final    Calcium 03/04/2025 9.0  8.6 - 10.5 mg/dL Final    Total Protein 03/04/2025 6.6  6.0 - 8.5 g/dL Final    Albumin 03/04/2025 4.2  3.5 - 5.2 g/dL Final    ALT (SGPT) 03/04/2025 19  1 - 33 U/L Final    AST (SGOT) 03/04/2025 29  1 - 32 U/L Final    Alkaline Phosphatase 03/04/2025 69  39 - 117 U/L Final    Total Bilirubin 03/04/2025 0.3  0.0 - 1.2 mg/dL Final    Globulin 03/04/2025 2.4  gm/dL Final    Calculated Result    A/G Ratio 03/04/2025 1.8  g/dL Final    BUN/Creatinine Ratio 03/04/2025 20.0  7.0 - 25.0 Final    Anion Gap 03/04/2025 10.0  5.0 - 15.0 mmol/L Final    eGFR 03/04/2025 104.2  >60.0 mL/min/1.73 Final    Magnesium 03/04/2025 2.2  1.6 - 2.6 mg/dL Final    Phosphorus 03/04/2025 3.0  2.5 - 4.5 mg/dL Final    WBC 03/04/2025 2.15 (L)  3.40 - 10.80 10*3/mm3 Final    RBC 03/04/2025 3.56 (L)  3.77 - 5.28 10*6/mm3 Final    Hemoglobin 03/04/2025 13.2  12.0 - 15.9 g/dL Final    Hematocrit 03/04/2025 37.6  34.0 - 46.6 % Final    MCV 03/04/2025 105.6 (H)  79.0 - 97.0 fL Final    MCH 03/04/2025 37.1 (H)  26.6 - 33.0 pg Final    MCHC 03/04/2025 35.1  31.5 - 35.7 g/dL Final    RDW 03/04/2025 12.7  12.3 - 15.4 % Final    RDW-SD 03/04/2025 50.2  37.0 - 54.0 fl Final    MPV 03/04/2025 8.7  6.0 - 12.0 fL Final    Platelets 03/04/2025 145  140 - 450 10*3/mm3 Final    Neutrophil % 03/04/2025 41.4 (L)  42.7 - 76.0 % Final    Lymphocyte % 03/04/2025 48.8 (H)  19.6 - 45.3 % Final    Monocyte % 03/04/2025 8.8  5.0 - 12.0 % Final     Eosinophil % 03/04/2025 0.5  0.3 - 6.2 % Final    Basophil % 03/04/2025 0.5  0.0 - 1.5 % Final    Immature Grans % 03/04/2025 0.0  0.0 - 0.5 % Final    Neutrophils, Absolute 03/04/2025 0.89 (L)  1.70 - 7.00 10*3/mm3 Final    Lymphocytes, Absolute 03/04/2025 1.05  0.70 - 3.10 10*3/mm3 Final    Monocytes, Absolute 03/04/2025 0.19  0.10 - 0.90 10*3/mm3 Final    Eosinophils, Absolute 03/04/2025 0.01  0.00 - 0.40 10*3/mm3 Final    Basophils, Absolute 03/04/2025 0.01  0.00 - 0.20 10*3/mm3 Final    Immature Grans, Absolute 03/04/2025 0.00  0.00 - 0.05 10*3/mm3 Final    Creatinine 03/04/2025 0.80  0.60 - 1.30 mg/dL Final    Serial Number: [id number]:  [id number]   Lab on 02/19/2025   Component Date Value Ref Range Status    Glucose 02/19/2025 79  65 - 99 mg/dL Final    BUN 02/19/2025 13  6 - 20 mg/dL Final    Creatinine 02/19/2025 0.74  0.57 - 1.00 mg/dL Final    Sodium 02/19/2025 140  136 - 145 mmol/L Final    Potassium 02/19/2025 4.2  3.5 - 5.2 mmol/L Final    Chloride 02/19/2025 102  98 - 107 mmol/L Final    CO2 02/19/2025 30.0 (H)  22.0 - 29.0 mmol/L Final    Calcium 02/19/2025 9.6  8.6 - 10.5 mg/dL Final    Total Protein 02/19/2025 6.7  6.0 - 8.5 g/dL Final    Albumin 02/19/2025 4.4  3.5 - 5.2 g/dL Final    ALT (SGPT) 02/19/2025 22  1 - 33 U/L Final    AST (SGOT) 02/19/2025 27  1 - 32 U/L Final    Alkaline Phosphatase 02/19/2025 62  39 - 117 U/L Final    Total Bilirubin 02/19/2025 0.4  0.0 - 1.2 mg/dL Final    Globulin 02/19/2025 2.3  gm/dL Final    Calculated Result    A/G Ratio 02/19/2025 1.9  g/dL Final    BUN/Creatinine Ratio 02/19/2025 17.6  7.0 - 25.0 Final    Anion Gap 02/19/2025 8.0  5.0 - 15.0 mmol/L Final    eGFR 02/19/2025 97.5  >60.0 mL/min/1.73 Final    Vitamin B-12 02/19/2025 375  211 - 946 pg/mL Final    Folate 02/19/2025 16.80  4.78 - 24.20 ng/mL Final    TSH 02/19/2025 1.880  0.270 - 4.200 uIU/mL Final    Performed by: 02/19/2025 Dr. Omer   Final    Pathologist Interpretation 02/19/2025 Red blood cell  macrocytosis without anemia.  Leukopenia with mild absolute neutropenia.  Granulocytes show normal morphology and no circulating blasts identified.  Platelet   Final    WBC 02/19/2025 2.89 (L)  3.40 - 10.80 10*3/mm3 Final    RBC 02/19/2025 3.68 (L)  3.77 - 5.28 10*6/mm3 Final    Hemoglobin 02/19/2025 13.5  12.0 - 15.9 g/dL Final    Hematocrit 02/19/2025 39.1  34.0 - 46.6 % Final    MCV 02/19/2025 106.3 (H)  79.0 - 97.0 fL Final    MCH 02/19/2025 36.7 (H)  26.6 - 33.0 pg Final    MCHC 02/19/2025 34.5  31.5 - 35.7 g/dL Final    RDW 02/19/2025 12.8  12.3 - 15.4 % Final    RDW-SD 02/19/2025 50.6  37.0 - 54.0 fl Final    MPV 02/19/2025 8.3  6.0 - 12.0 fL Final    Platelets 02/19/2025 181  140 - 450 10*3/mm3 Final    Neutrophil % 02/19/2025 52.7  42.7 - 76.0 % Final    Lymphocyte % 02/19/2025 36.0  19.6 - 45.3 % Final    Monocyte % 02/19/2025 8.3  5.0 - 12.0 % Final    Eosinophil % 02/19/2025 1.0  0.3 - 6.2 % Final    Basophil % 02/19/2025 1.7 (H)  0.0 - 1.5 % Final    Immature Grans % 02/19/2025 0.3  0.0 - 0.5 % Final    Neutrophils, Absolute 02/19/2025 1.52 (L)  1.70 - 7.00 10*3/mm3 Final    Lymphocytes, Absolute 02/19/2025 1.04  0.70 - 3.10 10*3/mm3 Final    Monocytes, Absolute 02/19/2025 0.24  0.10 - 0.90 10*3/mm3 Final    Eosinophils, Absolute 02/19/2025 0.03  0.00 - 0.40 10*3/mm3 Final    Basophils, Absolute 02/19/2025 0.05  0.00 - 0.20 10*3/mm3 Final    Immature Grans, Absolute 02/19/2025 0.01  0.00 - 0.05 10*3/mm3 Final   Orders Only on 01/02/2025   Component Date Value Ref Range Status    NCCN 01/02/2025 NCCN met (A)   Final    High Risk Cancer Risk Assessment   Lab on 11/19/2024   Component Date Value Ref Range Status    Glucose 11/19/2024 88  65 - 99 mg/dL Final    BUN 11/19/2024 12  6 - 20 mg/dL Final    Creatinine 11/19/2024 0.76  0.57 - 1.00 mg/dL Final    Sodium 11/19/2024 139  136 - 145 mmol/L Final    Potassium 11/19/2024 3.9  3.5 - 5.2 mmol/L Final    Chloride 11/19/2024 103  98 - 107 mmol/L Final    CO2  11/19/2024 28.0  22.0 - 29.0 mmol/L Final    Calcium 11/19/2024 9.3  8.6 - 10.5 mg/dL Final    Total Protein 11/19/2024 7.1  6.0 - 8.5 g/dL Final    Albumin 11/19/2024 4.5  3.5 - 5.2 g/dL Final    ALT (SGPT) 11/19/2024 23  1 - 33 U/L Final    AST (SGOT) 11/19/2024 31  1 - 32 U/L Final    Alkaline Phosphatase 11/19/2024 58  39 - 117 U/L Final    Total Bilirubin 11/19/2024 0.3  0.0 - 1.2 mg/dL Final    Globulin 11/19/2024 2.6  gm/dL Final    Calculated Result    A/G Ratio 11/19/2024 1.7  g/dL Final    BUN/Creatinine Ratio 11/19/2024 15.8  7.0 - 25.0 Final    Anion Gap 11/19/2024 8.0  5.0 - 15.0 mmol/L Final    eGFR 11/19/2024 94.4  >60.0 mL/min/1.73 Final    WBC 11/19/2024 3.49  3.40 - 10.80 10*3/mm3 Final    RBC 11/19/2024 3.56 (L)  3.77 - 5.28 10*6/mm3 Final    Hemoglobin 11/19/2024 13.4  12.0 - 15.9 g/dL Final    Hematocrit 11/19/2024 38.2  34.0 - 46.6 % Final    MCV 11/19/2024 107.3 (H)  79.0 - 97.0 fL Final    MCH 11/19/2024 37.6 (H)  26.6 - 33.0 pg Final    MCHC 11/19/2024 35.1  31.5 - 35.7 g/dL Final    RDW 11/19/2024 12.6  12.3 - 15.4 % Final    RDW-SD 11/19/2024 49.9  37.0 - 54.0 fl Final    MPV 11/19/2024 8.6  6.0 - 12.0 fL Final    Platelets 11/19/2024 169  140 - 450 10*3/mm3 Final    Neutrophil % 11/19/2024 57.6  42.7 - 76.0 % Final    Lymphocyte % 11/19/2024 32.1  19.6 - 45.3 % Final    Monocyte % 11/19/2024 8.3  5.0 - 12.0 % Final    Eosinophil % 11/19/2024 0.6  0.3 - 6.2 % Final    Basophil % 11/19/2024 1.4  0.0 - 1.5 % Final    Immature Grans % 11/19/2024 0.0  0.0 - 0.5 % Final    Neutrophils, Absolute 11/19/2024 2.01  1.70 - 7.00 10*3/mm3 Final    Lymphocytes, Absolute 11/19/2024 1.12  0.70 - 3.10 10*3/mm3 Final    Monocytes, Absolute 11/19/2024 0.29  0.10 - 0.90 10*3/mm3 Final    Eosinophils, Absolute 11/19/2024 0.02  0.00 - 0.40 10*3/mm3 Final    Basophils, Absolute 11/19/2024 0.05  0.00 - 0.20 10*3/mm3 Final    Immature Grans, Absolute 11/19/2024 0.00  0.00 - 0.05 10*3/mm3 Final       PHQ-9:  experiencing blunted emotions      4/28/2025    10:00 AM   PHQ-2/PHQ-9 Depression Screening   Little interest or pleasure in doing things Over half   Feeling down, depressed, or hopeless Over half   Trouble falling or staying asleep, or sleeping too much Over half   Feeling tired or having little energy Over half   Poor appetite or overeating Not at all   Feeling bad about yourself - or that you are a failure or have let yourself or your family down Over half   Trouble concentrating on things, such as reading the newspaper or watching television Not at all   Moving or speaking so slowly that other people could have noticed? Or the opposite - being so fidgety or restless that you have been moving around a lot more than usual. Not at all   Thoughts that you would be better off dead or hurting yourself in some way Not at all   Patient Health Questionnaire-9 Score 10   How difficult have these problems made it for you to do your work, take care of things at home, or get along with other people? Somewhat difficult      5-9: Minimal symptoms  10-14: Major depression mild  15-19: Major depression moderate  Greater then 20: Major depression severe    Appearance: WNL  Hygiene:  REPORTS good  Cooperation:  Cooperative  Eye Contact:  direct  Psychomotor Behavior:  denies psychomotor agitation/retardation, No EPS, No motor tics  Mood:  decreased mood,   Affect:  congruent   Hopelessness: Denies  Speech:  Normal  Thought Process:  Linear  Thought Content:  Normal  Concentration: Normal   Suicidal: denies  Homicidal:  None  Hallucinations:  None  Delusion:  None  Memory:  Intact  Orientation:  Person, Place, Time and Situation  Reliability:  good  Insight:  good  Judgement: good  Impulse Control: good  Estimated Intelligence: above average range    BEREKET REVIEWED NO RED FLAGS    Assessment & Plan   Diagnoses and all orders for this visit:    1. Moderate episode of recurrent major depressive disorder (Primary)    Other orders  -      FLUoxetine (PROzac) 20 MG capsule; Take 1 capsule by mouth Daily.  Dispense: 30 capsule; Refill: 1          PLAN:  cont venlafaxine XR 75 mg daily times 5 days then decrease to 37.5 mg daily x two weeks then stop  ( SNRI )  for depression   At same time begin fluoxetine 20 mg daily until seen  (SSRI) depression and stress     We discussed risks, benefits, and side effects of the above medications and the patient was agreeable with the plan. Patient was educated on the importance of compliance with treatment and follow-up appointments. Importance to notify provider for any S/E.     Cont to see her therapist/ Deborah Yu LCSW  to Provide Cognitive Behavioral Therapy and Solution Focused Therapy to improve functioning, maintain stability, and avoid decompensation and the need for higher level of care.      Assisted patient in identifying risk factors which would indicate the need for higher level of care including thoughts to harm self or others and/or self-harming behavior and encouraged patient to contact this office, call 911, or present to the nearest emergency room should any of these events occur. Discussed crisis intervention services and means to access.  Patient adamantly and convincingly denies current suicidal or homicidal ideation or perceptual disturbance.    Treatment Plan: stabilize mood, patient will stay out of psychiatric hospital and be at optimal level of functioning with therapy and take all medication as prescribed. Patient verbalized  understanding and agreement to plan.    Instructed to call for questions or concerns and return early if necessary.       Return in about 3 weeks (around 5/19/2025).  3 pm, telemedicine next session.

## 2025-05-02 ENCOUNTER — TREATMENT (OUTPATIENT)
Dept: PHYSICAL THERAPY | Facility: CLINIC | Age: 55
End: 2025-05-02
Payer: COMMERCIAL

## 2025-05-02 DIAGNOSIS — G89.29 CHRONIC LOW BACK PAIN, UNSPECIFIED BACK PAIN LATERALITY, UNSPECIFIED WHETHER SCIATICA PRESENT: Primary | ICD-10-CM

## 2025-05-02 DIAGNOSIS — M54.50 CHRONIC LOW BACK PAIN, UNSPECIFIED BACK PAIN LATERALITY, UNSPECIFIED WHETHER SCIATICA PRESENT: Primary | ICD-10-CM

## 2025-05-07 NOTE — PROGRESS NOTES
Physical Therapy Daily Treatment Note    [facility]   [address], [address] [place] [address]      Patient: Debra Beckman   : 1970  Referring practitioner: No ref. provider found  Date of Initial Visit: Type: THERAPY  Noted: 10/4/2023  Today's Date: 2025  Patient seen for 17 sessions    Certification Period 2025 thru 2025       Visit Diagnoses:    ICD-10-CM ICD-9-CM   1. Chronic low back pain, unspecified back pain laterality, unspecified whether sciatica present  M54.50 724.2    G89.29 338.29       Subjective     Pt states that she is feeling some tension in the low back and in bilateral posterior hips. She still feels improved overall and she denies having any pain in the the LE's. Pt also reports tension in the neck recently as well.     Objective   See Exercise, Manual, and Modality Logs for complete treatment.       Assessment/Plan     Pt responded well to dry needling and she did not have any adverse reaction to needle insertion or to application of electrical stimulation. Will assess her response at the next visit and progress as indicated.      Debra Beckman will continue to benefit from skilled physical therapy services to address deficits and continue to work towards reaching functional goals.           Timed:         Manual Therapy:         mins  29603;     Therapeutic Exercise:         mins  34514;     Neuromuscular Mera:        mins  95878;    Therapeutic Activity:          mins  56312;     Gait Training:           mins  03382;     Ultrasound:          mins  89058;    Ionto                                   mins   07380  Self Care                            mins   18661  Canalith Repos         mins 40066  Electrical Stimulation:         mins  12791    Un-Timed:  Electrical Stimulation:         mins  12053 ( );  Dry Needling     30     mins self-pay  Traction          mins [id number]      Timed Treatment:   0   mins   Total Treatment:     30    mins    Robinson Cardona, PT, DPT, Cert. DN  [place] License: [id number]

## 2025-05-19 ENCOUNTER — TELEMEDICINE (OUTPATIENT)
Dept: PSYCHIATRY | Facility: CLINIC | Age: 55
End: 2025-05-19
Payer: COMMERCIAL

## 2025-05-19 DIAGNOSIS — F33.1 MODERATE EPISODE OF RECURRENT MAJOR DEPRESSIVE DISORDER: Primary | ICD-10-CM

## 2025-05-19 PROCEDURE — 99212 OFFICE O/P EST SF 10 MIN: CPT | Performed by: NURSE PRACTITIONER

## 2025-05-19 NOTE — PROGRESS NOTES
"  Subjective   Debra Beckman is a 54 y.o. female who is here today for medication management follow up. Patient consenting to telemedicine session.     TIME IN:3p  TIME OUT: 315    I spent 15 minutes in patient care: reviewing records prior to the visit, assessing the patient, entering orders and documentation.    PATIENT AT: THEIR PLACE OF work in confidential setting she reports.    PROVIDER AT:   [facility]  [address], [address]  [place] [address]        Chief Complaint: MDD moderate    History of Present Illness Patient reports she only has been off venlafaxine 2 days because of delay in obtaining the fluoxetine 20 mg. Mood \"ok\" and has some but \"not bad\" brain zaps with being off venlafaxine. Will re evaluate mood and tolerance off venlafaxine and on fluoxetine. To call if has increase in w/d from venlafaxine.  Denies adverse effects from medications.   (Scales based on 0 - 10 with 10 being the worst)        The following portions of the patient's history were reviewed and updated as appropriate: allergies, current medications, past family history, past medical history, past social history, past surgical history, and problem list.    Review of Systems  A 14 point review of systems was performed and is negative except as noted above.    Objective   Physical Exam  There were no vitals taken for this visit.    No Known Allergies    Current Medications:   Current Outpatient Medications   Medication Sig Dispense Refill    albuterol sulfate  (90 Base) MCG/ACT inhaler Inhale 2 puffs Every 4 (Four) Hours As Needed for Wheezing. 18 g 11    anastrozole (ARIMIDEX) 1 MG tablet TAKE 1 TABLET BY MOUTH DAILY 90 tablet 1    budesonide-formoterol (SYMBICORT) 160-4.5 MCG/ACT inhaler Inhale 2 puffs 2 (Two) Times a Day. 6 g 5    chlorhexidine (PERIDEX) 0.12 % solution RINSE MOUTH WITH 1 MILLILITER AS DIRECTED TWICE DAILY THEN SPIT      FLUoxetine " (PROzac) 20 MG capsule Take 1 capsule by mouth Daily. 30 capsule 1    levocetirizine (XYZAL) 5 MG tablet Take 1 tablet by mouth Every Evening.      methocarbamol (ROBAXIN) 500 MG tablet Take 1 tablet by mouth 4 (Four) Times a Day. 40 tablet 1    pregabalin (LYRICA) 50 MG capsule TAKE 1 CAPSULE BY MOUTH TWICE DAILY 60 capsule 2    prochlorperazine (COMPAZINE) 5 MG tablet Take 1 tablet by mouth Every 8 (Eight) Hours As Needed for Nausea or Vomiting. 90 tablet 2    vitamin B-12 (CYANOCOBALAMIN) 1000 MCG tablet Take 1 tablet by mouth Daily.      vitamin B-6 (PYRIDOXINE) 50 MG tablet Take 1 tablet by mouth Daily.      Vitamin D, Cholecalciferol, (CHOLECALCIFEROL) 10 MCG (400 UNIT) tablet Take 1 tablet by mouth Daily.       No current facility-administered medications for this visit.       Lab Results: reviewed In epic      PHQ-9:      4/28/2025    10:00 AM   PHQ-2/PHQ-9 Depression Screening   Little interest or pleasure in doing things Over half   Feeling down, depressed, or hopeless Over half   Trouble falling or staying asleep, or sleeping too much Over half   Feeling tired or having little energy Over half   Poor appetite or overeating Not at all   Feeling bad about yourself - or that you are a failure or have let yourself or your family down Over half   Trouble concentrating on things, such as reading the newspaper or watching television Not at all   Moving or speaking so slowly that other people could have noticed? Or the opposite - being so fidgety or restless that you have been moving around a lot more than usual. Not at all   Thoughts that you would be better off dead or hurting yourself in some way Not at all   Patient Health Questionnaire-9 Score 10   How difficult have these problems made it for you to do your work, take care of things at home, or get along with other people? Somewhat difficult      5-9: Minimal symptoms  10-14: Major depression mild  15-19: Major depression moderate  Greater then 20: Major  depression severe      Appearance: WNL  Hygiene:  REPORTS good  Cooperation:  Cooperative  Eye Contact:  direct  Psychomotor Behavior:  denies psychomotor agitation/retardation, No EPS, No motor tics  Mood:  within normal limits  Affect:  smiling   Hopelessness: Denies  Speech:  Normal  Thought Process:  Linear  Thought Content:  Normal  Concentration: Normal   Suicidal: denies  Homicidal:  None  Hallucinations:  None  Delusion:  None  Memory:  Intact  Orientation:  Person, Place, Time and Situation  Reliability:  good  Insight: good  Judgement: good  Impulse Control: good      BEREKET REVIEWED NO RED FLAGS    Assessment & Plan   Diagnoses and all orders for this visit:    1. Moderate episode of recurrent major depressive disorder (Primary)          PLAN: off venlafaxine completely  Cont fluoxetine 20 mg po qd for MDD    We discussed risks, benefits, and side effects of the above medications and the patient was agreeable with the plan. Patient was educated on the importance of compliance with treatment and follow-up appointments.       Treatment Plan: stabilize mood, patient will stay out of psychiatric hospital and be at optimal level of functioning with therapy and take all medication as prescribed. Patient verbalized  understanding and agreement to plan.    Instructed to call for questions or concerns and return early if necessary.         Return in about 22 days (around 6/10/2025). 3pm telemed

## 2025-06-10 ENCOUNTER — TELEMEDICINE (OUTPATIENT)
Dept: PSYCHIATRY | Facility: CLINIC | Age: 55
End: 2025-06-10
Payer: COMMERCIAL

## 2025-06-10 DIAGNOSIS — F33.1 MODERATE EPISODE OF RECURRENT MAJOR DEPRESSIVE DISORDER: Primary | ICD-10-CM

## 2025-06-10 PROCEDURE — 99212 OFFICE O/P EST SF 10 MIN: CPT | Performed by: NURSE PRACTITIONER

## 2025-06-10 NOTE — PROGRESS NOTES
"  Subjective   Debra Beckman is a 54 y.o. female who is here today for medication management follow up. Consenting to telemedicine. S/p breast cancer treatment followed by Dr. JALEN Cano medical oncologist.     TIME IN:258  TIME OUT: 310    I spent 11 minutes in patient care: reviewing records prior to the visit, assessing the patient, entering orders and documentation.    PATIENT AT: THEIR PLACE OF RESIDENCE    PROVIDER AT:   [facility] [address], [address]  [place] [address]        Chief Complaint: MDD    History of Present Illness Patient reports tolerated cross tapering onto fluoxetine 20 mg daily has some brain zaps \"but ok now\". \"So far not tired on it and mood doing ok, no negative self talk\". Able to sleep at night and motivated for work and home life . Paying attention to self care.  Denies adverse effects from medications.   (Scales based on 0 - 10 with 10 being the worst)      The following portions of the patient's history were reviewed and updated as appropriate: allergies, current medications, past family history, past medical history, past social history, past surgical history, and problem list.    Review of Systems  A 14 point review of systems was performed and is negative except as noted above.    Objective   Physical Exam  There were no vitals taken for this visit.    No Known Allergies    Current Medications:   Current Outpatient Medications   Medication Sig Dispense Refill    FLUoxetine (PROzac) 20 MG capsule Take 1 capsule by mouth Daily. 90 capsule 0    albuterol sulfate  (90 Base) MCG/ACT inhaler Inhale 2 puffs Every 4 (Four) Hours As Needed for Wheezing. 18 g 11    anastrozole (ARIMIDEX) 1 MG tablet TAKE 1 TABLET BY MOUTH DAILY 90 tablet 1    budesonide-formoterol (SYMBICORT) 160-4.5 MCG/ACT inhaler Inhale 2 puffs 2 (Two) Times a Day. 6 g 5    chlorhexidine (PERIDEX) 0.12 % solution RINSE MOUTH WITH 1 " MILLILITER AS DIRECTED TWICE DAILY THEN SPIT      levocetirizine (XYZAL) 5 MG tablet Take 1 tablet by mouth Every Evening.      methocarbamol (ROBAXIN) 500 MG tablet Take 1 tablet by mouth 4 (Four) Times a Day. 40 tablet 1    pregabalin (LYRICA) 50 MG capsule TAKE 1 CAPSULE BY MOUTH TWICE DAILY 60 capsule 2    prochlorperazine (COMPAZINE) 5 MG tablet Take 1 tablet by mouth Every 8 (Eight) Hours As Needed for Nausea or Vomiting. 90 tablet 2    vitamin B-12 (CYANOCOBALAMIN) 1000 MCG tablet Take 1 tablet by mouth Daily.      vitamin B-6 (PYRIDOXINE) 50 MG tablet Take 1 tablet by mouth Daily.      Vitamin D, Cholecalciferol, (CHOLECALCIFEROL) 10 MCG (400 UNIT) tablet Take 1 tablet by mouth Daily.       No current facility-administered medications for this visit.       Appearance: appropriate, wnl   Hygiene:  REPORTS good  Cooperation:  Cooperative  Eye Contact:  direct  Psychomotor Behavior:  denies psychomotor agitation/retardation, No EPS, No motor tics  Mood:  within normal limits  Affect:  wnl  Hopelessness: Denies  Speech:  Normal  Thought Process:  Linear  Thought Content:  Normal  Concentration: Normal   Suicidal: denies  Homicidal:  None  Hallucinations:  None  Delusion:  None  Memory:  Intact  Orientation:  Person, Place, Time and Situation  Reliability:  good  Insight: good  Judgement: good  Impulse Control: good  Estimated Intelligence: average range    BEREKET REVIEWED NO RED FLAGS    Assessment & Plan   Diagnoses and all orders for this visit:    1. Moderate episode of recurrent major depressive disorder (Primary)    Other orders  -     FLUoxetine (PROzac) 20 MG capsule; Take 1 capsule by mouth Daily.  Dispense: 90 capsule; Refill: 0          PLAN: refill fluoxetine 20 mg daily # 90, 0 RF,  will reevaluate in 90 days    We discussed risks, benefits, and side effects of the above medications and the patient was agreeable with the plan. Patient was educated on the importance of compliance with treatment and  follow-up appointments.       Counseled patient regarding multimodal approach with encouragement of healthy nutrition, healthy sleep, regular physical mobility, social involvement, counseling, and medication compliance.     Assisted patient in identifying risk factors which would indicate the need for higher level of care including thoughts to harm self or others and/or self-harming behavior and encouraged patient to contact this office, call 911, or present to the nearest emergency room should any of these events occur. Discussed crisis intervention services and means to access.  Patient adamantly and convincingly denies current suicidal or homicidal ideation or perceptual disturbance.    Treatment Plan: stabilize mood, patient will stay out of psychiatric hospital and be at optimal level of functioning with therapy and take all medication as prescribed. Patient verbalized  understanding and agreement to plan.    Instructed to call for questions or concerns and return early if necessary.     Return in about 13 weeks (around 9/9/2025).

## 2025-06-11 ENCOUNTER — HOSPITAL ENCOUNTER (OUTPATIENT)
Dept: BONE DENSITY | Facility: HOSPITAL | Age: 55
Discharge: HOME | End: 2025-06-11
Admitting: INTERNAL MEDICINE
Payer: COMMERCIAL

## 2025-06-11 DIAGNOSIS — Z17.0 MALIGNANT NEOPLASM OF CENTRAL PORTION OF LEFT BREAST IN FEMALE, ESTROGEN RECEPTOR POSITIVE: ICD-10-CM

## 2025-06-11 DIAGNOSIS — C50.112 MALIGNANT NEOPLASM OF CENTRAL PORTION OF LEFT BREAST IN FEMALE, ESTROGEN RECEPTOR POSITIVE: ICD-10-CM

## 2025-06-11 PROCEDURE — 77080 DXA BONE DENSITY AXIAL: CPT

## 2025-06-13 ENCOUNTER — TREATMENT (OUTPATIENT)
Dept: PHYSICAL THERAPY | Facility: CLINIC | Age: 55
End: 2025-06-13
Payer: COMMERCIAL

## 2025-06-13 DIAGNOSIS — G89.29 CHRONIC LOW BACK PAIN, UNSPECIFIED BACK PAIN LATERALITY, UNSPECIFIED WHETHER SCIATICA PRESENT: Primary | ICD-10-CM

## 2025-06-13 DIAGNOSIS — M54.50 CHRONIC LOW BACK PAIN, UNSPECIFIED BACK PAIN LATERALITY, UNSPECIFIED WHETHER SCIATICA PRESENT: Primary | ICD-10-CM

## 2025-06-13 NOTE — PROGRESS NOTES
Physical Therapy Daily Treatment Note    [facility]   [address], [address] [place] [address]      Patient: Debra Beckman   : 1970  Referring practitioner: No ref. provider found  Date of Initial Visit: Type: THERAPY  Noted: 10/4/2023  Today's Date: 2025  Patient seen for 18 sessions    Certification Period 2025 thru 9/10/2025       Visit Diagnoses:    ICD-10-CM ICD-9-CM   1. Chronic low back pain, unspecified back pain laterality, unspecified whether sciatica present  M54.50 724.2    G89.29 338.29       Subjective     States that she feels like overall her back pain has continued to improve and she feels better since beginning dry needling treatment.  She does have some intermittent exacerbation of symptoms in the left and in the left left thigh and she has had some recent onset of nerve type pain in the backs of both legs        Objective   See Exercise, Manual, and Modality Logs for complete treatment.       Assessment/Plan     Patient tolerated treatment well today and she did not have any adverse reaction.  Included needling along the path of the sciatic nerve to address possible bilateral lower extremity nerve irritation and/or nighttime restless leg will assess patient's response at next visit and progress as indicated.    Debra Beckman will continue to benefit from skilled physical therapy services to address deficits and continue to work towards reaching functional goals.           Timed:         Manual Therapy:         mins  16096;     Therapeutic Exercise:         mins  80098;     Neuromuscular Mera:        mins  36039;    Therapeutic Activity:          mins  81044;     Gait Training:           mins  69837;     Ultrasound:          mins  45095;    Ionto                                   mins   42385  Self Care                            mins   76043  Canalith Repos         mins 78163  Electrical Stimulation:         mins  15339    Un-Timed:  Electrical  Stimulation:         mins  16606 ( );  Dry Needling     30     mins self-pay  Traction          mins [id number]      Timed Treatment:   0   mins   Total Treatment:     30   mins    Robinson Cardona PT, DPT, Cert. DN  [place] License: [id number]

## 2025-06-18 ENCOUNTER — LAB (OUTPATIENT)
Dept: LAB | Facility: HOSPITAL | Age: 55
End: 2025-06-18
Payer: COMMERCIAL

## 2025-06-18 ENCOUNTER — OFFICE VISIT (OUTPATIENT)
Dept: ONCOLOGY | Facility: CLINIC | Age: 55
End: 2025-06-18
Payer: COMMERCIAL

## 2025-06-18 VITALS
BODY MASS INDEX: 20.99 KG/M2 | SYSTOLIC BLOOD PRESSURE: 124 MMHG | WEIGHT: 126 LBS | HEIGHT: 65 IN | DIASTOLIC BLOOD PRESSURE: 76 MMHG | TEMPERATURE: 97.5 F | HEART RATE: 77 BPM | OXYGEN SATURATION: 96 %

## 2025-06-18 DIAGNOSIS — C50.112 MALIGNANT NEOPLASM OF CENTRAL PORTION OF LEFT BREAST IN FEMALE, ESTROGEN RECEPTOR POSITIVE: ICD-10-CM

## 2025-06-18 DIAGNOSIS — Z17.0 MALIGNANT NEOPLASM OF CENTRAL PORTION OF LEFT BREAST IN FEMALE, ESTROGEN RECEPTOR POSITIVE: ICD-10-CM

## 2025-06-18 DIAGNOSIS — C50.112 MALIGNANT NEOPLASM OF CENTRAL PORTION OF LEFT BREAST IN FEMALE, ESTROGEN RECEPTOR POSITIVE: Primary | ICD-10-CM

## 2025-06-18 DIAGNOSIS — Z17.0 MALIGNANT NEOPLASM OF CENTRAL PORTION OF LEFT BREAST IN FEMALE, ESTROGEN RECEPTOR POSITIVE: Primary | ICD-10-CM

## 2025-06-18 LAB
ALBUMIN SERPL-MCNC: 4.3 G/DL (ref 3.5–5.2)
ALBUMIN/GLOB SERPL: 1.5 G/DL
ALP SERPL-CCNC: 63 U/L (ref 39–117)
ALT SERPL W P-5'-P-CCNC: 19 U/L (ref 1–33)
ANION GAP SERPL CALCULATED.3IONS-SCNC: 9 MMOL/L (ref 5–15)
AST SERPL-CCNC: 24 U/L (ref 1–32)
BASOPHILS # BLD AUTO: 0.03 10*3/MM3 (ref 0–0.2)
BASOPHILS NFR BLD AUTO: 0.7 % (ref 0–1.5)
BILIRUB SERPL-MCNC: 0.5 MG/DL (ref 0–1.2)
BUN SERPL-MCNC: 11.1 MG/DL (ref 6–20)
BUN/CREAT SERPL: 16.1 (ref 7–25)
CALCIUM SPEC-SCNC: 9.7 MG/DL (ref 8.6–10.5)
CHLORIDE SERPL-SCNC: 102 MMOL/L (ref 98–107)
CO2 SERPL-SCNC: 27 MMOL/L (ref 22–29)
CREAT SERPL-MCNC: 0.69 MG/DL (ref 0.57–1)
DEPRECATED RDW RBC AUTO: 45.1 FL (ref 37–54)
EGFRCR SERPLBLD CKD-EPI 2021: 104.6 ML/MIN/1.73
EOSINOPHIL # BLD AUTO: 0.02 10*3/MM3 (ref 0–0.4)
EOSINOPHIL NFR BLD AUTO: 0.5 % (ref 0.3–6.2)
ERYTHROCYTE [DISTWIDTH] IN BLOOD BY AUTOMATED COUNT: 12.1 % (ref 12.3–15.4)
ESTRADIOL SERPL HS-MCNC: 17.4 PG/ML
FERRITIN SERPL-MCNC: 55.31 NG/ML (ref 13–150)
GLOBULIN UR ELPH-MCNC: 2.8 GM/DL
GLUCOSE SERPL-MCNC: 81 MG/DL (ref 65–99)
HCT VFR BLD AUTO: 44.5 % (ref 34–46.6)
HGB BLD-MCNC: 15.1 G/DL (ref 12–15.9)
IMM GRANULOCYTES # BLD AUTO: 0 10*3/MM3 (ref 0–0.05)
IMM GRANULOCYTES NFR BLD AUTO: 0 % (ref 0–0.5)
LYMPHOCYTES # BLD AUTO: 1.14 10*3/MM3 (ref 0.7–3.1)
LYMPHOCYTES NFR BLD AUTO: 27.3 % (ref 19.6–45.3)
MAGNESIUM SERPL-MCNC: 2.3 MG/DL (ref 1.6–2.6)
MCH RBC QN AUTO: 33.6 PG (ref 26.6–33)
MCHC RBC AUTO-ENTMCNC: 33.9 G/DL (ref 31.5–35.7)
MCV RBC AUTO: 99.1 FL (ref 79–97)
MONOCYTES # BLD AUTO: 0.38 10*3/MM3 (ref 0.1–0.9)
MONOCYTES NFR BLD AUTO: 9.1 % (ref 5–12)
NEUTROPHILS NFR BLD AUTO: 2.61 10*3/MM3 (ref 1.7–7)
NEUTROPHILS NFR BLD AUTO: 62.4 % (ref 42.7–76)
PLATELET # BLD AUTO: 203 10*3/MM3 (ref 140–450)
PMV BLD AUTO: 8.9 FL (ref 6–12)
POTASSIUM SERPL-SCNC: 4.5 MMOL/L (ref 3.5–5.2)
PROT SERPL-MCNC: 7.1 G/DL (ref 6–8.5)
RBC # BLD AUTO: 4.49 10*6/MM3 (ref 3.77–5.28)
SODIUM SERPL-SCNC: 138 MMOL/L (ref 136–145)
WBC NRBC COR # BLD AUTO: 4.18 10*3/MM3 (ref 3.4–10.8)

## 2025-06-18 PROCEDURE — 82728 ASSAY OF FERRITIN: CPT

## 2025-06-18 PROCEDURE — 36415 COLL VENOUS BLD VENIPUNCTURE: CPT

## 2025-06-18 PROCEDURE — 99214 OFFICE O/P EST MOD 30 MIN: CPT | Performed by: INTERNAL MEDICINE

## 2025-06-18 PROCEDURE — 80053 COMPREHEN METABOLIC PANEL: CPT

## 2025-06-18 PROCEDURE — 85025 COMPLETE CBC W/AUTO DIFF WBC: CPT

## 2025-06-18 PROCEDURE — 83002 ASSAY OF GONADOTROPIN (LH): CPT

## 2025-06-18 PROCEDURE — 83735 ASSAY OF MAGNESIUM: CPT

## 2025-06-18 PROCEDURE — 82670 ASSAY OF TOTAL ESTRADIOL: CPT

## 2025-06-18 PROCEDURE — 83001 ASSAY OF GONADOTROPIN (FSH): CPT

## 2025-06-18 NOTE — PATIENT INSTRUCTIONS
Stop anastrozole for 2 weeks and message with an update on your carpal tunnel. If symptoms substantially improve, we can try an alternative AI. If no change in symptoms, resume anastrozole.     Contact me if you would like referral for hand surgery.

## 2025-06-18 NOTE — PROGRESS NOTES
Hematology and Oncology [place]  Office number [phone]    Fax number [phone]     Follow up     Date: 25    Patient Name: Debra Beckman  MRN: [id number]  : 1970    Referring Physician: Dr. Goran Russell    Chief Complaint: follow up breast cancer    Cancer Staging: Stage IA (cT1b, cN0, cM0, G1, ER+, CO+, HER2-)    History of Present Illness: Debra Beckman is a pleasant 54 y.o. female who presents today for evaluation of left breast cancer.  She is accompanied by her supportive father who is a retired Hindu neurosurgeon, Dr. Beckman.    Screening mammogram 2022 demonstrated an asymmetry in the anterior left medial breast.  Diagnostic mammogram on 2022 demonstrated a persistent ill-defined asymmetry with architectural distortion in the left 9:00 periareolar region.  On ultrasound, this corresponded to an 8 mm mass in the 9:00 left breast.    Left breast 9:00 biopsy on 8/10/2022 demonstrated grade 1 invasive ductal carcinoma with associated DCIS (%, CO 60%, HER2/kathleen negative, 0+).    She proceeded with bilateral breast MRI on 8/15/2022.  This demonstrated a 1.4 cm 9:00 left breast enhancing mass, with a 1.3 cm area of clumped non-mass enhancement versus postbiopsy change 1 cm anterior lateral to this.  There is additionally a dominant focus of enhancement suspicious for a satellite lesion spanning 0.4 cm.  She underwent second look ultrasound and biopsy of the second lesion which corresponded to a subcentimeteric mass on ultrasound.    Left breast 9:00 biopsy on 2022 showed grade 2 invasive ductal carcinoma (ER greater than 90%, CO greater than 80%, HER2/kathleen negative, 0+)    Left breast lumpectomy, sentinel lymph node biopsy on 2022 demonstrated grade 2 invasive ductal carcinoma spanning 1.4 cm with associated DCIS.  Margin positive.  [facility] lymph node positive () with a macrometastasis spanning 4 mm with extracapsular extension. She underwent  re-excision 22 with fat necrosis and no residual invasive or in situ carcinoma. Single left axillary LN excised and benign (total LN count 1/2).    Breast cancer risk profile:  G3, P2; Age of first live birth 37  Premenopausal, LMP was 2022.  She discontinued OCPs at the time of her cancer diagnosis.  Family history of breast, ovarian, prostate or pancreatic cancer: Maternal aunt with breast cancer.  Genetics: [facility] cancer next panel was negative in .    Treatment history:  TC x 6 cycles  Adjuvant radiation completed 3/27/23    Arimidex, 2023 to continue  Verzinio, C1 23-3/2025  Zometa, C1 23, received cycle 4/4 3/2025: omit further due to broken/exposed tooth    Interval history:  Right greater than left carpal tunnel with numbness but no weakness with activities such as putting on makeup. Doing PT exercises and uses splint. Back pain and paresthesia improved with PT/dry needling. Lyrica at night helps. Mood is good and hot flashes controlled on Prozac. Some RLS, not interested in medication. Continues anastrozole. Wonders if that is contributing to her RLS/CT.    Past Medical History:   Past Medical History:   Diagnosis Date    Allergic     Asthma     Depression     Drug therapy     History of radiation therapy 2023    left breast/regional LNs    Hx of radiation therapy     Low back pain     Malignant neoplasm of central portion of left breast in female, estrogen receptor positive 2022    Radiculopathy 2022       Past Surgical History:   Past Surgical History:   Procedure Laterality Date    BREAST BIOPSY  08/10/2022    BREAST BIOPSY Left 2022    BREAST LUMPECTOMY      BREAST LUMPECTOMY WITH AXILLARY NODE DISSECTION Left 2022    clean dread    BREAST LUMPECTOMY WITH SENTINEL NODE BIOPSY Left 2022     SECTION      VENOUS ACCESS DEVICE (PORT) INSERTION Right 2022       Family History:   Family History   Problem Relation Age of Onset     Hypertension Mother     No Known Problems Father     Breast cancer Maternal Aunt     Alcohol abuse Maternal Aunt     Anxiety disorder Maternal Aunt     Depression Maternal Aunt     Alcohol abuse Maternal Aunt             Anxiety disorder Maternal Aunt     Depression Maternal Aunt     Alcohol abuse Maternal Uncle     Depression Maternal Uncle     Drug abuse Maternal Uncle     Ovarian cancer Neg Hx        Social History:   Social History     Socioeconomic History    Marital status:    Tobacco Use    Smoking status: Former     Passive exposure: Past    Smokeless tobacco: Never   Vaping Use    Vaping status: Former   Substance and Sexual Activity    Alcohol use: Yes     Comment: occasional    Drug use: No    Sexual activity: Not Currently   Works as a .      Medications:     Current Outpatient Medications:     albuterol sulfate  (90 Base) MCG/ACT inhaler, Inhale 2 puffs Every 4 (Four) Hours As Needed for Wheezing., Disp: 18 g, Rfl: 11    anastrozole (ARIMIDEX) 1 MG tablet, TAKE 1 TABLET BY MOUTH DAILY, Disp: 90 tablet, Rfl: 1    budesonide-formoterol (SYMBICORT) 160-4.5 MCG/ACT inhaler, Inhale 2 puffs 2 (Two) Times a Day., Disp: 6 g, Rfl: 5    chlorhexidine (PERIDEX) 0.12 % solution, RINSE MOUTH WITH 1 MILLILITER AS DIRECTED TWICE DAILY THEN SPIT, Disp: , Rfl:     FLUoxetine (PROzac) 20 MG capsule, Take 1 capsule by mouth Daily., Disp: 90 capsule, Rfl: 0    levocetirizine (XYZAL) 5 MG tablet, Take 1 tablet by mouth Every Evening., Disp: , Rfl:     methocarbamol (ROBAXIN) 500 MG tablet, Take 1 tablet by mouth 4 (Four) Times a Day., Disp: 40 tablet, Rfl: 1    pregabalin (LYRICA) 50 MG capsule, TAKE 1 CAPSULE BY MOUTH TWICE DAILY, Disp: 60 capsule, Rfl: 2    prochlorperazine (COMPAZINE) 5 MG tablet, Take 1 tablet by mouth Every 8 (Eight) Hours As Needed for Nausea or Vomiting., Disp: 90 tablet, Rfl: 2    vitamin B-12 (CYANOCOBALAMIN) 1000 MCG tablet, Take 1 tablet by mouth Daily.,  "Disp: , Rfl:     vitamin B-6 (PYRIDOXINE) 50 MG tablet, Take 1 tablet by mouth Daily., Disp: , Rfl:     Vitamin D, Cholecalciferol, (CHOLECALCIFEROL) 10 MCG (400 UNIT) tablet, Take 1 tablet by mouth Daily., Disp: , Rfl:     Allergies:   No Known Allergies    Objective     Vital Signs:   Vitals:    06/18/25 1049   BP: 124/76   Pulse: 77   Temp: 97.5 °F (36.4 °C)   TempSrc: Infrared   SpO2: 96%   Weight: 57.2 kg (126 lb)   Height: 165.1 cm (65\")   PainSc: 0-No pain    Body mass index is 20.97 kg/m².   Pain Score    06/18/25 1049   PainSc: 0-No pain       ECOG Performance Status: 0    Physical Exam:  General: No acute distress. Well appearing.  HEENT: Normocephalic, atraumatic. Sclera anicteric.   Neck: supple, no adenopathy.   Cardiovascular: regular rate and rhythm. No murmurs.   Respiratory: Normal rate. Clear to auscultation bilaterally.  Abdomen: Soft, nontender, non distended with normoactive bowel sounds.  Lymph: no cervical, supraclavicular or axillary adenopathy.  Neuro: Alert and oriented x 3. No focal deficits.   Ext: Symmetric, no swelling.   Breast: s/p bilateral mastectomy with no rash or palpable masses    Laboratory/Imaging Reviewed:   No visits with results within 2 Week(s) from this visit.   Latest known visit with results is:   Lab on 03/18/2025   Component Date Value Ref Range Status    Glucose 03/18/2025 77  65 - 99 mg/dL Final    BUN 03/18/2025 13  6 - 20 mg/dL Final    Creatinine 03/18/2025 0.59  0.57 - 1.00 mg/dL Final    Sodium 03/18/2025 143  136 - 145 mmol/L Final    Potassium 03/18/2025 4.3  3.5 - 5.2 mmol/L Final    Chloride 03/18/2025 105  98 - 107 mmol/L Final    CO2 03/18/2025 27.0  22.0 - 29.0 mmol/L Final    Calcium 03/18/2025 9.8  8.6 - 10.5 mg/dL Final    Total Protein 03/18/2025 6.9  6.0 - 8.5 g/dL Final    Albumin 03/18/2025 4.5  3.5 - 5.2 g/dL Final    ALT (SGPT) 03/18/2025 34 (H)  1 - 33 U/L Final    AST (SGOT) 03/18/2025 34 (H)  1 - 32 U/L Final    Alkaline Phosphatase " 03/18/2025 76  39 - 117 U/L Final    Total Bilirubin 03/18/2025 0.4  0.0 - 1.2 mg/dL Final    Globulin 03/18/2025 2.4  gm/dL Final    Calculated Result    A/G Ratio 03/18/2025 1.9  g/dL Final    BUN/Creatinine Ratio 03/18/2025 22.0  7.0 - 25.0 Final    Anion Gap 03/18/2025 11.0  5.0 - 15.0 mmol/L Final    eGFR 03/18/2025 108.6  >60.0 mL/min/1.73 Final    TSH 03/18/2025 1.350  0.270 - 4.200 uIU/mL Final    Folate 03/18/2025 11.90  4.78 - 24.20 ng/mL Final    Vitamin B-12 03/18/2025 370  211 - 946 pg/mL Final    WBC 03/18/2025 3.50  3.40 - 10.80 10*3/mm3 Final    RBC 03/18/2025 3.73 (L)  3.77 - 5.28 10*6/mm3 Final    Hemoglobin 03/18/2025 13.5  12.0 - 15.9 g/dL Final    Hematocrit 03/18/2025 39.3  34.0 - 46.6 % Final    MCV 03/18/2025 105.4 (H)  79.0 - 97.0 fL Final    MCH 03/18/2025 36.2 (H)  26.6 - 33.0 pg Final    MCHC 03/18/2025 34.4  31.5 - 35.7 g/dL Final    RDW 03/18/2025 12.6  12.3 - 15.4 % Final    RDW-SD 03/18/2025 49.6  37.0 - 54.0 fl Final    MPV 03/18/2025 8.6  6.0 - 12.0 fL Final    Platelets 03/18/2025 215  140 - 450 10*3/mm3 Final    Neutrophil % 03/18/2025 57.4  42.7 - 76.0 % Final    Lymphocyte % 03/18/2025 30.3  19.6 - 45.3 % Final    Monocyte % 03/18/2025 10.3  5.0 - 12.0 % Final    Eosinophil % 03/18/2025 0.6  0.3 - 6.2 % Final    Basophil % 03/18/2025 1.4  0.0 - 1.5 % Final    Immature Grans % 03/18/2025 0.0  0.0 - 0.5 % Final    Neutrophils, Absolute 03/18/2025 2.01  1.70 - 7.00 10*3/mm3 Final    Lymphocytes, Absolute 03/18/2025 1.06  0.70 - 3.10 10*3/mm3 Final    Monocytes, Absolute 03/18/2025 0.36  0.10 - 0.90 10*3/mm3 Final    Eosinophils, Absolute 03/18/2025 0.02  0.00 - 0.40 10*3/mm3 Final    Basophils, Absolute 03/18/2025 0.05  0.00 - 0.20 10*3/mm3 Final    Immature Grans, Absolute 03/18/2025 0.00  0.00 - 0.05 10*3/mm3 Final     Lab on 06/18/2025   Component Date Value Ref Range Status    Glucose 06/18/2025 81  65 - 99 mg/dL Final    BUN 06/18/2025 11.1  6.0 - 20.0 mg/dL Final     Creatinine 06/18/2025 0.69  0.57 - 1.00 mg/dL Final    Sodium 06/18/2025 138  136 - 145 mmol/L Final    Potassium 06/18/2025 4.5  3.5 - 5.2 mmol/L Final    Chloride 06/18/2025 102  98 - 107 mmol/L Final    CO2 06/18/2025 27.0  22.0 - 29.0 mmol/L Final    Calcium 06/18/2025 9.7  8.6 - 10.5 mg/dL Final    Total Protein 06/18/2025 7.1  6.0 - 8.5 g/dL Final    Albumin 06/18/2025 4.3  3.5 - 5.2 g/dL Final    ALT (SGPT) 06/18/2025 19  1 - 33 U/L Final    AST (SGOT) 06/18/2025 24  1 - 32 U/L Final    Alkaline Phosphatase 06/18/2025 63  39 - 117 U/L Final    Total Bilirubin 06/18/2025 0.5  0.0 - 1.2 mg/dL Final    Globulin 06/18/2025 2.8  gm/dL Final    Calculated Result    A/G Ratio 06/18/2025 1.5  g/dL Final    BUN/Creatinine Ratio 06/18/2025 16.1  7.0 - 25.0 Final    Anion Gap 06/18/2025 9.0  5.0 - 15.0 mmol/L Final    eGFR 06/18/2025 104.6  >60.0 mL/min/1.73 Final    Ferritin 06/18/2025 55.31  13.00 - 150.00 ng/mL Final    Magnesium 06/18/2025 2.3  1.6 - 2.6 mg/dL Final    Estradiol 06/18/2025 17.4  pg/mL Final    WBC 06/18/2025 4.18  3.40 - 10.80 10*3/mm3 Final    RBC 06/18/2025 4.49  3.77 - 5.28 10*6/mm3 Final    Hemoglobin 06/18/2025 15.1  12.0 - 15.9 g/dL Final    Hematocrit 06/18/2025 44.5  34.0 - 46.6 % Final    MCV 06/18/2025 99.1 (H)  79.0 - 97.0 fL Final    MCH 06/18/2025 33.6 (H)  26.6 - 33.0 pg Final    MCHC 06/18/2025 33.9  31.5 - 35.7 g/dL Final    RDW 06/18/2025 12.1 (L)  12.3 - 15.4 % Final    RDW-SD 06/18/2025 45.1  37.0 - 54.0 fl Final    MPV 06/18/2025 8.9  6.0 - 12.0 fL Final    Platelets 06/18/2025 203  140 - 450 10*3/mm3 Final    Neutrophil % 06/18/2025 62.4  42.7 - 76.0 % Final    Lymphocyte % 06/18/2025 27.3  19.6 - 45.3 % Final    Monocyte % 06/18/2025 9.1  5.0 - 12.0 % Final    Eosinophil % 06/18/2025 0.5  0.3 - 6.2 % Final    Basophil % 06/18/2025 0.7  0.0 - 1.5 % Final    Immature Grans % 06/18/2025 0.0  0.0 - 0.5 % Final    Neutrophils, Absolute 06/18/2025 2.61  1.70 - 7.00 10*3/mm3 Final     "Lymphocytes, Absolute 06/18/2025 1.14  0.70 - 3.10 10*3/mm3 Final    Monocytes, Absolute 06/18/2025 0.38  0.10 - 0.90 10*3/mm3 Final    Eosinophils, Absolute 06/18/2025 0.02  0.00 - 0.40 10*3/mm3 Final    Basophils, Absolute 06/18/2025 0.03  0.00 - 0.20 10*3/mm3 Final    Immature Grans, Absolute 06/18/2025 0.00  0.00 - 0.05 10*3/mm3 Final    FSH 06/18/2025 88.90  mIU/mL Final    LH 06/18/2025 32.50  mIU/mL Final     DEXA Bone Density Axial  Result Date: 6/11/2025  Narrative: DUAL-ENERGY X-RAY ABSORPTIOMETRY (DXA) INDICATION: Postmenopausal; screening for osteoporosis; history of glucocorticoids; cancer; asthma or emphysema COMPARISON: New baseline PROCEDURE: A DXA scan was performed using a Hologic densitometer. The lumbar spine L1-L4 was evaluated as well as bilateral total hip. The T-score compares the patient's bone mineral density with the peak bone mass of young normal patients.  According to criteria established by the World Health Organization, patients with T-scores  between 1.0 and 2.5 standard deviations BELOW the mean are osteopenic (low bone mass).  Patients with T-scores EQUAL TO OR GREATER than 2.5 standard deviations below the mean are osteoporotic. The Z-score compares the patient bone mineral density with age and sex matched peers.  According to the International Society for Clinical Densitometry's 2007 consensus conference:  In women prior to menopause and men less than age 50, Z-scores, not T-scores are preferred.  A Z-score of -2.0 or lower is defined as \"below the expected range for age\" and a Z-score above -2.0 is \"within the expected range for age.\"  The WHO diagnostic criteria may be applied in women in the menopausal transition.  Osteoporosis cannot be diagnosed in men under age 50 on the basis of BMD alone. TECHNICAL QUALITY:  The study is of good technical quality. RESULTS: Lumbar Spine:  The BMD measured in the L1-L4 region is 1.142 g/cm2.  The average T-score is 0.9.  The Z-score is 1.8. " Total Hip:  The BMD measured at the left total proximal femur is 0.945 g/cm2.  The T-score is 0.0.  The Z-score is 0.6. Femoral Neck:  The BMD measured at the left femoral neck is 0.811 g/cm2.  The T-score is -0.3.  The Z-score is 0.6. Total Hip:  The BMD measured at the right total proximal femur is 0.996 g/cm2.  The T-score is 0.4.  The Z-score is 1.0. Femoral neck: The BMD measured at the right femoral neck is 0.803 g/cm2. The T score is -0.4. The Z score is 0.5.     Impression: Normal The ten year fracture risk assessment is not calculated because all T-scores are at or above -1.0 All the treatment decisions require clinical judgment and consideration of individual patient factors, including patient preferences, co-morbidities, previous drug use, risk factors not captured in the FRAX model (frailty, falls, vitamin D deficiency, increased bone turnover, interval significant decline in bone density) and possible under or over estimation of fracture risk by FRAX. Approaches to reduce osteoporosis related fracture risk include optimizing calcium and vitamin D status, appropriate weight bearing exercises and fall-prevention measurements.  The National Osteoporosis Foundation recommends (http://www.nof.org/hcp/practice/practice-and-clinical-guidelines/clinicians-guide) that FDA-approved medical therapies be considered in postmenopausal women and men aged equal or greater than 50 years with :  a) hip or vertebral (clinical or morphometric) fracture; b) T-score of -2.5 or less at the spine or hip; c) Ten-year fracture probability by FRAX of greater than 3% for hip fracture  of greater than 20% for major osteoporotic fracture.  Secondary causes of bone loss should be evaluated if clinically indicated since the etiology of low BMD cannot be determined by BMD measurement alone. FOLLOWUP: Consider repeating the study in 2-3 years to reassess the patient's status or sooner if there is some new clinical indication. INTERVAL  CHANGE:   There were no equivalent studies available for comparison.  At this facility, the least significant change in the BMD at the left hip with 95% confidence is 0.758464 gm/cm2 at the hip and 0.688075 g/cm2 at the lumbar spine. Report dictated by: Kali Oliveira DNP  I have personally reviewed this case and agree with the findings above: Electronically Signed: Sharath Sears MD  6/11/2025 10:32 AM EDT  Workstation ID: [id number]       Assessment / Plan        1. Malignant neoplasm of central portion of left breast in female, estrogen receptor positive  2. AI use  3. Carpal tunnel  -She has an early stage hormone sensitive breast cancer with a single positive lymph node.  She has undergone curative intent surgery.  -Given strongly ER positive disease and N1, I recommended adjuvant TC over dose dense AC followed by weekly taxol and she completed 6 cycles  -She completed adjuvant radiation.   -Ki67 20%, initiated adjuvant Verzenio.  Completed 2025  -ROGERIO on exam  -Trial of AI switch as below  Patient Instructions   Stop anastrozole for 2 weeks and message with an update on your carpal tunnel. If symptoms substantially improve, we can try an alternative AI. If no change in symptoms, resume anastrozole.     Contact me if you would like referral for hand surgery.    Check labs today including:   Orders Placed This Encounter   Procedures    Comprehensive Metabolic Panel    Ferritin    Magnesium    Estradiol    Follicle Stimulating Hormone    Luteinizing Hormone    CBC & Differential       4. Hot flashes  5. Anxiety  -Continue prozac and follow with cancer psychiatry     6. Bone health  -Ca/D recommended  --She completed 4 cycles of Zometa, last in 3/2025.    --Complicated by tooth fracture/exposed root which has now healed without extraction.   --DEXA normal  --Monitor repeat DEXA in 2027    Follow Up:   3 mo    Rama Cano MD  Hematology and Oncology

## 2025-06-19 ENCOUNTER — RESULTS FOLLOW-UP (OUTPATIENT)
Dept: ONCOLOGY | Facility: CLINIC | Age: 55
End: 2025-06-19
Payer: COMMERCIAL

## 2025-06-19 LAB
FSH SERPL-ACNC: 88.9 MIU/ML
LH SERPL-ACNC: 32.5 MIU/ML

## 2025-07-08 RX ORDER — ANASTROZOLE 1 MG/1
1 TABLET ORAL DAILY
Qty: 90 TABLET | Refills: 1 | Status: SHIPPED | OUTPATIENT
Start: 2025-07-08

## 2025-07-08 NOTE — TELEPHONE ENCOUNTER
Upcoming Appts  With Oncology (Rama Cano MD)  09/17/2025 at 2:00 PM  Last Office Visit - This Dept  6/18/2025 Rama Cano MD  Last refill 1/6/25 90 tablets 0 refills

## 2025-07-09 ENCOUNTER — TELEPHONE (OUTPATIENT)
Dept: ONCOLOGY | Facility: CLINIC | Age: 55
End: 2025-07-09
Payer: COMMERCIAL

## 2025-07-10 RX ORDER — LETROZOLE 2.5 MG/1
2.5 TABLET, FILM COATED ORAL DAILY
Qty: 30 TABLET | Refills: 2 | Status: SHIPPED | OUTPATIENT
Start: 2025-07-10

## 2025-07-10 NOTE — TELEPHONE ENCOUNTER
Patient stated same symptoms that Dr. Cano evaluated and they are better since stopping anastrozole after LOV.   Patient stated she wishes to trial a different medication as discussed with MD.  Dr. Cano notified and prescription routed to MD to sign for Letrozole per her request.

## 2025-07-15 RX ORDER — LETROZOLE 2.5 MG/1
2.5 TABLET, FILM COATED ORAL DAILY
Refills: 2 | OUTPATIENT
Start: 2025-07-15

## 2025-07-25 ENCOUNTER — TREATMENT (OUTPATIENT)
Dept: PHYSICAL THERAPY | Facility: CLINIC | Age: 55
End: 2025-07-25

## 2025-07-25 DIAGNOSIS — M54.50 CHRONIC LOW BACK PAIN, UNSPECIFIED BACK PAIN LATERALITY, UNSPECIFIED WHETHER SCIATICA PRESENT: Primary | ICD-10-CM

## 2025-07-25 DIAGNOSIS — G89.29 CHRONIC LOW BACK PAIN, UNSPECIFIED BACK PAIN LATERALITY, UNSPECIFIED WHETHER SCIATICA PRESENT: Primary | ICD-10-CM

## 2025-07-25 NOTE — PROGRESS NOTES
Physical Therapy Daily Treatment Note    [facility]   [address], [address] [place] [address]      Patient: Debra Beckman   : 1970  Referring practitioner: No ref. provider found  Date of Initial Visit: Type: THERAPY  Noted: 10/4/2023  Today's Date: 2025  Patient seen for 19 sessions    Certification Period 2025 thru 10/22/2025       Visit Diagnoses:    ICD-10-CM ICD-9-CM   1. Chronic low back pain, unspecified back pain laterality, unspecified whether sciatica present  M54.50 724.2    G89.29 338.29       Subjective     Pt states that she is feeling some radiating symptoms on the side of both thighs today and over the past week or so. She feels that the dry needling treatments last about 4-5 weeks and give her a significant amount of relief.     Objective   See Exercise, Manual, and Modality Logs for complete treatment.       Assessment/Plan     Pt tolerates treatment well and did not have any adverse reaction or excessive soreness with needling. Will cont to progress as indicated.     Debra Beckman will continue to benefit from skilled physical therapy services to address deficits and continue to work towards reaching functional goals.           Timed:         Manual Therapy:         mins  69915;     Therapeutic Exercise:         mins  63749;     Neuromuscular Mera:        mins  08494;    Therapeutic Activity:          mins  88040;     Gait Training:           mins  83621;     Ultrasound:          mins  88190;    Ionto                                   mins   02788  Self Care                            mins   36612  Canalith Repos         mins 86300  Electrical Stimulation:         mins  18472    Un-Timed:  Electrical Stimulation:         mins  64287 ( );  Dry Needling     30     mins self-pay  Traction          mins 77260      Timed Treatment:   0   mins   Total Treatment:     30   mins    Robinson Cardona, PT, DPT, Cert. DN  [place] License: [id number]